# Patient Record
Sex: FEMALE | Race: WHITE | Employment: PART TIME | URBAN - METROPOLITAN AREA
[De-identification: names, ages, dates, MRNs, and addresses within clinical notes are randomized per-mention and may not be internally consistent; named-entity substitution may affect disease eponyms.]

---

## 2017-01-24 ENCOUNTER — HOSPITAL ENCOUNTER (INPATIENT)
Age: 27
LOS: 7 days | Discharge: HOME | DRG: 751 | End: 2017-01-31
Attending: EMERGENCY MEDICINE | Admitting: PSYCHIATRY & NEUROLOGY
Payer: MEDICAID

## 2017-01-24 DIAGNOSIS — F32.9 MAJOR DEPRESSIVE DISORDER WITH SINGLE EPISODE, REMISSION STATUS UNSPECIFIED: Primary | ICD-10-CM

## 2017-01-24 LAB
ALBUMIN SERPL-MCNC: 3.6 G/DL (ref 3.9–4.9)
ALP BLD-CCNC: 75 U/L (ref 40–130)
ALT SERPL-CCNC: 25 U/L (ref 0–33)
AMORPHOUS: ABNORMAL
AMPHETAMINE SCREEN, URINE: NORMAL
ANION GAP SERPL CALCULATED.3IONS-SCNC: 11 MEQ/L (ref 7–13)
AST SERPL-CCNC: 16 U/L (ref 0–35)
BARBITURATE SCREEN URINE: NORMAL
BASOPHILS ABSOLUTE: 0.1 K/UL (ref 0–0.2)
BASOPHILS RELATIVE PERCENT: 0.9 %
BENZODIAZEPINE SCREEN, URINE: NORMAL
BILIRUB SERPL-MCNC: 0.2 MG/DL (ref 0–1.2)
BILIRUBIN URINE: NEGATIVE
BLOOD, URINE: NEGATIVE
BUN BLDV-MCNC: 11 MG/DL (ref 6–20)
CALCIUM SERPL-MCNC: 8.9 MG/DL (ref 8.6–10.2)
CANNABINOID SCREEN URINE: NORMAL
CHLORIDE BLD-SCNC: 103 MEQ/L (ref 98–107)
CLARITY: ABNORMAL
CO2: 24 MEQ/L (ref 22–29)
COCAINE METABOLITE SCREEN URINE: NORMAL
COLOR: YELLOW
CREAT SERPL-MCNC: 0.46 MG/DL (ref 0.5–0.9)
EOSINOPHILS ABSOLUTE: 0.3 K/UL (ref 0–0.7)
EOSINOPHILS RELATIVE PERCENT: 4 %
EPITHELIAL CELLS, UA: ABNORMAL /HPF
ETHANOL PERCENT: NORMAL G/DL
ETHANOL: <10 MG/DL (ref 0–0.08)
GFR AFRICAN AMERICAN: >60
GFR NON-AFRICAN AMERICAN: >60
GLOBULIN: 2.2 G/DL (ref 2.3–3.5)
GLUCOSE BLD-MCNC: 138 MG/DL (ref 74–109)
GLUCOSE URINE: NEGATIVE MG/DL
GONADOTROPIN, CHORIONIC (HCG) QUANT: <0.1 MIU/ML
HCT VFR BLD CALC: 36.8 % (ref 37–47)
HEMOGLOBIN: 12.3 G/DL (ref 12–16)
KETONES, URINE: NEGATIVE MG/DL
LEUKOCYTE ESTERASE, URINE: ABNORMAL
LYMPHOCYTES ABSOLUTE: 3.3 K/UL (ref 1–4.8)
LYMPHOCYTES RELATIVE PERCENT: 38.5 %
Lab: NORMAL
MCH RBC QN AUTO: 26.4 PG (ref 27–31.3)
MCHC RBC AUTO-ENTMCNC: 33.5 % (ref 33–37)
MCV RBC AUTO: 78.9 FL (ref 82–100)
MONOCYTES ABSOLUTE: 0.4 K/UL (ref 0.2–0.8)
MONOCYTES RELATIVE PERCENT: 4.2 %
NEUTROPHILS ABSOLUTE: 4.5 K/UL (ref 1.4–6.5)
NEUTROPHILS RELATIVE PERCENT: 52.4 %
NITRITE, URINE: NEGATIVE
OPIATE SCREEN URINE: NORMAL
PDW BLD-RTO: 15.1 % (ref 11.5–14.5)
PH UA: 7 (ref 5–9)
PHENCYCLIDINE SCREEN URINE: NORMAL
PLATELET # BLD: 202 K/UL (ref 130–400)
POTASSIUM SERPL-SCNC: 3.7 MEQ/L (ref 3.5–5.1)
PROTEIN UA: NEGATIVE MG/DL
RBC # BLD: 4.67 M/UL (ref 4.2–5.4)
RBC UA: ABNORMAL /HPF (ref 0–2)
SODIUM BLD-SCNC: 138 MEQ/L (ref 132–144)
SPECIFIC GRAVITY UA: 1.02 (ref 1–1.03)
TOTAL CK: 63 U/L (ref 0–170)
TOTAL PROTEIN: 5.8 G/DL (ref 6.4–8.1)
TSH SERPL DL<=0.05 MIU/L-ACNC: 0.69 UIU/ML (ref 0.27–4.2)
UROBILINOGEN, URINE: 1 E.U./DL
WBC # BLD: 8.5 K/UL (ref 4.8–10.8)
WBC UA: ABNORMAL /HPF (ref 0–5)

## 2017-01-24 PROCEDURE — 84443 ASSAY THYROID STIM HORMONE: CPT

## 2017-01-24 PROCEDURE — 36415 COLL VENOUS BLD VENIPUNCTURE: CPT

## 2017-01-24 PROCEDURE — 80307 DRUG TEST PRSMV CHEM ANLYZR: CPT

## 2017-01-24 PROCEDURE — 85025 COMPLETE CBC W/AUTO DIFF WBC: CPT

## 2017-01-24 PROCEDURE — 82550 ASSAY OF CK (CPK): CPT

## 2017-01-24 PROCEDURE — 81001 URINALYSIS AUTO W/SCOPE: CPT

## 2017-01-24 PROCEDURE — G0480 DRUG TEST DEF 1-7 CLASSES: HCPCS

## 2017-01-24 PROCEDURE — 84702 CHORIONIC GONADOTROPIN TEST: CPT

## 2017-01-24 PROCEDURE — 99285 EMERGENCY DEPT VISIT HI MDM: CPT

## 2017-01-24 PROCEDURE — 1240000000 HC EMOTIONAL WELLNESS R&B

## 2017-01-24 PROCEDURE — 6370000000 HC RX 637 (ALT 250 FOR IP): Performed by: PSYCHIATRY & NEUROLOGY

## 2017-01-24 PROCEDURE — 80053 COMPREHEN METABOLIC PANEL: CPT

## 2017-01-24 RX ORDER — HALOPERIDOL 5 MG
5 TABLET ORAL EVERY 6 HOURS PRN
Status: DISCONTINUED | OUTPATIENT
Start: 2017-01-24 | End: 2017-01-31 | Stop reason: HOSPADM

## 2017-01-24 RX ORDER — QUETIAPINE FUMARATE 25 MG/1
100 TABLET, FILM COATED ORAL NIGHTLY
Status: ON HOLD | COMMUNITY
End: 2017-01-31 | Stop reason: HOSPADM

## 2017-01-24 RX ORDER — HYDROXYZINE PAMOATE 50 MG/1
50 CAPSULE ORAL EVERY 6 HOURS PRN
Status: DISCONTINUED | OUTPATIENT
Start: 2017-01-24 | End: 2017-01-31 | Stop reason: HOSPADM

## 2017-01-24 RX ORDER — ESCITALOPRAM OXALATE 10 MG/1
10 TABLET ORAL
Status: DISCONTINUED | OUTPATIENT
Start: 2017-01-25 | End: 2017-01-25

## 2017-01-24 RX ORDER — PALIPERIDONE 3 MG/1
3 TABLET, EXTENDED RELEASE ORAL EVERY MORNING
Status: ON HOLD | COMMUNITY
End: 2017-01-31 | Stop reason: HOSPADM

## 2017-01-24 RX ORDER — HALOPERIDOL 5 MG/ML
5 INJECTION INTRAMUSCULAR EVERY 6 HOURS PRN
Status: DISCONTINUED | OUTPATIENT
Start: 2017-01-24 | End: 2017-01-31 | Stop reason: HOSPADM

## 2017-01-24 RX ORDER — TRAZODONE HYDROCHLORIDE 50 MG/1
50 TABLET ORAL NIGHTLY
Status: DISCONTINUED | OUTPATIENT
Start: 2017-01-24 | End: 2017-01-31 | Stop reason: HOSPADM

## 2017-01-24 RX ORDER — ACETAMINOPHEN 325 MG/1
650 TABLET ORAL EVERY 4 HOURS PRN
Status: DISCONTINUED | OUTPATIENT
Start: 2017-01-24 | End: 2017-01-31 | Stop reason: HOSPADM

## 2017-01-24 RX ORDER — HYDROXYZINE HYDROCHLORIDE 50 MG/ML
50 INJECTION, SOLUTION INTRAMUSCULAR EVERY 6 HOURS PRN
Status: DISCONTINUED | OUTPATIENT
Start: 2017-01-24 | End: 2017-01-31 | Stop reason: HOSPADM

## 2017-01-24 RX ORDER — ESCITALOPRAM OXALATE 10 MG/1
10 TABLET ORAL NIGHTLY
Status: ON HOLD | COMMUNITY
End: 2017-01-31 | Stop reason: HOSPADM

## 2017-01-24 RX ADMIN — TRAZODONE HYDROCHLORIDE 50 MG: 50 TABLET ORAL at 22:28

## 2017-01-24 RX ADMIN — HYDROXYZINE PAMOATE 50 MG: 50 CAPSULE ORAL at 22:28

## 2017-01-24 ASSESSMENT — SLEEP AND FATIGUE QUESTIONNAIRES
AVERAGE NUMBER OF SLEEP HOURS: 4
DIFFICULTY ARISING: NO
RESTFUL SLEEP: NO
SLEEP PATTERN: DIFFICULTY FALLING ASLEEP
DIFFICULTY FALLING ASLEEP: YES
DO YOU USE A SLEEP AID: NO
DIFFICULTY STAYING ASLEEP: NO
DO YOU HAVE DIFFICULTY SLEEPING: YES

## 2017-01-24 ASSESSMENT — PATIENT HEALTH QUESTIONNAIRE - PHQ9: SUM OF ALL RESPONSES TO PHQ QUESTIONS 1-9: 19

## 2017-01-24 ASSESSMENT — ENCOUNTER SYMPTOMS
SHORTNESS OF BREATH: 0
VOMITING: 0
COUGH: 0
NAUSEA: 0

## 2017-01-25 PROBLEM — F33.2 MDD (MAJOR DEPRESSIVE DISORDER), RECURRENT SEVERE, WITHOUT PSYCHOSIS (HCC): Status: ACTIVE | Noted: 2017-01-25

## 2017-01-25 PROCEDURE — 6370000000 HC RX 637 (ALT 250 FOR IP): Performed by: NURSE PRACTITIONER

## 2017-01-25 PROCEDURE — 1240000000 HC EMOTIONAL WELLNESS R&B

## 2017-01-25 PROCEDURE — 99223 1ST HOSP IP/OBS HIGH 75: CPT | Performed by: PSYCHIATRY & NEUROLOGY

## 2017-01-25 PROCEDURE — 6370000000 HC RX 637 (ALT 250 FOR IP): Performed by: PSYCHIATRY & NEUROLOGY

## 2017-01-25 RX ORDER — SULFAMETHOXAZOLE AND TRIMETHOPRIM 800; 160 MG/1; MG/1
1 TABLET ORAL EVERY 12 HOURS SCHEDULED
Status: DISCONTINUED | OUTPATIENT
Start: 2017-01-25 | End: 2017-01-31 | Stop reason: HOSPADM

## 2017-01-25 RX ORDER — NICOTINE 21 MG/24HR
1 PATCH, TRANSDERMAL 24 HOURS TRANSDERMAL DAILY
Status: DISCONTINUED | OUTPATIENT
Start: 2017-01-25 | End: 2017-01-31 | Stop reason: HOSPADM

## 2017-01-25 RX ORDER — ESCITALOPRAM OXALATE 10 MG/1
15 TABLET ORAL
Status: DISCONTINUED | OUTPATIENT
Start: 2017-01-26 | End: 2017-01-28

## 2017-01-25 RX ORDER — PALIPERIDONE 3 MG/1
3 TABLET, EXTENDED RELEASE ORAL DAILY
Status: DISCONTINUED | OUTPATIENT
Start: 2017-01-25 | End: 2017-01-28

## 2017-01-25 RX ADMIN — ESCITALOPRAM 10 MG: 10 TABLET, FILM COATED ORAL at 09:36

## 2017-01-25 RX ADMIN — BENZOCAINE AND MENTHOL 1 LOZENGE: 15; 3.6 LOZENGE ORAL at 12:39

## 2017-01-25 RX ADMIN — SULFAMETHOXAZOLE AND TRIMETHOPRIM 1 TABLET: 800; 160 TABLET ORAL at 21:45

## 2017-01-25 RX ADMIN — TRAZODONE HYDROCHLORIDE 50 MG: 50 TABLET ORAL at 21:45

## 2017-01-25 RX ADMIN — PALIPERIDONE 3 MG: 3 TABLET, EXTENDED RELEASE ORAL at 12:18

## 2017-01-25 ASSESSMENT — LIFESTYLE VARIABLES: HISTORY_ALCOHOL_USE: YES

## 2017-01-26 PROCEDURE — 99232 SBSQ HOSP IP/OBS MODERATE 35: CPT | Performed by: PSYCHIATRY & NEUROLOGY

## 2017-01-26 PROCEDURE — 1240000000 HC EMOTIONAL WELLNESS R&B

## 2017-01-26 PROCEDURE — 6370000000 HC RX 637 (ALT 250 FOR IP): Performed by: PSYCHIATRY & NEUROLOGY

## 2017-01-26 PROCEDURE — 6370000000 HC RX 637 (ALT 250 FOR IP): Performed by: NURSE PRACTITIONER

## 2017-01-26 RX ADMIN — ACETAMINOPHEN 650 MG: 325 TABLET ORAL at 16:49

## 2017-01-26 RX ADMIN — PALIPERIDONE 3 MG: 3 TABLET, EXTENDED RELEASE ORAL at 09:26

## 2017-01-26 RX ADMIN — TRAZODONE HYDROCHLORIDE 50 MG: 50 TABLET ORAL at 22:01

## 2017-01-26 RX ADMIN — ESCITALOPRAM 15 MG: 10 TABLET, FILM COATED ORAL at 09:26

## 2017-01-26 RX ADMIN — HYDROXYZINE PAMOATE 50 MG: 50 CAPSULE ORAL at 15:08

## 2017-01-26 RX ADMIN — SULFAMETHOXAZOLE AND TRIMETHOPRIM 1 TABLET: 800; 160 TABLET ORAL at 22:01

## 2017-01-26 RX ADMIN — SULFAMETHOXAZOLE AND TRIMETHOPRIM 1 TABLET: 800; 160 TABLET ORAL at 09:26

## 2017-01-26 ASSESSMENT — PAIN SCALES - GENERAL: PAINLEVEL_OUTOF10: 4

## 2017-01-27 PROCEDURE — 90833 PSYTX W PT W E/M 30 MIN: CPT | Performed by: PSYCHIATRY & NEUROLOGY

## 2017-01-27 PROCEDURE — 6370000000 HC RX 637 (ALT 250 FOR IP): Performed by: PSYCHIATRY & NEUROLOGY

## 2017-01-27 PROCEDURE — 6370000000 HC RX 637 (ALT 250 FOR IP): Performed by: NURSE PRACTITIONER

## 2017-01-27 PROCEDURE — 1240000000 HC EMOTIONAL WELLNESS R&B

## 2017-01-27 PROCEDURE — 99231 SBSQ HOSP IP/OBS SF/LOW 25: CPT | Performed by: PSYCHIATRY & NEUROLOGY

## 2017-01-27 RX ORDER — SULFAMETHOXAZOLE AND TRIMETHOPRIM 800; 160 MG/1; MG/1
1 TABLET ORAL EVERY 12 HOURS SCHEDULED
Qty: 20 TABLET | Refills: 0 | Status: SHIPPED | OUTPATIENT
Start: 2017-01-25 | End: 2017-02-04

## 2017-01-27 RX ADMIN — PALIPERIDONE 3 MG: 3 TABLET, EXTENDED RELEASE ORAL at 08:29

## 2017-01-27 RX ADMIN — ESCITALOPRAM 15 MG: 10 TABLET, FILM COATED ORAL at 08:29

## 2017-01-27 RX ADMIN — HYDROXYZINE PAMOATE 50 MG: 50 CAPSULE ORAL at 16:25

## 2017-01-27 RX ADMIN — TRAZODONE HYDROCHLORIDE 50 MG: 50 TABLET ORAL at 21:37

## 2017-01-27 RX ADMIN — SULFAMETHOXAZOLE AND TRIMETHOPRIM 1 TABLET: 800; 160 TABLET ORAL at 08:29

## 2017-01-27 RX ADMIN — SULFAMETHOXAZOLE AND TRIMETHOPRIM 1 TABLET: 800; 160 TABLET ORAL at 21:37

## 2017-01-28 PROBLEM — F32.3 MAJOR DEPRESSIVE DISORDER WITH PSYCHOTIC FEATURES (HCC): Status: RESOLVED | Noted: 2017-01-28 | Resolved: 2017-01-28

## 2017-01-28 PROBLEM — F32.3 MAJOR DEPRESSIVE DISORDER WITH PSYCHOTIC FEATURES (HCC): Status: ACTIVE | Noted: 2017-01-28

## 2017-01-28 PROBLEM — F33.3 MDD (MAJOR DEPRESSIVE DISORDER), RECURRENT, SEVERE, WITH PSYCHOSIS (HCC): Status: ACTIVE | Noted: 2017-01-28

## 2017-01-28 PROCEDURE — 6370000000 HC RX 637 (ALT 250 FOR IP): Performed by: NURSE PRACTITIONER

## 2017-01-28 PROCEDURE — 6370000000 HC RX 637 (ALT 250 FOR IP): Performed by: PSYCHIATRY & NEUROLOGY

## 2017-01-28 PROCEDURE — 1240000000 HC EMOTIONAL WELLNESS R&B

## 2017-01-28 RX ORDER — PALIPERIDONE 3 MG/1
3 TABLET, EXTENDED RELEASE ORAL 2 TIMES DAILY
Status: DISCONTINUED | OUTPATIENT
Start: 2017-01-28 | End: 2017-01-31 | Stop reason: HOSPADM

## 2017-01-28 RX ORDER — ESCITALOPRAM OXALATE 20 MG/1
20 TABLET ORAL
Status: DISCONTINUED | OUTPATIENT
Start: 2017-01-29 | End: 2017-01-31 | Stop reason: HOSPADM

## 2017-01-28 RX ADMIN — PALIPERIDONE 3 MG: 3 TABLET, EXTENDED RELEASE ORAL at 08:55

## 2017-01-28 RX ADMIN — HYDROXYZINE PAMOATE 50 MG: 50 CAPSULE ORAL at 09:47

## 2017-01-28 RX ADMIN — TRAZODONE HYDROCHLORIDE 50 MG: 50 TABLET ORAL at 21:02

## 2017-01-28 RX ADMIN — HALOPERIDOL 5 MG: 5 TABLET ORAL at 09:47

## 2017-01-28 RX ADMIN — PALIPERIDONE 3 MG: 3 TABLET, EXTENDED RELEASE ORAL at 21:02

## 2017-01-28 RX ADMIN — BENZOCAINE AND MENTHOL 1 LOZENGE: 15; 3.6 LOZENGE ORAL at 19:06

## 2017-01-28 RX ADMIN — ESCITALOPRAM 20 MG: 10 TABLET, FILM COATED ORAL at 08:55

## 2017-01-28 RX ADMIN — SULFAMETHOXAZOLE AND TRIMETHOPRIM 1 TABLET: 800; 160 TABLET ORAL at 21:02

## 2017-01-28 RX ADMIN — SULFAMETHOXAZOLE AND TRIMETHOPRIM 1 TABLET: 800; 160 TABLET ORAL at 08:55

## 2017-01-28 RX ADMIN — BENZOCAINE AND MENTHOL 1 LOZENGE: 15; 3.6 LOZENGE ORAL at 09:48

## 2017-01-29 PROCEDURE — 6370000000 HC RX 637 (ALT 250 FOR IP): Performed by: NURSE PRACTITIONER

## 2017-01-29 PROCEDURE — 6370000000 HC RX 637 (ALT 250 FOR IP): Performed by: PSYCHIATRY & NEUROLOGY

## 2017-01-29 PROCEDURE — 1240000000 HC EMOTIONAL WELLNESS R&B

## 2017-01-29 RX ADMIN — PALIPERIDONE 3 MG: 3 TABLET, EXTENDED RELEASE ORAL at 09:33

## 2017-01-29 RX ADMIN — SULFAMETHOXAZOLE AND TRIMETHOPRIM 1 TABLET: 800; 160 TABLET ORAL at 09:32

## 2017-01-29 RX ADMIN — PALIPERIDONE 3 MG: 3 TABLET, EXTENDED RELEASE ORAL at 20:45

## 2017-01-29 RX ADMIN — ESCITALOPRAM OXALATE 20 MG: 20 TABLET ORAL at 09:33

## 2017-01-29 RX ADMIN — TRAZODONE HYDROCHLORIDE 50 MG: 50 TABLET ORAL at 20:45

## 2017-01-29 RX ADMIN — SULFAMETHOXAZOLE AND TRIMETHOPRIM 1 TABLET: 800; 160 TABLET ORAL at 20:45

## 2017-01-30 PROCEDURE — 6370000000 HC RX 637 (ALT 250 FOR IP): Performed by: PSYCHIATRY & NEUROLOGY

## 2017-01-30 PROCEDURE — 1240000000 HC EMOTIONAL WELLNESS R&B

## 2017-01-30 PROCEDURE — 6370000000 HC RX 637 (ALT 250 FOR IP): Performed by: PHYSICIAN ASSISTANT

## 2017-01-30 PROCEDURE — 6370000000 HC RX 637 (ALT 250 FOR IP): Performed by: NURSE PRACTITIONER

## 2017-01-30 PROCEDURE — 99232 SBSQ HOSP IP/OBS MODERATE 35: CPT | Performed by: PSYCHIATRY & NEUROLOGY

## 2017-01-30 RX ADMIN — BENZOCAINE AND MENTHOL 1 LOZENGE: 15; 3.6 LOZENGE ORAL at 12:53

## 2017-01-30 RX ADMIN — SALINE NASAL SPRAY 1 SPRAY: 1.5 SOLUTION NASAL at 21:22

## 2017-01-30 RX ADMIN — TRAZODONE HYDROCHLORIDE 50 MG: 50 TABLET ORAL at 21:10

## 2017-01-30 RX ADMIN — PALIPERIDONE 3 MG: 3 TABLET, EXTENDED RELEASE ORAL at 21:10

## 2017-01-30 RX ADMIN — SULFAMETHOXAZOLE AND TRIMETHOPRIM 1 TABLET: 800; 160 TABLET ORAL at 08:15

## 2017-01-30 RX ADMIN — PALIPERIDONE 3 MG: 3 TABLET, EXTENDED RELEASE ORAL at 08:15

## 2017-01-30 RX ADMIN — SALINE NASAL SPRAY 1 SPRAY: 1.5 SOLUTION NASAL at 15:56

## 2017-01-30 RX ADMIN — ESCITALOPRAM OXALATE 20 MG: 20 TABLET ORAL at 08:15

## 2017-01-30 RX ADMIN — ACETAMINOPHEN 650 MG: 325 TABLET ORAL at 13:43

## 2017-01-30 RX ADMIN — SULFAMETHOXAZOLE AND TRIMETHOPRIM 1 TABLET: 800; 160 TABLET ORAL at 21:10

## 2017-01-30 ASSESSMENT — PAIN SCALES - GENERAL: PAINLEVEL_OUTOF10: 8

## 2017-01-31 VITALS
BODY MASS INDEX: 34.04 KG/M2 | DIASTOLIC BLOOD PRESSURE: 76 MMHG | OXYGEN SATURATION: 99 % | SYSTOLIC BLOOD PRESSURE: 120 MMHG | RESPIRATION RATE: 20 BRPM | TEMPERATURE: 98.8 F | HEART RATE: 113 BPM | WEIGHT: 185 LBS | HEIGHT: 62 IN

## 2017-01-31 PROCEDURE — 6370000000 HC RX 637 (ALT 250 FOR IP): Performed by: PSYCHIATRY & NEUROLOGY

## 2017-01-31 PROCEDURE — 6370000000 HC RX 637 (ALT 250 FOR IP): Performed by: NURSE PRACTITIONER

## 2017-01-31 PROCEDURE — 99238 HOSP IP/OBS DSCHRG MGMT 30/<: CPT | Performed by: PSYCHIATRY & NEUROLOGY

## 2017-01-31 RX ORDER — TRAZODONE HYDROCHLORIDE 50 MG/1
50 TABLET ORAL NIGHTLY
Qty: 15 TABLET | Refills: 1 | Status: SHIPPED | OUTPATIENT
Start: 2017-01-31 | End: 2019-01-01 | Stop reason: ALTCHOICE

## 2017-01-31 RX ORDER — PALIPERIDONE 3 MG/1
3 TABLET, EXTENDED RELEASE ORAL 2 TIMES DAILY
Qty: 30 TABLET | Refills: 1 | Status: ON HOLD | OUTPATIENT
Start: 2017-01-31 | End: 2019-01-06 | Stop reason: HOSPADM

## 2017-01-31 RX ORDER — ESCITALOPRAM OXALATE 20 MG/1
20 TABLET ORAL
Qty: 15 TABLET | Refills: 1 | Status: ON HOLD | OUTPATIENT
Start: 2017-01-31 | End: 2019-01-06 | Stop reason: HOSPADM

## 2017-01-31 RX ADMIN — ESCITALOPRAM OXALATE 20 MG: 20 TABLET ORAL at 09:17

## 2017-01-31 RX ADMIN — PALIPERIDONE 3 MG: 3 TABLET, EXTENDED RELEASE ORAL at 09:17

## 2017-01-31 RX ADMIN — SULFAMETHOXAZOLE AND TRIMETHOPRIM 1 TABLET: 800; 160 TABLET ORAL at 09:17

## 2017-04-09 ENCOUNTER — HOSPITAL ENCOUNTER (EMERGENCY)
Age: 27
Discharge: HOME | End: 2017-04-09
Payer: MEDICAID

## 2017-04-09 VITALS
OXYGEN SATURATION: 97 % | SYSTOLIC BLOOD PRESSURE: 118 MMHG | WEIGHT: 220 LBS | BODY MASS INDEX: 40.48 KG/M2 | TEMPERATURE: 98.5 F | HEART RATE: 107 BPM | RESPIRATION RATE: 18 BRPM | DIASTOLIC BLOOD PRESSURE: 78 MMHG | HEIGHT: 62 IN

## 2017-04-09 DIAGNOSIS — F43.21 ADJUSTMENT DISORDER WITH DEPRESSED MOOD: Primary | ICD-10-CM

## 2017-04-09 LAB
ALBUMIN SERPL-MCNC: 3.9 G/DL (ref 3.9–4.9)
ALP BLD-CCNC: 98 U/L (ref 40–130)
ALT SERPL-CCNC: 26 U/L (ref 0–33)
AMPHETAMINE SCREEN, URINE: NORMAL
ANION GAP SERPL CALCULATED.3IONS-SCNC: 10 MEQ/L (ref 7–13)
AST SERPL-CCNC: 17 U/L (ref 0–35)
BACTERIA: NORMAL /HPF
BARBITURATE SCREEN URINE: NORMAL
BASOPHILS ABSOLUTE: 0.1 K/UL (ref 0–0.2)
BASOPHILS RELATIVE PERCENT: 0.7 %
BENZODIAZEPINE SCREEN, URINE: NORMAL
BILIRUB SERPL-MCNC: 0.3 MG/DL (ref 0–1.2)
BILIRUBIN URINE: NEGATIVE
BLOOD, URINE: NEGATIVE
BUN BLDV-MCNC: 10 MG/DL (ref 6–20)
CALCIUM SERPL-MCNC: 9 MG/DL (ref 8.6–10.2)
CANNABINOID SCREEN URINE: NORMAL
CHLORIDE BLD-SCNC: 102 MEQ/L (ref 98–107)
CK MB: 1.9 NG/ML (ref 0–3.8)
CLARITY: ABNORMAL
CO2: 24 MEQ/L (ref 22–29)
COCAINE METABOLITE SCREEN URINE: NORMAL
COLOR: YELLOW
CREAT SERPL-MCNC: 0.7 MG/DL (ref 0.5–0.9)
CREATINE KINASE-MB INDEX: 1.1 % (ref 0–3.5)
EOSINOPHILS ABSOLUTE: 0.2 K/UL (ref 0–0.7)
EOSINOPHILS RELATIVE PERCENT: 2.1 %
EPITHELIAL CELLS, UA: NORMAL /HPF
ETHANOL PERCENT: NORMAL G/DL
ETHANOL: <10 MG/DL (ref 0–0.08)
GFR AFRICAN AMERICAN: >60
GFR NON-AFRICAN AMERICAN: >60
GLOBULIN: 2.6 G/DL (ref 2.3–3.5)
GLUCOSE BLD-MCNC: 156 MG/DL (ref 74–109)
GLUCOSE URINE: NEGATIVE MG/DL
HCG(URINE) PREGNANCY TEST: NEGATIVE
HCT VFR BLD CALC: 38.9 % (ref 37–47)
HEMOGLOBIN: 12.8 G/DL (ref 12–16)
KETONES, URINE: NEGATIVE MG/DL
LEUKOCYTE ESTERASE, URINE: NEGATIVE
LYMPHOCYTES ABSOLUTE: 3.2 K/UL (ref 1–4.8)
LYMPHOCYTES RELATIVE PERCENT: 29.3 %
Lab: NORMAL
MCH RBC QN AUTO: 26.3 PG (ref 27–31.3)
MCHC RBC AUTO-ENTMCNC: 32.9 % (ref 33–37)
MCV RBC AUTO: 80 FL (ref 82–100)
MONOCYTES ABSOLUTE: 0.4 K/UL (ref 0.2–0.8)
MONOCYTES RELATIVE PERCENT: 4.1 %
NEUTROPHILS ABSOLUTE: 7 K/UL (ref 1.4–6.5)
NEUTROPHILS RELATIVE PERCENT: 63.8 %
NITRITE, URINE: NEGATIVE
OPIATE SCREEN URINE: NORMAL
PDW BLD-RTO: 15.2 % (ref 11.5–14.5)
PH UA: 6 (ref 5–9)
PHENCYCLIDINE SCREEN URINE: NORMAL
PLATELET # BLD: 280 K/UL (ref 130–400)
POTASSIUM SERPL-SCNC: 4 MEQ/L (ref 3.5–5.1)
PROTEIN UA: 100 MG/DL
RBC # BLD: 4.86 M/UL (ref 4.2–5.4)
RBC UA: NORMAL /HPF (ref 0–2)
SODIUM BLD-SCNC: 136 MEQ/L (ref 132–144)
SPECIFIC GRAVITY UA: 1.02 (ref 1–1.03)
TOTAL CK: 168 U/L (ref 0–170)
TOTAL PROTEIN: 6.5 G/DL (ref 6.4–8.1)
TSH SERPL DL<=0.05 MIU/L-ACNC: 0.93 UIU/ML (ref 0.27–4.2)
URINE REFLEX TO CULTURE: YES
UROBILINOGEN, URINE: 1 E.U./DL
WBC # BLD: 10.9 K/UL (ref 4.8–10.8)
WBC UA: NORMAL /HPF (ref 0–5)

## 2017-04-09 PROCEDURE — 85025 COMPLETE CBC W/AUTO DIFF WBC: CPT

## 2017-04-09 PROCEDURE — 84443 ASSAY THYROID STIM HORMONE: CPT

## 2017-04-09 PROCEDURE — 82553 CREATINE MB FRACTION: CPT

## 2017-04-09 PROCEDURE — 87086 URINE CULTURE/COLONY COUNT: CPT

## 2017-04-09 PROCEDURE — 99285 EMERGENCY DEPT VISIT HI MDM: CPT

## 2017-04-09 PROCEDURE — 36415 COLL VENOUS BLD VENIPUNCTURE: CPT

## 2017-04-09 PROCEDURE — 80053 COMPREHEN METABOLIC PANEL: CPT

## 2017-04-09 PROCEDURE — 6370000000 HC RX 637 (ALT 250 FOR IP): Performed by: NURSE PRACTITIONER

## 2017-04-09 PROCEDURE — 82550 ASSAY OF CK (CPK): CPT

## 2017-04-09 PROCEDURE — 84703 CHORIONIC GONADOTROPIN ASSAY: CPT

## 2017-04-09 PROCEDURE — G0480 DRUG TEST DEF 1-7 CLASSES: HCPCS

## 2017-04-09 PROCEDURE — 80307 DRUG TEST PRSMV CHEM ANLYZR: CPT

## 2017-04-09 PROCEDURE — 81001 URINALYSIS AUTO W/SCOPE: CPT

## 2017-04-09 RX ORDER — LORAZEPAM 1 MG/1
2 TABLET ORAL ONCE
Status: COMPLETED | OUTPATIENT
Start: 2017-04-09 | End: 2017-04-09

## 2017-04-09 RX ORDER — HALOPERIDOL 5 MG
5 TABLET ORAL ONCE
Status: COMPLETED | OUTPATIENT
Start: 2017-04-09 | End: 2017-04-09

## 2017-04-09 RX ORDER — GINSENG 100 MG
1 CAPSULE ORAL ONCE
Status: COMPLETED | OUTPATIENT
Start: 2017-04-09 | End: 2017-04-09

## 2017-04-09 RX ADMIN — BACITRACIN 1 G: 500 OINTMENT TOPICAL at 12:44

## 2017-04-09 RX ADMIN — HALOPERIDOL 5 MG: 5 TABLET ORAL at 12:43

## 2017-04-09 RX ADMIN — LORAZEPAM 2 MG: 1 TABLET ORAL at 12:43

## 2017-04-10 LAB — URINE CULTURE, ROUTINE: NORMAL

## 2017-07-18 ENCOUNTER — APPOINTMENT (OUTPATIENT)
Dept: GENERAL RADIOLOGY | Age: 27
End: 2017-07-18
Payer: COMMERCIAL

## 2017-07-18 ENCOUNTER — HOSPITAL ENCOUNTER (EMERGENCY)
Age: 27
Discharge: HOME | End: 2017-07-18
Payer: COMMERCIAL

## 2017-07-18 VITALS
OXYGEN SATURATION: 98 % | HEIGHT: 62 IN | WEIGHT: 220 LBS | HEART RATE: 91 BPM | BODY MASS INDEX: 40.48 KG/M2 | DIASTOLIC BLOOD PRESSURE: 81 MMHG | SYSTOLIC BLOOD PRESSURE: 114 MMHG | RESPIRATION RATE: 14 BRPM | TEMPERATURE: 98.2 F

## 2017-07-18 DIAGNOSIS — S93.601A RIGHT FOOT SPRAIN, INITIAL ENCOUNTER: ICD-10-CM

## 2017-07-18 DIAGNOSIS — S93.401A SPRAIN OF RIGHT ANKLE, UNSPECIFIED LIGAMENT, INITIAL ENCOUNTER: Primary | ICD-10-CM

## 2017-07-18 LAB — PREGNANCY TEST URINE, POC: NORMAL

## 2017-07-18 PROCEDURE — 6370000000 HC RX 637 (ALT 250 FOR IP): Performed by: PHYSICIAN ASSISTANT

## 2017-07-18 PROCEDURE — 73630 X-RAY EXAM OF FOOT: CPT

## 2017-07-18 PROCEDURE — 99283 EMERGENCY DEPT VISIT LOW MDM: CPT

## 2017-07-18 PROCEDURE — 73610 X-RAY EXAM OF ANKLE: CPT

## 2017-07-18 RX ORDER — ACETAMINOPHEN 325 MG/1
650 TABLET ORAL ONCE
Status: COMPLETED | OUTPATIENT
Start: 2017-07-18 | End: 2017-07-18

## 2017-07-18 RX ORDER — ACETAMINOPHEN 325 MG/1
650 TABLET ORAL EVERY 6 HOURS PRN
Qty: 20 TABLET | Refills: 0 | Status: SHIPPED | OUTPATIENT
Start: 2017-07-18 | End: 2019-01-01

## 2017-07-18 RX ADMIN — ACETAMINOPHEN 650 MG: 325 TABLET ORAL at 18:25

## 2017-07-18 ASSESSMENT — PAIN DESCRIPTION - LOCATION: LOCATION: ANKLE

## 2017-07-18 ASSESSMENT — PAIN DESCRIPTION - FREQUENCY: FREQUENCY: INTERMITTENT

## 2017-07-18 ASSESSMENT — PAIN DESCRIPTION - PAIN TYPE: TYPE: ACUTE PAIN

## 2017-07-18 ASSESSMENT — ENCOUNTER SYMPTOMS
GASTROINTESTINAL NEGATIVE: 1
EYES NEGATIVE: 1
RESPIRATORY NEGATIVE: 1

## 2017-07-18 ASSESSMENT — PAIN DESCRIPTION - DESCRIPTORS: DESCRIPTORS: TINGLING

## 2017-07-18 ASSESSMENT — PAIN SCALES - GENERAL
PAINLEVEL_OUTOF10: 5
PAINLEVEL_OUTOF10: 5

## 2017-08-04 ENCOUNTER — HOSPITAL ENCOUNTER (EMERGENCY)
Age: 27
Discharge: HOME | End: 2017-08-04
Payer: COMMERCIAL

## 2017-08-04 VITALS
WEIGHT: 220 LBS | BODY MASS INDEX: 40.48 KG/M2 | DIASTOLIC BLOOD PRESSURE: 71 MMHG | RESPIRATION RATE: 20 BRPM | HEIGHT: 62 IN | OXYGEN SATURATION: 98 % | SYSTOLIC BLOOD PRESSURE: 113 MMHG | HEART RATE: 76 BPM | TEMPERATURE: 98.1 F

## 2017-08-04 DIAGNOSIS — K64.9 BLEEDING HEMORRHOID: Primary | ICD-10-CM

## 2017-08-04 PROCEDURE — 99283 EMERGENCY DEPT VISIT LOW MDM: CPT

## 2017-08-04 RX ORDER — DOCUSATE SODIUM 100 MG/1
100 CAPSULE, LIQUID FILLED ORAL DAILY
Qty: 20 CAPSULE | Refills: 0 | Status: SHIPPED | OUTPATIENT
Start: 2017-08-04 | End: 2018-10-02

## 2017-08-04 ASSESSMENT — ENCOUNTER SYMPTOMS
DIARRHEA: 0
VOMITING: 0
EYE PAIN: 0
ALLERGIC/IMMUNOLOGIC NEGATIVE: 1
APNEA: 0
SHORTNESS OF BREATH: 0
ABDOMINAL PAIN: 0
ANAL BLEEDING: 1
TROUBLE SWALLOWING: 0
NAUSEA: 0
COLOR CHANGE: 0

## 2017-08-04 ASSESSMENT — PAIN DESCRIPTION - LOCATION: LOCATION: RECTUM

## 2017-08-04 ASSESSMENT — PAIN SCALES - GENERAL: PAINLEVEL_OUTOF10: 1

## 2017-08-04 ASSESSMENT — PAIN DESCRIPTION - DESCRIPTORS: DESCRIPTORS: THROBBING

## 2017-09-05 ENCOUNTER — OFFICE VISIT (OUTPATIENT)
Dept: OBGYN | Age: 27
End: 2017-09-05

## 2017-09-05 VITALS
DIASTOLIC BLOOD PRESSURE: 82 MMHG | BODY MASS INDEX: 41.77 KG/M2 | WEIGHT: 227 LBS | SYSTOLIC BLOOD PRESSURE: 130 MMHG | HEART RATE: 84 BPM | HEIGHT: 62 IN

## 2017-09-05 DIAGNOSIS — N91.2 AMENORRHEA: Primary | ICD-10-CM

## 2017-09-05 PROCEDURE — 99203 OFFICE O/P NEW LOW 30 MIN: CPT | Performed by: OBSTETRICS & GYNECOLOGY

## 2017-09-12 LAB — PAP SMEAR: ABNORMAL

## 2017-09-16 PROBLEM — N91.2 AMENORRHEA: Status: ACTIVE | Noted: 2017-09-16

## 2018-01-17 ENCOUNTER — OFFICE VISIT (OUTPATIENT)
Dept: OBGYN | Age: 28
End: 2018-01-17

## 2018-01-17 VITALS
SYSTOLIC BLOOD PRESSURE: 126 MMHG | BODY MASS INDEX: 41.96 KG/M2 | WEIGHT: 228 LBS | HEIGHT: 62 IN | DIASTOLIC BLOOD PRESSURE: 74 MMHG

## 2018-01-17 DIAGNOSIS — Z32.02 NEGATIVE PREGNANCY TEST: ICD-10-CM

## 2018-01-17 DIAGNOSIS — N91.2 AMENORRHEA: ICD-10-CM

## 2018-01-17 DIAGNOSIS — N91.2 AMENORRHEA: Primary | ICD-10-CM

## 2018-01-17 LAB
CONTROL: NORMAL
FOLLICLE STIMULATING HORMONE: 5.8 MIU/ML
GONADOTROPIN, CHORIONIC (HCG) QUANT: <0.1 MIU/ML
LUTEINIZING HORMONE: 13.4 MIU/ML
PREGNANCY TEST URINE, POC: NEGATIVE
PROLACTIN: 67.3 NG/ML
T4 FREE: 1.17 NG/DL (ref 0.93–1.7)
TSH REFLEX: 1.38 UIU/ML (ref 0.27–4.2)

## 2018-01-17 PROCEDURE — 99202 OFFICE O/P NEW SF 15 MIN: CPT | Performed by: OBSTETRICS & GYNECOLOGY

## 2018-01-17 PROCEDURE — 4004F PT TOBACCO SCREEN RCVD TLK: CPT | Performed by: OBSTETRICS & GYNECOLOGY

## 2018-01-17 PROCEDURE — G8417 CALC BMI ABV UP PARAM F/U: HCPCS | Performed by: OBSTETRICS & GYNECOLOGY

## 2018-01-17 PROCEDURE — 81025 URINE PREGNANCY TEST: CPT | Performed by: OBSTETRICS & GYNECOLOGY

## 2018-01-17 PROCEDURE — G8427 DOCREV CUR MEDS BY ELIG CLIN: HCPCS | Performed by: OBSTETRICS & GYNECOLOGY

## 2018-01-17 PROCEDURE — G8484 FLU IMMUNIZE NO ADMIN: HCPCS | Performed by: OBSTETRICS & GYNECOLOGY

## 2018-01-17 ASSESSMENT — ENCOUNTER SYMPTOMS
RECTAL PAIN: 0
CONSTIPATION: 0
VOMITING: 0
ABDOMINAL PAIN: 0
ALLERGIC/IMMUNOLOGIC NEGATIVE: 1
RESPIRATORY NEGATIVE: 1
NAUSEA: 0
DIARRHEA: 0
ANAL BLEEDING: 0
EYES NEGATIVE: 1
ABDOMINAL DISTENTION: 0
BLOOD IN STOOL: 0

## 2018-01-17 NOTE — PROGRESS NOTES
Topics Concern    Not on file     Social History Narrative    No narrative on file     Family History   Problem Relation Age of Onset    Depression Father        Review of Systems   Constitutional: Negative. Negative for activity change, appetite change, chills, diaphoresis, fatigue, fever and unexpected weight change. HENT: Negative. Eyes: Negative. Respiratory: Negative. Cardiovascular: Negative. Gastrointestinal: Negative for abdominal distention, abdominal pain, anal bleeding, blood in stool, constipation, diarrhea, nausea, rectal pain and vomiting. Endocrine: Negative. Genitourinary: Positive for menstrual problem (Oligomenorrhea). Negative for decreased urine volume, difficulty urinating, dyspareunia, dysuria, enuresis, flank pain, frequency, genital sores, hematuria, pelvic pain, urgency, vaginal bleeding, vaginal discharge and vaginal pain. Musculoskeletal: Negative. Skin: Negative. Allergic/Immunologic: Negative. Neurological: Negative. Hematological: Negative. Psychiatric/Behavioral: Negative. Objective:     Physical Exam   Constitutional: She is oriented to person, place, and time. She appears well-developed and well-nourished. No distress. HENT:   Head: Normocephalic and atraumatic. Eyes: Conjunctivae are normal.   Neck: Normal range of motion. Neck supple. Cardiovascular: Normal rate and regular rhythm. Pulmonary/Chest: Effort normal. No respiratory distress. Musculoskeletal: Normal range of motion. She exhibits no edema, tenderness or deformity. Neurological: She is alert and oriented to person, place, and time. She exhibits normal muscle tone. Coordination normal.   Skin: Skin is warm and dry. She is not diaphoretic. No pallor. Psychiatric: She has a normal mood and affect. Her behavior is normal. Judgment and thought content normal.       Assessment:      1.  Amenorrhea  POCT URINE PREGNANCY    17-Hydroxyprogesterone Qnt, Serum    Follicle Stimulating Hormone    HCG, Quantitative, Pregnancy    DHEA-Sulfate    Luteinizing Hormone    TSH with Reflex    Testosterone Free and Total, Non-Male    T4, Free    Prolactin    Progesterone, Quantitative    US Pelvis Complete    US Non OB Transvaginal   2. Negative pregnancy test           Plan:      Medications placed this encounter:  No orders of the defined types were placed in this encounter.         Orders placed this encounter:  Orders Placed This Encounter   Procedures    US Pelvis Complete     Standing Status:   Future     Standing Expiration Date:   1/17/2019    US Non OB Transvaginal     Standing Status:   Future     Standing Expiration Date:   1/17/2019    17-Hydroxyprogesterone Qnt, Serum     Standing Status:   Future     Standing Expiration Date:   0/69/2169    Follicle Stimulating Hormone     Standing Status:   Future     Standing Expiration Date:   1/17/2019    HCG, Quantitative, Pregnancy     Standing Status:   Future     Standing Expiration Date:   1/17/2019    DHEA-Sulfate     Standing Status:   Future     Standing Expiration Date:   1/17/2019    Luteinizing Hormone     Standing Status:   Future     Standing Expiration Date:   1/17/2019    TSH with Reflex     Standing Status:   Future     Standing Expiration Date:   1/17/2019    Testosterone Free and Total, Non-Male     Standing Status:   Future     Standing Expiration Date:   1/17/2019    T4, Free     Standing Status:   Future     Standing Expiration Date:   1/17/2019    Prolactin     Standing Status:   Future     Standing Expiration Date:   1/17/2019    Progesterone, Quantitative     Standing Status:   Future     Standing Expiration Date:   1/17/2019    POCT URINE PREGNANCY         Follow up:  Return for Ultrasound, Results Review, Annual.

## 2018-01-19 LAB — DHEAS (DHEA SULFATE): 189 UG/DL (ref 65–380)

## 2018-01-20 LAB
17-OH PROGESTERONE LCMS: 46.3 NG/DL
PROGESTERONE, LC/MS/MS: <0.1 NG/ML

## 2018-01-21 LAB
SEX HORMONE BINDING GLOBULIN: 21 NMOL/L (ref 30–135)
TESTOSTERONE FREE: 7.2 PG/ML (ref 0.8–7.4)
TESTOSTERONE, FEMALES/CHILDREN: 35 NG/DL (ref 9–55)

## 2018-01-22 DIAGNOSIS — R79.89 ELEVATED PROLACTIN LEVEL: Primary | ICD-10-CM

## 2018-01-31 ENCOUNTER — HOSPITAL ENCOUNTER (OUTPATIENT)
Dept: ULTRASOUND IMAGING | Age: 28
Discharge: HOME | End: 2018-02-02
Payer: COMMERCIAL

## 2018-01-31 ENCOUNTER — APPOINTMENT (OUTPATIENT)
Dept: GENERAL RADIOLOGY | Age: 28
End: 2018-01-31
Payer: COMMERCIAL

## 2018-01-31 ENCOUNTER — HOSPITAL ENCOUNTER (EMERGENCY)
Age: 28
Discharge: HOME | End: 2018-01-31
Payer: COMMERCIAL

## 2018-01-31 VITALS
HEART RATE: 72 BPM | OXYGEN SATURATION: 99 % | RESPIRATION RATE: 16 BRPM | DIASTOLIC BLOOD PRESSURE: 84 MMHG | SYSTOLIC BLOOD PRESSURE: 139 MMHG | TEMPERATURE: 97.9 F

## 2018-01-31 DIAGNOSIS — N91.2 AMENORRHEA: ICD-10-CM

## 2018-01-31 DIAGNOSIS — M25.532 LEFT WRIST PAIN: ICD-10-CM

## 2018-01-31 DIAGNOSIS — S63.502A SPRAIN OF LEFT WRIST, INITIAL ENCOUNTER: Primary | ICD-10-CM

## 2018-01-31 PROCEDURE — 6370000000 HC RX 637 (ALT 250 FOR IP): Performed by: NURSE PRACTITIONER

## 2018-01-31 PROCEDURE — 29125 APPL SHORT ARM SPLINT STATIC: CPT

## 2018-01-31 PROCEDURE — 73090 X-RAY EXAM OF FOREARM: CPT

## 2018-01-31 PROCEDURE — 76856 US EXAM PELVIC COMPLETE: CPT

## 2018-01-31 PROCEDURE — 76830 TRANSVAGINAL US NON-OB: CPT

## 2018-01-31 PROCEDURE — 99283 EMERGENCY DEPT VISIT LOW MDM: CPT

## 2018-01-31 RX ORDER — IBUPROFEN 600 MG/1
600 TABLET ORAL EVERY 6 HOURS PRN
Qty: 20 TABLET | Refills: 0 | Status: SHIPPED | OUTPATIENT
Start: 2018-01-31 | End: 2019-01-01

## 2018-01-31 RX ORDER — IBUPROFEN 400 MG/1
800 TABLET ORAL ONCE
Status: COMPLETED | OUTPATIENT
Start: 2018-01-31 | End: 2018-01-31

## 2018-01-31 RX ADMIN — IBUPROFEN 800 MG: 400 TABLET, FILM COATED ORAL at 22:31

## 2018-01-31 ASSESSMENT — PAIN DESCRIPTION - LOCATION: LOCATION: WRIST

## 2018-01-31 ASSESSMENT — PAIN DESCRIPTION - ORIENTATION: ORIENTATION: LEFT

## 2018-01-31 ASSESSMENT — PAIN DESCRIPTION - PAIN TYPE: TYPE: ACUTE PAIN

## 2018-01-31 ASSESSMENT — PAIN SCALES - GENERAL
PAINLEVEL_OUTOF10: 7
PAINLEVEL_OUTOF10: 7

## 2018-02-01 ENCOUNTER — OFFICE VISIT (OUTPATIENT)
Dept: OBGYN CLINIC | Age: 28
End: 2018-02-01
Payer: COMMERCIAL

## 2018-02-01 VITALS
WEIGHT: 230 LBS | SYSTOLIC BLOOD PRESSURE: 118 MMHG | HEIGHT: 62 IN | BODY MASS INDEX: 42.33 KG/M2 | DIASTOLIC BLOOD PRESSURE: 64 MMHG

## 2018-02-01 DIAGNOSIS — Z01.419 WOMEN'S ANNUAL ROUTINE GYNECOLOGICAL EXAMINATION: Primary | ICD-10-CM

## 2018-02-01 DIAGNOSIS — Z11.3 ROUTINE SCREENING FOR STI (SEXUALLY TRANSMITTED INFECTION): ICD-10-CM

## 2018-02-01 DIAGNOSIS — Z11.51 SPECIAL SCREENING EXAMINATION FOR HUMAN PAPILLOMAVIRUS (HPV): ICD-10-CM

## 2018-02-01 PROCEDURE — 99395 PREV VISIT EST AGE 18-39: CPT | Performed by: OBSTETRICS & GYNECOLOGY

## 2018-02-01 RX ORDER — NORETHINDRONE ACETATE AND ETHINYL ESTRADIOL 1MG-20(21)
1 KIT ORAL DAILY
Qty: 3 PACKET | Refills: 4 | Status: SHIPPED | OUTPATIENT
Start: 2018-02-01 | End: 2020-08-28

## 2018-02-01 ASSESSMENT — ENCOUNTER SYMPTOMS
ANAL BLEEDING: 0
ABDOMINAL PAIN: 0
EYES NEGATIVE: 1
ALLERGIC/IMMUNOLOGIC NEGATIVE: 1
NAUSEA: 0
VOMITING: 0
CONSTIPATION: 0
RESPIRATORY NEGATIVE: 1
RECTAL PAIN: 0
BLOOD IN STOOL: 0
ABDOMINAL DISTENTION: 0
DIARRHEA: 0

## 2018-02-01 ASSESSMENT — PATIENT HEALTH QUESTIONNAIRE - PHQ9
SUM OF ALL RESPONSES TO PHQ9 QUESTIONS 1 & 2: 0
SUM OF ALL RESPONSES TO PHQ QUESTIONS 1-9: 0
2. FEELING DOWN, DEPRESSED OR HOPELESS: 0
1. LITTLE INTEREST OR PLEASURE IN DOING THINGS: 0

## 2018-02-01 NOTE — PROGRESS NOTES
Subjective:      Patient ID: Jessica Friedman is a 24 y.o. female    Annual exam.  Irregular cycles. Pap performed. STD screening offered. Monthly SBE encouraged. F/U annual or prn. Pt also here to discuss results of labs and US extending her appointment time by 15 minutes. Labd with slightly low SHBG; otherwise labs normal.  US with 10 cm normal uterus and normal size ovaries with multiple follicles, no abnormal ovarian cysts. ECHO WNL at 1.1 cm. Discussed all results and reassured. Discussed options for cycle regulation including OCP, DEPO, Nuvaring, IUD, impant and OE patch with risks and benefits. Patient has decided to start OCP for regulation. F/u 3 month med check. Vitals:  /64   Ht 5' 2\" (1.575 m)   Wt 230 lb (104.3 kg)   LMP 11/19/2017   BMI 42.07 kg/m²   Past Medical History:   Diagnosis Date    Depression     Hypertension      Past Surgical History:   Procedure Laterality Date    TONSILLECTOMY       Allergies:  Review of patient's allergies indicates no known allergies.   Current Outpatient Prescriptions   Medication Sig Dispense Refill    norethindrone-ethinyl estradiol (LOESTRIN FE 1/20) 1-20 MG-MCG per tablet Take 1 tablet by mouth daily 3 packet 4    ibuprofen (ADVIL;MOTRIN) 600 MG tablet Take 1 tablet by mouth every 6 hours as needed for Pain 20 tablet 0    docusate sodium (COLACE) 100 MG capsule Take 1 capsule by mouth daily 20 capsule 0    acetaminophen (TYLENOL) 325 MG tablet Take 2 tablets by mouth every 6 hours as needed for Pain 20 tablet 0    escitalopram (LEXAPRO) 20 MG tablet Take 1 tablet by mouth daily (with breakfast) (Patient taking differently: Take 10 mg by mouth daily (with breakfast) ) 15 tablet 1    traZODone (DESYREL) 50 MG tablet Take 1 tablet by mouth nightly 15 tablet 1    paliperidone (INVEGA) 3 MG extended release tablet Take 1 tablet by mouth 2 times daily (Patient taking differently: Take 3 mg by mouth nightly ) 30 tablet 1     No current facility-administered medications for this visit. Social History     Social History    Marital status:      Spouse name: N/A    Number of children: N/A    Years of education: N/A     Occupational History    Not on file. Social History Main Topics    Smoking status: Current Every Day Smoker     Packs/day: 0.50     Years: 8.00     Types: Cigarettes    Smokeless tobacco: Never Used    Alcohol use No    Drug use: No    Sexual activity: Yes     Partners: Male     Other Topics Concern    Not on file     Social History Narrative    No narrative on file     Family History   Problem Relation Age of Onset    Depression Father        Review of Systems   Constitutional: Negative. Negative for activity change, appetite change, chills, diaphoresis, fatigue, fever and unexpected weight change. HENT: Negative. Eyes: Negative. Respiratory: Negative. Cardiovascular: Negative. Gastrointestinal: Negative for abdominal distention, abdominal pain, anal bleeding, blood in stool, constipation, diarrhea, nausea, rectal pain and vomiting. Endocrine: Negative. Genitourinary: Positive for menstrual problem (Oligomenorrhea). Negative for decreased urine volume, difficulty urinating, dyspareunia, dysuria, enuresis, flank pain, frequency, genital sores, hematuria, pelvic pain, urgency, vaginal bleeding, vaginal discharge and vaginal pain. Musculoskeletal: Negative. Skin: Negative. Allergic/Immunologic: Negative. Neurological: Negative. Hematological: Negative. Psychiatric/Behavioral: Negative. Objective:     Physical Exam   Constitutional: She is oriented to person, place, and time. She appears well-developed and well-nourished. HENT:   Head: Normocephalic. Neck: Normal range of motion. Neck supple. No thyromegaly present. Cardiovascular: Normal rate, regular rhythm and normal heart sounds.     Pulmonary/Chest: Effort normal and breath sounds normal. No  norethindrone-ethinyl estradiol (LOESTRIN FE 1/20) 1-20 MG-MCG per tablet     Sig: Take 1 tablet by mouth daily     Dispense:  3 packet     Refill:  4         Orders placed this encounter:  Orders Placed This Encounter   Procedures    PAP SMEAR     Standing Status:   Future     Standing Expiration Date:   2/1/2019     Order Specific Question:   Collection Type     Answer: Thin Prep     Order Specific Question:   Prior Abnormal Pap Test     Answer:   No     Order Specific Question:   Screening or Diagnostic     Answer:   Screening     Order Specific Question:   HPV Requested? Answer:   Yes     Order Specific Question:   High Risk Patient     Answer:   N/A         Follow up:  Return in about 3 months (around 5/1/2018) for Med Check.

## 2018-02-03 ASSESSMENT — ENCOUNTER SYMPTOMS
COUGH: 0
SHORTNESS OF BREATH: 0
COLOR CHANGE: 0
VOICE CHANGE: 0
SORE THROAT: 0
ABDOMINAL PAIN: 0
DIARRHEA: 0
NAUSEA: 0
TROUBLE SWALLOWING: 0
BACK PAIN: 0
VOMITING: 0
CONSTIPATION: 0

## 2018-02-03 NOTE — ED PROVIDER NOTES
[facility] ED  eMERGENCY dEPARTMENT eNCOUnter      Pt Name: Lior Duckworth  MRN: [id number]  [place] 1990  Date of evaluation: 1/31/2018  Provider: Lola Bain NP     [address] [place] [address]       Chief Complaint   Patient presents with    Wrist Pain     doesnt recall any injury        HISTORY OF PRESENT ILLNESS   (Location/Symptom, Timing/Onset, Context/Setting, Quality, Duration, Modifying Factors, Severity) Note limiting factors. HPI    Lior Duckworth is a 24year old female patient per chart review with a history of major depressive disorder, amenorrhea. She presents with complaints of left wrist pain with no known injury that started today while at work. 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 She states that the pain is a throbbing that comes and goes and hurts more with movement and touching it. She denies any associated symptoms. She denies any numbness or tingling in the left hand or left upper extremity. Nursing Notes were reviewed. REVIEW OF SYSTEMS    (2+ for level 4; 10+ for level 5)     Review of Systems   Constitutional: Negative for appetite change and fever. HENT: Negative for drooling, ear pain, sore throat, trouble swallowing and voice change. Respiratory: Negative for cough and shortness of breath. Cardiovascular: Negative for chest pain. Gastrointestinal: Negative for abdominal pain, constipation, diarrhea, nausea and vomiting. Genitourinary: Negative for decreased urine volume and dysuria. Musculoskeletal: Positive for arthralgias (left wrist). Negative for back pain. Skin: Negative for color change. Neurological: Negative for dizziness, weakness, light-headedness and headaches. Psychiatric/Behavioral: Negative for agitation and behavioral problems. Except as noted above the remainder of the review of systems was reviewed and negative.      PAST MEDICAL HISTORY     Past

## 2018-02-09 DIAGNOSIS — Z11.51 SPECIAL SCREENING EXAMINATION FOR HUMAN PAPILLOMAVIRUS (HPV): ICD-10-CM

## 2018-02-09 DIAGNOSIS — Z01.419 WOMEN'S ANNUAL ROUTINE GYNECOLOGICAL EXAMINATION: ICD-10-CM

## 2018-02-09 DIAGNOSIS — Z11.3 ROUTINE SCREENING FOR STI (SEXUALLY TRANSMITTED INFECTION): ICD-10-CM

## 2018-02-14 ENCOUNTER — TELEPHONE (OUTPATIENT)
Dept: OBGYN CLINIC | Age: 28
End: 2018-02-14

## 2018-02-14 RX ORDER — METRONIDAZOLE 500 MG/1
500 TABLET ORAL 2 TIMES DAILY
Qty: 14 TABLET | Refills: 0 | Status: SHIPPED | OUTPATIENT
Start: 2018-02-14 | End: 2018-02-21

## 2018-02-14 RX ORDER — FLUCONAZOLE 150 MG/1
150 TABLET ORAL ONCE
Qty: 1 TABLET | Refills: 0 | Status: SHIPPED | OUTPATIENT
Start: 2018-02-14 | End: 2018-02-14

## 2018-02-14 NOTE — TELEPHONE ENCOUNTER
----- Message from Nereida Arndt MD sent at 2/12/2018 10:24 PM EST -----  ASCUS, + HPV, BV and yeast.  Appt to discuss pap. Tindamax 2 gm x 1 and Diflucan x 1.

## 2018-03-01 ENCOUNTER — HOSPITAL ENCOUNTER (EMERGENCY)
Age: 28
Discharge: HOME | End: 2018-03-01
Attending: STUDENT IN AN ORGANIZED HEALTH CARE EDUCATION/TRAINING PROGRAM
Payer: COMMERCIAL

## 2018-03-01 VITALS
HEIGHT: 62 IN | WEIGHT: 220 LBS | BODY MASS INDEX: 40.48 KG/M2 | RESPIRATION RATE: 18 BRPM | OXYGEN SATURATION: 98 % | DIASTOLIC BLOOD PRESSURE: 78 MMHG | SYSTOLIC BLOOD PRESSURE: 125 MMHG | TEMPERATURE: 98.3 F | HEART RATE: 101 BPM

## 2018-03-01 DIAGNOSIS — J01.00 ACUTE MAXILLARY SINUSITIS, RECURRENCE NOT SPECIFIED: Primary | ICD-10-CM

## 2018-03-01 DIAGNOSIS — J20.9 ACUTE BRONCHITIS, UNSPECIFIED ORGANISM: ICD-10-CM

## 2018-03-01 DIAGNOSIS — Z72.0 OCCASIONAL CIGARETTE SMOKER: ICD-10-CM

## 2018-03-01 PROCEDURE — 99282 EMERGENCY DEPT VISIT SF MDM: CPT

## 2018-03-01 RX ORDER — BENZONATATE 100 MG/1
100 CAPSULE ORAL 3 TIMES DAILY PRN
Qty: 21 CAPSULE | Refills: 0 | Status: SHIPPED | OUTPATIENT
Start: 2018-03-01 | End: 2018-10-02 | Stop reason: ALTCHOICE

## 2018-03-01 RX ORDER — ALBUTEROL SULFATE 90 UG/1
2 AEROSOL, METERED RESPIRATORY (INHALATION) EVERY 6 HOURS PRN
Qty: 1 INHALER | Refills: 3 | Status: SHIPPED | OUTPATIENT
Start: 2018-03-01 | End: 2018-05-15

## 2018-03-01 RX ORDER — ECHINACEA PURPUREA EXTRACT 125 MG
2 TABLET ORAL
Qty: 1 BOTTLE | Refills: 0 | Status: SHIPPED | OUTPATIENT
Start: 2018-03-01 | End: 2018-10-02 | Stop reason: ALTCHOICE

## 2018-03-01 RX ORDER — PROMETHAZINE HYDROCHLORIDE AND CODEINE PHOSPHATE 6.25; 1 MG/5ML; MG/5ML
5 SYRUP ORAL 4 TIMES DAILY PRN
Qty: 120 ML | Refills: 0 | Status: SHIPPED | OUTPATIENT
Start: 2018-03-01 | End: 2018-03-08

## 2018-03-01 RX ORDER — AZITHROMYCIN 250 MG/1
TABLET, FILM COATED ORAL
Qty: 1 PACKET | Refills: 0 | Status: SHIPPED | OUTPATIENT
Start: 2018-03-01 | End: 2018-03-11

## 2018-03-01 ASSESSMENT — ENCOUNTER SYMPTOMS
SINUS PRESSURE: 1
PHOTOPHOBIA: 0
TROUBLE SWALLOWING: 0
BACK PAIN: 0
COUGH: 1
RHINORRHEA: 1
VOMITING: 0
SHORTNESS OF BREATH: 0
CHEST TIGHTNESS: 0
FACIAL SWELLING: 0
SINUS PAIN: 1
ABDOMINAL PAIN: 0
WHEEZING: 0
DIARRHEA: 0

## 2018-03-01 ASSESSMENT — PAIN SCALES - GENERAL: PAINLEVEL_OUTOF10: 10

## 2018-03-01 ASSESSMENT — PAIN DESCRIPTION - PAIN TYPE: TYPE: ACUTE PAIN

## 2018-03-01 ASSESSMENT — PAIN DESCRIPTION - ORIENTATION: ORIENTATION: RIGHT;LEFT

## 2018-03-01 ASSESSMENT — PAIN DESCRIPTION - LOCATION: LOCATION: EAR

## 2018-03-01 NOTE — ED PROVIDER NOTES
diarrhea and vomiting. Endocrine: Negative for polydipsia and polyphagia. Genitourinary: Negative for dysuria, flank pain and frequency. Musculoskeletal: Negative for back pain and myalgias. Skin: Negative for pallor and rash. Neurological: Negative for syncope, weakness and headaches. Hematological: Does not bruise/bleed easily. All other systems reviewed and are negative. Except as noted above the remainder of the review of systems was reviewed and negative. PAST MEDICAL HISTORY     Past Medical History:   Diagnosis Date    Depression     Hypertension          SURGICAL HISTORY       Past Surgical History:   Procedure Laterality Date    TONSILLECTOMY           CURRENT MEDICATIONS       Previous Medications    ACETAMINOPHEN (TYLENOL) 325 MG TABLET    Take 2 tablets by mouth every 6 hours as needed for Pain    DOCUSATE SODIUM (COLACE) 100 MG CAPSULE    Take 1 capsule by mouth daily    ESCITALOPRAM (LEXAPRO) 20 MG TABLET    Take 1 tablet by mouth daily (with breakfast)    IBUPROFEN (ADVIL;MOTRIN) 600 MG TABLET    Take 1 tablet by mouth every 6 hours as needed for Pain    NORETHINDRONE-ETHINYL ESTRADIOL (LOESTRIN FE 1/20) 1-20 MG-MCG PER TABLET    Take 1 tablet by mouth daily    PALIPERIDONE (INVEGA) 3 MG EXTENDED RELEASE TABLET    Take 1 tablet by mouth 2 times daily    TRAZODONE (DESYREL) 50 MG TABLET    Take 1 tablet by mouth nightly       ALLERGIES     Patient has no known allergies.     FAMILY HISTORY       Family History   Problem Relation Age of Onset    Depression Father           SOCIAL HISTORY       Social History     Social History    Marital status:      Spouse name: N/A    Number of children: N/A    Years of education: N/A     Social History Main Topics    Smoking status: Current Every Day Smoker     Packs/day: 0.50     Years: 8.00     Types: Cigarettes    Smokeless tobacco: Never Used    Alcohol use No    Drug use: No    Sexual activity: Yes     Partners: Male There is no tenderness. There is no rebound and no guarding. Musculoskeletal: Normal range of motion. She exhibits no edema or tenderness. Lymphadenopathy:        Head (right side): No submental adenopathy present. Head (left side): No submental adenopathy present. She has no cervical adenopathy. Neurological: She is alert and oriented to person, place, and time. She has normal reflexes. No cranial nerve deficit. Coordination normal.   Skin: Skin is warm and dry. No rash noted. She is not diaphoretic. No erythema. No pallor. Psychiatric: She has a normal mood and affect. Her behavior is normal. Judgment and thought content normal.   Nursing note and vitals reviewed. DIAGNOSTIC RESULTS     EKG: All EKG's are interpreted by the Emergency Department Physician who either signs or Co-signs this chart in the absence of a cardiologist.        RADIOLOGY:   Non-plain film images such as CT, Ultrasound and MRI are read by the radiologist. Plain radiographic images are visualized and preliminarily interpreted by the emergency physician with the below findings:        Interpretation per the Radiologist below, if available at the time of this note:    No orders to display         ED BEDSIDE ULTRASOUND:   Performed by ED Physician - none    LABS:  Labs Reviewed - No data to display    All other labs were within normal range or not returned as of this dictation. EMERGENCY DEPARTMENT COURSE and DIFFERENTIAL DIAGNOSIS/MDM:   Vitals:    Vitals:    03/01/18 1127   BP: 125/78   Pulse: 101   Resp: 18   Temp: 98.3 °F (36.8 °C)   TempSrc: Oral   SpO2: 98%   Weight: 220 lb (99.8 kg)   Height: 5' 2\" (1.575 m)         MDM  She has early been seen in a couple of emergency rooms according to her. She states that she's had flu swabs and chest x-rays and was told that everything is normal.  However the patient is 2 weeks of sinus symptoms with purulent nasal discharge and subjective fever.   Patient's symptoms are

## 2018-03-01 NOTE — ED NOTES
Pt given work excuse. Discharge instructions and scripts. Pt verbalized understanding.  Pt left er with steady gait     Lia Gaspar RN  67/84/63 [id number]

## 2018-05-14 ENCOUNTER — HOSPITAL ENCOUNTER (EMERGENCY)
Age: 28
Discharge: HOME | End: 2018-05-15
Payer: COMMERCIAL

## 2018-05-14 DIAGNOSIS — Z71.6 ENCOUNTER FOR SMOKING CESSATION COUNSELING: ICD-10-CM

## 2018-05-14 DIAGNOSIS — R05.9 COUGH: ICD-10-CM

## 2018-05-14 DIAGNOSIS — J45.901 EXACERBATION OF ASTHMA, UNSPECIFIED ASTHMA SEVERITY, UNSPECIFIED WHETHER PERSISTENT: Primary | ICD-10-CM

## 2018-05-14 PROCEDURE — 94640 AIRWAY INHALATION TREATMENT: CPT

## 2018-05-14 PROCEDURE — 6370000000 HC RX 637 (ALT 250 FOR IP): Performed by: PHYSICIAN ASSISTANT

## 2018-05-14 PROCEDURE — 99283 EMERGENCY DEPT VISIT LOW MDM: CPT

## 2018-05-14 RX ORDER — IPRATROPIUM BROMIDE AND ALBUTEROL SULFATE 2.5; .5 MG/3ML; MG/3ML
1 SOLUTION RESPIRATORY (INHALATION) CONTINUOUS PRN
Status: COMPLETED | OUTPATIENT
Start: 2018-05-14 | End: 2018-05-15

## 2018-05-14 RX ORDER — PREDNISONE 20 MG/1
40 TABLET ORAL ONCE
Status: COMPLETED | OUTPATIENT
Start: 2018-05-14 | End: 2018-05-15

## 2018-05-14 RX ADMIN — IPRATROPIUM BROMIDE AND ALBUTEROL SULFATE 1 AMPULE: .5; 3 SOLUTION RESPIRATORY (INHALATION) at 23:16

## 2018-05-14 RX ADMIN — IPRATROPIUM BROMIDE AND ALBUTEROL SULFATE 1 AMPULE: .5; 3 SOLUTION RESPIRATORY (INHALATION) at 23:14

## 2018-05-14 RX ADMIN — IPRATROPIUM BROMIDE AND ALBUTEROL SULFATE 1 AMPULE: .5; 3 SOLUTION RESPIRATORY (INHALATION) at 23:15

## 2018-05-14 ASSESSMENT — PAIN DESCRIPTION - ORIENTATION: ORIENTATION: LEFT;RIGHT

## 2018-05-14 ASSESSMENT — ENCOUNTER SYMPTOMS
WHEEZING: 1
SHORTNESS OF BREATH: 1
COUGH: 1
VOMITING: 0
PHOTOPHOBIA: 0
ABDOMINAL PAIN: 0
VOICE CHANGE: 0
ABDOMINAL DISTENTION: 0
APNEA: 0
BACK PAIN: 0
EYE DISCHARGE: 0
NAUSEA: 0
ANAL BLEEDING: 0

## 2018-05-14 ASSESSMENT — PAIN SCALES - GENERAL: PAINLEVEL_OUTOF10: 5

## 2018-05-14 ASSESSMENT — PULMONARY FUNCTION TESTS: PEFR_L/MIN: 100

## 2018-05-14 ASSESSMENT — PAIN DESCRIPTION - LOCATION: LOCATION: RIB CAGE

## 2018-05-14 ASSESSMENT — PAIN DESCRIPTION - PAIN TYPE: TYPE: ACUTE PAIN

## 2018-05-14 ASSESSMENT — PAIN DESCRIPTION - DESCRIPTORS: DESCRIPTORS: PRESSURE

## 2018-05-15 VITALS
RESPIRATION RATE: 18 BRPM | DIASTOLIC BLOOD PRESSURE: 68 MMHG | SYSTOLIC BLOOD PRESSURE: 144 MMHG | BODY MASS INDEX: 40.48 KG/M2 | OXYGEN SATURATION: 95 % | HEIGHT: 62 IN | HEART RATE: 91 BPM | WEIGHT: 220 LBS | TEMPERATURE: 98.5 F

## 2018-05-15 PROCEDURE — 6370000000 HC RX 637 (ALT 250 FOR IP): Performed by: PHYSICIAN ASSISTANT

## 2018-05-15 RX ORDER — ALBUTEROL SULFATE 90 UG/1
2 AEROSOL, METERED RESPIRATORY (INHALATION) EVERY 6 HOURS PRN
Qty: 1 INHALER | Refills: 0 | Status: SHIPPED | OUTPATIENT
Start: 2018-05-15 | End: 2021-08-21

## 2018-05-15 RX ORDER — PREDNISONE 10 MG/1
40 TABLET ORAL DAILY
Qty: 20 TABLET | Refills: 0 | Status: SHIPPED | OUTPATIENT
Start: 2018-05-15 | End: 2018-05-20

## 2018-05-15 RX ORDER — AMOXICILLIN 500 MG/1
500 CAPSULE ORAL 3 TIMES DAILY
Qty: 21 CAPSULE | Refills: 0 | Status: SHIPPED | OUTPATIENT
Start: 2018-05-15 | End: 2018-05-22

## 2018-05-15 RX ADMIN — PREDNISONE 40 MG: 20 TABLET ORAL at 00:18

## 2018-06-05 ENCOUNTER — HOSPITAL ENCOUNTER (EMERGENCY)
Age: 28
Discharge: HOME | End: 2018-06-05
Payer: COMMERCIAL

## 2018-06-05 ENCOUNTER — APPOINTMENT (OUTPATIENT)
Dept: GENERAL RADIOLOGY | Age: 28
End: 2018-06-05
Payer: COMMERCIAL

## 2018-06-05 VITALS
DIASTOLIC BLOOD PRESSURE: 71 MMHG | TEMPERATURE: 98 F | OXYGEN SATURATION: 99 % | HEART RATE: 81 BPM | RESPIRATION RATE: 20 BRPM | WEIGHT: 220 LBS | BODY MASS INDEX: 40.48 KG/M2 | SYSTOLIC BLOOD PRESSURE: 116 MMHG | HEIGHT: 62 IN

## 2018-06-05 DIAGNOSIS — S39.012A LUMBAR STRAIN, INITIAL ENCOUNTER: ICD-10-CM

## 2018-06-05 DIAGNOSIS — N30.01 ACUTE CYSTITIS WITH HEMATURIA: Primary | ICD-10-CM

## 2018-06-05 LAB
BACTERIA: ABNORMAL /HPF
BILIRUBIN URINE: NEGATIVE
BLOOD, URINE: ABNORMAL
CHP ED QC CHECK: NORMAL
CLARITY: CLEAR
COLOR: YELLOW
GLUCOSE URINE: NEGATIVE MG/DL
KETONES, URINE: NEGATIVE MG/DL
LEUKOCYTE ESTERASE, URINE: NEGATIVE
MUCUS: PRESENT
NITRITE, URINE: NEGATIVE
PH UA: 7 (ref 5–9)
PREGNANCY TEST URINE, POC: NEGATIVE
PROTEIN UA: NEGATIVE MG/DL
RBC UA: ABNORMAL /HPF (ref 0–2)
SPECIFIC GRAVITY UA: 1.02 (ref 1–1.03)
URINE REFLEX TO CULTURE: YES
UROBILINOGEN, URINE: 1 E.U./DL
WBC UA: ABNORMAL /HPF (ref 0–5)

## 2018-06-05 PROCEDURE — 6360000002 HC RX W HCPCS: Performed by: PHYSICIAN ASSISTANT

## 2018-06-05 PROCEDURE — 87086 URINE CULTURE/COLONY COUNT: CPT

## 2018-06-05 PROCEDURE — 96372 THER/PROPH/DIAG INJ SC/IM: CPT

## 2018-06-05 PROCEDURE — 99283 EMERGENCY DEPT VISIT LOW MDM: CPT

## 2018-06-05 PROCEDURE — 6370000000 HC RX 637 (ALT 250 FOR IP): Performed by: PHYSICIAN ASSISTANT

## 2018-06-05 PROCEDURE — 72110 X-RAY EXAM L-2 SPINE 4/>VWS: CPT

## 2018-06-05 PROCEDURE — 81001 URINALYSIS AUTO W/SCOPE: CPT

## 2018-06-05 RX ORDER — NITROFURANTOIN 25; 75 MG/1; MG/1
100 CAPSULE ORAL ONCE
Status: COMPLETED | OUTPATIENT
Start: 2018-06-05 | End: 2018-06-05

## 2018-06-05 RX ORDER — KETOROLAC TROMETHAMINE 30 MG/ML
60 INJECTION, SOLUTION INTRAMUSCULAR; INTRAVENOUS ONCE
Status: COMPLETED | OUTPATIENT
Start: 2018-06-05 | End: 2018-06-05

## 2018-06-05 RX ORDER — PHENAZOPYRIDINE HYDROCHLORIDE 200 MG/1
200 TABLET, FILM COATED ORAL 3 TIMES DAILY PRN
Qty: 6 TABLET | Refills: 0 | Status: SHIPPED | OUTPATIENT
Start: 2018-06-05 | End: 2018-06-08

## 2018-06-05 RX ORDER — NITROFURANTOIN 25; 75 MG/1; MG/1
100 CAPSULE ORAL 2 TIMES DAILY
Qty: 14 CAPSULE | Refills: 0 | Status: SHIPPED | OUTPATIENT
Start: 2018-06-05 | End: 2018-06-12

## 2018-06-05 RX ORDER — ORPHENADRINE CITRATE 30 MG/ML
60 INJECTION INTRAMUSCULAR; INTRAVENOUS ONCE
Status: COMPLETED | OUTPATIENT
Start: 2018-06-05 | End: 2018-06-05

## 2018-06-05 RX ORDER — PHENAZOPYRIDINE HYDROCHLORIDE 100 MG/1
200 TABLET, FILM COATED ORAL ONCE
Status: COMPLETED | OUTPATIENT
Start: 2018-06-05 | End: 2018-06-05

## 2018-06-05 RX ADMIN — KETOROLAC TROMETHAMINE 60 MG: 30 INJECTION, SOLUTION INTRAMUSCULAR at 14:38

## 2018-06-05 RX ADMIN — ORPHENADRINE CITRATE 60 MG: 30 INJECTION INTRAMUSCULAR; INTRAVENOUS at 14:39

## 2018-06-05 RX ADMIN — NITROFURANTOIN (MONOHYDRATE/MACROCRYSTALS) 100 MG: 75; 25 CAPSULE ORAL at 15:06

## 2018-06-05 RX ADMIN — PHENAZOPYRIDINE HYDROCHLORIDE 200 MG: 100 TABLET ORAL at 15:06

## 2018-06-05 ASSESSMENT — ENCOUNTER SYMPTOMS
EYES NEGATIVE: 1
RESPIRATORY NEGATIVE: 1
BACK PAIN: 1
GASTROINTESTINAL NEGATIVE: 1

## 2018-06-05 ASSESSMENT — PAIN SCALES - GENERAL
PAINLEVEL_OUTOF10: 8
PAINLEVEL_OUTOF10: 8

## 2018-06-05 ASSESSMENT — PAIN DESCRIPTION - LOCATION: LOCATION: BACK

## 2018-06-07 LAB — URINE CULTURE, ROUTINE: NORMAL

## 2018-10-02 ENCOUNTER — HOSPITAL ENCOUNTER (EMERGENCY)
Age: 28
Discharge: HOME | End: 2018-10-02
Payer: COMMERCIAL

## 2018-10-02 VITALS
TEMPERATURE: 98 F | SYSTOLIC BLOOD PRESSURE: 111 MMHG | HEART RATE: 97 BPM | DIASTOLIC BLOOD PRESSURE: 75 MMHG | WEIGHT: 220 LBS | BODY MASS INDEX: 40.48 KG/M2 | OXYGEN SATURATION: 96 % | HEIGHT: 62 IN | RESPIRATION RATE: 20 BRPM

## 2018-10-02 DIAGNOSIS — J01.40 ACUTE NON-RECURRENT PANSINUSITIS: Primary | ICD-10-CM

## 2018-10-02 LAB
CHP ED QC CHECK: YES
PREGNANCY TEST URINE, POC: NEGATIVE

## 2018-10-02 PROCEDURE — 99283 EMERGENCY DEPT VISIT LOW MDM: CPT

## 2018-10-02 RX ORDER — PREDNISONE 10 MG/1
50 TABLET ORAL DAILY
Qty: 25 TABLET | Refills: 0 | Status: SHIPPED | OUTPATIENT
Start: 2018-10-02 | End: 2018-10-07

## 2018-10-02 RX ORDER — AMOXICILLIN AND CLAVULANATE POTASSIUM 875; 125 MG/1; MG/1
1 TABLET, FILM COATED ORAL 2 TIMES DAILY
Qty: 20 TABLET | Refills: 0 | Status: SHIPPED | OUTPATIENT
Start: 2018-10-02 | End: 2018-10-12

## 2018-10-02 RX ORDER — BENZONATATE 100 MG/1
100 CAPSULE ORAL 3 TIMES DAILY PRN
Qty: 20 CAPSULE | Refills: 0 | Status: SHIPPED | OUTPATIENT
Start: 2018-10-02 | End: 2019-01-01 | Stop reason: ALTCHOICE

## 2018-10-02 ASSESSMENT — ENCOUNTER SYMPTOMS
WHEEZING: 1
ABDOMINAL PAIN: 0
BACK PAIN: 0
TROUBLE SWALLOWING: 0
SORE THROAT: 0
VOMITING: 0
RHINORRHEA: 1
COUGH: 1
SHORTNESS OF BREATH: 0
SINUS PAIN: 1
NAUSEA: 0
DIARRHEA: 0

## 2018-10-02 ASSESSMENT — PAIN SCALES - GENERAL: PAINLEVEL_OUTOF10: 5

## 2018-10-02 ASSESSMENT — PAIN DESCRIPTION - FREQUENCY: FREQUENCY: INTERMITTENT

## 2018-10-02 NOTE — ED PROVIDER NOTES
[address] [facility] ED  eMERGENCY dEPARTMENT eNCOUnter      Pt Name: Conda Nageotte  MRN: [id number]  [place] 1990  Date of evaluation: 10/2/2018  Provider: ALEXANDR Parker CNP      HISTORY OF PRESENT ILLNESS    Conda Nageotte is a 34 y.o. female who presents to the Emergency Department with cough, nasal congestion, runny nose x 1 week. She states dizzy when coughing or with raising head movements at times. Appetite well, denies fever. REVIEW OF SYSTEMS       Review of Systems   Constitutional: Negative for activity change, appetite change and fever. HENT: Positive for postnasal drip, rhinorrhea and sinus pain. Negative for congestion, drooling, ear pain, sore throat and trouble swallowing. Respiratory: Positive for cough and wheezing. Negative for shortness of breath. Cardiovascular: Negative for chest pain. Gastrointestinal: Negative for abdominal pain, diarrhea, nausea and vomiting. Genitourinary: Negative for dysuria. Musculoskeletal: Negative for arthralgias and back pain. Skin: Negative for rash. All other systems reviewed and are negative.         PAST MEDICAL HISTORY     Past Medical History:   Diagnosis Date    Asthma     Depression     Hypertension          SURGICAL HISTORY       Past Surgical History:   Procedure Laterality Date     SECTION      TONSILLECTOMY           CURRENT MEDICATIONS       Previous Medications    ACETAMINOPHEN (TYLENOL) 325 MG TABLET    Take 2 tablets by mouth every 6 hours as needed for Pain    ALBUTEROL SULFATE HFA (VENTOLIN HFA) 108 (90 BASE) MCG/ACT INHALER    Inhale 2 puffs into the lungs every 6 hours as needed for Wheezing    ESCITALOPRAM (LEXAPRO) 20 MG TABLET    Take 1 tablet by mouth daily (with breakfast)    IBUPROFEN (ADVIL;MOTRIN) 600 MG TABLET    Take 1 tablet by mouth every 6 hours as needed for Pain    NORETHINDRONE-ETHINYL ESTRADIOL (LOESTRIN FE ) 1-20 MG-MCG PER TABLET    Take 1 tablet by mouth daily

## 2019-01-01 ENCOUNTER — HOSPITAL ENCOUNTER (INPATIENT)
Age: 29
LOS: 5 days | Discharge: HOME | DRG: 750 | End: 2019-01-06
Attending: PSYCHIATRY & NEUROLOGY | Admitting: PSYCHIATRY & NEUROLOGY
Payer: COMMERCIAL

## 2019-01-01 DIAGNOSIS — F25.1 SCHIZOAFFECTIVE DISORDER, DEPRESSIVE TYPE (HCC): Primary | ICD-10-CM

## 2019-01-01 LAB
ACETAMINOPHEN LEVEL: <5 UG/ML (ref 10–30)
ALBUMIN SERPL-MCNC: 3.6 G/DL (ref 3.9–4.9)
ALP BLD-CCNC: 82 U/L (ref 40–130)
ALT SERPL-CCNC: 12 U/L (ref 0–33)
AMPHETAMINE SCREEN, URINE: NORMAL
ANION GAP SERPL CALCULATED.3IONS-SCNC: 11 MEQ/L (ref 7–13)
AST SERPL-CCNC: 10 U/L (ref 0–35)
BARBITURATE SCREEN URINE: NORMAL
BASOPHILS ABSOLUTE: 0.1 K/UL (ref 0–0.2)
BASOPHILS RELATIVE PERCENT: 1.1 %
BENZODIAZEPINE SCREEN, URINE: NORMAL
BILIRUB SERPL-MCNC: <0.2 MG/DL (ref 0–1.2)
BILIRUBIN URINE: NEGATIVE
BLOOD, URINE: NEGATIVE
BUN BLDV-MCNC: 12 MG/DL (ref 6–20)
CALCIUM SERPL-MCNC: 8.7 MG/DL (ref 8.6–10.2)
CANNABINOID SCREEN URINE: NORMAL
CHLORIDE BLD-SCNC: 102 MEQ/L (ref 98–107)
CLARITY: CLEAR
CO2: 23 MEQ/L (ref 22–29)
COCAINE METABOLITE SCREEN URINE: NORMAL
COLOR: YELLOW
CREAT SERPL-MCNC: 0.47 MG/DL (ref 0.5–0.9)
EOSINOPHILS ABSOLUTE: 0.2 K/UL (ref 0–0.7)
EOSINOPHILS RELATIVE PERCENT: 2.7 %
ETHANOL PERCENT: NORMAL G/DL
ETHANOL: <10 MG/DL (ref 0–0.08)
GFR AFRICAN AMERICAN: >60
GFR NON-AFRICAN AMERICAN: >60
GLOBULIN: 3 G/DL (ref 2.3–3.5)
GLUCOSE BLD-MCNC: 109 MG/DL (ref 74–109)
GLUCOSE URINE: NEGATIVE MG/DL
HCG(URINE) PREGNANCY TEST: NEGATIVE
HCT VFR BLD CALC: 39.4 % (ref 37–47)
HEMOGLOBIN: 13.2 G/DL (ref 12–16)
KETONES, URINE: NEGATIVE MG/DL
LEUKOCYTE ESTERASE, URINE: NEGATIVE
LYMPHOCYTES ABSOLUTE: 3.1 K/UL (ref 1–4.8)
LYMPHOCYTES RELATIVE PERCENT: 36.3 %
Lab: NORMAL
MCH RBC QN AUTO: 28.2 PG (ref 27–31.3)
MCHC RBC AUTO-ENTMCNC: 33.4 % (ref 33–37)
MCV RBC AUTO: 84.3 FL (ref 82–100)
MONOCYTES ABSOLUTE: 0.6 K/UL (ref 0.2–0.8)
MONOCYTES RELATIVE PERCENT: 7.3 %
NEUTROPHILS ABSOLUTE: 4.4 K/UL (ref 1.4–6.5)
NEUTROPHILS RELATIVE PERCENT: 52.6 %
NITRITE, URINE: NEGATIVE
OPIATE SCREEN URINE: NORMAL
PDW BLD-RTO: 13.8 % (ref 11.5–14.5)
PH UA: 6 (ref 5–9)
PHENCYCLIDINE SCREEN URINE: NORMAL
PLATELET # BLD: 251 K/UL (ref 130–400)
POTASSIUM SERPL-SCNC: 3.8 MEQ/L (ref 3.5–5.1)
PROTEIN UA: NEGATIVE MG/DL
RBC # BLD: 4.67 M/UL (ref 4.2–5.4)
SALICYLATE, SERUM: <0.3 MG/DL (ref 15–30)
SODIUM BLD-SCNC: 136 MEQ/L (ref 132–144)
SPECIFIC GRAVITY UA: 1.02 (ref 1–1.03)
TOTAL CK: 51 U/L (ref 0–170)
TOTAL PROTEIN: 6.6 G/DL (ref 6.4–8.1)
TSH SERPL DL<=0.05 MIU/L-ACNC: 0.75 UIU/ML (ref 0.27–4.2)
UROBILINOGEN, URINE: 1 E.U./DL
WBC # BLD: 8.4 K/UL (ref 4.8–10.8)

## 2019-01-01 PROCEDURE — 36415 COLL VENOUS BLD VENIPUNCTURE: CPT

## 2019-01-01 PROCEDURE — 6370000000 HC RX 637 (ALT 250 FOR IP): Performed by: PSYCHIATRY & NEUROLOGY

## 2019-01-01 PROCEDURE — 84703 CHORIONIC GONADOTROPIN ASSAY: CPT

## 2019-01-01 PROCEDURE — 6370000000 HC RX 637 (ALT 250 FOR IP): Performed by: PHYSICIAN ASSISTANT

## 2019-01-01 PROCEDURE — G0480 DRUG TEST DEF 1-7 CLASSES: HCPCS

## 2019-01-01 PROCEDURE — 99285 EMERGENCY DEPT VISIT HI MDM: CPT

## 2019-01-01 PROCEDURE — 85025 COMPLETE CBC W/AUTO DIFF WBC: CPT

## 2019-01-01 PROCEDURE — 82550 ASSAY OF CK (CPK): CPT

## 2019-01-01 PROCEDURE — 80307 DRUG TEST PRSMV CHEM ANLYZR: CPT

## 2019-01-01 PROCEDURE — 81003 URINALYSIS AUTO W/O SCOPE: CPT

## 2019-01-01 PROCEDURE — 84443 ASSAY THYROID STIM HORMONE: CPT

## 2019-01-01 PROCEDURE — 80053 COMPREHEN METABOLIC PANEL: CPT

## 2019-01-01 PROCEDURE — 1240000000 HC EMOTIONAL WELLNESS R&B

## 2019-01-01 RX ORDER — HALOPERIDOL 5 MG
5 TABLET ORAL EVERY 6 HOURS PRN
Status: DISCONTINUED | OUTPATIENT
Start: 2019-01-01 | End: 2019-01-06 | Stop reason: HOSPADM

## 2019-01-01 RX ORDER — NICOTINE 21 MG/24HR
1 PATCH, TRANSDERMAL 24 HOURS TRANSDERMAL DAILY
Status: DISCONTINUED | OUTPATIENT
Start: 2019-01-02 | End: 2019-01-06 | Stop reason: HOSPADM

## 2019-01-01 RX ORDER — HYDROXYZINE PAMOATE 50 MG/1
50 CAPSULE ORAL EVERY 6 HOURS PRN
Status: DISCONTINUED | OUTPATIENT
Start: 2019-01-01 | End: 2019-01-06 | Stop reason: HOSPADM

## 2019-01-01 RX ORDER — LANOLIN ALCOHOL/MO/W.PET/CERES
5 CREAM (GRAM) TOPICAL NIGHTLY
Status: ON HOLD | COMMUNITY
End: 2019-01-06 | Stop reason: HOSPADM

## 2019-01-01 RX ORDER — ACETAMINOPHEN 325 MG/1
650 TABLET ORAL EVERY 4 HOURS PRN
Status: DISCONTINUED | OUTPATIENT
Start: 2019-01-01 | End: 2019-01-06 | Stop reason: HOSPADM

## 2019-01-01 RX ORDER — HALOPERIDOL 5 MG/ML
5 INJECTION INTRAMUSCULAR EVERY 6 HOURS PRN
Status: DISCONTINUED | OUTPATIENT
Start: 2019-01-01 | End: 2019-01-06 | Stop reason: HOSPADM

## 2019-01-01 RX ORDER — HYDROXYZINE HYDROCHLORIDE 50 MG/ML
50 INJECTION, SOLUTION INTRAMUSCULAR EVERY 6 HOURS PRN
Status: DISCONTINUED | OUTPATIENT
Start: 2019-01-01 | End: 2019-01-06 | Stop reason: HOSPADM

## 2019-01-01 RX ORDER — MAGNESIUM HYDROXIDE/ALUMINUM HYDROXICE/SIMETHICONE 120; 1200; 1200 MG/30ML; MG/30ML; MG/30ML
30 SUSPENSION ORAL EVERY 6 HOURS PRN
Status: DISCONTINUED | OUTPATIENT
Start: 2019-01-01 | End: 2019-01-06 | Stop reason: HOSPADM

## 2019-01-01 RX ORDER — NORETHINDRONE ACETATE AND ETHINYL ESTRADIOL 1MG-20(21)
1 KIT ORAL DAILY
Status: DISCONTINUED | OUTPATIENT
Start: 2019-01-02 | End: 2019-01-06 | Stop reason: HOSPADM

## 2019-01-01 RX ORDER — ACETAMINOPHEN 80 MG
TABLET,CHEWABLE ORAL ONCE
Status: DISCONTINUED | OUTPATIENT
Start: 2019-01-01 | End: 2019-01-02

## 2019-01-01 RX ORDER — TRAZODONE HYDROCHLORIDE 50 MG/1
50 TABLET ORAL NIGHTLY PRN
Status: DISCONTINUED | OUTPATIENT
Start: 2019-01-01 | End: 2019-01-06 | Stop reason: HOSPADM

## 2019-01-01 RX ORDER — NICOTINE 21 MG/24HR
1 PATCH, TRANSDERMAL 24 HOURS TRANSDERMAL ONCE
Status: COMPLETED | OUTPATIENT
Start: 2019-01-01 | End: 2019-01-02

## 2019-01-01 RX ORDER — ESCITALOPRAM OXALATE 10 MG/1
15 TABLET ORAL
Status: DISCONTINUED | OUTPATIENT
Start: 2019-01-02 | End: 2019-01-02

## 2019-01-01 RX ORDER — PALIPERIDONE 6 MG/1
6 TABLET, EXTENDED RELEASE ORAL NIGHTLY
Status: DISCONTINUED | OUTPATIENT
Start: 2019-01-01 | End: 2019-01-02

## 2019-01-01 RX ORDER — ALBUTEROL SULFATE 90 UG/1
2 AEROSOL, METERED RESPIRATORY (INHALATION) EVERY 6 HOURS PRN
Status: DISCONTINUED | OUTPATIENT
Start: 2019-01-01 | End: 2019-01-06 | Stop reason: HOSPADM

## 2019-01-01 RX ORDER — BENZTROPINE MESYLATE 1 MG/ML
2 INJECTION INTRAMUSCULAR; INTRAVENOUS 2 TIMES DAILY PRN
Status: DISCONTINUED | OUTPATIENT
Start: 2019-01-01 | End: 2019-01-06 | Stop reason: HOSPADM

## 2019-01-01 RX ADMIN — MELATONIN TAB 3 MG 5 MG: 3 TAB at 22:57

## 2019-01-01 RX ADMIN — PALIPERIDONE 6 MG: 6 TABLET, EXTENDED RELEASE ORAL at 22:57

## 2019-01-01 RX ADMIN — HYDROXYZINE PAMOATE 50 MG: 50 CAPSULE ORAL at 22:57

## 2019-01-01 ASSESSMENT — ENCOUNTER SYMPTOMS
COLOR CHANGE: 0
ALLERGIC/IMMUNOLOGIC NEGATIVE: 1
APNEA: 0
ABDOMINAL PAIN: 0
EYE PAIN: 0
TROUBLE SWALLOWING: 0
SHORTNESS OF BREATH: 0

## 2019-01-01 ASSESSMENT — SLEEP AND FATIGUE QUESTIONNAIRES
DIFFICULTY FALLING ASLEEP: YES
DO YOU USE A SLEEP AID: YES
DIFFICULTY ARISING: NO
RESTFUL SLEEP: NO
SLEEP PATTERN: DISTURBED/INTERRUPTED SLEEP
DIFFICULTY STAYING ASLEEP: YES
DO YOU HAVE DIFFICULTY SLEEPING: YES

## 2019-01-01 ASSESSMENT — PATIENT HEALTH QUESTIONNAIRE - PHQ9: SUM OF ALL RESPONSES TO PHQ QUESTIONS 1-9: 13

## 2019-01-02 PROCEDURE — 6370000000 HC RX 637 (ALT 250 FOR IP): Performed by: PSYCHIATRY & NEUROLOGY

## 2019-01-02 PROCEDURE — 99223 1ST HOSP IP/OBS HIGH 75: CPT | Performed by: PSYCHIATRY & NEUROLOGY

## 2019-01-02 PROCEDURE — 1240000000 HC EMOTIONAL WELLNESS R&B

## 2019-01-02 RX ORDER — ESCITALOPRAM OXALATE 20 MG/1
20 TABLET ORAL
Status: DISCONTINUED | OUTPATIENT
Start: 2019-01-03 | End: 2019-01-06 | Stop reason: HOSPADM

## 2019-01-02 RX ORDER — PALIPERIDONE 3 MG/1
3 TABLET, EXTENDED RELEASE ORAL EVERY EVENING
Status: DISCONTINUED | OUTPATIENT
Start: 2019-01-02 | End: 2019-01-02

## 2019-01-02 RX ORDER — RISPERIDONE 2 MG/1
2 TABLET, FILM COATED ORAL 2 TIMES DAILY
Status: DISCONTINUED | OUTPATIENT
Start: 2019-01-02 | End: 2019-01-06 | Stop reason: HOSPADM

## 2019-01-02 RX ADMIN — HYDROXYZINE PAMOATE 50 MG: 50 CAPSULE ORAL at 09:37

## 2019-01-02 RX ADMIN — RISPERIDONE 2 MG: 2 TABLET ORAL at 22:02

## 2019-01-02 RX ADMIN — MELATONIN TAB 3 MG 5 MG: 3 TAB at 22:02

## 2019-01-02 RX ADMIN — ESCITALOPRAM OXALATE 15 MG: 10 TABLET ORAL at 09:30

## 2019-01-02 RX ADMIN — RISPERIDONE 2 MG: 2 TABLET ORAL at 11:21

## 2019-01-02 ASSESSMENT — LIFESTYLE VARIABLES: HISTORY_ALCOHOL_USE: NO

## 2019-01-03 LAB
EKG ATRIAL RATE: 99 BPM
EKG P AXIS: 15 DEGREES
EKG P-R INTERVAL: 138 MS
EKG Q-T INTERVAL: 352 MS
EKG QRS DURATION: 76 MS
EKG QTC CALCULATION (BAZETT): 451 MS
EKG R AXIS: 59 DEGREES
EKG T AXIS: 51 DEGREES
EKG VENTRICULAR RATE: 99 BPM

## 2019-01-03 PROCEDURE — 6370000000 HC RX 637 (ALT 250 FOR IP): Performed by: PSYCHIATRY & NEUROLOGY

## 2019-01-03 PROCEDURE — 6370000000 HC RX 637 (ALT 250 FOR IP): Performed by: NURSE PRACTITIONER

## 2019-01-03 PROCEDURE — 1240000000 HC EMOTIONAL WELLNESS R&B

## 2019-01-03 PROCEDURE — 93005 ELECTROCARDIOGRAM TRACING: CPT

## 2019-01-03 PROCEDURE — 99232 SBSQ HOSP IP/OBS MODERATE 35: CPT | Performed by: PSYCHIATRY & NEUROLOGY

## 2019-01-03 RX ADMIN — MELATONIN TAB 3 MG 5 MG: 3 TAB at 21:36

## 2019-01-03 RX ADMIN — ESCITALOPRAM OXALATE 20 MG: 20 TABLET ORAL at 08:54

## 2019-01-03 RX ADMIN — BENZOCAINE, MENTHOL 1 LOZENGE: 15; 3.6 LOZENGE ORAL at 21:36

## 2019-01-03 RX ADMIN — RISPERIDONE 2 MG: 2 TABLET ORAL at 21:36

## 2019-01-03 RX ADMIN — RISPERIDONE 2 MG: 2 TABLET ORAL at 08:54

## 2019-01-04 PROCEDURE — 99231 SBSQ HOSP IP/OBS SF/LOW 25: CPT | Performed by: PSYCHIATRY & NEUROLOGY

## 2019-01-04 PROCEDURE — 90833 PSYTX W PT W E/M 30 MIN: CPT | Performed by: PSYCHIATRY & NEUROLOGY

## 2019-01-04 PROCEDURE — 1240000000 HC EMOTIONAL WELLNESS R&B

## 2019-01-04 PROCEDURE — 6370000000 HC RX 637 (ALT 250 FOR IP): Performed by: PSYCHIATRY & NEUROLOGY

## 2019-01-04 PROCEDURE — 6370000000 HC RX 637 (ALT 250 FOR IP): Performed by: NURSE PRACTITIONER

## 2019-01-04 PROCEDURE — 93010 ELECTROCARDIOGRAM REPORT: CPT | Performed by: INTERNAL MEDICINE

## 2019-01-04 RX ADMIN — BENZOCAINE, MENTHOL 1 LOZENGE: 15; 3.6 LOZENGE ORAL at 20:38

## 2019-01-04 RX ADMIN — RISPERIDONE 2 MG: 2 TABLET ORAL at 09:18

## 2019-01-04 RX ADMIN — ACETAMINOPHEN 650 MG: 325 TABLET ORAL at 12:36

## 2019-01-04 RX ADMIN — MELATONIN TAB 3 MG 5 MG: 3 TAB at 20:35

## 2019-01-04 RX ADMIN — RISPERIDONE 2 MG: 2 TABLET ORAL at 20:35

## 2019-01-04 RX ADMIN — ESCITALOPRAM OXALATE 20 MG: 20 TABLET ORAL at 09:18

## 2019-01-04 ASSESSMENT — PAIN SCALES - GENERAL: PAINLEVEL_OUTOF10: 6

## 2019-01-05 PROCEDURE — 1240000000 HC EMOTIONAL WELLNESS R&B

## 2019-01-05 PROCEDURE — 6370000000 HC RX 637 (ALT 250 FOR IP): Performed by: PSYCHIATRY & NEUROLOGY

## 2019-01-05 PROCEDURE — 6370000000 HC RX 637 (ALT 250 FOR IP): Performed by: NURSE PRACTITIONER

## 2019-01-05 RX ADMIN — ESCITALOPRAM OXALATE 20 MG: 20 TABLET ORAL at 08:56

## 2019-01-05 RX ADMIN — MELATONIN TAB 3 MG 5 MG: 3 TAB at 20:54

## 2019-01-05 RX ADMIN — RISPERIDONE 2 MG: 2 TABLET ORAL at 08:56

## 2019-01-05 RX ADMIN — RISPERIDONE 2 MG: 2 TABLET ORAL at 20:55

## 2019-01-05 RX ADMIN — BENZOCAINE, MENTHOL 1 LOZENGE: 15; 3.6 LOZENGE ORAL at 08:56

## 2019-01-05 RX ADMIN — BENZOCAINE, MENTHOL 1 LOZENGE: 15; 3.6 LOZENGE ORAL at 20:55

## 2019-01-06 VITALS
DIASTOLIC BLOOD PRESSURE: 74 MMHG | OXYGEN SATURATION: 96 % | HEART RATE: 103 BPM | RESPIRATION RATE: 18 BRPM | HEIGHT: 62 IN | BODY MASS INDEX: 40.3 KG/M2 | WEIGHT: 219 LBS | TEMPERATURE: 97 F | SYSTOLIC BLOOD PRESSURE: 117 MMHG

## 2019-01-06 PROCEDURE — 6370000000 HC RX 637 (ALT 250 FOR IP): Performed by: PSYCHIATRY & NEUROLOGY

## 2019-01-06 RX ORDER — LANOLIN ALCOHOL/MO/W.PET/CERES
5 CREAM (GRAM) TOPICAL NIGHTLY
Qty: 15 TABLET | Refills: 1 | Status: SHIPPED | OUTPATIENT
Start: 2019-01-06 | End: 2020-03-20

## 2019-01-06 RX ORDER — RISPERIDONE 2 MG/1
2 TABLET, FILM COATED ORAL 2 TIMES DAILY
Qty: 30 TABLET | Refills: 1 | Status: SHIPPED | OUTPATIENT
Start: 2019-01-06 | End: 2020-11-13

## 2019-01-06 RX ORDER — ESCITALOPRAM OXALATE 20 MG/1
20 TABLET ORAL
Qty: 15 TABLET | Refills: 1 | Status: SHIPPED | OUTPATIENT
Start: 2019-01-07 | End: 2020-08-28

## 2019-01-06 RX ADMIN — ESCITALOPRAM OXALATE 20 MG: 20 TABLET ORAL at 09:05

## 2019-01-06 RX ADMIN — RISPERIDONE 2 MG: 2 TABLET ORAL at 09:05

## 2019-02-06 ENCOUNTER — HOSPITAL ENCOUNTER (EMERGENCY)
Age: 29
Discharge: HOME | End: 2019-02-06
Attending: EMERGENCY MEDICINE
Payer: COMMERCIAL

## 2019-02-06 ENCOUNTER — APPOINTMENT (OUTPATIENT)
Dept: GENERAL RADIOLOGY | Age: 29
End: 2019-02-06
Payer: COMMERCIAL

## 2019-02-06 ENCOUNTER — APPOINTMENT (OUTPATIENT)
Dept: ULTRASOUND IMAGING | Age: 29
End: 2019-02-06
Payer: COMMERCIAL

## 2019-02-06 VITALS
HEART RATE: 92 BPM | DIASTOLIC BLOOD PRESSURE: 79 MMHG | OXYGEN SATURATION: 98 % | TEMPERATURE: 98 F | BODY MASS INDEX: 36.8 KG/M2 | HEIGHT: 62 IN | SYSTOLIC BLOOD PRESSURE: 144 MMHG | RESPIRATION RATE: 20 BRPM | WEIGHT: 200 LBS

## 2019-02-06 DIAGNOSIS — I82.622 ARM DVT (DEEP VENOUS THROMBOEMBOLISM), ACUTE, LEFT (HCC): Primary | ICD-10-CM

## 2019-02-06 LAB
CHP ED QC CHECK: YES
PREGNANCY TEST URINE, POC: NEGATIVE

## 2019-02-06 PROCEDURE — 73080 X-RAY EXAM OF ELBOW: CPT

## 2019-02-06 PROCEDURE — 73110 X-RAY EXAM OF WRIST: CPT

## 2019-02-06 PROCEDURE — 6360000002 HC RX W HCPCS: Performed by: EMERGENCY MEDICINE

## 2019-02-06 PROCEDURE — 99284 EMERGENCY DEPT VISIT MOD MDM: CPT

## 2019-02-06 PROCEDURE — 93971 EXTREMITY STUDY: CPT

## 2019-02-06 PROCEDURE — 6370000000 HC RX 637 (ALT 250 FOR IP): Performed by: EMERGENCY MEDICINE

## 2019-02-06 RX ORDER — OXYCODONE HYDROCHLORIDE AND ACETAMINOPHEN 5; 325 MG/1; MG/1
1 TABLET ORAL ONCE
Status: COMPLETED | OUTPATIENT
Start: 2019-02-06 | End: 2019-02-06

## 2019-02-06 RX ORDER — HYDROCODONE BITARTRATE AND ACETAMINOPHEN 5; 325 MG/1; MG/1
1 TABLET ORAL EVERY 6 HOURS PRN
Qty: 12 TABLET | Refills: 0 | Status: SHIPPED | OUTPATIENT
Start: 2019-02-06 | End: 2019-02-09

## 2019-02-06 RX ADMIN — ENOXAPARIN SODIUM 90 MG: 100 INJECTION SUBCUTANEOUS at 13:52

## 2019-02-06 RX ADMIN — OXYCODONE AND ACETAMINOPHEN 1 TABLET: 5; 325 TABLET ORAL at 11:39

## 2019-02-06 ASSESSMENT — PAIN - FUNCTIONAL ASSESSMENT: PAIN_FUNCTIONAL_ASSESSMENT: PREVENTS OR INTERFERES SOME ACTIVE ACTIVITIES AND ADLS

## 2019-02-06 ASSESSMENT — ENCOUNTER SYMPTOMS
WHEEZING: 0
ABDOMINAL PAIN: 0
STRIDOR: 0
EYES NEGATIVE: 1
NAUSEA: 0
TROUBLE SWALLOWING: 0
ALLERGIC/IMMUNOLOGIC NEGATIVE: 1
RHINORRHEA: 0
VOMITING: 0
SHORTNESS OF BREATH: 0

## 2019-02-06 ASSESSMENT — PAIN DESCRIPTION - ORIENTATION
ORIENTATION: LEFT
ORIENTATION: LEFT

## 2019-02-06 ASSESSMENT — PAIN DESCRIPTION - LOCATION
LOCATION: ARM
LOCATION: ARM

## 2019-02-06 ASSESSMENT — PAIN SCALES - GENERAL
PAINLEVEL_OUTOF10: 3
PAINLEVEL_OUTOF10: 8
PAINLEVEL_OUTOF10: 3
PAINLEVEL_OUTOF10: 8

## 2019-02-06 ASSESSMENT — PAIN DESCRIPTION - DESCRIPTORS
DESCRIPTORS: DISCOMFORT;SORE
DESCRIPTORS: THROBBING

## 2019-02-06 ASSESSMENT — PAIN DESCRIPTION - FREQUENCY
FREQUENCY: CONTINUOUS
FREQUENCY: CONTINUOUS

## 2019-02-06 ASSESSMENT — PAIN DESCRIPTION - ONSET
ONSET: PROGRESSIVE
ONSET: ON-GOING

## 2019-02-06 ASSESSMENT — PAIN DESCRIPTION - PROGRESSION
CLINICAL_PROGRESSION: NOT CHANGED
CLINICAL_PROGRESSION: NOT CHANGED

## 2019-02-06 ASSESSMENT — PAIN DESCRIPTION - PAIN TYPE
TYPE: ACUTE PAIN
TYPE: ACUTE PAIN

## 2019-02-06 ASSESSMENT — PAIN SCALES - WONG BAKER: WONGBAKER_NUMERICALRESPONSE: 2

## 2019-03-07 PROBLEM — I82.A12 DVT OF LEFT AXILLARY VEIN, ACUTE (HCC): Status: ACTIVE | Noted: 2019-03-07

## 2019-06-16 ENCOUNTER — APPOINTMENT (OUTPATIENT)
Dept: ULTRASOUND IMAGING | Age: 29
End: 2019-06-16
Payer: COMMERCIAL

## 2019-06-16 ENCOUNTER — HOSPITAL ENCOUNTER (OUTPATIENT)
Age: 29
Discharge: HOME | End: 2019-06-16
Attending: OBSTETRICS & GYNECOLOGY | Admitting: OBSTETRICS & GYNECOLOGY
Payer: COMMERCIAL

## 2019-06-16 VITALS
RESPIRATION RATE: 18 BRPM | BODY MASS INDEX: 47.92 KG/M2 | DIASTOLIC BLOOD PRESSURE: 72 MMHG | HEART RATE: 104 BPM | HEIGHT: 62 IN | WEIGHT: 260.38 LBS | TEMPERATURE: 98.1 F | SYSTOLIC BLOOD PRESSURE: 142 MMHG

## 2019-06-16 PROBLEM — N91.2 AMENORRHEA: Status: RESOLVED | Noted: 2017-09-16 | Resolved: 2019-06-16

## 2019-06-16 LAB — HCG(URINE) PREGNANCY TEST: NEGATIVE

## 2019-06-16 PROCEDURE — 84703 CHORIONIC GONADOTROPIN ASSAY: CPT

## 2019-06-16 PROCEDURE — 99282 EMERGENCY DEPT VISIT SF MDM: CPT

## 2019-06-16 PROCEDURE — 76801 OB US < 14 WKS SINGLE FETUS: CPT

## 2019-06-16 PROCEDURE — 76817 TRANSVAGINAL US OBSTETRIC: CPT

## 2019-06-16 NOTE — FLOWSHEET NOTE
Pt states she passed a clot the \"size of a rat\". Denies prenatal care with current pregnancy. Per labs neg pregnancy test in Jan and Feb of this year. States \"the one I took at home was positive. Call placed to Dr Lesly Ontiveros orders obtained.

## 2019-06-16 NOTE — FLOWSHEET NOTE
Pt states her \"Mckenzie felt the baby move at [facility] a few days ago\". Unable to palpate fundus, per pt request attempted to listen to FHR unable to detect. Refuses to allow visitor in room. Pt uncooperative with attempting to find FHR. Pt admits to living at [facility] and mother has custody of other child. Pt sates she works at [employer].

## 2019-06-16 NOTE — FLOWSHEET NOTE
Mckenzie states he felt the baby move \"HOW can that happen? \" question addressed by Dr Lesly Ontiveros.

## 2019-06-16 NOTE — FLOWSHEET NOTE
Initial Ultrasound shows no pregnancy a transvaginal being done for further confirmation. Supervisor and security notified due to pt.'s psych HX.

## 2019-06-16 NOTE — PROGRESS NOTES
OB triage note    33 yo   presents to [facility] triage directly from ER desk. Pt reported she was 24 wks pregnant and passed a large clot. Pt has had no prenatal care. States she had a pos HPT and has been feeling fetal movement. Prior C/S x1. No records available. VSS  Abd morbidly obese, soft, NT    TA and TV U/S ordered - no evidence of IUP    Qual HCG pending    A/P  Irregular bleeding - not pregnant  U/S reviewed w pt. Pt very upset, stating that she felt fetal movement. Pt to f/u with an OB.

## 2019-06-16 NOTE — FLOWSHEET NOTE
Returned from Ultrasound requesting her cobyjad Angela Jerod asking \"so did they see the baby\" Informed pt we are awaiting the report and the doctor will be in to see her.

## 2019-06-16 NOTE — FLOWSHEET NOTE
Pt made aware pregnancy test negative. Discharge instructions provided to pt. Pt sated she had no further questions.

## 2019-07-29 ENCOUNTER — HOSPITAL ENCOUNTER (EMERGENCY)
Age: 29
Discharge: HOME | End: 2019-07-29
Payer: COMMERCIAL

## 2019-07-29 ENCOUNTER — APPOINTMENT (OUTPATIENT)
Dept: GENERAL RADIOLOGY | Age: 29
End: 2019-07-29
Payer: COMMERCIAL

## 2019-07-29 VITALS
RESPIRATION RATE: 18 BRPM | SYSTOLIC BLOOD PRESSURE: 154 MMHG | TEMPERATURE: 98.7 F | HEART RATE: 104 BPM | OXYGEN SATURATION: 96 % | WEIGHT: 218 LBS | DIASTOLIC BLOOD PRESSURE: 85 MMHG | HEIGHT: 62 IN | BODY MASS INDEX: 40.12 KG/M2

## 2019-07-29 DIAGNOSIS — J20.9 ACUTE BRONCHITIS, UNSPECIFIED ORGANISM: Primary | ICD-10-CM

## 2019-07-29 LAB
BILIRUBIN URINE: NEGATIVE
BLOOD, URINE: NEGATIVE
CHP ED QC CHECK: NORMAL
CLARITY: ABNORMAL
COLOR: YELLOW
GLUCOSE URINE: NEGATIVE MG/DL
KETONES, URINE: NEGATIVE MG/DL
LEUKOCYTE ESTERASE, URINE: NEGATIVE
NITRITE, URINE: NEGATIVE
PH UA: 7 (ref 5–9)
PREGNANCY TEST URINE, POC: NORMAL
PROTEIN UA: NEGATIVE MG/DL
S PYO AG THROAT QL: NEGATIVE
SPECIFIC GRAVITY UA: 1.02 (ref 1–1.03)
URINE REFLEX TO CULTURE: ABNORMAL
UROBILINOGEN, URINE: 4 E.U./DL

## 2019-07-29 PROCEDURE — 81003 URINALYSIS AUTO W/O SCOPE: CPT

## 2019-07-29 PROCEDURE — 87880 STREP A ASSAY W/OPTIC: CPT

## 2019-07-29 PROCEDURE — 71046 X-RAY EXAM CHEST 2 VIEWS: CPT

## 2019-07-29 PROCEDURE — 99283 EMERGENCY DEPT VISIT LOW MDM: CPT

## 2019-07-29 PROCEDURE — 6370000000 HC RX 637 (ALT 250 FOR IP): Performed by: PHYSICIAN ASSISTANT

## 2019-07-29 PROCEDURE — 87081 CULTURE SCREEN ONLY: CPT

## 2019-07-29 RX ORDER — DEXTROMETHORPHAN HYDROBROMIDE AND PROMETHAZINE HYDROCHLORIDE 15; 6.25 MG/5ML; MG/5ML
5 SYRUP ORAL 4 TIMES DAILY PRN
Qty: 140 ML | Refills: 0 | Status: SHIPPED | OUTPATIENT
Start: 2019-07-29 | End: 2019-08-05

## 2019-07-29 RX ORDER — GUAIFENESIN, PSEUDOEPHEDRINE HYDROCHLORIDE 600; 60 MG/1; MG/1
1 TABLET, EXTENDED RELEASE ORAL EVERY 12 HOURS
Qty: 14 TABLET | Refills: 0 | Status: SHIPPED | OUTPATIENT
Start: 2019-07-29 | End: 2019-08-05

## 2019-07-29 RX ORDER — AMOXICILLIN AND CLAVULANATE POTASSIUM 875; 125 MG/1; MG/1
1 TABLET, FILM COATED ORAL ONCE
Status: COMPLETED | OUTPATIENT
Start: 2019-07-29 | End: 2019-07-29

## 2019-07-29 RX ORDER — AMOXICILLIN AND CLAVULANATE POTASSIUM 875; 125 MG/1; MG/1
1 TABLET, FILM COATED ORAL 2 TIMES DAILY
Qty: 20 TABLET | Refills: 0 | Status: SHIPPED | OUTPATIENT
Start: 2019-07-29 | End: 2019-08-08

## 2019-07-29 RX ADMIN — AMOXICILLIN AND CLAVULANATE POTASSIUM 1 TABLET: 875; 125 TABLET, FILM COATED ORAL at 23:00

## 2019-07-29 ASSESSMENT — PAIN DESCRIPTION - PAIN TYPE: TYPE: ACUTE PAIN

## 2019-07-29 ASSESSMENT — ENCOUNTER SYMPTOMS
ABDOMINAL PAIN: 0
SORE THROAT: 1
SHORTNESS OF BREATH: 0
EYE PAIN: 0
RHINORRHEA: 1
TROUBLE SWALLOWING: 0
APNEA: 0
ALLERGIC/IMMUNOLOGIC NEGATIVE: 1
COLOR CHANGE: 0
COUGH: 1

## 2019-07-29 ASSESSMENT — PAIN DESCRIPTION - DESCRIPTORS: DESCRIPTORS: ACHING

## 2019-07-29 ASSESSMENT — PAIN DESCRIPTION - DIRECTION: RADIATING_TOWARDS: ALL OVER BODY ACHES

## 2019-07-29 ASSESSMENT — PAIN SCALES - GENERAL: PAINLEVEL_OUTOF10: 5

## 2019-07-30 NOTE — ED PROVIDER NOTES
Relationships    Social connections:     Talks on phone: None     Gets together: None     Attends Voodoo service: None     Active member of club or organization: None     Attends meetings of clubs or organizations: None     Relationship status: None    Intimate partner violence:     Fear of current or ex partner: None     Emotionally abused: None     Physically abused: None     Forced sexual activity: None   Other Topics Concern    None   Social History Narrative    None       SCREENINGS           PHYSICAL EXAM    (up to 7 forlevel 4, 8 or more for level 5)     ED Triage Vitals [07/29/19 2131]   BP Temp Temp Source Pulse Resp SpO2 Height Weight   (!) 143/93 98.7 °F (37.1 °C) Oral 109 18 96 % 5' 2\" (1.575 m) 218 lb (98.9 kg)       Physical Exam   Constitutional: She is oriented to person, place, and time. She appears well-developed and well-nourished. No distress. HENT:   Head: Normocephalic and atraumatic. Nose: Mucosal edema present. Mouth/Throat: No oropharyngeal exudate. Eyes: Pupils are equal, round, and reactive to light. Conjunctivae and EOM are normal. No scleral icterus. Neck: Normal range of motion. Neck supple. No tracheal deviation present. Cardiovascular: Normal rate, normal heart sounds and intact distal pulses. Pulmonary/Chest: Effort normal and breath sounds normal. No respiratory distress. Abdominal: Soft. Bowel sounds are normal. She exhibits no distension. Musculoskeletal: Normal range of motion. Neurological: She is alert and oriented to person, place, and time. No cranial nerve deficit. She exhibits normal muscle tone. Skin: Skin is warm and dry. No rash noted. She is not diaphoretic. No erythema. Psychiatric: She has a normal mood and affect. Her behavior is normal. Judgment and thought content normal.   Nursing note and vitals reviewed.         DIAGNOSTIC RESULTS     RADIOLOGY:   Non-plain film images such as CT, Ultrasound and MRI are read by the radiologist. days       (Please note that portions of this note were completed with a voice recognition program.  Efforts were made to edit the dictations but occasionally words are mis-transcribed.)    Willodean Nyhan, PA-C Willodean Nyhan, PA-C  07/29/19 1077

## 2019-07-30 NOTE — ED NOTES
Patient up to the bathroom to provide urine sample. Strep test sent to lab.       Teodora Hammonds RN  07/29/19 [id number]

## 2019-08-01 LAB — S PYO THROAT QL CULT: NORMAL

## 2019-08-04 NOTE — ED PROVIDER NOTES
[address] [facility] ED  eMERGENCY dEPARTMENT eNCOUnter   Independent Attestation     Pt Name: Lenora Lee  MRN: [id number]  [place] 1990  Date of evaluation: 8/4/19     Lenora Lee is a 34 y.o. female with CC: Cough (all over aches cough started 2-3 days prior with off and on fevers and diarrhea)      Based on the medical record the care appears appropriate. I was personally available for consultation in the Emergency Department.     Chrissy Owen MD  Attending Emergency Physician                    Chrissy Owen MD  08/04/19 0919

## 2019-08-11 ENCOUNTER — HOSPITAL ENCOUNTER (EMERGENCY)
Age: 29
Discharge: HOME | End: 2019-08-11
Attending: STUDENT IN AN ORGANIZED HEALTH CARE EDUCATION/TRAINING PROGRAM
Payer: COMMERCIAL

## 2019-08-11 ENCOUNTER — APPOINTMENT (OUTPATIENT)
Dept: CT IMAGING | Age: 29
End: 2019-08-11
Payer: COMMERCIAL

## 2019-08-11 VITALS
HEIGHT: 62 IN | OXYGEN SATURATION: 98 % | HEART RATE: 86 BPM | SYSTOLIC BLOOD PRESSURE: 127 MMHG | TEMPERATURE: 98.1 F | DIASTOLIC BLOOD PRESSURE: 67 MMHG | RESPIRATION RATE: 16 BRPM | WEIGHT: 220 LBS | BODY MASS INDEX: 40.48 KG/M2

## 2019-08-11 DIAGNOSIS — E86.0 DEHYDRATION: ICD-10-CM

## 2019-08-11 DIAGNOSIS — B37.31 YEAST VAGINITIS: ICD-10-CM

## 2019-08-11 DIAGNOSIS — J90 PLEURAL EFFUSION: ICD-10-CM

## 2019-08-11 DIAGNOSIS — R55 SYNCOPE, UNSPECIFIED SYNCOPE TYPE: Primary | ICD-10-CM

## 2019-08-11 LAB
ALBUMIN SERPL-MCNC: 4.2 G/DL (ref 3.5–4.6)
ALP BLD-CCNC: 92 U/L (ref 40–130)
ALT SERPL-CCNC: 37 U/L (ref 0–33)
ANION GAP SERPL CALCULATED.3IONS-SCNC: 12 MEQ/L (ref 9–15)
APTT: 23.9 SEC (ref 24.4–36.8)
AST SERPL-CCNC: 22 U/L (ref 0–35)
BASOPHILS ABSOLUTE: 0.1 K/UL (ref 0–0.2)
BASOPHILS RELATIVE PERCENT: 0.8 %
BILIRUB SERPL-MCNC: 0.3 MG/DL (ref 0.2–0.7)
BILIRUBIN URINE: NEGATIVE
BLOOD, URINE: NEGATIVE
BUN BLDV-MCNC: 8 MG/DL (ref 6–20)
C-REACTIVE PROTEIN, HIGH SENSITIVITY: 13 MG/L (ref 0–5)
CALCIUM SERPL-MCNC: 9.3 MG/DL (ref 8.5–9.9)
CHLORIDE BLD-SCNC: 101 MEQ/L (ref 95–107)
CHOLESTEROL, TOTAL: 169 MG/DL (ref 0–199)
CLARITY: ABNORMAL
CO2: 23 MEQ/L (ref 20–31)
COLOR: YELLOW
CREAT SERPL-MCNC: 0.46 MG/DL (ref 0.5–0.9)
EKG ATRIAL RATE: 101 BPM
EKG P AXIS: 6 DEGREES
EKG P-R INTERVAL: 142 MS
EKG Q-T INTERVAL: 356 MS
EKG QRS DURATION: 72 MS
EKG QTC CALCULATION (BAZETT): 461 MS
EKG R AXIS: 51 DEGREES
EKG T AXIS: 38 DEGREES
EKG VENTRICULAR RATE: 101 BPM
EOSINOPHILS ABSOLUTE: 0.3 K/UL (ref 0–0.7)
EOSINOPHILS RELATIVE PERCENT: 2.4 %
GFR AFRICAN AMERICAN: >60
GFR NON-AFRICAN AMERICAN: >60
GLOBULIN: 3.5 G/DL (ref 2.3–3.5)
GLUCOSE BLD-MCNC: 95 MG/DL (ref 70–99)
GLUCOSE URINE: NEGATIVE MG/DL
HCG, URINE, POC: NEGATIVE
HCT VFR BLD CALC: 42.2 % (ref 37–47)
HDLC SERPL-MCNC: 43 MG/DL (ref 40–59)
HEMOGLOBIN: 14.8 G/DL (ref 12–16)
INR BLD: 1
KETONES, URINE: NEGATIVE MG/DL
LACTIC ACID: 1.8 MMOL/L (ref 0.5–2.2)
LDL CHOLESTEROL CALCULATED: 87 MG/DL (ref 0–129)
LEUKOCYTE ESTERASE, URINE: NEGATIVE
LYMPHOCYTES ABSOLUTE: 4.3 K/UL (ref 1–4.8)
LYMPHOCYTES RELATIVE PERCENT: 31.9 %
Lab: NORMAL
MAGNESIUM: 1.8 MG/DL (ref 1.7–2.4)
MCH RBC QN AUTO: 30.2 PG (ref 27–31.3)
MCHC RBC AUTO-ENTMCNC: 35.1 % (ref 33–37)
MCV RBC AUTO: 86 FL (ref 82–100)
MONOCYTES ABSOLUTE: 0.7 K/UL (ref 0.2–0.8)
MONOCYTES RELATIVE PERCENT: 5.4 %
NEGATIVE QC PASS/FAIL: NORMAL
NEUTROPHILS ABSOLUTE: 7.9 K/UL (ref 1.4–6.5)
NEUTROPHILS RELATIVE PERCENT: 59.5 %
NITRITE, URINE: NEGATIVE
PDW BLD-RTO: 13.9 % (ref 11.5–14.5)
PH UA: 5 (ref 5–9)
PLATELET # BLD: 265 K/UL (ref 130–400)
POC CREATININE WHOLE BLOOD: 0.5
POSITIVE QC PASS/FAIL: NORMAL
POTASSIUM SERPL-SCNC: 4.2 MEQ/L (ref 3.4–4.9)
PRO-BNP: 39 PG/ML
PROTEIN UA: NEGATIVE MG/DL
PROTHROMBIN TIME: 13.5 SEC (ref 12.3–14.9)
RAPID INFLUENZA  B AGN: NEGATIVE
RAPID INFLUENZA A AGN: NEGATIVE
RBC # BLD: 4.9 M/UL (ref 4.2–5.4)
SODIUM BLD-SCNC: 136 MEQ/L (ref 135–144)
SPECIFIC GRAVITY UA: 1.03 (ref 1–1.03)
TOTAL CK: 85 U/L (ref 0–170)
TOTAL PROTEIN: 7.7 G/DL (ref 6.3–8)
TRIGL SERPL-MCNC: 194 MG/DL (ref 0–150)
TROPONIN: <0.01 NG/ML (ref 0–0.01)
TSH SERPL DL<=0.05 MIU/L-ACNC: 0.94 UIU/ML (ref 0.44–3.86)
URINE REFLEX TO CULTURE: ABNORMAL
UROBILINOGEN, URINE: 0.2 E.U./DL
WBC # BLD: 13.4 K/UL (ref 4.8–10.8)

## 2019-08-11 PROCEDURE — 87804 INFLUENZA ASSAY W/OPTIC: CPT

## 2019-08-11 PROCEDURE — 84484 ASSAY OF TROPONIN QUANT: CPT

## 2019-08-11 PROCEDURE — 99284 EMERGENCY DEPT VISIT MOD MDM: CPT

## 2019-08-11 PROCEDURE — 80053 COMPREHEN METABOLIC PANEL: CPT

## 2019-08-11 PROCEDURE — 85610 PROTHROMBIN TIME: CPT

## 2019-08-11 PROCEDURE — 96374 THER/PROPH/DIAG INJ IV PUSH: CPT

## 2019-08-11 PROCEDURE — 36415 COLL VENOUS BLD VENIPUNCTURE: CPT

## 2019-08-11 PROCEDURE — 96361 HYDRATE IV INFUSION ADD-ON: CPT

## 2019-08-11 PROCEDURE — 6360000002 HC RX W HCPCS: Performed by: STUDENT IN AN ORGANIZED HEALTH CARE EDUCATION/TRAINING PROGRAM

## 2019-08-11 PROCEDURE — 80061 LIPID PANEL: CPT

## 2019-08-11 PROCEDURE — 83605 ASSAY OF LACTIC ACID: CPT

## 2019-08-11 PROCEDURE — 86141 C-REACTIVE PROTEIN HS: CPT

## 2019-08-11 PROCEDURE — 87040 BLOOD CULTURE FOR BACTERIA: CPT

## 2019-08-11 PROCEDURE — 93005 ELECTROCARDIOGRAM TRACING: CPT | Performed by: STUDENT IN AN ORGANIZED HEALTH CARE EDUCATION/TRAINING PROGRAM

## 2019-08-11 PROCEDURE — 85025 COMPLETE CBC W/AUTO DIFF WBC: CPT

## 2019-08-11 PROCEDURE — 84443 ASSAY THYROID STIM HORMONE: CPT

## 2019-08-11 PROCEDURE — 83880 ASSAY OF NATRIURETIC PEPTIDE: CPT

## 2019-08-11 PROCEDURE — 2580000003 HC RX 258: Performed by: STUDENT IN AN ORGANIZED HEALTH CARE EDUCATION/TRAINING PROGRAM

## 2019-08-11 PROCEDURE — 6360000004 HC RX CONTRAST MEDICATION: Performed by: STUDENT IN AN ORGANIZED HEALTH CARE EDUCATION/TRAINING PROGRAM

## 2019-08-11 PROCEDURE — 81003 URINALYSIS AUTO W/O SCOPE: CPT

## 2019-08-11 PROCEDURE — 71275 CT ANGIOGRAPHY CHEST: CPT

## 2019-08-11 PROCEDURE — 82550 ASSAY OF CK (CPK): CPT

## 2019-08-11 PROCEDURE — 83735 ASSAY OF MAGNESIUM: CPT

## 2019-08-11 PROCEDURE — 85730 THROMBOPLASTIN TIME PARTIAL: CPT

## 2019-08-11 RX ORDER — ACETAMINOPHEN 500 MG
1000 TABLET ORAL EVERY 6 HOURS PRN
Qty: 30 TABLET | Refills: 0 | Status: SHIPPED | OUTPATIENT
Start: 2019-08-11 | End: 2019-10-28

## 2019-08-11 RX ORDER — ECHINACEA PURPUREA EXTRACT 125 MG
1 TABLET ORAL
Qty: 1 BOTTLE | Refills: 0 | Status: SHIPPED | OUTPATIENT
Start: 2019-08-11 | End: 2019-10-28

## 2019-08-11 RX ORDER — KETOROLAC TROMETHAMINE 30 MG/ML
30 INJECTION, SOLUTION INTRAMUSCULAR; INTRAVENOUS ONCE
Status: COMPLETED | OUTPATIENT
Start: 2019-08-11 | End: 2019-08-11

## 2019-08-11 RX ORDER — 0.9 % SODIUM CHLORIDE 0.9 %
1000 INTRAVENOUS SOLUTION INTRAVENOUS ONCE
Status: COMPLETED | OUTPATIENT
Start: 2019-08-11 | End: 2019-08-11

## 2019-08-11 RX ADMIN — KETOROLAC TROMETHAMINE 30 MG: 30 INJECTION, SOLUTION INTRAMUSCULAR at 12:10

## 2019-08-11 RX ADMIN — IOPAMIDOL 100 ML: 612 INJECTION, SOLUTION INTRAVENOUS at 11:52

## 2019-08-11 RX ADMIN — SODIUM CHLORIDE 1000 ML: 9 INJECTION, SOLUTION INTRAVENOUS at 10:59

## 2019-08-11 ASSESSMENT — ENCOUNTER SYMPTOMS
DIARRHEA: 0
BACK PAIN: 0
TROUBLE SWALLOWING: 0
NAUSEA: 0
COUGH: 1
SINUS PRESSURE: 0
VOMITING: 0
RHINORRHEA: 1
CHEST TIGHTNESS: 0
ABDOMINAL PAIN: 0
ANAL BLEEDING: 0
ABDOMINAL DISTENTION: 0
SHORTNESS OF BREATH: 1

## 2019-08-11 ASSESSMENT — PAIN SCALES - GENERAL
PAINLEVEL_OUTOF10: 2
PAINLEVEL_OUTOF10: 5

## 2019-08-11 NOTE — ED PROVIDER NOTES
Endocrine: Negative for polydipsia and polyphagia. Genitourinary: Positive for vaginal discharge. Negative for dysuria, flank pain and frequency. Musculoskeletal: Negative for back pain and myalgias. Skin: Negative for pallor and rash. Neurological: Positive for syncope and light-headedness. Negative for weakness and headaches. Hematological: Does not bruise/bleed easily. All other systems reviewed and are negative. Except as noted above the remainder of the review of systems was reviewed and negative. PAST MEDICAL HISTORY     Past Medical History:   Diagnosis Date    Anxiety     Asthma     Depression     Hypertension          SURGICALHISTORY       Past Surgical History:   Procedure Laterality Date     SECTION      TONSILLECTOMY           CURRENT MEDICATIONS       Previous Medications    ALBUTEROL SULFATE HFA (VENTOLIN HFA) 108 (90 BASE) MCG/ACT INHALER    Inhale 2 puffs into the lungs every 6 hours as needed for Wheezing    APIXABAN (ELIQUIS STARTER PACK) 5 MG TABS TABLET    Take 10 mg (2 tablets) orally twice daily for 7 days, then take 5 mg (1 tablet) orally twice daily thereafter.     ESCITALOPRAM (LEXAPRO) 20 MG TABLET    Take 1 tablet by mouth daily (with breakfast)    MELATONIN 3 MG TABS TABLET    Take 1.5 tablets by mouth nightly    NORETHINDRONE-ETHINYL ESTRADIOL (LOESTRIN FE ) 1-20 MG-MCG PER TABLET    Take 1 tablet by mouth daily    RISPERIDONE (RISPERDAL) 2 MG TABLET    Take 1 tablet by mouth 2 times daily       ALLERGIES     Food    FAMILY HISTORY       Family History   Problem Relation Age of Onset    Depression Father           SOCIAL HISTORY       Social History     Socioeconomic History    Marital status:      Spouse name: None    Number of children: None    Years of education: None    Highest education level: None   Occupational History    None   Social Needs    Financial resource strain: None    Food insecurity:     Worry: None tachycardia 101 bpm, normal axis, there is good R wave transition in precordial leads, QT interval 356 ms, no PVCs. RADIOLOGY:   Non-plain filmimages such as CT, Ultrasound and MRI are read by the radiologist. Plain radiographic images are visualized and preliminarily interpreted by the emergency physician with the below findings:    CTA of the chest (verbal report from Dr. Vinicius Landeros): No pulmonary embolism, no consolidative infiltrate, trace bilateral pleural effusions, no pneumothorax.     Interpretation per the Radiologist below, if available at the time ofthis note:    CTA [address]    (Results Pending)         ED BEDSIDE ULTRASOUND:   Performed by ED Physician - none    LABS:  Labs Reviewed   APTT - Abnormal; Notable for the following components:       Result Value    aPTT 23.9 (*)     All other components within normal limits   CBC WITH AUTO DIFFERENTIAL - Abnormal; Notable for the following components:    WBC 13.4 (*)     Neutrophils # 7.9 (*)     All other components within normal limits   COMPREHENSIVE METABOLIC PANEL - Abnormal; Notable for the following components:    CREATININE 0.46 (*)     ALT 37 (*)     All other components within normal limits   HIGH SENSITIVITY CRP - Abnormal; Notable for the following components:    CRP High Sensitivity 13.0 (*)     All other components within normal limits   LIPID PANEL - Abnormal; Notable for the following components:    Triglycerides 194 (*)     All other components within normal limits   URINE RT REFLEX TO CULTURE - Abnormal; Notable for the following components:    Clarity, UA CLOUDY (*)     All other components within normal limits   POCT CREATININE - Normal   RAPID INFLUENZA A/B ANTIGENS   CULTURE BLOOD #1   CULTURE BLOOD #2   BRAIN NATRIURETIC PEPTIDE   CK   LACTIC ACID, PLASMA   MAGNESIUM   PROTIME-INR   TROPONIN   TSH WITHOUT REFLEX   URINE RT REFLEX TO CULTURE   POC PREGNANCY UR-QUAL       All other labs were within normal range or not returned

## 2019-08-12 LAB
GFR AFRICAN AMERICAN: >60
GFR NON-AFRICAN AMERICAN: >60
PERFORMED ON: ABNORMAL
POC CREATININE: 0.5 MG/DL (ref 0.6–1.1)
POC SAMPLE TYPE: ABNORMAL

## 2019-08-13 PROCEDURE — 93010 ELECTROCARDIOGRAM REPORT: CPT | Performed by: INTERNAL MEDICINE

## 2019-08-16 LAB
BLOOD CULTURE, ROUTINE: NORMAL
CULTURE, BLOOD 2: NORMAL

## 2019-08-20 ENCOUNTER — HOSPITAL ENCOUNTER (EMERGENCY)
Age: 29
Discharge: ANOTHER HOSPITAL | End: 2019-08-21
Payer: COMMERCIAL

## 2019-08-20 DIAGNOSIS — F25.0 SCHIZOAFFECTIVE DISORDER, BIPOLAR TYPE (HCC): Primary | ICD-10-CM

## 2019-08-20 LAB
ACETAMINOPHEN LEVEL: <5 UG/ML (ref 10–30)
ALBUMIN SERPL-MCNC: 3.7 G/DL (ref 3.5–4.6)
ALP BLD-CCNC: 87 U/L (ref 40–130)
ALT SERPL-CCNC: 33 U/L (ref 0–33)
AMPHETAMINE SCREEN, URINE: NORMAL
ANION GAP SERPL CALCULATED.3IONS-SCNC: 11 MEQ/L (ref 9–15)
AST SERPL-CCNC: 17 U/L (ref 0–35)
BARBITURATE SCREEN URINE: NORMAL
BASOPHILS ABSOLUTE: 0.1 K/UL (ref 0–0.2)
BASOPHILS RELATIVE PERCENT: 0.7 %
BENZODIAZEPINE SCREEN, URINE: NORMAL
BILIRUB SERPL-MCNC: <0.2 MG/DL (ref 0.2–0.7)
BILIRUBIN URINE: NEGATIVE
BLOOD, URINE: NEGATIVE
BUN BLDV-MCNC: 12 MG/DL (ref 6–20)
CALCIUM SERPL-MCNC: 9.2 MG/DL (ref 8.5–9.9)
CANNABINOID SCREEN URINE: NORMAL
CHLORIDE BLD-SCNC: 102 MEQ/L (ref 95–107)
CLARITY: ABNORMAL
CO2: 27 MEQ/L (ref 20–31)
COCAINE METABOLITE SCREEN URINE: NORMAL
COLOR: YELLOW
CREAT SERPL-MCNC: 0.55 MG/DL (ref 0.5–0.9)
EOSINOPHILS ABSOLUTE: 0.3 K/UL (ref 0–0.7)
EOSINOPHILS RELATIVE PERCENT: 2.5 %
ETHANOL PERCENT: NORMAL G/DL
ETHANOL: <10 MG/DL (ref 0–0.08)
GFR AFRICAN AMERICAN: >60
GFR NON-AFRICAN AMERICAN: >60
GLOBULIN: 3.2 G/DL (ref 2.3–3.5)
GLUCOSE BLD-MCNC: 124 MG/DL (ref 70–99)
GLUCOSE URINE: NEGATIVE MG/DL
HCG(URINE) PREGNANCY TEST: NEGATIVE
HCT VFR BLD CALC: 39.5 % (ref 37–47)
HEMOGLOBIN: 14 G/DL (ref 12–16)
KETONES, URINE: NEGATIVE MG/DL
LEUKOCYTE ESTERASE, URINE: NEGATIVE
LYMPHOCYTES ABSOLUTE: 3.7 K/UL (ref 1–4.8)
LYMPHOCYTES RELATIVE PERCENT: 35 %
Lab: NORMAL
MCH RBC QN AUTO: 30.9 PG (ref 27–31.3)
MCHC RBC AUTO-ENTMCNC: 35.5 % (ref 33–37)
MCV RBC AUTO: 87.1 FL (ref 82–100)
MONOCYTES ABSOLUTE: 0.7 K/UL (ref 0.2–0.8)
MONOCYTES RELATIVE PERCENT: 6.8 %
NEUTROPHILS ABSOLUTE: 5.8 K/UL (ref 1.4–6.5)
NEUTROPHILS RELATIVE PERCENT: 55 %
NITRITE, URINE: NEGATIVE
OPIATE SCREEN URINE: NORMAL
PDW BLD-RTO: 14 % (ref 11.5–14.5)
PH UA: 6.5 (ref 5–9)
PHENCYCLIDINE SCREEN URINE: NORMAL
PLATELET # BLD: 267 K/UL (ref 130–400)
POTASSIUM SERPL-SCNC: 4.1 MEQ/L (ref 3.4–4.9)
PROTEIN UA: NEGATIVE MG/DL
RBC # BLD: 4.53 M/UL (ref 4.2–5.4)
SALICYLATE, SERUM: <0.3 MG/DL (ref 15–30)
SODIUM BLD-SCNC: 140 MEQ/L (ref 135–144)
SPECIFIC GRAVITY UA: 1.02 (ref 1–1.03)
TOTAL CK: 58 U/L (ref 0–170)
TOTAL PROTEIN: 6.9 G/DL (ref 6.3–8)
TSH SERPL DL<=0.05 MIU/L-ACNC: 0.86 UIU/ML (ref 0.44–3.86)
URINE REFLEX TO CULTURE: ABNORMAL
UROBILINOGEN, URINE: 1 E.U./DL
WBC # BLD: 10.5 K/UL (ref 4.8–10.8)

## 2019-08-20 PROCEDURE — 80307 DRUG TEST PRSMV CHEM ANLYZR: CPT

## 2019-08-20 PROCEDURE — 85025 COMPLETE CBC W/AUTO DIFF WBC: CPT

## 2019-08-20 PROCEDURE — 84443 ASSAY THYROID STIM HORMONE: CPT

## 2019-08-20 PROCEDURE — 84703 CHORIONIC GONADOTROPIN ASSAY: CPT

## 2019-08-20 PROCEDURE — 99284 EMERGENCY DEPT VISIT MOD MDM: CPT

## 2019-08-20 PROCEDURE — 81003 URINALYSIS AUTO W/O SCOPE: CPT

## 2019-08-20 PROCEDURE — 80053 COMPREHEN METABOLIC PANEL: CPT

## 2019-08-20 PROCEDURE — 36415 COLL VENOUS BLD VENIPUNCTURE: CPT

## 2019-08-20 PROCEDURE — G0480 DRUG TEST DEF 1-7 CLASSES: HCPCS

## 2019-08-20 PROCEDURE — 82550 ASSAY OF CK (CPK): CPT

## 2019-08-20 PROCEDURE — 6370000000 HC RX 637 (ALT 250 FOR IP): Performed by: PERSONAL EMERGENCY RESPONSE ATTENDANT

## 2019-08-20 RX ORDER — HYDROXYZINE HYDROCHLORIDE 25 MG/1
50 TABLET, FILM COATED ORAL ONCE
Status: COMPLETED | OUTPATIENT
Start: 2019-08-20 | End: 2019-08-20

## 2019-08-20 RX ADMIN — HYDROXYZINE HYDROCHLORIDE 50 MG: 25 TABLET, FILM COATED ORAL at 21:14

## 2019-08-20 ASSESSMENT — ENCOUNTER SYMPTOMS
ABDOMINAL PAIN: 0
SORE THROAT: 0
EYE PAIN: 0
COUGH: 0
SHORTNESS OF BREATH: 0
NAUSEA: 0
RHINORRHEA: 0
DIARRHEA: 0
PHOTOPHOBIA: 0
VOMITING: 0
BACK PAIN: 0

## 2019-08-20 NOTE — ED PROVIDER NOTES
[address] [facility] ED  EMERGENCY DEPARTMENT ENCOUNTER      Pt Name: Alex Rucker  MRN: [id number]  De[place]fguera 1990  Date of evaluation: 8/20/2019  Provider: Yazmin Griggs MD    CHIEF COMPLAINT       Chief Complaint   Patient presents with    Psychiatric Evaluation     suicidal. brought by [facility]         HISTORY OF PRESENT ILLNESS   (Location/Symptom, Timing/Onset,Context/Setting, Quality, Duration, Modifying Factors, Severity)  Note limiting factors. Alex Rucker is a 34 y.o. female who presents to the emergency department with complaints of anxiety, depression specific plan for suicide. Reports ongoing stressors including the loss of her job was is also been out of her medications for the last week and has not been able to get a hold of her nor  to assist in scheduling an appointment with the [facility] psychiatrist.      Location/Symptom -depression, SI  Onset - 1 week  Context/Setting - as above  Quality - depressed  Duration - 1 week  Modifying Factors - none  Severity - severe        Nursing Notes were reviewed. REVIEW OF SYSTEMS    (2-9 systems for level 4, 10 or more for level 5)     Review of Systems   Constitutional: Negative for chills, diaphoresis, fatigue and fever. HENT: Negative for congestion, rhinorrhea and sore throat. Eyes: Negative for photophobia and pain. Respiratory: Negative for cough and shortness of breath. Cardiovascular: Negative for chest pain and palpitations. Gastrointestinal: Negative for abdominal pain, diarrhea, nausea and vomiting. Genitourinary: Negative for dysuria and flank pain. Musculoskeletal: Negative for back pain. Skin: Negative for rash. Neurological: Negative for dizziness, light-headedness and headaches. Psychiatric/Behavioral: Positive for suicidal ideas. The patient is nervous/anxious. Depressed     All other systems reviewed and are negative.       Except as noted above the remainder of the review of systems was other components within normal limits   ACETAMINOPHEN LEVEL - Abnormal; Notable for the following components:    Acetaminophen Level <5 (*)     All other components within normal limits   CBC WITH AUTO DIFFERENTIAL   CK   ETHANOL   URINE DRUG SCREEN   TSH WITHOUT REFLEX   PREGNANCY, URINE       All other labs were within normal range or not returned as of this dictation. EMERGENCY DEPARTMENT COURSE and DIFFERENTIAL DIAGNOSIS/MDM:   Vitals:    Vitals:    08/20/19 1407 08/20/19 2045 08/21/19 0512 08/21/19 0846   BP: 131/76 127/82  122/83   Pulse: 116 96  100   Resp: 18 18  18   Temp: 98.7 °F (37.1 °C)   97.7 °F (36.5 °C)   TempSrc: Oral      SpO2: 95% 95%  96%   Weight: 218 lb (98.9 kg)  218 lb (98.9 kg)    Height: 5' 2\" (1.575 m)  5' 2\" (1.575 m)             MDM The patient is medically cleared for psychiatric evaluation. CRITICAL CARE TIME       CONSULTS:  None    PROCEDURES:  Unless otherwise noted below, none     Procedures    FINAL IMPRESSION      1. Schizoaffective disorder, bipolar type [facility])          DISPOSITION/PLAN   DISPOSITION Decision To Transfer 08/21/2019 05:12:58 AM      PATIENT REFERRED TO:  No follow-up provider specified.     DISCHARGE MEDICATIONS:  New Prescriptions    No medications on file          (Please notethat portions of this note were completed with a voice recognition program.  Efforts were made to edit the dictations but occasionally words are mis-transcribed.)    Roxanne Sloan MD (electronically signed)  Attending Emergency Physician          Roxanne Sloan MD  08/21/19 9976

## 2019-08-20 NOTE — ED NOTES
Lab called to notify of lab draw.  Electronically signed by Guerita Eng RN on 8/20/2019 at 3:42 PM       Mayur Sellers RN  08/20/19 9515

## 2019-08-20 NOTE — ED NOTES
Provisional Diagnosis:    Schizoaffective bipolar type    Psychosocial and Contextual Factors:    Pt's financee called ambulance after pt cut her left wrist with butter knife. 6 superficial marks noted. No bleeding noted. No sutures required or orders given for care per ED physician. Pt complains that she just lost her job at [employer] this week for \"racking too many points for being [employer]\". Pt admits she has been without her medications from the [facility] for some time because she is having difficulty getting a hold of her CM at facility. C-SSRS Summary:     Patient: C-SSRS Suicide Screening  1) Within the past month, have you wished you were dead or wished you could go to sleep and not wake up? : Yes  2) Have you actually had any thoughts of killing yourself? : Yes(self-harmed today with butter knife)  3) Have you been thinking about how you might kill yourself? : No  4) Have you had these thoughts and had some intention of acting on them? : No  5) Have you started to work out or worked out the details of how to kill yourself? Do you intend to carry out this plan? : No  6) Have you ever done anything, started to do anything, or prepared to do anything to end your life?: Yes(pt self-harmed today with a butter knife superficial lacerations to left wrsit 6 marks)  Did this occur within the past 3 months? : No    Family: N/A    Agency: [facility]    C-SSRS Risk Assessment  Suicidal and Self-Injurious Behavior : Self-injurious behavior without suicidal intent - Past 3 months, Self-injurious behavior without suicidal intent - Lifetime  Suicidal Ideation (Most Severe in Past Month):  Wish to be dead, Suicidal thoughts with method (but without specific plan or intent to act)  Activating Events (Recent): Recent loss(es) or other significant negative event(s) (legal, financial, relationship, etc., Current or pending isolation or feeling alone  Treatment History: Previous psychiatric diagnoses and treatments, Hopeless or

## 2019-08-20 NOTE — ED NOTES
Pt provided urine specimen. Sent to lab.  Lab called for blood work     Jeannie , [place]  08/20/19 3523

## 2019-08-21 VITALS
HEIGHT: 62 IN | WEIGHT: 218 LBS | DIASTOLIC BLOOD PRESSURE: 83 MMHG | OXYGEN SATURATION: 96 % | RESPIRATION RATE: 18 BRPM | SYSTOLIC BLOOD PRESSURE: 122 MMHG | BODY MASS INDEX: 40.12 KG/M2 | HEART RATE: 100 BPM | TEMPERATURE: 97.7 F

## 2019-08-21 NOTE — ED NOTES
Pt in bed with eyes closed. Respirations are even and unlabored. No s/s of distress noted at this time. Will continue to monitor.    Electronically signed by Colby Valera RN on 8/21/2019 at 7:39 AM       Rosemary Finney RN  08/21/19 4387

## 2019-08-21 NOTE — ED NOTES
Called [facility] for report, per Barrett call back in 30 min.  Electronically signed by Josué Kinney RN on 8/21/2019 at 8:04 AM       Matthew Dutta RN  08/21/19 0384

## 2019-09-24 ENCOUNTER — HOSPITAL ENCOUNTER (EMERGENCY)
Age: 29
Discharge: HOME | End: 2019-09-24
Payer: COMMERCIAL

## 2019-09-24 VITALS
BODY MASS INDEX: 40.48 KG/M2 | DIASTOLIC BLOOD PRESSURE: 82 MMHG | HEIGHT: 62 IN | HEART RATE: 85 BPM | WEIGHT: 220 LBS | RESPIRATION RATE: 16 BRPM | OXYGEN SATURATION: 95 % | SYSTOLIC BLOOD PRESSURE: 141 MMHG | TEMPERATURE: 98.2 F

## 2019-09-24 DIAGNOSIS — R10.84 GENERALIZED ABDOMINAL PAIN: Primary | ICD-10-CM

## 2019-09-24 LAB
ALBUMIN SERPL-MCNC: 3.8 G/DL (ref 3.5–4.6)
ALP BLD-CCNC: 76 U/L (ref 40–130)
ALT SERPL-CCNC: 64 U/L (ref 0–33)
ANION GAP SERPL CALCULATED.3IONS-SCNC: 12 MEQ/L (ref 9–15)
AST SERPL-CCNC: 28 U/L (ref 0–35)
BACTERIA: NEGATIVE /HPF
BASOPHILS ABSOLUTE: 0.2 K/UL (ref 0–0.2)
BASOPHILS RELATIVE PERCENT: 1.3 %
BILIRUB SERPL-MCNC: <0.2 MG/DL (ref 0.2–0.7)
BILIRUBIN URINE: NEGATIVE
BLOOD, URINE: NEGATIVE
BUN BLDV-MCNC: 8 MG/DL (ref 6–20)
CALCIUM SERPL-MCNC: 9.3 MG/DL (ref 8.5–9.9)
CHLORIDE BLD-SCNC: 106 MEQ/L (ref 95–107)
CLARITY: CLEAR
CO2: 23 MEQ/L (ref 20–31)
COLOR: YELLOW
CREAT SERPL-MCNC: 0.7 MG/DL (ref 0.5–0.9)
EOSINOPHILS ABSOLUTE: 0.3 K/UL (ref 0–0.7)
EOSINOPHILS RELATIVE PERCENT: 2.3 %
EPITHELIAL CELLS, UA: NORMAL /HPF (ref 0–5)
GFR AFRICAN AMERICAN: >60
GFR NON-AFRICAN AMERICAN: >60
GLOBULIN: 2.9 G/DL (ref 2.3–3.5)
GLUCOSE BLD-MCNC: 96 MG/DL (ref 70–99)
GLUCOSE URINE: NEGATIVE MG/DL
HCG, URINE, POC: NEGATIVE
HCT VFR BLD CALC: 41.7 % (ref 37–47)
HEMOGLOBIN: 14.2 G/DL (ref 12–16)
HYALINE CASTS: NORMAL /HPF (ref 0–5)
KETONES, URINE: NEGATIVE MG/DL
LACTIC ACID: 1.6 MMOL/L (ref 0.5–2.2)
LEUKOCYTE ESTERASE, URINE: ABNORMAL
LIPASE: 28 U/L (ref 12–95)
LYMPHOCYTES ABSOLUTE: 5 K/UL (ref 1–4.8)
LYMPHOCYTES RELATIVE PERCENT: 38.7 %
Lab: NORMAL
MCH RBC QN AUTO: 29.5 PG (ref 27–31.3)
MCHC RBC AUTO-ENTMCNC: 34 % (ref 33–37)
MCV RBC AUTO: 86.8 FL (ref 82–100)
MONOCYTES ABSOLUTE: 0.8 K/UL (ref 0.2–0.8)
MONOCYTES RELATIVE PERCENT: 6.3 %
NEGATIVE QC PASS/FAIL: NORMAL
NEUTROPHILS ABSOLUTE: 6.7 K/UL (ref 1.4–6.5)
NEUTROPHILS RELATIVE PERCENT: 51.4 %
NITRITE, URINE: NEGATIVE
PDW BLD-RTO: 13.5 % (ref 11.5–14.5)
PH UA: 6.5 (ref 5–9)
PLATELET # BLD: 258 K/UL (ref 130–400)
POSITIVE QC PASS/FAIL: NORMAL
POTASSIUM SERPL-SCNC: 3.6 MEQ/L (ref 3.4–4.9)
PROTEIN UA: NEGATIVE MG/DL
RBC # BLD: 4.81 M/UL (ref 4.2–5.4)
RBC UA: NORMAL /HPF (ref 0–5)
SODIUM BLD-SCNC: 141 MEQ/L (ref 135–144)
SPECIFIC GRAVITY UA: 1.01 (ref 1–1.03)
TOTAL PROTEIN: 6.7 G/DL (ref 6.3–8)
URINE REFLEX TO CULTURE: YES
UROBILINOGEN, URINE: 0.2 E.U./DL
WBC # BLD: 13 K/UL (ref 4.8–10.8)
WBC UA: NORMAL /HPF (ref 0–5)

## 2019-09-24 PROCEDURE — 96374 THER/PROPH/DIAG INJ IV PUSH: CPT

## 2019-09-24 PROCEDURE — 83605 ASSAY OF LACTIC ACID: CPT

## 2019-09-24 PROCEDURE — 96375 TX/PRO/DX INJ NEW DRUG ADDON: CPT

## 2019-09-24 PROCEDURE — 2580000003 HC RX 258: Performed by: PHYSICIAN ASSISTANT

## 2019-09-24 PROCEDURE — 36415 COLL VENOUS BLD VENIPUNCTURE: CPT

## 2019-09-24 PROCEDURE — 83690 ASSAY OF LIPASE: CPT

## 2019-09-24 PROCEDURE — 81001 URINALYSIS AUTO W/SCOPE: CPT

## 2019-09-24 PROCEDURE — 6360000002 HC RX W HCPCS: Performed by: PHYSICIAN ASSISTANT

## 2019-09-24 PROCEDURE — 85025 COMPLETE CBC W/AUTO DIFF WBC: CPT

## 2019-09-24 PROCEDURE — 87086 URINE CULTURE/COLONY COUNT: CPT

## 2019-09-24 PROCEDURE — 80053 COMPREHEN METABOLIC PANEL: CPT

## 2019-09-24 PROCEDURE — 99283 EMERGENCY DEPT VISIT LOW MDM: CPT

## 2019-09-24 RX ORDER — ONDANSETRON 4 MG/1
4 TABLET, ORALLY DISINTEGRATING ORAL EVERY 8 HOURS PRN
Qty: 20 TABLET | Refills: 0 | Status: SHIPPED | OUTPATIENT
Start: 2019-09-24 | End: 2019-10-28

## 2019-09-24 RX ORDER — 0.9 % SODIUM CHLORIDE 0.9 %
1000 INTRAVENOUS SOLUTION INTRAVENOUS ONCE
Status: COMPLETED | OUTPATIENT
Start: 2019-09-24 | End: 2019-09-24

## 2019-09-24 RX ORDER — DICYCLOMINE HYDROCHLORIDE 10 MG/1
10 CAPSULE ORAL EVERY 6 HOURS PRN
Qty: 20 CAPSULE | Refills: 0 | Status: SHIPPED | OUTPATIENT
Start: 2019-09-24 | End: 2019-10-28

## 2019-09-24 RX ORDER — HYDROXYZINE PAMOATE 25 MG/1
25 CAPSULE ORAL 2 TIMES DAILY
COMMUNITY
End: 2020-03-20

## 2019-09-24 RX ORDER — KETOROLAC TROMETHAMINE 30 MG/ML
30 INJECTION, SOLUTION INTRAMUSCULAR; INTRAVENOUS ONCE
Status: COMPLETED | OUTPATIENT
Start: 2019-09-24 | End: 2019-09-24

## 2019-09-24 RX ORDER — ONDANSETRON 2 MG/ML
4 INJECTION INTRAMUSCULAR; INTRAVENOUS ONCE
Status: COMPLETED | OUTPATIENT
Start: 2019-09-24 | End: 2019-09-24

## 2019-09-24 RX ADMIN — SODIUM CHLORIDE 1000 ML: 9 INJECTION, SOLUTION INTRAVENOUS at 20:37

## 2019-09-24 RX ADMIN — ONDANSETRON 4 MG: 2 INJECTION INTRAMUSCULAR; INTRAVENOUS at 20:37

## 2019-09-24 RX ADMIN — KETOROLAC TROMETHAMINE 30 MG: 30 INJECTION, SOLUTION INTRAMUSCULAR at 20:37

## 2019-09-24 ASSESSMENT — ENCOUNTER SYMPTOMS
APNEA: 0
EYE PAIN: 0
ABDOMINAL PAIN: 1
NAUSEA: 1
VOMITING: 1
TROUBLE SWALLOWING: 0
SHORTNESS OF BREATH: 0
DIARRHEA: 1
COLOR CHANGE: 0
ALLERGIC/IMMUNOLOGIC NEGATIVE: 1

## 2019-09-24 ASSESSMENT — PAIN SCALES - GENERAL
PAINLEVEL_OUTOF10: 5
PAINLEVEL_OUTOF10: 5

## 2019-09-24 ASSESSMENT — PAIN DESCRIPTION - PAIN TYPE: TYPE: ACUTE PAIN

## 2019-09-24 ASSESSMENT — PAIN DESCRIPTION - LOCATION: LOCATION: ABDOMEN

## 2019-09-25 NOTE — ED PROVIDER NOTES
Her behavior is normal. Judgment and thought content normal.   Nursing note and vitals reviewed. DIAGNOSTIC RESULTS     RADIOLOGY:   Non-plain film images such as CT, Ultrasound and MRI are read by the radiologist. Yvonne Torres radiographic images are visualized and preliminarilyinterpreted by Sondra Mendosa PA-C with the below findings:      Interpretation per the Radiologist below, if available at the time of this note:    No orders to display       LABS:  Labs Reviewed   URINE RT REFLEX TO CULTURE - Abnormal; Notable for the following components:       Result Value    Leukocyte Esterase, Urine MODERATE (*)     All other components within normal limits   COMPREHENSIVE METABOLIC PANEL - Abnormal; Notable for the following components:    ALT 64 (*)     All other components within normal limits   CBC WITH AUTO DIFFERENTIAL - Abnormal; Notable for the following components:    WBC 13.0 (*)     Neutrophils Absolute 6.7 (*)     Lymphocytes Absolute 5.0 (*)     All other components within normal limits   POC PREGNANCY UR-QUAL - Normal   URINE CULTURE   LACTIC ACID, PLASMA   LIPASE   MICROSCOPIC URINALYSIS       All other labs were within normal range or not returnedas of this dictation. EMERGENCYDEPARTMENT COURSE and DIFFERENTIAL DIAGNOSIS/MDM:   Vitals:    Vitals:    09/24/19 1940 09/24/19 2030 09/24/19 2100 09/24/19 2200   BP: (!) 163/99 128/86 132/69 135/75   Pulse: 99 98 96 92   Resp: 18 14 16 14   Temp: 98.2 °F (36.8 °C)      TempSrc: Oral      SpO2: 97% 97% 96% 97%   Weight: 220 lb (99.8 kg)      Height: 5' 2\" (1.575 m)          REASSESSMENT      Patient presents emergency department with a 3-week history of generalized abdominal pain. On exam patient had no tenderness to palpation. Patient has been afebrile and no signs of concerning abnormalities on exam.  Patient will be discharged home with PCP follow-up    MDM    PROCEDURES:    Procedures      FINAL IMPRESSION      1.  Generalized abdominal pain

## 2019-09-26 LAB — URINE CULTURE, ROUTINE: NORMAL

## 2019-10-22 ENCOUNTER — HOSPITAL ENCOUNTER (EMERGENCY)
Age: 29
Discharge: HOME | End: 2019-10-22
Payer: COMMERCIAL

## 2019-10-22 VITALS
HEIGHT: 62 IN | BODY MASS INDEX: 46.01 KG/M2 | TEMPERATURE: 97.8 F | OXYGEN SATURATION: 98 % | RESPIRATION RATE: 20 BRPM | HEART RATE: 86 BPM | DIASTOLIC BLOOD PRESSURE: 101 MMHG | WEIGHT: 250 LBS | SYSTOLIC BLOOD PRESSURE: 148 MMHG

## 2019-10-22 DIAGNOSIS — R10.84 GENERALIZED ABDOMINAL PAIN: ICD-10-CM

## 2019-10-22 DIAGNOSIS — N39.0 URINARY TRACT INFECTION WITHOUT HEMATURIA, SITE UNSPECIFIED: Primary | ICD-10-CM

## 2019-10-22 DIAGNOSIS — Z87.42 HISTORY OF POLYCYSTIC OVARIES: ICD-10-CM

## 2019-10-22 LAB
ALBUMIN SERPL-MCNC: 3.9 G/DL (ref 3.5–4.6)
ALP BLD-CCNC: 72 U/L (ref 40–130)
ALT SERPL-CCNC: 62 U/L (ref 0–33)
ANION GAP SERPL CALCULATED.3IONS-SCNC: 13 MEQ/L (ref 9–15)
AST SERPL-CCNC: 32 U/L (ref 0–35)
BACTERIA: NEGATIVE /HPF
BASOPHILS ABSOLUTE: 0.1 K/UL (ref 0–0.2)
BASOPHILS RELATIVE PERCENT: 1 %
BILIRUB SERPL-MCNC: <0.2 MG/DL (ref 0.2–0.7)
BILIRUBIN URINE: NEGATIVE
BLOOD, URINE: NEGATIVE
BUN BLDV-MCNC: 8 MG/DL (ref 6–20)
CALCIUM SERPL-MCNC: 8.7 MG/DL (ref 8.5–9.9)
CHLORIDE BLD-SCNC: 101 MEQ/L (ref 95–107)
CLARITY: CLEAR
CO2: 22 MEQ/L (ref 20–31)
COLOR: YELLOW
CONTROL: ABNORMAL
CREAT SERPL-MCNC: 0.46 MG/DL (ref 0.5–0.9)
EOSINOPHILS ABSOLUTE: 0.3 K/UL (ref 0–0.7)
EOSINOPHILS RELATIVE PERCENT: 2.7 %
EPITHELIAL CELLS, UA: ABNORMAL /HPF (ref 0–5)
GFR AFRICAN AMERICAN: >60
GFR NON-AFRICAN AMERICAN: >60
GLOBULIN: 3 G/DL (ref 2.3–3.5)
GLUCOSE BLD-MCNC: 118 MG/DL (ref 70–99)
GLUCOSE URINE: NEGATIVE MG/DL
HCG, URINE, POC: NEGATIVE
HCT VFR BLD CALC: 42.1 % (ref 37–47)
HEMOGLOBIN: 14.5 G/DL (ref 12–16)
HYALINE CASTS: ABNORMAL /HPF (ref 0–5)
KETONES, URINE: NEGATIVE MG/DL
LEUKOCYTE ESTERASE, URINE: ABNORMAL
LYMPHOCYTES ABSOLUTE: 4.7 K/UL (ref 1–4.8)
LYMPHOCYTES RELATIVE PERCENT: 35.6 %
Lab: NORMAL
MCH RBC QN AUTO: 29.8 PG (ref 27–31.3)
MCHC RBC AUTO-ENTMCNC: 34.5 % (ref 33–37)
MCV RBC AUTO: 86.2 FL (ref 82–100)
MONOCYTES ABSOLUTE: 1 K/UL (ref 0.2–0.8)
MONOCYTES RELATIVE PERCENT: 7.4 %
NEGATIVE QC PASS/FAIL: NORMAL
NEUTROPHILS ABSOLUTE: 7 K/UL (ref 1.4–6.5)
NEUTROPHILS RELATIVE PERCENT: 53.3 %
NITRITE, URINE: NEGATIVE
PDW BLD-RTO: 13.1 % (ref 11.5–14.5)
PH UA: 6 (ref 5–9)
PLATELET # BLD: 280 K/UL (ref 130–400)
POSITIVE QC PASS/FAIL: NORMAL
POTASSIUM SERPL-SCNC: 3.6 MEQ/L (ref 3.4–4.9)
PREGNANCY TEST URINE, POC: ABNORMAL
PROTEIN UA: 30 MG/DL
RBC # BLD: 4.88 M/UL (ref 4.2–5.4)
RBC UA: ABNORMAL /HPF (ref 0–5)
SODIUM BLD-SCNC: 136 MEQ/L (ref 135–144)
SPECIFIC GRAVITY UA: 1.01 (ref 1–1.03)
TOTAL PROTEIN: 6.9 G/DL (ref 6.3–8)
URINE REFLEX TO CULTURE: YES
UROBILINOGEN, URINE: 0.2 E.U./DL
WBC # BLD: 13.1 K/UL (ref 4.8–10.8)
WBC UA: ABNORMAL /HPF (ref 0–5)

## 2019-10-22 PROCEDURE — 85025 COMPLETE CBC W/AUTO DIFF WBC: CPT

## 2019-10-22 PROCEDURE — 87086 URINE CULTURE/COLONY COUNT: CPT

## 2019-10-22 PROCEDURE — 6360000002 HC RX W HCPCS: Performed by: PHYSICIAN ASSISTANT

## 2019-10-22 PROCEDURE — 80053 COMPREHEN METABOLIC PANEL: CPT

## 2019-10-22 PROCEDURE — 96374 THER/PROPH/DIAG INJ IV PUSH: CPT

## 2019-10-22 PROCEDURE — 36415 COLL VENOUS BLD VENIPUNCTURE: CPT

## 2019-10-22 PROCEDURE — 99284 EMERGENCY DEPT VISIT MOD MDM: CPT

## 2019-10-22 PROCEDURE — 96375 TX/PRO/DX INJ NEW DRUG ADDON: CPT

## 2019-10-22 PROCEDURE — 81001 URINALYSIS AUTO W/SCOPE: CPT

## 2019-10-22 RX ORDER — HYDROCODONE BITARTRATE AND ACETAMINOPHEN 5; 325 MG/1; MG/1
1 TABLET ORAL EVERY 6 HOURS PRN
Qty: 10 TABLET | Refills: 0 | Status: SHIPPED | OUTPATIENT
Start: 2019-10-22 | End: 2019-10-25

## 2019-10-22 RX ORDER — SULFAMETHOXAZOLE AND TRIMETHOPRIM 800; 160 MG/1; MG/1
1 TABLET ORAL 2 TIMES DAILY
Qty: 14 TABLET | Refills: 0 | Status: SHIPPED | OUTPATIENT
Start: 2019-10-22 | End: 2019-10-28

## 2019-10-22 RX ORDER — KETOROLAC TROMETHAMINE 30 MG/ML
30 INJECTION, SOLUTION INTRAMUSCULAR; INTRAVENOUS ONCE
Status: COMPLETED | OUTPATIENT
Start: 2019-10-22 | End: 2019-10-22

## 2019-10-22 RX ORDER — ONDANSETRON 2 MG/ML
4 INJECTION INTRAMUSCULAR; INTRAVENOUS ONCE
Status: COMPLETED | OUTPATIENT
Start: 2019-10-22 | End: 2019-10-22

## 2019-10-22 RX ADMIN — ONDANSETRON 4 MG: 2 INJECTION INTRAMUSCULAR; INTRAVENOUS at 20:13

## 2019-10-22 RX ADMIN — KETOROLAC TROMETHAMINE 30 MG: 30 INJECTION, SOLUTION INTRAMUSCULAR; INTRAVENOUS at 20:13

## 2019-10-22 ASSESSMENT — ENCOUNTER SYMPTOMS
BACK PAIN: 1
NAUSEA: 1
ABDOMINAL PAIN: 1
VOMITING: 0
EYE DISCHARGE: 0
COLOR CHANGE: 0
VOICE CHANGE: 0
PHOTOPHOBIA: 0
APNEA: 0
ANAL BLEEDING: 0
ABDOMINAL DISTENTION: 0

## 2019-10-22 ASSESSMENT — PAIN DESCRIPTION - ORIENTATION: ORIENTATION: RIGHT;LEFT

## 2019-10-22 ASSESSMENT — PAIN DESCRIPTION - LOCATION: LOCATION: ABDOMEN

## 2019-10-22 ASSESSMENT — PAIN SCALES - GENERAL: PAINLEVEL_OUTOF10: 3

## 2019-10-24 LAB — URINE CULTURE, ROUTINE: NORMAL

## 2019-10-28 ENCOUNTER — OFFICE VISIT (OUTPATIENT)
Dept: ENDOCRINOLOGY | Age: 29
End: 2019-10-28
Payer: COMMERCIAL

## 2019-10-28 VITALS
HEIGHT: 62 IN | OXYGEN SATURATION: 96 % | RESPIRATION RATE: 18 BRPM | DIASTOLIC BLOOD PRESSURE: 82 MMHG | BODY MASS INDEX: 46.22 KG/M2 | WEIGHT: 251.2 LBS | HEART RATE: 108 BPM | SYSTOLIC BLOOD PRESSURE: 131 MMHG

## 2019-10-28 DIAGNOSIS — E66.01 MORBID OBESITY (HCC): ICD-10-CM

## 2019-10-28 DIAGNOSIS — R73.03 PRE-DIABETES: Primary | ICD-10-CM

## 2019-10-28 DIAGNOSIS — E88.81 INSULIN RESISTANCE: ICD-10-CM

## 2019-10-28 DIAGNOSIS — E28.2 PCOS (POLYCYSTIC OVARIAN SYNDROME): ICD-10-CM

## 2019-10-28 DIAGNOSIS — E04.9 GOITER: ICD-10-CM

## 2019-10-28 PROBLEM — E88.819 INSULIN RESISTANCE: Status: ACTIVE | Noted: 2019-10-28

## 2019-10-28 LAB
CHP ED QC CHECK: NORMAL
GLUCOSE BLD-MCNC: 170 MG/DL
HBA1C MFR BLD: 5.7 %

## 2019-10-28 PROCEDURE — 83036 HEMOGLOBIN GLYCOSYLATED A1C: CPT | Performed by: INTERNAL MEDICINE

## 2019-10-28 PROCEDURE — 99203 OFFICE O/P NEW LOW 30 MIN: CPT | Performed by: INTERNAL MEDICINE

## 2019-10-28 PROCEDURE — G8484 FLU IMMUNIZE NO ADMIN: HCPCS | Performed by: INTERNAL MEDICINE

## 2019-10-28 PROCEDURE — G8417 CALC BMI ABV UP PARAM F/U: HCPCS | Performed by: INTERNAL MEDICINE

## 2019-10-28 PROCEDURE — 4004F PT TOBACCO SCREEN RCVD TLK: CPT | Performed by: INTERNAL MEDICINE

## 2019-10-28 PROCEDURE — 82962 GLUCOSE BLOOD TEST: CPT | Performed by: INTERNAL MEDICINE

## 2019-10-28 PROCEDURE — G8427 DOCREV CUR MEDS BY ELIG CLIN: HCPCS | Performed by: INTERNAL MEDICINE

## 2019-10-28 RX ORDER — MULTIVIT WITH MINERALS/LUTEIN
250 TABLET ORAL DAILY
COMMUNITY
End: 2021-05-21

## 2019-10-28 RX ORDER — UREA 10 %
800 LOTION (ML) TOPICAL DAILY
COMMUNITY
End: 2020-07-30

## 2019-10-28 ASSESSMENT — ENCOUNTER SYMPTOMS: ROS SKIN COMMENTS: FACIAL HAIR

## 2019-10-29 ENCOUNTER — HOSPITAL ENCOUNTER (OUTPATIENT)
Dept: PULMONOLOGY | Age: 29
Discharge: HOME | End: 2019-10-29
Payer: COMMERCIAL

## 2019-10-29 PROCEDURE — 94070 EVALUATION OF WHEEZING: CPT

## 2019-10-29 PROCEDURE — 94070 EVALUATION OF WHEEZING: CPT | Performed by: INTERNAL MEDICINE

## 2019-10-29 PROCEDURE — 6360000002 HC RX W HCPCS: Performed by: NURSE PRACTITIONER

## 2019-10-29 RX ORDER — ALBUTEROL SULFATE 2.5 MG/3ML
2.5 SOLUTION RESPIRATORY (INHALATION) ONCE
Status: COMPLETED | OUTPATIENT
Start: 2019-10-29 | End: 2019-10-29

## 2019-10-29 RX ADMIN — ALBUTEROL SULFATE 2.5 MG: 2.5 SOLUTION RESPIRATORY (INHALATION) at 11:33

## 2019-11-04 ENCOUNTER — HOSPITAL ENCOUNTER (OUTPATIENT)
Dept: ULTRASOUND IMAGING | Age: 29
Discharge: HOME | End: 2019-11-06
Payer: COMMERCIAL

## 2019-11-04 DIAGNOSIS — N91.2 AMENORRHEA: ICD-10-CM

## 2019-11-04 PROCEDURE — 76830 TRANSVAGINAL US NON-OB: CPT

## 2019-11-04 PROCEDURE — 76856 US EXAM PELVIC COMPLETE: CPT

## 2020-01-02 ENCOUNTER — OFFICE VISIT (OUTPATIENT)
Dept: ENDOCRINOLOGY | Age: 30
End: 2020-01-02
Payer: COMMERCIAL

## 2020-01-02 VITALS
HEIGHT: 62 IN | WEIGHT: 255 LBS | HEART RATE: 109 BPM | SYSTOLIC BLOOD PRESSURE: 122 MMHG | BODY MASS INDEX: 46.93 KG/M2 | DIASTOLIC BLOOD PRESSURE: 80 MMHG

## 2020-01-02 PROCEDURE — 4004F PT TOBACCO SCREEN RCVD TLK: CPT | Performed by: INTERNAL MEDICINE

## 2020-01-02 PROCEDURE — G8428 CUR MEDS NOT DOCUMENT: HCPCS | Performed by: INTERNAL MEDICINE

## 2020-01-02 PROCEDURE — G8417 CALC BMI ABV UP PARAM F/U: HCPCS | Performed by: INTERNAL MEDICINE

## 2020-01-02 PROCEDURE — 99213 OFFICE O/P EST LOW 20 MIN: CPT | Performed by: INTERNAL MEDICINE

## 2020-01-02 PROCEDURE — G8484 FLU IMMUNIZE NO ADMIN: HCPCS | Performed by: INTERNAL MEDICINE

## 2020-01-02 RX ORDER — CABERGOLINE 0.5 MG/1
0.5 TABLET ORAL
Qty: 8 TABLET | Refills: 3 | Status: SHIPPED | OUTPATIENT
Start: 2020-01-02 | End: 2020-08-28

## 2020-01-06 NOTE — PROGRESS NOTES
Subjective:      Patient ID: Matthew Gilbert is a 34 y.o. female. 2 month f/u on obesity /pcos   C/o galactorrhea     Other   This is a chronic (polycystic ovary syndrome/prediabtes ) problem. The current episode started more than 1 year ago. Treatments tried: metformin      Reviewed labs     hyperprolactinemia galactorrhea from Risperdal       Results for Cristian Mccall (MRN [id number]) as of 1/5/2020 22:21   Ref. Range 12/11/2019 10:24   Sodium Latest Ref Range: 135 - 144 mEq/L 138   Potassium Latest Ref Range: 3.4 - 4.9 mEq/L 3.8   Chloride Latest Ref Range: 95 - 107 mEq/L 104   CO2 Latest Ref Range: 20 - 31 mEq/L 21   BUN Latest Ref Range: 6 - 20 mg/dL 7   Creatinine Latest Ref Range: 0.50 - 0.90 mg/dL 0.50   Anion Gap Latest Ref Range: 9 - 15 mEq/L 13   GFR Non- Latest Ref Range: >60  >60.0   GFR African American Latest Ref Range: >60  >60.0   Glucose Latest Ref Range: 70 - 99 mg/dL 137 (H)   Calcium Latest Ref Range: 8.5 - 9.9 mg/dL 8.9   Total Protein Latest Ref Range: 6.3 - 8.0 g/dL 7.3   Cholesterol, Total Latest Ref Range: 0 - 199 mg/dL 118   HDL Cholesterol Latest Ref Range: 40 - 59 mg/dL 37 (L)   LDL Calculated Latest Ref Range: 0 - 129 mg/dL 44   Triglycerides Latest Ref Range: 0 - 150 mg/dL 183 (H)   Albumin Latest Ref Range: 3.5 - 4.6 g/dL 4.0   Globulin Latest Ref Range: 2.3 - 3.5 g/dL 3.3   Alk Phos Latest Ref Range: 40 - 130 U/L 109   ALT Latest Ref Range: 0 - 33 U/L 63 (H)   AST Latest Ref Range: 0 - 35 U/L 31   Bilirubin Latest Ref Range: 0.2 - 0.7 mg/dL 0.3   Bilirubin, Direct Latest Ref Range: 0.0 - 0.4 mg/dL <0.2   Bilirubin, Indirect Latest Ref Range: 0.0 - 0.6 mg/dL see below   Hemoglobin A1C Latest Ref Range: 4.8 - 5.9 % 6.0 (H)   Prolactin Latest Units: ng/mL 36.6   TSH Latest Ref Range: 0.440 - 3.860 uIU/mL 0.933   T4 Free Latest Ref Range: 0.84 - 1.68 ng/dL 1.46           Obesity Body mass index is 46.64 kg/m².     Patient Active Problem List   Diagnosis    MDD (major depressive disorder), recurrent severe, without psychosis [facility]    MDD (major depressive disorder), recurrent, severe, with psychosis [facility]    Schizoaffective disorder, depressive type [facility]    DVT of left axillary vein, acute ([facility]    Insulin resistance    PCOS (polycystic ovarian syndrome)    SOB (shortness of breath)       Allergies   Allergen Reactions    Food Hives and Itching     Squash       Current Outpatient Medications:     Estradiol Cypionate (DEPO-ESTRADIOL IM), Inject into the muscle, Disp: , Rfl:     cabergoline (DOSTINEX) 0.5 MG tablet, Take 1 tablet by mouth Twice a Week, Disp: 8 tablet, Rfl: 3    Ascorbic Acid (VITAMIN C) 250 MG tablet, Take 250 mg by mouth daily, Disp: , Rfl:     Multiple Vitamin (MULTI VITAMIN DAILY PO), Take by mouth, Disp: , Rfl:     folic acid (FOLVITE) 528 MCG tablet, Take 800 mcg by mouth daily, Disp: , Rfl:     metFORMIN (GLUCOPHAGE) 500 MG tablet, Take 1 tablet by mouth 2 times daily (with meals), Disp: 60 tablet, Rfl: 3    hydrOXYzine (VISTARIL) 25 MG capsule, Take 25 mg by mouth 2 times daily, Disp: , Rfl:     escitalopram (LEXAPRO) 20 MG tablet, Take 1 tablet by mouth daily (with breakfast), Disp: 15 tablet, Rfl: 1    risperiDONE (RISPERDAL) 2 MG tablet, Take 1 tablet by mouth 2 times daily, Disp: 30 tablet, Rfl: 1    melatonin 3 MG TABS tablet, Take 1.5 tablets by mouth nightly, Disp: 15 tablet, Rfl: 1    albuterol sulfate HFA (VENTOLIN HFA) 108 (90 Base) MCG/ACT inhaler, Inhale 2 puffs into the lungs every 6 hours as needed for Wheezing, Disp: 1 Inhaler, Rfl: 0    Ferrous Gluconate (IRON 27 PO), Take by mouth, Disp: , Rfl:     norethindrone-ethinyl estradiol (LOESTRIN FE 1/20) 1-20 MG-MCG per tablet, Take 1 tablet by mouth daily (Patient not taking: Reported on 1/2/2020), Disp: 3 packet, Rfl: 4    Review of Systems   Psychiatric/Behavioral: Positive for dysphoric mood. All other systems reviewed and are negative.     Vitals:    01/02/20 1512   BP:

## 2020-01-27 ENCOUNTER — HOSPITAL ENCOUNTER (OUTPATIENT)
Dept: ULTRASOUND IMAGING | Age: 30
Discharge: HOME | End: 2020-01-29
Payer: COMMERCIAL

## 2020-01-27 PROCEDURE — 76536 US EXAM OF HEAD AND NECK: CPT

## 2020-02-05 ENCOUNTER — HOSPITAL ENCOUNTER (OUTPATIENT)
Dept: NUTRITION | Age: 30
Discharge: HOME | End: 2020-02-05
Payer: COMMERCIAL

## 2020-02-05 PROCEDURE — 97802 MEDICAL NUTRITION INDIV IN: CPT

## 2020-02-18 ENCOUNTER — HOSPITAL ENCOUNTER (EMERGENCY)
Age: 30
Discharge: HOME | End: 2020-02-18
Attending: EMERGENCY MEDICINE
Payer: COMMERCIAL

## 2020-02-18 VITALS
OXYGEN SATURATION: 99 % | HEIGHT: 62 IN | DIASTOLIC BLOOD PRESSURE: 82 MMHG | BODY MASS INDEX: 44.35 KG/M2 | SYSTOLIC BLOOD PRESSURE: 129 MMHG | TEMPERATURE: 98.6 F | RESPIRATION RATE: 18 BRPM | HEART RATE: 117 BPM | WEIGHT: 241 LBS

## 2020-02-18 LAB
INFLUENZA A BY PCR: NEGATIVE
INFLUENZA B BY PCR: NEGATIVE

## 2020-02-18 PROCEDURE — 99283 EMERGENCY DEPT VISIT LOW MDM: CPT

## 2020-02-18 PROCEDURE — 6370000000 HC RX 637 (ALT 250 FOR IP): Performed by: EMERGENCY MEDICINE

## 2020-02-18 PROCEDURE — 87502 INFLUENZA DNA AMP PROBE: CPT

## 2020-02-18 RX ORDER — IBUPROFEN 400 MG/1
400 TABLET ORAL ONCE
Status: COMPLETED | OUTPATIENT
Start: 2020-02-18 | End: 2020-02-18

## 2020-02-18 RX ORDER — ONDANSETRON 4 MG/1
4 TABLET, ORALLY DISINTEGRATING ORAL EVERY 8 HOURS PRN
Qty: 20 TABLET | Refills: 0 | Status: SHIPPED | OUTPATIENT
Start: 2020-02-18 | End: 2020-07-30

## 2020-02-18 RX ORDER — ACETAMINOPHEN 325 MG/1
650 TABLET ORAL ONCE
Status: COMPLETED | OUTPATIENT
Start: 2020-02-18 | End: 2020-02-18

## 2020-02-18 RX ADMIN — ACETAMINOPHEN 650 MG: 325 TABLET ORAL at 20:52

## 2020-02-18 RX ADMIN — IBUPROFEN 400 MG: 400 TABLET, FILM COATED ORAL at 20:52

## 2020-02-18 ASSESSMENT — ENCOUNTER SYMPTOMS
BLOOD IN STOOL: 0
ABDOMINAL PAIN: 0
COUGH: 0
BACK PAIN: 0
EYE PAIN: 0
VOMITING: 0
NAUSEA: 1
SINUS PAIN: 0
EYES NEGATIVE: 1
SHORTNESS OF BREATH: 0
DIARRHEA: 0
CHEST TIGHTNESS: 0
ABDOMINAL DISTENTION: 0
SORE THROAT: 0
WHEEZING: 0
EYE DISCHARGE: 0
RESPIRATORY NEGATIVE: 1

## 2020-02-18 ASSESSMENT — PAIN DESCRIPTION - DESCRIPTORS
DESCRIPTORS: ACHING
DESCRIPTORS: ACHING

## 2020-02-18 ASSESSMENT — PAIN SCALES - GENERAL
PAINLEVEL_OUTOF10: 5
PAINLEVEL_OUTOF10: 3
PAINLEVEL_OUTOF10: 5

## 2020-02-18 ASSESSMENT — PAIN DESCRIPTION - PROGRESSION: CLINICAL_PROGRESSION: GRADUALLY IMPROVING

## 2020-02-18 ASSESSMENT — PAIN DESCRIPTION - PAIN TYPE: TYPE: ACUTE PAIN

## 2020-02-18 ASSESSMENT — PAIN DESCRIPTION - ONSET: ONSET: ON-GOING

## 2020-02-18 ASSESSMENT — PAIN DESCRIPTION - LOCATION
LOCATION: RIB CAGE
LOCATION: RIB CAGE

## 2020-02-18 ASSESSMENT — PAIN DESCRIPTION - FREQUENCY
FREQUENCY: INTERMITTENT
FREQUENCY: INTERMITTENT

## 2020-02-19 NOTE — ED PROVIDER NOTES
congestion or rhinorrhea. Eyes:      General: No scleral icterus. Right eye: No discharge. Left eye: No discharge. Conjunctiva/sclera: Conjunctivae normal.      Pupils: Pupils are equal, round, and reactive to light. Neck:      Musculoskeletal: Normal range of motion and neck supple. Cardiovascular:      Rate and Rhythm: Normal rate and regular rhythm. Heart sounds: Normal heart sounds. No murmur. No friction rub. Pulmonary:      Effort: Pulmonary effort is normal. No respiratory distress. Breath sounds: Normal breath sounds. No stridor. No wheezing or rhonchi. Abdominal:      General: Bowel sounds are normal. There is no distension. Palpations: Abdomen is soft. Tenderness: There is no abdominal tenderness. Musculoskeletal: Normal range of motion. General: No tenderness, deformity or signs of injury. Skin:     General: Skin is warm and dry. Neurological:      Mental Status: She is alert and oriented to person, place, and time. Psychiatric:         Behavior: Behavior normal.         DIAGNOSTIC RESULTS     EKG: All EKG's are interpreted by the Emergency Department Physician who either signs or Co-signsthis chart in the absence of a cardiologist.    None    RADIOLOGY:   Non-plain filmimages such as CT, Ultrasound and MRI are read by the radiologist. Plain radiographic images are visualized and preliminarily interpreted by the emergency physician with the below findings:    None    Interpretation per the Radiologist below, if available at the time ofthis note:    No orders to display         ED BEDSIDE ULTRASOUND:   Performed by ED Physician - none    LABS:  Labs Reviewed   RAPID INFLUENZA A/B ANTIGENS       All other labs were within normal range or not returned as of this dictation.     EMERGENCY DEPARTMENT COURSE and DIFFERENTIAL DIAGNOSIS/MDM:   Vitals:    Vitals:    02/18/20 2030   BP: 129/82   Pulse: 117   Resp: 18   Temp: 98.6 °F (37 °C)   TempSrc: Oral   SpO2: 99%   Weight: 241 lb (109.3 kg)   Height: 5' 2\" (1.575 m)       Patient's once a screen is negative the patient is going to be discharged Tylenol or ibuprofen on their own schedules plenty of fluids plenty of rest.    MDM    CRITICAL CARE TIME   Total Critical Care time was 0 minutes, excluding separately reportableprocedures. There was a high probability of clinicallysignificant/life threatening deterioration in the patient's condition which required my urgent intervention. CONSULTS:  None    PROCEDURES:  Unless otherwise noted below, none     Procedures    FINAL IMPRESSION      1.  Acute viral syndrome          DISPOSITION/PLAN   DISPOSITION Decision To Discharge 02/18/2020 09:05:53 PM      PATIENT REFERRED TO:  Danay Callaway  [address] [address] [phone]            DISCHARGE MEDICATIONS:  New Prescriptions    ONDANSETRON (ZOFRAN ODT) 4 MG DISINTEGRATING TABLET    Take 1 tablet by mouth every 8 hours as needed for Nausea          (Please note that portions of this note were completed with a voice recognition program.  Efforts were made to edit the dictations but occasionally words are mis-transcribed.)    Merrill Richardson DO (electronically signed)  Attending Emergency Physician          Merrill Richardson DO  02/18/20 0071

## 2020-02-19 NOTE — ED TRIAGE NOTES
Pt presents to ED with c/o general illness. Per pt, she was diagnosed with the flu 2-3 weeks ago. Pt states she took all meds to completion, however 1.5 weeks ago she began experiencing fever, chills, n/v/d, productive cough associated w/yellow mucous, sob, and rib pain/cp. Upon assessment, pt is A/Ox4, skin p/w/d, resp even and unlabored, msp's intact.

## 2020-03-20 ENCOUNTER — OFFICE VISIT (OUTPATIENT)
Dept: ENDOCRINOLOGY | Age: 30
End: 2020-03-20
Payer: COMMERCIAL

## 2020-03-20 VITALS
WEIGHT: 255 LBS | BODY MASS INDEX: 46.64 KG/M2 | DIASTOLIC BLOOD PRESSURE: 78 MMHG | SYSTOLIC BLOOD PRESSURE: 110 MMHG | HEART RATE: 106 BPM

## 2020-03-20 PROCEDURE — G8417 CALC BMI ABV UP PARAM F/U: HCPCS | Performed by: INTERNAL MEDICINE

## 2020-03-20 PROCEDURE — 99213 OFFICE O/P EST LOW 20 MIN: CPT | Performed by: INTERNAL MEDICINE

## 2020-03-20 PROCEDURE — G8484 FLU IMMUNIZE NO ADMIN: HCPCS | Performed by: INTERNAL MEDICINE

## 2020-03-20 PROCEDURE — G8427 DOCREV CUR MEDS BY ELIG CLIN: HCPCS | Performed by: INTERNAL MEDICINE

## 2020-03-20 PROCEDURE — 4004F PT TOBACCO SCREEN RCVD TLK: CPT | Performed by: INTERNAL MEDICINE

## 2020-03-20 RX ORDER — FLUOXETINE HYDROCHLORIDE 20 MG/1
20 CAPSULE ORAL DAILY
COMMUNITY
Start: 2020-03-02 | End: 2020-11-13 | Stop reason: ALTCHOICE

## 2020-03-20 RX ORDER — ATORVASTATIN CALCIUM 20 MG/1
20 TABLET, FILM COATED ORAL DAILY
COMMUNITY
Start: 2020-01-30 | End: 2020-11-13

## 2020-03-20 RX ORDER — LURASIDONE HYDROCHLORIDE 40 MG/1
40 TABLET, FILM COATED ORAL DAILY
COMMUNITY
Start: 2020-03-02 | End: 2020-11-13

## 2020-03-20 RX ORDER — ESTRADIOL 0.03 MG/D
FILM, EXTENDED RELEASE TRANSDERMAL
COMMUNITY
End: 2020-07-30

## 2020-03-20 RX ORDER — MELATONIN 10 MG
CAPSULE ORAL
COMMUNITY
Start: 2020-03-16

## 2020-03-20 NOTE — PROGRESS NOTES
severe, without [facility]    MDD (major depressive disorder), recurrent, severe, with psychosis ([facility]    Schizoaffective disorder, depressive type ([facility]    DVT of left axillary vein, acute ([facility]    Insulin resistance    PCOS (polycystic ovarian syndrome)    SOB (shortness of breath)       Allergies   Allergen Reactions    Food Hives and Itching     Squash       Current Outpatient Medications:     estradiol 0.025 MG/24HR PTTW, Inject into the muscle, Disp: , Rfl:     atorvastatin (LIPITOR) 20 MG tablet, 20 mg daily, Disp: , Rfl:     FLUoxetine (PROZAC) 20 MG capsule, 20 mg daily, Disp: , Rfl:     LATUDA 40 MG TABS tablet, 40 mg daily, Disp: , Rfl:     Melatonin 10 MG CAPS, , Disp: , Rfl:     ondansetron (ZOFRAN ODT) 4 MG disintegrating tablet, Take 1 tablet by mouth every 8 hours as needed for Nausea, Disp: 20 tablet, Rfl: 0    Estradiol Cypionate (DEPO-ESTRADIOL IM), Inject into the muscle, Disp: , Rfl:     cabergoline (DOSTINEX) 0.5 MG tablet, Take 1 tablet by mouth Twice a Week, Disp: 8 tablet, Rfl: 3    Ferrous Gluconate (IRON 27 PO), Take by mouth, Disp: , Rfl:     Ascorbic Acid (VITAMIN C) 250 MG tablet, Take 250 mg by mouth daily, Disp: , Rfl:     Multiple Vitamin (MULTI VITAMIN DAILY PO), Take by mouth, Disp: , Rfl:     folic acid (FOLVITE) 560 MCG tablet, Take 800 mcg by mouth daily, Disp: , Rfl:     metFORMIN (GLUCOPHAGE) 500 MG tablet, Take 1 tablet by mouth 2 times daily (with meals), Disp: 60 tablet, Rfl: 3    risperiDONE (RISPERDAL) 2 MG tablet, Take 1 tablet by mouth 2 times daily, Disp: 30 tablet, Rfl: 1    albuterol sulfate HFA (VENTOLIN HFA) 108 (90 Base) MCG/ACT inhaler, Inhale 2 puffs into the lungs every 6 hours as needed for Wheezing, Disp: 1 Inhaler, Rfl: 0    norethindrone-ethinyl estradiol (LOESTRIN FE 1/20) 1-20 MG-MCG per tablet, Take 1 tablet by mouth daily, Disp: 3 packet, Rfl: 4    escitalopram (LEXAPRO) 20 MG tablet, Take 1 tablet by mouth daily (with breakfast) (Patient not taking: Reported on 3/20/2020), Disp: 15 tablet, Rfl: 1    Review of Systems   Psychiatric/Behavioral: Positive for dysphoric mood. All other systems reviewed and are negative. Vitals:    03/20/20 1507   BP: 110/78   Pulse: 106   Weight: 255 lb (115.7 kg)       Objective:   Physical Exam  Constitutional:       Appearance: Normal appearance. She is well-developed. She is obese. HENT:      Head: Normocephalic and atraumatic. Neck:      Musculoskeletal: Normal range of motion and neck supple. Thyroid: Thyromegaly present. Cardiovascular:      Rate and Rhythm: Normal rate. Musculoskeletal: Normal range of motion. Neurological:      Mental Status: She is alert. Assessment:       Diagnosis Orders   1. PCOS (polycystic ovarian syndrome)  Basic Metabolic Panel    Hemoglobin A1C   2. Goiter     3. Hyperprolactinemia ([address]     4.  Obstructive sleep apnea syndrome  Kwesi Brown MD, [facility]           Plan:      Orders Placed This Encounter   Procedures    Basic Metabolic Panel     Standing Status:   Future     Standing Expiration Date:   3/20/2021    Hemoglobin A1C     Standing Status:   Future     Standing Expiration Date:   3/20/2021   Kwesi Brown MD, Pulmonology, [facility]     Referral Priority:   Routine     Referral Type:   Eval and Treat     Referral Reason:   Specialty Services Required     Referred to Provider:   Mavis Lomeli MD     Requested Specialty:   Pulmonology     Number of Visits Requested:   1     Continue metformin and Dostinex at current dose     Era Madrid MD

## 2020-04-17 ENCOUNTER — APPOINTMENT (OUTPATIENT)
Dept: GENERAL RADIOLOGY | Age: 30
End: 2020-04-17
Payer: COMMERCIAL

## 2020-04-17 ENCOUNTER — HOSPITAL ENCOUNTER (EMERGENCY)
Age: 30
Discharge: HOME | End: 2020-04-17
Attending: EMERGENCY MEDICINE
Payer: COMMERCIAL

## 2020-04-17 VITALS
HEART RATE: 116 BPM | HEIGHT: 62 IN | WEIGHT: 220 LBS | TEMPERATURE: 98 F | DIASTOLIC BLOOD PRESSURE: 86 MMHG | OXYGEN SATURATION: 98 % | RESPIRATION RATE: 17 BRPM | SYSTOLIC BLOOD PRESSURE: 143 MMHG | BODY MASS INDEX: 40.48 KG/M2

## 2020-04-17 LAB
ALBUMIN SERPL-MCNC: 4.1 G/DL (ref 3.5–4.6)
ALP BLD-CCNC: 93 U/L (ref 40–130)
ALT SERPL-CCNC: 47 U/L (ref 0–33)
ANION GAP SERPL CALCULATED.3IONS-SCNC: 16 MEQ/L (ref 9–15)
AST SERPL-CCNC: 23 U/L (ref 0–35)
ATYPICAL LYMPHOCYTE RELATIVE PERCENT: 4 %
BASOPHILS ABSOLUTE: 0.1 K/UL (ref 0–0.2)
BASOPHILS RELATIVE PERCENT: 1 %
BILIRUB SERPL-MCNC: 0.3 MG/DL (ref 0.2–0.7)
BILIRUBIN URINE: NEGATIVE
BLOOD, URINE: ABNORMAL
BUN BLDV-MCNC: 9 MG/DL (ref 6–20)
CALCIUM SERPL-MCNC: 10.1 MG/DL (ref 8.5–9.9)
CHLORIDE BLD-SCNC: 101 MEQ/L (ref 95–107)
CHP ED QC CHECK: NORMAL
CLARITY: CLEAR
CO2: 22 MEQ/L (ref 20–31)
COLOR: YELLOW
CREAT SERPL-MCNC: 0.62 MG/DL (ref 0.5–0.9)
EOSINOPHILS ABSOLUTE: 0.6 K/UL (ref 0–0.7)
EOSINOPHILS RELATIVE PERCENT: 5 %
EPITHELIAL CELLS, UA: NORMAL /HPF
GFR AFRICAN AMERICAN: >60
GFR NON-AFRICAN AMERICAN: >60
GLOBULIN: 3.3 G/DL (ref 2.3–3.5)
GLUCOSE BLD-MCNC: 112 MG/DL (ref 70–99)
GLUCOSE URINE: NEGATIVE MG/DL
HCT VFR BLD CALC: 46.5 % (ref 37–47)
HEMOGLOBIN: 15.6 G/DL (ref 12–16)
KETONES, URINE: NEGATIVE MG/DL
LEUKOCYTE ESTERASE, URINE: ABNORMAL
LYMPHOCYTES ABSOLUTE: 5.9 K/UL (ref 1–4.8)
LYMPHOCYTES RELATIVE PERCENT: 42 %
MCH RBC QN AUTO: 29 PG (ref 27–31.3)
MCHC RBC AUTO-ENTMCNC: 33.5 % (ref 33–37)
MCV RBC AUTO: 86.6 FL (ref 82–100)
MONOCYTES ABSOLUTE: 0.4 K/UL (ref 0.2–0.8)
MONOCYTES RELATIVE PERCENT: 2.9 %
NEUTROPHILS ABSOLUTE: 5.9 K/UL (ref 1.4–6.5)
NEUTROPHILS RELATIVE PERCENT: 46 %
NITRITE, URINE: NEGATIVE
PDW BLD-RTO: 14 % (ref 11.5–14.5)
PH UA: 6 (ref 5–9)
PLATELET # BLD: 289 K/UL (ref 130–400)
PLATELET SLIDE REVIEW: NORMAL
POTASSIUM SERPL-SCNC: 4.1 MEQ/L (ref 3.4–4.9)
PREGNANCY TEST URINE, POC: NEGATIVE
PROTEIN UA: NEGATIVE MG/DL
RBC # BLD: 5.37 M/UL (ref 4.2–5.4)
RBC # BLD: NORMAL 10*6/UL
RBC UA: NORMAL /HPF (ref 0–2)
SLIDE REVIEW: ABNORMAL
SODIUM BLD-SCNC: 139 MEQ/L (ref 135–144)
SPECIFIC GRAVITY UA: 1.01 (ref 1–1.03)
TOTAL PROTEIN: 7.4 G/DL (ref 6.3–8)
UROBILINOGEN, URINE: 0.2 E.U./DL
WBC # BLD: 12.9 K/UL (ref 4.8–10.8)
WBC UA: NORMAL /HPF (ref 0–5)

## 2020-04-17 PROCEDURE — 96361 HYDRATE IV INFUSION ADD-ON: CPT

## 2020-04-17 PROCEDURE — 6360000002 HC RX W HCPCS: Performed by: EMERGENCY MEDICINE

## 2020-04-17 PROCEDURE — 81003 URINALYSIS AUTO W/O SCOPE: CPT

## 2020-04-17 PROCEDURE — 85025 COMPLETE CBC W/AUTO DIFF WBC: CPT

## 2020-04-17 PROCEDURE — 99284 EMERGENCY DEPT VISIT MOD MDM: CPT

## 2020-04-17 PROCEDURE — 96375 TX/PRO/DX INJ NEW DRUG ADDON: CPT

## 2020-04-17 PROCEDURE — 36415 COLL VENOUS BLD VENIPUNCTURE: CPT

## 2020-04-17 PROCEDURE — 72110 X-RAY EXAM L-2 SPINE 4/>VWS: CPT

## 2020-04-17 PROCEDURE — 80053 COMPREHEN METABOLIC PANEL: CPT

## 2020-04-17 PROCEDURE — 2580000003 HC RX 258: Performed by: EMERGENCY MEDICINE

## 2020-04-17 PROCEDURE — 96374 THER/PROPH/DIAG INJ IV PUSH: CPT

## 2020-04-17 RX ORDER — ONDANSETRON 2 MG/ML
4 INJECTION INTRAMUSCULAR; INTRAVENOUS ONCE
Status: COMPLETED | OUTPATIENT
Start: 2020-04-17 | End: 2020-04-17

## 2020-04-17 RX ORDER — MELOXICAM 15 MG/1
15 TABLET ORAL DAILY PRN
Qty: 90 TABLET | Refills: 1 | Status: SHIPPED | OUTPATIENT
Start: 2020-04-17 | End: 2020-07-30

## 2020-04-17 RX ORDER — CYCLOBENZAPRINE HCL 10 MG
10 TABLET ORAL NIGHTLY PRN
Qty: 10 TABLET | Refills: 0 | Status: SHIPPED | OUTPATIENT
Start: 2020-04-17 | End: 2020-04-27

## 2020-04-17 RX ORDER — KETOROLAC TROMETHAMINE 30 MG/ML
30 INJECTION, SOLUTION INTRAMUSCULAR; INTRAVENOUS ONCE
Status: COMPLETED | OUTPATIENT
Start: 2020-04-17 | End: 2020-04-17

## 2020-04-17 RX ORDER — 0.9 % SODIUM CHLORIDE 0.9 %
1000 INTRAVENOUS SOLUTION INTRAVENOUS ONCE
Status: COMPLETED | OUTPATIENT
Start: 2020-04-17 | End: 2020-04-17

## 2020-04-17 RX ORDER — MORPHINE SULFATE 2 MG/ML
4 INJECTION, SOLUTION INTRAMUSCULAR; INTRAVENOUS
Status: DISCONTINUED | OUTPATIENT
Start: 2020-04-17 | End: 2020-04-17 | Stop reason: HOSPADM

## 2020-04-17 RX ADMIN — SODIUM CHLORIDE 1000 ML: 9 INJECTION, SOLUTION INTRAVENOUS at 18:03

## 2020-04-17 RX ADMIN — ONDANSETRON 4 MG: 2 INJECTION INTRAMUSCULAR; INTRAVENOUS at 18:03

## 2020-04-17 RX ADMIN — MORPHINE SULFATE 4 MG: 2 INJECTION, SOLUTION INTRAMUSCULAR; INTRAVENOUS at 18:03

## 2020-04-17 RX ADMIN — KETOROLAC TROMETHAMINE 30 MG: 30 INJECTION, SOLUTION INTRAMUSCULAR at 18:04

## 2020-04-17 ASSESSMENT — PAIN SCALES - GENERAL
PAINLEVEL_OUTOF10: 9
PAINLEVEL_OUTOF10: 9

## 2020-04-17 ASSESSMENT — PAIN DESCRIPTION - ORIENTATION: ORIENTATION: LOWER

## 2020-04-17 ASSESSMENT — ENCOUNTER SYMPTOMS
ABDOMINAL PAIN: 0
SORE THROAT: 0
COUGH: 0
DIARRHEA: 0
NAUSEA: 0
SHORTNESS OF BREATH: 0
BACK PAIN: 1
VOMITING: 0

## 2020-04-17 ASSESSMENT — PAIN DESCRIPTION - ONSET: ONSET: ON-GOING

## 2020-04-17 ASSESSMENT — PAIN DESCRIPTION - DESCRIPTORS: DESCRIPTORS: PATIENT UNABLE TO DESCRIBE

## 2020-04-17 ASSESSMENT — PAIN DESCRIPTION - FREQUENCY: FREQUENCY: CONTINUOUS

## 2020-04-17 ASSESSMENT — PAIN DESCRIPTION - LOCATION: LOCATION: BACK

## 2020-04-17 ASSESSMENT — PAIN DESCRIPTION - PAIN TYPE: TYPE: ACUTE PAIN

## 2020-04-17 NOTE — ED NOTES
ptr asking for a \"turkey wrap\"   Told  Patient she cant have one till we are finished with her tests      Fox Null RN  04/17/20 8116

## 2020-04-17 NOTE — ED NOTES
Pt messaging on phone  While being brought back from xray   No distress noted      Fox Cortez, RN  04/17/20 1980

## 2020-04-17 NOTE — ED PROVIDER NOTES
(DOSTINEX) 0.5 MG TABLET    Take 1 tablet by mouth Twice a Week    ESCITALOPRAM (LEXAPRO) 20 MG TABLET    Take 1 tablet by mouth daily (with breakfast)    ESTRADIOL 0.025 MG/24HR PTTW    Inject into the muscle    ESTRADIOL CYPIONATE (DEPO-ESTRADIOL IM)    Inject into the muscle    FERROUS GLUCONATE (IRON 27 PO)    Take by mouth    FLUOXETINE (PROZAC) 20 MG CAPSULE    20 mg daily    FOLIC ACID (FOLVITE) 630 MCG TABLET    Take 800 mcg by mouth daily    LATUDA 40 MG TABS TABLET    40 mg daily    MELATONIN 10 MG CAPS        METFORMIN (GLUCOPHAGE) 500 MG TABLET    take 1 tablet by mouth twice a day with food    MULTIPLE VITAMIN (MULTI VITAMIN DAILY PO)    Take by mouth    NORETHINDRONE-ETHINYL ESTRADIOL (LOESTRIN FE 1/20) 1-20 MG-MCG PER TABLET    Take 1 tablet by mouth daily    ONDANSETRON (ZOFRAN ODT) 4 MG DISINTEGRATING TABLET    Take 1 tablet by mouth every 8 hours as needed for Nausea    RISPERIDONE (RISPERDAL) 2 MG TABLET    Take 1 tablet by mouth 2 times daily       ALLERGIES     Food    FAMILY HISTORY       Family History   Problem Relation Age of Onset    Depression Father     Diabetes Mother           SOCIAL HISTORY       Social History     Socioeconomic History    Marital status:      Spouse name: None    Number of children: None    Years of education: None    Highest education level: None   Occupational History    None   Social Needs    Financial resource strain: None    Food insecurity     Worry: None     Inability: None    Transportation needs     Medical: None     Non-medical: None   Tobacco Use    Smoking status: Current Every Day Smoker     Packs/day: 0.25     Years: 12.00     Pack years: 3.00     Types: Cigarettes    Smokeless tobacco: Never Used    Tobacco comment: 3 cigs/day   Substance and Sexual Activity    Alcohol use: Yes     Comment: \"once in a while\"    Drug use: Not Currently     Types: Marijuana     Comment: every 3-4 months    Sexual activity: Yes     Partners: Male suspicion for cord compression. Pt given 1 L NS, IV morphine, IV zofran, IV toradol with moderate relief. Labs remarkable for WBC 12. XR of lumbar spine negative. Pt likely with msk pain. Pt reassessed and feels completely better. Pt tolerating PO in the ED. Pt educated about the results and msk back pain. Pt given prescription for mobic, flexeril. Pt given back pain warning signs and will f/u with pcp. Pt understands plan. FINAL IMPRESSION      1.  Strain of lumbar region, initial encounter          DISPOSITION/PLAN   DISPOSITION Decision To Discharge 04/17/2020 06:36:31 PM        DISCHARGE MEDICATIONS:  [unfilled]         Noé Justice MD(electronically signed)  Attending Emergency Physician            Noé Justice MD  04/17/20 5945

## 2020-04-17 NOTE — ED NOTES
Dr Wisam Louie at bedside     [address] [facility], [address] [place] [address]  04/17/20 1874

## 2020-04-18 RX ORDER — MELOXICAM 15 MG/1
15 TABLET ORAL DAILY PRN
Qty: 90 TABLET | Refills: 1 | Status: SHIPPED | OUTPATIENT
Start: 2020-04-18 | End: 2020-07-30

## 2020-04-18 RX ORDER — CYCLOBENZAPRINE HCL 10 MG
10 TABLET ORAL NIGHTLY PRN
Qty: 10 TABLET | Refills: 0 | Status: SHIPPED | OUTPATIENT
Start: 2020-04-18 | End: 2020-04-28

## 2020-05-30 ENCOUNTER — HOSPITAL ENCOUNTER (EMERGENCY)
Age: 30
Discharge: HOME | End: 2020-05-31
Attending: NEUROMUSCULOSKELETAL MEDICINE, SPORTS MEDICINE
Payer: COMMERCIAL

## 2020-05-30 ENCOUNTER — APPOINTMENT (OUTPATIENT)
Dept: GENERAL RADIOLOGY | Age: 30
End: 2020-05-30
Payer: COMMERCIAL

## 2020-05-30 VITALS
BODY MASS INDEX: 46.74 KG/M2 | OXYGEN SATURATION: 95 % | HEIGHT: 62 IN | WEIGHT: 254 LBS | DIASTOLIC BLOOD PRESSURE: 85 MMHG | HEART RATE: 99 BPM | RESPIRATION RATE: 18 BRPM | SYSTOLIC BLOOD PRESSURE: 119 MMHG | TEMPERATURE: 98.5 F

## 2020-05-30 PROCEDURE — 73610 X-RAY EXAM OF ANKLE: CPT

## 2020-05-30 PROCEDURE — 73630 X-RAY EXAM OF FOOT: CPT

## 2020-05-30 PROCEDURE — 99283 EMERGENCY DEPT VISIT LOW MDM: CPT

## 2020-05-30 ASSESSMENT — PAIN DESCRIPTION - FREQUENCY: FREQUENCY: INTERMITTENT

## 2020-05-30 ASSESSMENT — PAIN DESCRIPTION - LOCATION: LOCATION: ANKLE

## 2020-05-30 ASSESSMENT — PAIN DESCRIPTION - DESCRIPTORS: DESCRIPTORS: DISCOMFORT

## 2020-05-30 ASSESSMENT — PAIN DESCRIPTION - ONSET: ONSET: ON-GOING

## 2020-05-30 ASSESSMENT — PAIN DESCRIPTION - ORIENTATION: ORIENTATION: LEFT

## 2020-05-30 ASSESSMENT — PAIN SCALES - GENERAL: PAINLEVEL_OUTOF10: 3

## 2020-05-30 ASSESSMENT — PAIN DESCRIPTION - PAIN TYPE: TYPE: ACUTE PAIN

## 2020-05-31 PROCEDURE — 6370000000 HC RX 637 (ALT 250 FOR IP): Performed by: NEUROMUSCULOSKELETAL MEDICINE, SPORTS MEDICINE

## 2020-05-31 RX ORDER — IBUPROFEN 400 MG/1
400 TABLET ORAL ONCE
Status: COMPLETED | OUTPATIENT
Start: 2020-05-31 | End: 2020-05-31

## 2020-05-31 RX ORDER — IBUPROFEN 400 MG/1
400 TABLET ORAL EVERY 6 HOURS PRN
Qty: 120 TABLET | Refills: 0 | Status: SHIPPED | OUTPATIENT
Start: 2020-05-31 | End: 2020-11-13

## 2020-05-31 RX ADMIN — IBUPROFEN 400 MG: 400 TABLET, FILM COATED ORAL at 00:22

## 2020-05-31 ASSESSMENT — PAIN SCALES - GENERAL: PAINLEVEL_OUTOF10: 5

## 2020-05-31 NOTE — ED PROVIDER NOTES
tremor, vertigo, dizziness, memory loss, syncope. Skin: No rash, hair loss or abnormal pigmentation. Psychiatric: negative for sleep disturbance, mood disorder and recent psychosocial stressors  Hematology/Lymphology: Negative for prolonged bleeding, bruising easily or swollen nodes. Endocrine: No polyuria, polydipsia, cold or heat intolerance. All other 14-point systems reviewed and negative except for HPI. Except as noted above the remainder of the review of systems was reviewed and negative.        PAST MEDICAL HISTORY     Past Medical History:   Diagnosis Date    Anxiety     Asthma     Depression     DVT (deep venous thrombosis) (HCC)     Hypertension          SURGICAL HISTORY       Past Surgical History:   Procedure Laterality Date     SECTION      TONSILLECTOMY           CURRENTMEDICATIONS       Previous Medications    ALBUTEROL SULFATE HFA (VENTOLIN HFA) 108 (90 BASE) MCG/ACT INHALER    Inhale 2 puffs into the lungs every 6 hours as needed for Wheezing    ASCORBIC ACID (VITAMIN C) 250 MG TABLET    Take 250 mg by mouth daily    ATORVASTATIN (LIPITOR) 20 MG TABLET    20 mg daily    CABERGOLINE (DOSTINEX) 0.5 MG TABLET    Take 1 tablet by mouth Twice a Week    ESCITALOPRAM (LEXAPRO) 20 MG TABLET    Take 1 tablet by mouth daily (with breakfast)    ESTRADIOL 0.025 MG/24HR PTTW    Inject into the muscle    ESTRADIOL CYPIONATE (DEPO-ESTRADIOL IM)    Inject into the muscle    FERROUS GLUCONATE (IRON 27 PO)    Take by mouth    FLUOXETINE (PROZAC) 20 MG CAPSULE    20 mg daily    FOLIC ACID (FOLVITE) 451 MCG TABLET    Take 800 mcg by mouth daily    LATUDA 40 MG TABS TABLET    40 mg daily    MELATONIN 10 MG CAPS        MELOXICAM (MOBIC) 15 MG TABLET    Take 1 tablet by mouth daily as needed for Pain    MELOXICAM (MOBIC) 15 MG TABLET    Take 1 tablet by mouth daily as needed for Pain    METFORMIN (GLUCOPHAGE) 500 MG TABLET    take 1 tablet by mouth twice a day with food    MULTIPLE VITAMIN (MULTI PM        DISCHARGE MEDICATIONS:  Grecia Islas MD(electronically signed)  Attending Emergency Physician            Traci Santos MD  05/31/20 0006

## 2020-07-11 ENCOUNTER — HOSPITAL ENCOUNTER (EMERGENCY)
Age: 30
Discharge: HOME | End: 2020-07-11
Payer: COMMERCIAL

## 2020-07-11 LAB
HCG, URINE, POC: NEGATIVE
Lab: NORMAL
NEGATIVE QC PASS/FAIL: NORMAL
POSITIVE QC PASS/FAIL: NORMAL

## 2020-07-11 PROCEDURE — 6370000000 HC RX 637 (ALT 250 FOR IP)

## 2020-07-11 PROCEDURE — 99282 EMERGENCY DEPT VISIT SF MDM: CPT

## 2020-07-11 RX ORDER — SULFAMETHOXAZOLE AND TRIMETHOPRIM 800; 160 MG/1; MG/1
TABLET ORAL
Status: COMPLETED
Start: 2020-07-11 | End: 2020-07-11

## 2020-07-11 RX ORDER — CEPHALEXIN 500 MG/1
CAPSULE ORAL
Status: COMPLETED
Start: 2020-07-11 | End: 2020-07-11

## 2020-07-11 RX ADMIN — CEPHALEXIN: 500 CAPSULE ORAL at 01:40

## 2020-07-11 RX ADMIN — SULFAMETHOXAZOLE AND TRIMETHOPRIM: 800; 160 TABLET ORAL at 01:40

## 2020-07-17 ENCOUNTER — OFFICE VISIT (OUTPATIENT)
Dept: FAMILY MEDICINE CLINIC | Age: 30
End: 2020-07-17
Payer: COMMERCIAL

## 2020-07-17 VITALS
SYSTOLIC BLOOD PRESSURE: 122 MMHG | OXYGEN SATURATION: 98 % | DIASTOLIC BLOOD PRESSURE: 64 MMHG | RESPIRATION RATE: 18 BRPM | TEMPERATURE: 97 F | HEART RATE: 96 BPM

## 2020-07-17 PROBLEM — Z20.822 COVID-19 RULED OUT: Status: ACTIVE | Noted: 2020-07-17

## 2020-07-17 PROBLEM — K52.9 ACUTE GASTROENTERITIS: Status: ACTIVE | Noted: 2020-07-17

## 2020-07-17 PROCEDURE — 4004F PT TOBACCO SCREEN RCVD TLK: CPT | Performed by: NURSE PRACTITIONER

## 2020-07-17 PROCEDURE — 99213 OFFICE O/P EST LOW 20 MIN: CPT | Performed by: NURSE PRACTITIONER

## 2020-07-17 PROCEDURE — G8417 CALC BMI ABV UP PARAM F/U: HCPCS | Performed by: NURSE PRACTITIONER

## 2020-07-17 PROCEDURE — G8428 CUR MEDS NOT DOCUMENT: HCPCS | Performed by: NURSE PRACTITIONER

## 2020-07-17 ASSESSMENT — ENCOUNTER SYMPTOMS
ABDOMINAL DISTENTION: 1
FACIAL SWELLING: 0
CHEST TIGHTNESS: 0
DIARRHEA: 1
CONSTIPATION: 0
NAUSEA: 1
BACK PAIN: 0
WHEEZING: 0
SORE THROAT: 0
EYE DISCHARGE: 0
ABDOMINAL PAIN: 0
SINUS PRESSURE: 0
SHORTNESS OF BREATH: 0
EYE REDNESS: 0
COUGH: 0

## 2020-07-17 NOTE — PROGRESS NOTES
Subjective:      Patient ID: Roxene Mortimer is a 31 y.o. female who presents today for:  Chief Complaint   Patient presents with    Concern For COVID-19     x3 days getting worse. diarrhea, sob, hot and cold chills, sweats, body stiffness.         HPI    Past Medical History:   Diagnosis Date    Anxiety     Asthma     Depression     DVT (deep venous thrombosis) (formerly [facility])     Hypertension      Past Surgical History:   Procedure Laterality Date     SECTION      TONSILLECTOMY       Family History   Problem Relation Age of Onset    Depression Father     Diabetes Mother      Social History     Socioeconomic History    Marital status:      Spouse name: Not on file    Number of children: Not on file    Years of education: Not on file    Highest education level: Not on file   Occupational History    Not on file   Social Needs    Financial resource strain: Not on file    Food insecurity     Worry: Not on file     Inability: Not on file    Transportation needs     Medical: Not on file     Non-medical: Not on file   Tobacco Use    Smoking status: Current Every Day Smoker     Packs/day: 0.25     Years: 12.00     Pack years: 3.00     Types: Cigarettes    Smokeless tobacco: Never Used    Tobacco comment: 3 cigs/day   Substance and Sexual Activity    Alcohol use: Yes     Comment: \"once in a while\"    Drug use: Not Currently     Types: Marijuana     Comment: every 3-4 months    Sexual activity: Yes     Partners: Male   Lifestyle    Physical activity     Days per week: Not on file     Minutes per session: Not on file    Stress: Not on file   Relationships    Social connections     Talks on phone: Not on file     Gets together: Not on file     Attends Zoroastrianism service: Not on file     Active member of club or organization: Not on file     Attends meetings of clubs or organizations: Not on file     Relationship status: Not on file    Intimate partner violence     Fear of current or ex partner: Not on file     Emotionally abused: Not on file     Physically abused: Not on file     Forced sexual activity: Not on file   Other Topics Concern    Not on file   Social History Narrative    Not on file     Current Outpatient Medications on File Prior to Visit   Medication Sig Dispense Refill    VITAMIN D PO Take by mouth      metFORMIN (GLUCOPHAGE) 500 MG tablet take 1 tablet by mouth twice a day with food 60 tablet 3    atorvastatin (LIPITOR) 20 MG tablet 20 mg daily      FLUoxetine (PROZAC) 20 MG capsule 20 mg daily      LATUDA 40 MG TABS tablet 40 mg daily      Melatonin 10 MG CAPS       ondansetron (ZOFRAN ODT) 4 MG disintegrating tablet Take 1 tablet by mouth every 8 hours as needed for Nausea 20 tablet 0    cabergoline (DOSTINEX) 0.5 MG tablet Take 1 tablet by mouth Twice a Week 8 tablet 3    Ferrous Gluconate (IRON 27 PO) Take by mouth      Ascorbic Acid (VITAMIN C) 250 MG tablet Take 250 mg by mouth daily      Multiple Vitamin (MULTI VITAMIN DAILY PO) Take by mouth      folic acid (FOLVITE) 566 MCG tablet Take 800 mcg by mouth daily      escitalopram (LEXAPRO) 20 MG tablet Take 1 tablet by mouth daily (with breakfast) 15 tablet 1    risperiDONE (RISPERDAL) 2 MG tablet Take 1 tablet by mouth 2 times daily 30 tablet 1    albuterol sulfate HFA (VENTOLIN HFA) 108 (90 Base) MCG/ACT inhaler Inhale 2 puffs into the lungs every 6 hours as needed for Wheezing 1 Inhaler 0    norethindrone-ethinyl estradiol (LOESTRIN FE 1/20) 1-20 MG-MCG per tablet Take 1 tablet by mouth daily 3 packet 4    ibuprofen (IBU) 400 MG tablet Take 1 tablet by mouth every 6 hours as needed for Pain (Patient not taking: Reported on 7/17/2020) 120 tablet 0    meloxicam (MOBIC) 15 MG tablet Take 1 tablet by mouth daily as needed for Pain (Patient not taking: Reported on 7/17/2020) 90 tablet 1    meloxicam (MOBIC) 15 MG tablet Take 1 tablet by mouth daily as needed for Pain (Patient not taking: Reported on 7/17/2020) 90 tablet 1    estradiol 0.025 MG/24HR PTTW Inject into the muscle      Estradiol Cypionate (DEPO-ESTRADIOL IM) Inject into the muscle       No current facility-administered medications on file prior to visit.      :  Food    Review of Systems   Constitutional: Positive for chills and fatigue. Negative for appetite change, diaphoresis and fever. HENT: Negative for congestion, dental problem, ear discharge, ear pain, facial swelling, mouth sores, postnasal drip, sinus pressure and sore throat. Eyes: Negative for discharge and redness. Respiratory: Negative for cough, chest tightness, shortness of breath and wheezing. Cardiovascular: Negative for chest pain, palpitations and leg swelling. Gastrointestinal: Positive for abdominal distention, diarrhea and nausea. Negative for abdominal pain and constipation. Endocrine: Negative for cold intolerance and heat intolerance. Genitourinary: Negative for difficulty urinating, dysuria, flank pain, pelvic pain and urgency. Musculoskeletal: Negative for arthralgias, back pain, gait problem and joint swelling. Skin: Negative. Neurological: Negative for dizziness, seizures, syncope, weakness, numbness and headaches. Psychiatric/Behavioral: Negative for agitation, behavioral problems, confusion and dysphoric mood. Objective:   /64 (Site: Left Upper Arm, Position: Sitting, Cuff Size: Large Adult)   Pulse 96   Temp 97 °F (36.1 °C) (Tympanic)   Resp 18   SpO2 98%     Physical Exam  Constitutional:       Appearance: She is well-developed. HENT:      Head: Normocephalic and atraumatic. Eyes:      Pupils: Pupils are equal, round, and reactive to light. Neck:      Thyroid: No thyromegaly. Trachea: No tracheal deviation. Cardiovascular:      Rate and Rhythm: Normal rate and regular rhythm. Heart sounds: Normal heart sounds. No murmur. No gallop.     Pulmonary:      Effort: Pulmonary effort is normal.      Breath sounds: Normal breath sounds. No wheezing or rales. Chest:      Chest wall: No tenderness. Abdominal:      General: Bowel sounds are increased. There is no distension. Palpations: Abdomen is soft. There is no mass. Tenderness: There is no abdominal tenderness. There is no guarding or rebound. Negative signs include Rovsing's sign. Musculoskeletal: Normal range of motion. General: No tenderness. Lymphadenopathy:      Cervical: No cervical adenopathy. Skin:     General: Skin is warm and dry. Findings: No erythema or rash. Neurological:      Mental Status: She is alert and oriented to person, place, and time. Coordination: Coordination normal.   Psychiatric:         Behavior: Behavior normal.         Thought Content: Thought content normal.         Procedures   :       Diagnosis Orders   1. COVID-19 ruled out     2. Acute gastroenteritis         :      No orders of the defined types were placed in this encounter. we spoke about symptoms monitoring and since her bowel sound are quite hyperactive- to make sure she is hydrating and supplementing her electrolytes  Since testing is taking a while- we spoke about possible other testing if symptoms continue to get worse or no improvement in few days    No orders of the defined types were placed in this encounter. No follow-ups on file. Reviewed with the patient: current clinicalstatus, medications, activities and diet. Side effects, adverse effects of the medication prescribedtoday, as well as treatment plan/ rationale and result expectations have been discussedwith the patient who expresses understanding and desires to proceed. Close follow upto evaluate treatment results and for coordination of care. I have reviewedthe patient's medical history in detail and updated the computerized patient record.     ALEXANDR Chisholm - CNP

## 2020-07-18 DIAGNOSIS — K52.9 ACUTE GASTROENTERITIS: ICD-10-CM

## 2020-07-18 DIAGNOSIS — Z20.822 COVID-19 RULED OUT: ICD-10-CM

## 2020-07-23 LAB
SARS-COV-2: NOT DETECTED
SOURCE: NORMAL

## 2020-07-24 ENCOUNTER — TELEPHONE (OUTPATIENT)
Dept: FAMILY MEDICINE CLINIC | Age: 30
End: 2020-07-24

## 2020-07-30 ENCOUNTER — OFFICE VISIT (OUTPATIENT)
Dept: OBGYN CLINIC | Age: 30
End: 2020-07-30
Payer: COMMERCIAL

## 2020-07-30 VITALS
HEART RATE: 92 BPM | SYSTOLIC BLOOD PRESSURE: 100 MMHG | BODY MASS INDEX: 46.19 KG/M2 | DIASTOLIC BLOOD PRESSURE: 62 MMHG | HEIGHT: 62 IN | WEIGHT: 251 LBS

## 2020-07-30 DIAGNOSIS — N89.8 VAGINAL ITCHING: ICD-10-CM

## 2020-07-30 DIAGNOSIS — N76.0 ACUTE VAGINITIS: ICD-10-CM

## 2020-07-30 DIAGNOSIS — R39.9 UTI SYMPTOMS: ICD-10-CM

## 2020-07-30 LAB
BILIRUBIN, POC: NORMAL
BLOOD URINE, POC: NORMAL
CLARITY, POC: CLEAR
COLOR, POC: YELLOW
GLUCOSE URINE, POC: NORMAL
KETONES, POC: NORMAL
LEUKOCYTE EST, POC: NORMAL
NITRITE, POC: NORMAL
PH, POC: 6
PROTEIN, POC: NORMAL
SPECIFIC GRAVITY, POC: 1.03
UROBILINOGEN, POC: NORMAL

## 2020-07-30 PROCEDURE — 81003 URINALYSIS AUTO W/O SCOPE: CPT | Performed by: ADVANCED PRACTICE MIDWIFE

## 2020-07-30 PROCEDURE — 99213 OFFICE O/P EST LOW 20 MIN: CPT | Performed by: ADVANCED PRACTICE MIDWIFE

## 2020-07-30 PROCEDURE — G8427 DOCREV CUR MEDS BY ELIG CLIN: HCPCS | Performed by: ADVANCED PRACTICE MIDWIFE

## 2020-07-30 PROCEDURE — G8417 CALC BMI ABV UP PARAM F/U: HCPCS | Performed by: ADVANCED PRACTICE MIDWIFE

## 2020-07-30 PROCEDURE — 4004F PT TOBACCO SCREEN RCVD TLK: CPT | Performed by: ADVANCED PRACTICE MIDWIFE

## 2020-07-30 RX ORDER — FLUCONAZOLE 150 MG/1
TABLET ORAL
Qty: 6 TABLET | Refills: 1 | Status: SHIPPED | OUTPATIENT
Start: 2020-07-30 | End: 2020-08-28

## 2020-07-30 RX ORDER — NITROFURANTOIN 25; 75 MG/1; MG/1
100 CAPSULE ORAL 2 TIMES DAILY
Qty: 14 CAPSULE | Refills: 0 | Status: SHIPPED | OUTPATIENT
Start: 2020-07-30 | End: 2020-08-06

## 2020-07-30 ASSESSMENT — ENCOUNTER SYMPTOMS
VOMITING: 0
ABDOMINAL PAIN: 0
COUGH: 0
CONSTIPATION: 0
DIARRHEA: 0
NAUSEA: 0
SHORTNESS OF BREATH: 0

## 2020-07-30 NOTE — PROGRESS NOTES
Chief Complaint:     Drew Mcduffie is a 31 y.o. female who presents here today for complaints of:      Chief Complaint   Patient presents with    Vaginal Itching     And white discharge when wiping. History of Present Illness:     Vaginal Irritation, Discharge - here today with complaints of:  Vaginal itching, white chunky discharge. This is a new problem, the severity of symptoms is described as moderate, and the progression is unchanged. Symptoms began approximately 2-3 day(s) ago.  Denies pain.  Associated symptoms:  c/o urinary frequency, urgency. Denies fever, headache, malaise, lymphadenopathy, myalgias. Denies dysuria. Denies vaginal odor.  Activities that aggravate:  None   Treatments tried:   None   Sexually active: Yes   Gender of sexual partners:  Male   Possible STD exposure within the last 90 days:  No, Use of barrier protection:  No   Past history of sexually transmitted diseases:  Denies    Past Medical, Surgical, Family, and Social History: Allergies:  Food    No LMP recorded.     OB History    Para Term  AB Living   7 1 1 0 5 1   SAB TAB Ectopic Molar Multiple Live Births   5 0 0 0 0 1      # Outcome Date GA Lbr Terence/2nd Weight Sex Delivery Anes PTL Lv   7 Term 14    M CS-Unspec   KIARA   6 SAB            5 SAB            4 SAB            3 SAB            2             1 SAB      SAB         Obstetric Comments   1 boy: 3 yo does not have custody (lives with mom pt has visitation rights)     Past Medical History:   Diagnosis Date    Anxiety     Asthma     Depression     DVT (deep venous thrombosis) ([facility] [address]     Hypertension      Past Surgical History:   Procedure Laterality Date     SECTION      TONSILLECTOMY       Family History   Problem Relation Age of Onset    Depression Father     Diabetes Mother      Medications:     Current Outpatient Medications on File Prior to Visit   Medication Sig Dispense Refill    VITAMIN D PO Take rhinorrhea. Mouth/Throat:      Mouth: Mucous membranes are moist.   Eyes:      General: No scleral icterus. Right eye: No discharge. Left eye: No discharge. Neck:      Musculoskeletal: Normal range of motion and neck supple. Cardiovascular:      Rate and Rhythm: Normal rate and regular rhythm. Pulses: Normal pulses. Pulmonary:      Effort: Pulmonary effort is normal. No respiratory distress. Abdominal:      Palpations: Abdomen is soft. Hernia: There is no hernia in the left inguinal area or right inguinal area. Genitourinary:     General: Normal vulva. Exam position: Lithotomy position. Pubic Area: No rash or pubic lice. Labia:         Right: No rash, tenderness, lesion or injury. Left: No rash, tenderness, lesion or injury. Urethra: No prolapse, urethral pain, urethral swelling or urethral lesion. Vagina: No signs of injury and foreign body. Vaginal discharge, erythema and tenderness present. No bleeding, lesions or prolapsed vaginal walls. Cervix: No cervical motion tenderness, discharge, friability, lesion, erythema, cervical bleeding or eversion. Uterus: Not deviated, not enlarged, not fixed, not tender and no uterine prolapse. Adnexa:         Right: No mass, tenderness or fullness. Left: No mass, tenderness or fullness. Rectum: No mass or external hemorrhoid. Comments: Moderate vulvar redness, irritation  Musculoskeletal: Normal range of motion. Right lower leg: No edema. Left lower leg: No edema. Lymphadenopathy:      Lower Body: No right inguinal adenopathy. No left inguinal adenopathy. Skin:     General: Skin is warm and dry. Capillary Refill: Capillary refill takes less than 2 seconds. Coloration: Skin is not jaundiced or pale. Neurological:      Mental Status: She is alert and oriented to person, place, and time. Mental status is at baseline. Motor: No weakness. Coordination: Coordination normal.      Gait: Gait normal.   Psychiatric:         Mood and Affect: Mood normal.         Behavior: Behavior normal.       Assessment:      Diagnosis Orders   1. Vaginal itching  POCT Urinalysis No Micro (Auto)    Wet Prep, Genital   2. Antibiotic-induced yeast infection     3. Acute vaginitis  C.trachomatis N.gonorrhoeae DNA   4. UTI symptoms  Culture, Urine    Urinalysis     Plan:     1. UTI Symptoms  UA - Negative, sent for culture  Rx for Macrobid    2. Vaginitis  Candidal Vaginitis - Rx for Diflucan, topical miconazole for external symptom relief PRN    3. Hx of antbiotic induced yeast infection  Diflucan Rx given for 6 doses (2 weeks) instead of just 3 doses    Follow Up:  Return if symptoms worsen or fail to improve, for Annual Well Woman Visit (when vaginitis symptoms have resolved). Orders Placed This Encounter   Procedures    Wet Prep, Genital     Standing Status:   Future     Standing Expiration Date:   7/30/2021    C.trachomatis N.gonorrhoeae DNA     Standing Status:   Future     Standing Expiration Date:   7/30/2021    Culture, Urine     Order Specific Question:   Specify (ex-cath, midstream, cysto, etc)? Answer:   midstream    Urinalysis     Standing Status:   Future     Standing Expiration Date:   7/30/2021    POCT Urinalysis No Micro (Auto)     Orders Placed This Encounter   Medications    fluconazole (DIFLUCAN) 150 MG tablet     Sig: Take 1 tablet every 3 days for 6 doses. For example: Take on Monday, Thursday, Sunday.      Dispense:  6 tablet     Refill:  1    nitrofurantoin, macrocrystal-monohydrate, (MACROBID) 100 MG capsule     Sig: Take 1 capsule by mouth 2 times daily for 7 days     Dispense:  14 capsule     Refill:  0    miconazole (MICOTIN) 2 % cream     Sig: Apply externally as needed to relieve itching, irritation     Dispense:  1 Tube     Refill:  [facility]

## 2020-07-30 NOTE — PATIENT INSTRUCTIONS
wipe from front to back. When should you call for help? Call your doctor now or seek immediate medical care if:  · Symptoms such as fever, chills, nausea, or vomiting get worse or appear for the first time. · You have new pain in your back just below your rib cage. This is called flank pain. · There is new blood or pus in your urine. · You have any problems with your antibiotic medicine. Watch closely for changes in your health, and be sure to contact your doctor if:  · You are not getting better after taking an antibiotic for 2 days. · Your symptoms go away but then come back. Where can you learn more? Go to [email or web address] and sign in to your [employer] account. Enter N994 in the CPXi box to learn more about \"Urinary Tract Infection in Women: Care Instructions. \"     If you do not have an account, please click on the \"Sign Up Now\" link. Current as of: August 22, 2019               Content Version: 12.5  © 0987-9465 [employer]. Care instructions adapted under license by [facility] [employer] [facility] ([facility]). If you have questions about a medical condition or this instruction, always ask your healthcare professional. [place] [address] any warranty or liability for your use of this information. Patient Education        Vaginal Yeast Infection: Care Instructions  Your Care Instructions     A vaginal yeast infection is caused by too many yeast cells in the vagina. This is common in women of all ages. Itching, vaginal discharge and irritation, and other symptoms can bother you. But yeast infections don't often cause other health problems. Some medicines can increase your risk of getting a yeast infection. These include antibiotics, birth control pills, hormones, and steroids. You may also be more likely to get a yeast infection if you are pregnant, have diabetes, douche, or wear tight clothes.   With treatment, most yeast infections get better in 2 to 3 days.  Follow-up care is a key part of your treatment and safety. Be sure to make and go to all appointments, and call your doctor if you are having problems. It's also a good idea to know your test results and keep a list of the medicines you take. How can you care for yourself at home? · Take your medicines exactly as prescribed. Call your doctor if you think you are having a problem with your medicine. · Ask your doctor about over-the-counter (OTC) medicines for yeast infections. They may cost less than prescription medicines. If you use an OTC treatment, read and follow all instructions on the label. · Do not use tampons while using a vaginal cream or suppository. The tampons can absorb the medicine. Use pads instead. · Wear loose cotton clothing. Do not wear nylon or other fabric that holds body heat and moisture close to the skin. · Try sleeping without underwear. · Do not scratch. Relieve itching with a cold pack or a cool bath. · Do not wash your vaginal area more than once a day. Use plain water or a mild, unscented soap. Air-dry the vaginal area. · Change out of wet swimsuits after swimming. · Do not have sex until you have finished your treatment. · Do not douche. When should you call for help? Call your doctor now or seek immediate medical care if:  · You have unexpected vaginal bleeding. · You have new or increased pain in your vagina or pelvis. Watch closely for changes in your health, and be sure to contact your doctor if:  · You have a fever. · You are not getting better after 2 days. · Your symptoms come back after you finish your medicines. Where can you learn more? Go to [email or web address] [email or web address] and sign in to your [employer] account. Enter E502 in the Yerdle box to learn more about \"Vaginal Yeast Infection: Care Instructions. \"     If you do not have an account, please click on the \"Sign Up Now\" link.   Current as of: November 8, 8775               GTDVDMJ Version: 12.5  © 7209-0653 [employer]. Care instructions adapted under license by [facility] ([facility]). If you have questions about a medical condition or this instruction, always ask your healthcare professional. [address] any warranty or liability for your use of this information.

## 2020-07-31 LAB
BACTERIA: ABNORMAL /HPF
BILIRUBIN URINE: NEGATIVE
BLOOD, URINE: NEGATIVE
CLARITY: ABNORMAL
CLUE CELLS: ABNORMAL
COLOR: YELLOW
EPITHELIAL CELLS, UA: ABNORMAL /HPF (ref 0–5)
GLUCOSE URINE: NEGATIVE MG/DL
HYALINE CASTS: ABNORMAL /HPF (ref 0–5)
KETONES, URINE: NEGATIVE MG/DL
LEUKOCYTE ESTERASE, URINE: ABNORMAL
NITRITE, URINE: NEGATIVE
PH UA: 5 (ref 5–9)
PROTEIN UA: NEGATIVE MG/DL
RBC UA: ABNORMAL /HPF (ref 0–2)
SPECIFIC GRAVITY UA: 1.02 (ref 1–1.03)
TRICHOMONAS PREP: ABNORMAL
TRICHOMONAS VAGINALIS SCREEN: NEGATIVE
UROBILINOGEN, URINE: 0.2 E.U./DL
WBC UA: ABNORMAL /HPF (ref 0–5)
YEAST WET PREP: ABNORMAL

## 2020-08-01 LAB — URINE CULTURE, ROUTINE: NORMAL

## 2020-08-05 LAB
C TRACH DNA GENITAL QL NAA+PROBE: NEGATIVE
N. GONORRHOEAE DNA: NEGATIVE

## 2020-08-28 ENCOUNTER — OFFICE VISIT (OUTPATIENT)
Dept: OBGYN CLINIC | Age: 30
End: 2020-08-28
Payer: COMMERCIAL

## 2020-08-28 VITALS
WEIGHT: 253 LBS | BODY MASS INDEX: 46.56 KG/M2 | HEIGHT: 62 IN | SYSTOLIC BLOOD PRESSURE: 124 MMHG | DIASTOLIC BLOOD PRESSURE: 82 MMHG

## 2020-08-28 DIAGNOSIS — Z11.51 SCREENING FOR HPV (HUMAN PAPILLOMAVIRUS): ICD-10-CM

## 2020-08-28 DIAGNOSIS — Z01.419 ENCOUNTER FOR WELL WOMAN EXAM WITH ROUTINE GYNECOLOGICAL EXAM: ICD-10-CM

## 2020-08-28 PROCEDURE — 4004F PT TOBACCO SCREEN RCVD TLK: CPT | Performed by: ADVANCED PRACTICE MIDWIFE

## 2020-08-28 PROCEDURE — G8427 DOCREV CUR MEDS BY ELIG CLIN: HCPCS | Performed by: ADVANCED PRACTICE MIDWIFE

## 2020-08-28 PROCEDURE — 36415 COLL VENOUS BLD VENIPUNCTURE: CPT | Performed by: ADVANCED PRACTICE MIDWIFE

## 2020-08-28 PROCEDURE — 99395 PREV VISIT EST AGE 18-39: CPT | Performed by: ADVANCED PRACTICE MIDWIFE

## 2020-08-28 PROCEDURE — G8417 CALC BMI ABV UP PARAM F/U: HCPCS | Performed by: ADVANCED PRACTICE MIDWIFE

## 2020-08-28 PROCEDURE — 99214 OFFICE O/P EST MOD 30 MIN: CPT | Performed by: ADVANCED PRACTICE MIDWIFE

## 2020-08-28 RX ORDER — ACETAMINOPHEN AND CODEINE PHOSPHATE 120; 12 MG/5ML; MG/5ML
1 SOLUTION ORAL DAILY
COMMUNITY
End: 2020-08-28 | Stop reason: ALTCHOICE

## 2020-08-28 RX ORDER — FLUCONAZOLE 150 MG/1
TABLET ORAL
Qty: 3 TABLET | Refills: 1 | Status: SHIPPED | OUTPATIENT
Start: 2020-08-28 | End: 2020-11-13

## 2020-08-28 RX ORDER — ACETAMINOPHEN AND CODEINE PHOSPHATE 120; 12 MG/5ML; MG/5ML
1 SOLUTION ORAL DAILY
Qty: 90 TABLET | Refills: 3 | Status: SHIPPED | OUTPATIENT
Start: 2020-08-28 | End: 2020-11-13

## 2020-08-28 ASSESSMENT — ENCOUNTER SYMPTOMS
SHORTNESS OF BREATH: 0
TROUBLE SWALLOWING: 0
COUGH: 0
VOICE CHANGE: 0
SORE THROAT: 0
VOMITING: 0
CONSTIPATION: 0
DIARRHEA: 0
RHINORRHEA: 0
ABDOMINAL PAIN: 0
NAUSEA: 0

## 2020-08-28 NOTE — PROGRESS NOTES
History:   Procedure Laterality Date     SECTION      TONSILLECTOMY       Family History   Problem Relation Age of Onset    Depression Father     Diabetes Mother      Medications:     Current Outpatient Medications on File Prior to Visit   Medication Sig Dispense Refill    Multiple Vitamins-Minerals (MULTIVITAMIN ADULT PO) Take by mouth      VITAMIN D PO Take by mouth      ibuprofen (IBU) 400 MG tablet Take 1 tablet by mouth every 6 hours as needed for Pain 120 tablet 0    atorvastatin (LIPITOR) 20 MG tablet 20 mg daily      LATUDA 40 MG TABS tablet 40 mg daily      Melatonin 10 MG CAPS       Ascorbic Acid (VITAMIN C) 250 MG tablet Take 250 mg by mouth daily      risperiDONE (RISPERDAL) 2 MG tablet Take 1 tablet by mouth 2 times daily 30 tablet 1    albuterol sulfate HFA (VENTOLIN HFA) 108 (90 Base) MCG/ACT inhaler Inhale 2 puffs into the lungs every 6 hours as needed for Wheezing 1 Inhaler 0    FLUoxetine (PROZAC) 20 MG capsule 20 mg daily       No current facility-administered medications on file prior to visit. Review of Systems:     Review of Systems   Constitutional: Negative for activity change, appetite change, chills, diaphoresis, fatigue, fever and unexpected weight change. HENT: Negative for congestion, postnasal drip, rhinorrhea, sneezing, sore throat, trouble swallowing and voice change. Respiratory: Negative for cough and shortness of breath. Cardiovascular: Negative for chest pain. Gastrointestinal: Negative for abdominal pain, constipation, diarrhea, nausea and vomiting. Genitourinary: Positive for menstrual problem (Irregular Periods). Negative for difficulty urinating, dyspareunia, dysuria, frequency, genital sores, pelvic pain, vaginal bleeding, vaginal discharge and vaginal pain. Musculoskeletal: Negative for arthralgias and myalgias. Neurological: Negative for dizziness, syncope and headaches. Hematological: Negative for adenopathy.    All other systems friability, lesion, erythema, cervical bleeding or eversion. Uterus: Normal. Not deviated, not enlarged, not fixed, not tender and no uterine prolapse. Adnexa: Right adnexa normal and left adnexa normal.        Right: No mass, tenderness or fullness. Left: No mass, tenderness or fullness. Rectum: Normal. No mass or external hemorrhoid. Comments: Significant vulvar redness, irritation  Musculoskeletal: Normal range of motion. General: No swelling or deformity. Right lower leg: No edema. Left lower leg: No edema. Lymphadenopathy:      Cervical: No cervical adenopathy. Upper Body:      Right upper body: No supraclavicular, axillary or pectoral adenopathy. Left upper body: No supraclavicular, axillary or pectoral adenopathy. Lower Body: No right inguinal adenopathy. No left inguinal adenopathy. Skin:     General: Skin is warm and dry. Capillary Refill: Capillary refill takes less than 2 seconds. Coloration: Skin is not jaundiced or pale. Findings: Acne present. Comments: Acanthosis Nigricans - back of neck, axilla, pelvic/groin region  Increased facial hair   Neurological:      General: No focal deficit present. Mental Status: She is alert and oriented to person, place, and time. Mental status is at baseline. Cranial Nerves: No cranial nerve deficit. Sensory: No sensory deficit. Motor: No weakness. Coordination: Coordination normal.      Gait: Gait normal.   Psychiatric:         Mood and Affect: Mood normal.         Behavior: Behavior normal.         Thought Content: Thought content normal.         Judgment: Judgment normal.       Assessment:      Diagnosis Orders   1. Encounter for well woman exam with routine gynecological exam  Pap Smear   2. Screening for HPV (human papillomavirus)  Pap Smear   3. Candidal vaginitis  fluconazole (DIFLUCAN) 150 MG tablet   4.  Polycystic ovarian disease  Anti Mullerian Hormone    Follicle Stimulating Hormone    HCG, Quantitative, Pregnancy    Insulin, Random    Luteinizing Hormone    Prolactin    Testosterone free nonmale    US PELVIS COMPLETE    US NON OB TRANSVAGINAL   5. Infertility associated with anovulation  Anti Mullerian Hormone    Follicle Stimulating Hormone    HCG, Quantitative, Pregnancy    Insulin, Random    Luteinizing Hormone    Prolactin    Testosterone free nonmale    US PELVIS COMPLETE    US NON OB TRANSVAGINAL   6. Screening for STD (sexually transmitted disease)  Miscellaneous Sendout 1     Plan:     1. Annual Well Woman Visit  Previous Pap:  ASCUS, HPV+ (2/6/18). NILM, HPV+ (9/5/17)  Pap collected today    2. Vaginitis, Candidal  Rx for Diflucan    3. PCOS, Infertility  S:  Questioning PCOS diagnosis, feels that it was made without further investigation or testing. Reports a history of irregular/absent periods, coarse facial hair, acne, darkening of skin folds. Would like to discuss infertility treatment options. O:  Obesity, A1c 6% (12/11/19), darkening of skin behind neck, axilla, groin/pelvic region  A:  PCOS  P:  Obtain PCOS labs, Pelvic US  Refer to physician for infertility counseling and treatment plan options    4. STD Screening  Consents to routine STD screening    Pt was seen with total face to face time of 45 minutes with more than 50% of the visit being counseling and education regarding encounter diagnosis of PCOS, infertility. See discussion /counseling details as stated above. Follow Up:  Return for Follow-up with Dr. Anton Fabry or Sherif Graham for infertility treatment (after pelvic US is done). Orders Placed This Encounter   Procedures    US PELVIS COMPLETE     Standing Status:   Future     Standing Expiration Date:   8/28/2021    US NON OB TRANSVAGINAL     Standing Status:   Future     Standing Expiration Date:   8/28/2021    Pap Smear     Patient History:    No LMP recorded.   OBGYN Status: Having periods  Past Surgical History:  No date:  SECTION  No date: TONSILLECTOMY  Medications/Contraceptives Affecting Cytology     Combination Contraceptives - Oral Disp Start End     norethindrone-ethinyl estradiol ([address] ZIMMER ) 1-20 MG-MCG per   tablet    3 packet 2018     Sig: Take 1 tablet by mouth daily    Route: Oral        Social History    Tobacco Use      Smoking status: Current Every Day Smoker        Packs/day: 0.25        Years: 12.00        Pack years: 3        Types: Cigarettes      Smokeless tobacco: Never Used      Tobacco comment: 3 cigs/day       Standing Status:   Future     Standing Expiration Date:   2021     Order Specific Question:   Collection Type     Answer: Thin Prep     Order Specific Question:   Prior Abnormal Pap Test     Answer:   No     Order Specific Question:   Screening or Diagnostic     Answer:   Screening     Order Specific Question:   HPV Requested? Answer:   Yes     Order Specific Question:   High Risk Patient     Answer:   N/A    Anti Mullerian Hormone     Standing Status:   Future     Standing Expiration Date:      [facility] Follicle Stimulating Hormone     Standing Status:   Future     Standing Expiration Date:   2021    HCG, Quantitative, Pregnancy     Standing Status:   Future     Standing Expiration Date:   2021    Insulin, Random     Standing Status:   Future     Standing Expiration Date:   2021    Luteinizing Hormone     Standing Status:   Future     Standing Expiration Date:   2021    Prolactin     Standing Status:   Future     Standing Expiration Date:   2021    Testosterone free nonmale     Standing Status:   Future     Standing Expiration Date:   2021    Miscellaneous Sendout 1     Standing Status:   Future     Standing Expiration Date:   2021     Order Specific Question:   Specify Req. Test (1 Test/Order)     Answer:   GONORRHEA/CHLAMYDIA/TRICH.  TO BE TESTED ON THIN PREP VIAL     Orders Placed This Encounter   Medications  norethindrone (ORTHO MICRONOR) 0.35 MG tablet     Sig: Take 1 tablet by mouth daily     Dispense:  90 tablet     Refill:  3    fluconazole (DIFLUCAN) 150 MG tablet     Sig: Take 1 tablet every 3 days for 3 doses. For example: Take on Monday, Thursday, Sunday.      Dispense:  3 tablet     Refill:  [facility]

## 2020-08-28 NOTE — PATIENT INSTRUCTIONS
Patient Education        Polycystic Ovary Syndrome: Care Instructions  Your Care Instructions  Polycystic ovary syndrome, or PCOS, means a woman's hormones are out of balance. It can cause problems with your periods and make it hard to get pregnant. Doctors don't know for sure what causes PCOS, but it seems to run in families. It also seems to be linked to obesity and a risk for diabetes. If you have PCOS, your sisters and daughters have a higher chance of getting it too. You may have other symptoms. These include weight gain, acne, too much hair growth on the face or body, high blood pressure, and high blood sugar. Your ovaries may have cysts on them. These cysts are growths filled with fluid. Keep in mind that although you may not have regular periods, you can still get pregnant. Talk to your doctor about birth control if you do not want to get pregnant. Sometimes the hormone changes with PCOS can also make it hard for some women to get pregnant. If this is a concern, talk to your doctor about treatment for this problem. Women who have PCOS can go for months or longer with no period. Your doctor may recommend medicines that can help get your cycles back to normal.  Follow-up care is a key part of your treatment and safety. Be sure to make and go to all appointments, and call your doctor if you are having problems. It's also a good idea to know your test results and keep a list of the medicines you take. How can you care for yourself at home? · Take your medicines exactly as prescribed. Call your doctor if you think you are having a problem with your medicine. · Eat a healthy diet. Include fruits, vegetables, beans, and whole grains in your diet each day. · If you are overweight, losing weight can help with many of the symptoms of PCOS. Talk to your doctor about safe ways to lose weight. · Get at least 30 minutes of exercise on most days of the week. Walking is a good choice.  Or you can run, swim, cycle, or play tennis or team sports. · For hair growth you don't want, try bleaching, plucking, electrolysis, or laser therapy. · Acne can be treated with over-the-counter medicines. Look for ones that have benzoyl peroxide or salicylic acid in them. When should you call for help? Call your doctor now or seek immediate medical care if:  · You have severe vaginal bleeding. · You have new or worse belly or pelvic pain. Watch closely for changes in your health, and be sure to contact your doctor if:  · You do not get better as expected. · You have unusual vaginal bleeding. Where can you learn more? Go to https://chpepiceweb.healthnoFeeRealEstateSales.com. org and sign in to your [employer] account. Enter A066 in the [employer] box to learn more about \"Polycystic Ovary Syndrome: Care Instructions. \"     If you do not have an account, please click on the \"Sign Up Now\" link. Current as of: November 8, 2019               Content Version: 12.5  © 5651-1719 [employer]. Care instructions adapted under license by [facility]). If you have questions about a medical condition or this instruction, always ask your healthcare professional. [place] [address] any warranty or liability for your use of this information. Patient Education        Well Visit, Ages 25 to 48: Care Instructions  Your Care Instructions     Physical exams can help you stay healthy. Your doctor has checked your overall health and may have suggested ways to take good care of yourself. He or she also may have recommended tests. At home, you can help prevent illness with healthy eating, regular exercise, and other steps. Follow-up care is a key part of your treatment and safety. Be sure to make and go to all appointments, and call your doctor if you are having problems. It's also a good idea to know your test results and keep a list of the medicines you take. How can you care for yourself at home?   · Reach and stay at a factors. · Vision. Talk with your doctor about how often to have a glaucoma test.  · Diabetes. Ask your doctor whether you should have tests for diabetes. · Colon cancer. Your risk for colorectal cancer gets higher as you get older. Some experts say that adults should start regular screening at age 48 and stop at age 76. Others say to start before age 48 or continue after age 76. Talk with your doctor about your risk and when to start and stop screening. For women  · Breast exam and mammogram. Talk to your doctor about when you should have a clinical breast exam and a mammogram. Medical experts differ on whether and how often women under 50 should have these tests. Your doctor can help you decide what is right for you. · Cervical cancer screening test and pelvic exam. Begin with a Pap test at age 24. The test often is part of a pelvic exam. Starting at age 27, you may choose to have a Pap test, an HPV test, or both tests at the same time (called co-testing). Talk with your doctor about how often to have testing. · Tests for sexually transmitted infections (STIs). Ask whether you should have tests for STIs. You may be at risk if you have sex with more than one person, especially if your partners do not wear condoms. For men  · Tests for sexually transmitted infections (STIs). Ask whether you should have tests for STIs. You may be at risk if you have sex with more than one person, especially if you do not wear a condom. · Testicular cancer exam. Ask your doctor whether you should check your testicles regularly. · Prostate exam. Talk to your doctor about whether you should have a blood test (called a PSA test) for prostate cancer. Experts differ on whether and when men should have this test. Some experts suggest it if you are older than 39 and are -American or have a father or brother who got prostate cancer when he was younger than 72. When should you call for help?   Watch closely for changes in your health, and be sure to contact your doctor if you have any problems or symptoms that concern you. Where can you learn more? Go to https://chpepiceweb.[employer]). org and sign in to your [employer] account. Enter P072 in the [facility] box to learn more about \"Well Visit, Ages 25 to 48: Care Instructions. \"     If you do not have an account, please click on the \"Sign Up Now\" link. Current as of: August 22, 2019               Content Version: 12.5  © 6106-9443 [employer]wise[employer]. Care instructions adapted under license by [facility] ([facility]). If you have questions about a medical condition or this instruction, always ask your healthcare professional. [address] any warranty or liability for your use of this information.

## 2020-09-08 LAB
C. TRACHOMATIS DNA,THIN PREP: NEGATIVE
N. GONORRHOEAE DNA, THIN PREP: NEGATIVE

## 2020-09-12 LAB
HPV COMMENT: NORMAL
HPV TYPE 16: NOT DETECTED
HPV TYPE 18: NOT DETECTED
HPVOH (OTHER TYPES): NOT DETECTED

## 2020-11-12 PROBLEM — F39 MOOD DISORDER (HCC): Status: ACTIVE | Noted: 2018-03-14

## 2020-11-13 ENCOUNTER — OFFICE VISIT (OUTPATIENT)
Dept: OBGYN CLINIC | Age: 30
End: 2020-11-13
Payer: COMMERCIAL

## 2020-11-13 VITALS
HEART RATE: 92 BPM | BODY MASS INDEX: 47.01 KG/M2 | WEIGHT: 257 LBS | DIASTOLIC BLOOD PRESSURE: 82 MMHG | SYSTOLIC BLOOD PRESSURE: 142 MMHG

## 2020-11-13 DIAGNOSIS — N91.2 AMENORRHEA: ICD-10-CM

## 2020-11-13 DIAGNOSIS — E28.2 POLYCYSTIC OVARIAN DISEASE: ICD-10-CM

## 2020-11-13 LAB
BASOPHILS ABSOLUTE: 0.1 K/UL (ref 0–0.2)
BASOPHILS RELATIVE PERCENT: 0.7 %
EOSINOPHILS ABSOLUTE: 0.3 K/UL (ref 0–0.7)
EOSINOPHILS RELATIVE PERCENT: 2.2 %
FOLLICLE STIMULATING HORMONE: 6.5 MIU/ML
GLUCOSE BLD-MCNC: 116 MG/DL (ref 70–99)
HCT VFR BLD CALC: 41.7 % (ref 37–47)
HEMOGLOBIN: 13.8 G/DL (ref 12–16)
INSULIN: 95.9 UIU/ML (ref 2.6–24.9)
LUTEINIZING HORMONE: 13.5 MIU/ML
LYMPHOCYTES ABSOLUTE: 3.9 K/UL (ref 1–4.8)
LYMPHOCYTES RELATIVE PERCENT: 29.7 %
MCH RBC QN AUTO: 28.8 PG (ref 27–31.3)
MCHC RBC AUTO-ENTMCNC: 33 % (ref 33–37)
MCV RBC AUTO: 87.1 FL (ref 82–100)
MONOCYTES ABSOLUTE: 0.9 K/UL (ref 0.2–0.8)
MONOCYTES RELATIVE PERCENT: 6.6 %
NEUTROPHILS ABSOLUTE: 8 K/UL (ref 1.4–6.5)
NEUTROPHILS RELATIVE PERCENT: 60.8 %
PDW BLD-RTO: 13.6 % (ref 11.5–14.5)
PLATELET # BLD: 268 K/UL (ref 130–400)
PROLACTIN: 50 NG/ML
RBC # BLD: 4.79 M/UL (ref 4.2–5.4)
TSH REFLEX: 1.9 UIU/ML (ref 0.44–3.86)
WBC # BLD: 13.2 K/UL (ref 4.8–10.8)

## 2020-11-13 PROCEDURE — 99204 OFFICE O/P NEW MOD 45 MIN: CPT | Performed by: OBSTETRICS & GYNECOLOGY

## 2020-11-13 PROCEDURE — G8417 CALC BMI ABV UP PARAM F/U: HCPCS | Performed by: OBSTETRICS & GYNECOLOGY

## 2020-11-13 PROCEDURE — G8428 CUR MEDS NOT DOCUMENT: HCPCS | Performed by: OBSTETRICS & GYNECOLOGY

## 2020-11-13 PROCEDURE — G8484 FLU IMMUNIZE NO ADMIN: HCPCS | Performed by: OBSTETRICS & GYNECOLOGY

## 2020-11-13 PROCEDURE — 4004F PT TOBACCO SCREEN RCVD TLK: CPT | Performed by: OBSTETRICS & GYNECOLOGY

## 2020-11-13 RX ORDER — RISPERIDONE 1 MG/1
TABLET, FILM COATED ORAL
COMMUNITY
Start: 2020-10-26 | End: 2021-10-13

## 2020-11-13 RX ORDER — HYDROXYZINE PAMOATE 25 MG/1
CAPSULE ORAL
Status: ON HOLD | COMMUNITY
Start: 2020-10-26 | End: 2021-10-18 | Stop reason: HOSPADM

## 2020-11-13 RX ORDER — LETROZOLE 2.5 MG/1
2.5 TABLET, FILM COATED ORAL DAILY
Qty: 5 TABLET | Refills: 1 | Status: SHIPPED | OUTPATIENT
Start: 2020-11-13 | End: 2021-01-14 | Stop reason: SDUPTHER

## 2020-11-13 RX ORDER — LURASIDONE HYDROCHLORIDE 80 MG/1
140 TABLET, FILM COATED ORAL EVERY EVENING
Status: ON HOLD | COMMUNITY
Start: 2020-10-26 | End: 2021-10-18 | Stop reason: HOSPADM

## 2020-11-13 RX ORDER — PNV,CALCIUM 72/IRON/FOLIC ACID 27 MG-1 MG
1 TABLET ORAL DAILY
Qty: 30 TABLET | Refills: 11 | Status: SHIPPED | OUTPATIENT
Start: 2020-11-13 | End: 2020-11-13

## 2020-11-13 RX ORDER — MEDROXYPROGESTERONE ACETATE 10 MG/1
10 TABLET ORAL DAILY
Qty: 10 TABLET | Refills: 3 | Status: SHIPPED | OUTPATIENT
Start: 2020-11-13 | End: 2021-10-13

## 2020-11-13 ASSESSMENT — ENCOUNTER SYMPTOMS
COUGH: 0
ABDOMINAL PAIN: 0
SORE THROAT: 0
VOICE CHANGE: 0
CONSTIPATION: 0
TROUBLE SWALLOWING: 0
CHEST TIGHTNESS: 0
VOMITING: 0
COLOR CHANGE: 0
BACK PAIN: 0
NAUSEA: 0
BLOOD IN STOOL: 0
WHEEZING: 0
SHORTNESS OF BREATH: 0
ABDOMINAL DISTENTION: 0

## 2020-11-13 NOTE — PROGRESS NOTES
Subjective: Estrella came in for evaluation of oligo ovulation. She states she is a  6 para 1-0-5-1. She has been treated in the past with Metformin for polycystic ovaries she is now taking that twice a day from Dr. Tyshawn Duron for pre-diabetes. She had 1 full-term spontaneous vaginal delivery 4 years ago she does not have custody of that child. She has multiple psych issues. Pregnancy test today was negative      Patient ID: Rylie Luong is a 31 y.o. female. HPI    Review of Systems   Constitutional: Negative for activity change, appetite change, fatigue and unexpected weight change. HENT: Negative for dental problem, ear pain, hearing loss, nosebleeds, sore throat, trouble swallowing and voice change. Eyes: Negative for visual disturbance. Respiratory: Negative for cough, chest tightness, shortness of breath and wheezing. Cardiovascular: Negative for chest pain and palpitations. Gastrointestinal: Negative for abdominal distention, abdominal pain, blood in stool, constipation, nausea and vomiting. Endocrine: Negative for cold intolerance, heat intolerance, polydipsia, polyphagia and polyuria. Genitourinary: Positive for menstrual problem. Negative for difficulty urinating, dyspareunia, dysuria, flank pain, frequency, genital sores, hematuria, pelvic pain, urgency, vaginal bleeding, vaginal discharge and vaginal pain. Musculoskeletal: Negative for arthralgias, back pain, joint swelling and myalgias. Skin: Negative for color change and rash. Allergic/Immunologic: Negative for environmental allergies, food allergies and immunocompromised state. Neurological: Negative for dizziness, seizures, syncope, speech difficulty, weakness, numbness and headaches. Hematological: Negative for adenopathy. Does not bruise/bleed easily. Psychiatric/Behavioral: Negative for agitation, behavioral problems, confusion, decreased concentration, dysphoric mood and suicidal ideas.  The patient is not

## 2020-11-18 LAB
SEX HORMONE BINDING GLOBULIN: 25 NMOL/L (ref 30–135)
TESTOSTERONE FREE: 6.7 PG/ML (ref 0.8–7.4)
TESTOSTERONE, FEMALES/CHILDREN: 35 NG/DL (ref 9–55)

## 2020-12-17 ENCOUNTER — OFFICE VISIT (OUTPATIENT)
Dept: OBGYN CLINIC | Age: 30
End: 2020-12-17
Payer: COMMERCIAL

## 2020-12-17 VITALS
SYSTOLIC BLOOD PRESSURE: 138 MMHG | HEART RATE: 75 BPM | BODY MASS INDEX: 47.92 KG/M2 | DIASTOLIC BLOOD PRESSURE: 82 MMHG | WEIGHT: 262 LBS

## 2020-12-17 PROCEDURE — G8427 DOCREV CUR MEDS BY ELIG CLIN: HCPCS | Performed by: OBSTETRICS & GYNECOLOGY

## 2020-12-17 PROCEDURE — G8484 FLU IMMUNIZE NO ADMIN: HCPCS | Performed by: OBSTETRICS & GYNECOLOGY

## 2020-12-17 PROCEDURE — 99213 OFFICE O/P EST LOW 20 MIN: CPT | Performed by: OBSTETRICS & GYNECOLOGY

## 2020-12-17 PROCEDURE — 4004F PT TOBACCO SCREEN RCVD TLK: CPT | Performed by: OBSTETRICS & GYNECOLOGY

## 2020-12-17 PROCEDURE — G8417 CALC BMI ABV UP PARAM F/U: HCPCS | Performed by: OBSTETRICS & GYNECOLOGY

## 2020-12-17 RX ORDER — FLUCONAZOLE 150 MG/1
150 TABLET ORAL
Qty: 3 TABLET | Refills: 1 | Status: SHIPPED | OUTPATIENT
Start: 2020-12-17 | End: 2021-05-13 | Stop reason: ALTCHOICE

## 2020-12-17 ASSESSMENT — ENCOUNTER SYMPTOMS
WHEEZING: 0
CONSTIPATION: 0
SORE THROAT: 0
BLOOD IN STOOL: 0
CHEST TIGHTNESS: 0
ABDOMINAL DISTENTION: 0
TROUBLE SWALLOWING: 0
VOMITING: 0
ABDOMINAL PAIN: 0
VOICE CHANGE: 0
COUGH: 0
NAUSEA: 0
SHORTNESS OF BREATH: 0
BACK PAIN: 0
COLOR CHANGE: 0

## 2020-12-17 NOTE — PROGRESS NOTES
Subjective:  Marty took Provera for 20 days she did have a withdrawal bleed. She did not get the Femara. UCG negative she has not had a menstrual cycle yet. Sling level and sugar were level were elevated she stopped taking the Metformin for an unknown reason. Advised to restart that      Patient ID: Perla Trent is a 31 y.o. female. HPI    Review of Systems   Constitutional: Negative for activity change, appetite change, fatigue and unexpected weight change. HENT: Negative for dental problem, ear pain, hearing loss, nosebleeds, sore throat, trouble swallowing and voice change. Eyes: Negative for visual disturbance. Respiratory: Negative for cough, chest tightness, shortness of breath and wheezing. Cardiovascular: Negative for chest pain and palpitations. Gastrointestinal: Negative for abdominal distention, abdominal pain, blood in stool, constipation, nausea and vomiting. Endocrine: Negative for cold intolerance, heat intolerance, polydipsia, polyphagia and polyuria. Genitourinary: Negative for difficulty urinating, dyspareunia, dysuria, flank pain, frequency, genital sores, hematuria, menstrual problem, pelvic pain, urgency, vaginal bleeding, vaginal discharge and vaginal pain. Musculoskeletal: Negative for arthralgias, back pain, joint swelling and myalgias. Skin: Negative for color change and rash. Allergic/Immunologic: Negative for environmental allergies, food allergies and immunocompromised state. Neurological: Negative for dizziness, seizures, syncope, speech difficulty, weakness, numbness and headaches. Hematological: Negative for adenopathy. Does not bruise/bleed easily. Psychiatric/Behavioral: Negative for agitation, behavioral problems, confusion, decreased concentration, dysphoric mood and suicidal ideas. The patient is not nervous/anxious and is not hyperactive. Objective:   Physical Exam  Constitutional:       General: She is awake.       Appearance: She is obese.   Neurological:      Mental Status: She is alert. Psychiatric:         Behavior: Behavior is cooperative.          Assessment:    Anovulation      Plan:    Patient to follow up in mid January around a 42 we will do a UCG if negative we will give her Provera followed by the [address] [place], [place] [address]St ,

## 2020-12-18 ENCOUNTER — APPOINTMENT (OUTPATIENT)
Dept: CT IMAGING | Age: 30
End: 2020-12-18
Payer: COMMERCIAL

## 2020-12-18 ENCOUNTER — HOSPITAL ENCOUNTER (EMERGENCY)
Age: 30
Discharge: HOME | End: 2020-12-18
Payer: COMMERCIAL

## 2020-12-18 VITALS
OXYGEN SATURATION: 98 % | SYSTOLIC BLOOD PRESSURE: 135 MMHG | DIASTOLIC BLOOD PRESSURE: 84 MMHG | TEMPERATURE: 98.1 F | WEIGHT: 262 LBS | BODY MASS INDEX: 48.21 KG/M2 | HEART RATE: 115 BPM | HEIGHT: 62 IN | RESPIRATION RATE: 16 BRPM

## 2020-12-18 LAB
ALBUMIN SERPL-MCNC: 4.1 G/DL (ref 3.5–4.6)
ALP BLD-CCNC: 114 U/L (ref 40–130)
ALT SERPL-CCNC: 41 U/L (ref 0–33)
ANION GAP SERPL CALCULATED.3IONS-SCNC: 13 MEQ/L (ref 9–15)
AST SERPL-CCNC: 27 U/L (ref 0–35)
BACTERIA: ABNORMAL /HPF
BASOPHILS ABSOLUTE: 0.1 K/UL (ref 0–0.2)
BASOPHILS RELATIVE PERCENT: 0.6 %
BILIRUB SERPL-MCNC: 0.5 MG/DL (ref 0.2–0.7)
BILIRUBIN URINE: NEGATIVE
BLOOD, URINE: ABNORMAL
BUN BLDV-MCNC: 8 MG/DL (ref 6–20)
CALCIUM SERPL-MCNC: 9 MG/DL (ref 8.5–9.9)
CHLORIDE BLD-SCNC: 100 MEQ/L (ref 95–107)
CHP ED QC CHECK: YES
CLARITY: ABNORMAL
CO2: 19 MEQ/L (ref 20–31)
COLOR: YELLOW
CREAT SERPL-MCNC: 0.55 MG/DL (ref 0.5–0.9)
EOSINOPHILS ABSOLUTE: 0.1 K/UL (ref 0–0.7)
EOSINOPHILS RELATIVE PERCENT: 0.6 %
EPITHELIAL CELLS, UA: ABNORMAL /HPF (ref 0–5)
GFR AFRICAN AMERICAN: >60
GFR AFRICAN AMERICAN: >60
GFR NON-AFRICAN AMERICAN: >60
GFR NON-AFRICAN AMERICAN: >60
GLOBULIN: 2.8 G/DL (ref 2.3–3.5)
GLUCOSE BLD-MCNC: 103 MG/DL (ref 70–99)
GLUCOSE URINE: NEGATIVE MG/DL
HCT VFR BLD CALC: 41.1 % (ref 37–47)
HEMOGLOBIN: 14.1 G/DL (ref 12–16)
HYALINE CASTS: ABNORMAL /HPF (ref 0–5)
INFLUENZA A BY PCR: NEGATIVE
INFLUENZA B BY PCR: NEGATIVE
KETONES, URINE: NEGATIVE MG/DL
LACTIC ACID: 1.3 MMOL/L (ref 0.5–2.2)
LEUKOCYTE ESTERASE, URINE: ABNORMAL
LIPASE: 14 U/L (ref 12–95)
LYMPHOCYTES ABSOLUTE: 2 K/UL (ref 1–4.8)
LYMPHOCYTES RELATIVE PERCENT: 18.9 %
MCH RBC QN AUTO: 29.5 PG (ref 27–31.3)
MCHC RBC AUTO-ENTMCNC: 34.4 % (ref 33–37)
MCV RBC AUTO: 85.8 FL (ref 82–100)
MONOCYTES ABSOLUTE: 0.8 K/UL (ref 0.2–0.8)
MONOCYTES RELATIVE PERCENT: 7.7 %
NEUTROPHILS ABSOLUTE: 7.7 K/UL (ref 1.4–6.5)
NEUTROPHILS RELATIVE PERCENT: 72.2 %
NITRITE, URINE: NEGATIVE
PDW BLD-RTO: 13.2 % (ref 11.5–14.5)
PERFORMED ON: NORMAL
PH UA: 6.5 (ref 5–9)
PLATELET # BLD: 222 K/UL (ref 130–400)
POC CREATININE WHOLE BLOOD: 0.6
POC CREATININE: 0.6 MG/DL (ref 0.6–1.1)
POC SAMPLE TYPE: NORMAL
POTASSIUM SERPL-SCNC: 3.7 MEQ/L (ref 3.4–4.9)
PREGNANCY TEST URINE, POC: NEGATIVE
PROTEIN UA: 30 MG/DL
RBC # BLD: 4.79 M/UL (ref 4.2–5.4)
RBC UA: ABNORMAL /HPF (ref 0–2)
SARS-COV-2, NAAT: NOT DETECTED
SODIUM BLD-SCNC: 132 MEQ/L (ref 135–144)
SPECIFIC GRAVITY UA: 1.02 (ref 1–1.03)
TOTAL PROTEIN: 6.9 G/DL (ref 6.3–8)
URINE REFLEX TO CULTURE: YES
UROBILINOGEN, URINE: 1 E.U./DL
WBC # BLD: 10.6 K/UL (ref 4.8–10.8)
WBC UA: >100 /HPF (ref 0–5)

## 2020-12-18 PROCEDURE — U0002 COVID-19 LAB TEST NON-CDC: HCPCS

## 2020-12-18 PROCEDURE — 85025 COMPLETE CBC W/AUTO DIFF WBC: CPT

## 2020-12-18 PROCEDURE — 87040 BLOOD CULTURE FOR BACTERIA: CPT

## 2020-12-18 PROCEDURE — 87502 INFLUENZA DNA AMP PROBE: CPT

## 2020-12-18 PROCEDURE — 83690 ASSAY OF LIPASE: CPT

## 2020-12-18 PROCEDURE — 96365 THER/PROPH/DIAG IV INF INIT: CPT

## 2020-12-18 PROCEDURE — 36415 COLL VENOUS BLD VENIPUNCTURE: CPT

## 2020-12-18 PROCEDURE — 6360000004 HC RX CONTRAST MEDICATION: Performed by: PHYSICIAN ASSISTANT

## 2020-12-18 PROCEDURE — 6360000002 HC RX W HCPCS: Performed by: PERSONAL EMERGENCY RESPONSE ATTENDANT

## 2020-12-18 PROCEDURE — 2580000003 HC RX 258: Performed by: PHYSICIAN ASSISTANT

## 2020-12-18 PROCEDURE — 6360000002 HC RX W HCPCS: Performed by: PHYSICIAN ASSISTANT

## 2020-12-18 PROCEDURE — 2580000003 HC RX 258: Performed by: PERSONAL EMERGENCY RESPONSE ATTENDANT

## 2020-12-18 PROCEDURE — 80053 COMPREHEN METABOLIC PANEL: CPT

## 2020-12-18 PROCEDURE — 74177 CT ABD & PELVIS W/CONTRAST: CPT

## 2020-12-18 PROCEDURE — 87086 URINE CULTURE/COLONY COUNT: CPT

## 2020-12-18 PROCEDURE — 87077 CULTURE AEROBIC IDENTIFY: CPT

## 2020-12-18 PROCEDURE — 96375 TX/PRO/DX INJ NEW DRUG ADDON: CPT

## 2020-12-18 PROCEDURE — 87186 SC STD MICRODIL/AGAR DIL: CPT

## 2020-12-18 PROCEDURE — 81001 URINALYSIS AUTO W/SCOPE: CPT

## 2020-12-18 PROCEDURE — 87149 DNA/RNA DIRECT PROBE: CPT

## 2020-12-18 PROCEDURE — 6370000000 HC RX 637 (ALT 250 FOR IP): Performed by: PERSONAL EMERGENCY RESPONSE ATTENDANT

## 2020-12-18 PROCEDURE — 99284 EMERGENCY DEPT VISIT MOD MDM: CPT

## 2020-12-18 PROCEDURE — 83605 ASSAY OF LACTIC ACID: CPT

## 2020-12-18 RX ORDER — CEPHALEXIN 500 MG/1
1000 CAPSULE ORAL 2 TIMES DAILY
Qty: 28 CAPSULE | Refills: 0 | Status: SHIPPED | OUTPATIENT
Start: 2020-12-18 | End: 2020-12-25

## 2020-12-18 RX ORDER — KETOROLAC TROMETHAMINE 15 MG/ML
15 INJECTION, SOLUTION INTRAMUSCULAR; INTRAVENOUS ONCE
Status: COMPLETED | OUTPATIENT
Start: 2020-12-18 | End: 2020-12-18

## 2020-12-18 RX ORDER — ACETAMINOPHEN 500 MG
1000 TABLET ORAL ONCE
Status: COMPLETED | OUTPATIENT
Start: 2020-12-18 | End: 2020-12-18

## 2020-12-18 RX ORDER — 0.9 % SODIUM CHLORIDE 0.9 %
1000 INTRAVENOUS SOLUTION INTRAVENOUS ONCE
Status: COMPLETED | OUTPATIENT
Start: 2020-12-18 | End: 2020-12-18

## 2020-12-18 RX ADMIN — ACETAMINOPHEN 1000 MG: 500 TABLET ORAL at 20:19

## 2020-12-18 RX ADMIN — SODIUM CHLORIDE 1000 ML: 9 INJECTION, SOLUTION INTRAVENOUS at 18:10

## 2020-12-18 RX ADMIN — CEFTRIAXONE SODIUM 1 G: 1 INJECTION, POWDER, FOR SOLUTION INTRAMUSCULAR; INTRAVENOUS at 20:19

## 2020-12-18 RX ADMIN — KETOROLAC TROMETHAMINE 15 MG: 15 INJECTION, SOLUTION INTRAMUSCULAR; INTRAVENOUS at 18:10

## 2020-12-18 RX ADMIN — IOPAMIDOL 100 ML: 612 INJECTION, SOLUTION INTRAVENOUS at 18:54

## 2020-12-18 ASSESSMENT — PAIN DESCRIPTION - PAIN TYPE
TYPE: ACUTE PAIN
TYPE: ACUTE PAIN

## 2020-12-18 ASSESSMENT — ENCOUNTER SYMPTOMS
ABDOMINAL PAIN: 1
SORE THROAT: 0
EYE DISCHARGE: 0
CONSTIPATION: 0
DIARRHEA: 0
ABDOMINAL DISTENTION: 0
COLOR CHANGE: 0
VOMITING: 0
RHINORRHEA: 0
NAUSEA: 0
SHORTNESS OF BREATH: 0

## 2020-12-18 ASSESSMENT — PAIN SCALES - GENERAL
PAINLEVEL_OUTOF10: 7
PAINLEVEL_OUTOF10: 10
PAINLEVEL_OUTOF10: 3
PAINLEVEL_OUTOF10: 3

## 2020-12-18 ASSESSMENT — PAIN DESCRIPTION - LOCATION
LOCATION: ABDOMEN
LOCATION: ABDOMEN

## 2020-12-18 ASSESSMENT — PAIN DESCRIPTION - DESCRIPTORS: DESCRIPTORS: ACHING

## 2020-12-18 NOTE — ED NOTES
Patient presents to the ER with complaints of abdominal pain  Body aches  Back pain  And fever  Patient states that she has been feeling this way for a few days  Respirations even and unlabored  Febrile at 102.1     Aston Bajwa RN  12/18/20 [id number]

## 2020-12-18 NOTE — ED PROVIDER NOTES
[address] [facility] ED  eMERGENCY dEPARTMENT eNCOUnter      Pt Name: Susan Maddox  MRN: [id number]  [place] 1990  Date of evaluation: 12/18/2020  Provider: Sheyla Leal       Chief Complaint   Patient presents with    Abdominal Pain         HISTORY OF PRESENT ILLNESS   (Location/Symptom, Timing/Onset,Context/Setting, Quality, Duration, Modifying Factors, Severity)  Note limiting factors. Susan Maddox is a 31 y.o. female who presents to the emergency department complaint of generalized pain all over, as well as low suprapubic abdominal pain which patient states started this morning for her. There is no fevers, no nausea vomiting according to patient. denies any urinary complaints, no constipation or diarrhea. She is rating her current pain at this time is a 10 out of 10. HPI    NursingNotes were reviewed. REVIEW OF SYSTEMS    (2-9 systems for level 4, 10 or more for level 5)     Review of Systems   Constitutional: Negative for activity change and appetite change. HENT: Negative for congestion, ear discharge, ear pain, nosebleeds, rhinorrhea and sore throat. Eyes: Negative for discharge. Respiratory: Negative for shortness of breath. Cardiovascular: Negative for chest pain, palpitations and leg swelling. Gastrointestinal: Positive for abdominal pain. Negative for abdominal distention, constipation, diarrhea, nausea and vomiting. Genitourinary: Negative for difficulty urinating and dysuria. Musculoskeletal: Positive for myalgias. Negative for arthralgias. Skin: Negative for color change, pallor, rash and wound. Neurological: Negative for dizziness, tremors, syncope, weakness, numbness and headaches. Psychiatric/Behavioral: Negative for agitation and confusion. Except as noted above the remainder of the review of systems was reviewed and negative.        PAST MEDICAL HISTORY     Past Medical History:   Diagnosis Date    Abnormal Pap smear of cervix     Anxiety     Asthma     Depression     D[facility] thrombosis) ([facility]     Hypertension     PTSD (post-traumatic stress disorder)     Schizoaffective disorder ([facility])          SURGICALHISTORY       Past Surgical History:   Procedure Laterality Date     SECTION      TONSILLECTOMY           CURRENT MEDICATIONS       Previous Medications    ALBUTEROL SULFATE HFA (VENTOLIN HFA) 108 (90 BASE) MCG/ACT INHALER    Inhale 2 puffs into the lungs every 6 hours as needed for Wheezing    ASCORBIC ACID (VITAMIN C) 250 MG TABLET    Take 250 mg by mouth daily    FLUCONAZOLE (DIFLUCAN) 150 MG TABLET    Take 1 tablet by mouth every 3 days May repeat in 72 hours as needed.     HYDROXYZINE (VISTARIL) 25 MG CAPSULE    TAKE 1 CAPSULE BY MOUTH 3 TIMES A DAY AS NEEDED FOR ANXIETY    LATUDA 80 MG TABS TABLET    TAKE 1 TABLET BY MOUTH EVERY EVENING WITH MEALS TAKE WITH AT LEAST 350 CALORIE MEAL    LETROZOLE (FEMARA) 2.5 MG TABLET    Take 1 tablet by mouth daily Day 5 through 9 of the menstrual cycle    MEDROXYPROGESTERONE (PROVERA) 10 MG TABLET    Take 1 tablet by mouth daily For 10 days    MELATONIN 10 MG CAPS        MULTIPLE VITAMINS-MINERALS (MULTIVITAMIN ADULT PO)    Take by mouth    RISPERIDONE (RISPERDAL) 1 MG TABLET    TAKE 1 TABLET BY MOUTH EVERYDAY AT BEDTIME    VITAMIN D PO    Take by mouth       ALLERGIES     Food    FAMILY HISTORY       Family History   Problem Relation Age of Onset    Depression Father     Diabetes Mother     Breast Cancer Neg Hx           SOCIAL HISTORY       Social History     Socioeconomic History    Marital status:      Spouse name: None    Number of children: None    Years of education: None    Highest education level: None   Occupational History    None   Social Needs    Financial resource strain: None    Food insecurity     Worry: None     Inability: None    Transportation needs     Medical: None     Non-medical: None   Tobacco Use    Smoking status: Current normal.      Pupils: Pupils are equal, round, and reactive to light. Neck:      Musculoskeletal: Normal range of motion and neck supple. No neck rigidity. Vascular: No JVD. Trachea: No tracheal deviation. Cardiovascular:      Rate and Rhythm: Normal rate. Pulses: Normal pulses. Heart sounds: Normal heart sounds. No murmur. No friction rub. No gallop. Pulmonary:      Effort: Pulmonary effort is normal. No tachypnea, accessory muscle usage, respiratory distress or retractions. Breath sounds: No stridor. No wheezing, rhonchi or rales. Chest:      Chest wall: No tenderness. Abdominal:      General: Abdomen is flat. Bowel sounds are normal. There is no distension or abdominal bruit. Palpations: There is no shifting dullness, fluid wave, hepatomegaly, splenomegaly, mass or pulsatile mass. Tenderness: There is abdominal tenderness. There is no right CVA tenderness, left CVA tenderness, guarding or rebound. Negative signs include Deal's sign, Rovsing's sign and McBurney's sign. Comments: Patient complains of pain with slightest touch abdomen in all 4 quadrants, there is no guarding mass or rebound. No CVA tenderness. Musculoskeletal:         General: No deformity. Skin:     General: Skin is warm and dry. Capillary Refill: Capillary refill takes less than 2 seconds. Coloration: Skin is not jaundiced. Neurological:      General: No focal deficit present. Mental Status: She is alert and oriented to person, place, and time. Mental status is at baseline. Cranial Nerves: No cranial nerve deficit. Sensory: No sensory deficit. Motor: No weakness.       Coordination: Coordination normal.   Psychiatric:         Mood and Affect: Mood normal.         DIAGNOSTIC RESULTS     EKG: All EKG's are interpreted by the Emergency Department Physician who either signs or Co-signsthis chart in the absence of a cardiologist.        RADIOLOGY:   Trixie Marcial process. Pt does have a UTI and was started on Rocephin. Patient was given 1 L IV fluids and Toradol and is currently afebrile at 98.1. She is up in the cart stating that she feels much better eating food and drinking fluids. She is aware to alternate Motrin Tylenol at home every 3-4 hours for fever/pain control. She will be sent home with Keflex. She is also to keep her self hydrated at home. Standard anticipatory guidance given to patient upon discharge. Have given them a specific time frame in which to follow-up and who to follow-up with. I have also advised them that they should return to the emergency department if they get worse, or not getting better or develop any new or concerning symptoms. Patient demonstrates understanding. CRITICAL CARE TIME   Total Critical Care time was 0 minutes, excluding separately reportableprocedures. There was a high probability of clinicallysignificant/life threatening deterioration in the patient's condition which required my urgent intervention. CONSULTS:  None    PROCEDURES:  Unless otherwise noted below, none     Procedures    FINAL IMPRESSION      1. Acute cystitis without hematuria          DISPOSITION/PLAN   DISPOSITION Discharge - Pending Orders Complete 12/18/2020 08:05:09 PM      PATIENT REFERRED TO:  Joellen Led  [address] [facility],[phone] [id number]  [phone]    In 2 days        DISCHARGE MEDICATIONS:  New Prescriptions    CEPHALEXIN (KEFLEX) 500 MG CAPSULE    Take 2 capsules by mouth 2 times daily for 7 days          (Please note that portions of this note were completed with a voice recognition program.  Efforts were made to edit the dictations but occasionally words are mis-transcribed. )    JADE Hill (electronically signed)  Attending Emergency Physician         JADE Lassiter  12/18/20 2010

## 2020-12-20 LAB
BLOOD CULTURE, ROUTINE: ABNORMAL
BLOOD CULTURE, ROUTINE: ABNORMAL
ORGANISM: ABNORMAL

## 2020-12-21 LAB
CULTURE, BLOOD 2: ABNORMAL
ORGANISM: ABNORMAL
URINE CULTURE, ROUTINE: ABNORMAL

## 2021-01-14 ENCOUNTER — OFFICE VISIT (OUTPATIENT)
Dept: OBGYN CLINIC | Age: 31
End: 2021-01-14
Payer: COMMERCIAL

## 2021-01-14 VITALS
DIASTOLIC BLOOD PRESSURE: 88 MMHG | HEART RATE: 95 BPM | BODY MASS INDEX: 46.64 KG/M2 | SYSTOLIC BLOOD PRESSURE: 130 MMHG | WEIGHT: 255 LBS

## 2021-01-14 DIAGNOSIS — E28.2 POLYCYSTIC OVARIAN DISEASE: Primary | ICD-10-CM

## 2021-01-14 LAB
HCG, URINE, POC: NEGATIVE
Lab: NORMAL
NEGATIVE QC PASS/FAIL: NORMAL
POSITIVE QC PASS/FAIL: NORMAL

## 2021-01-14 PROCEDURE — G8484 FLU IMMUNIZE NO ADMIN: HCPCS | Performed by: OBSTETRICS & GYNECOLOGY

## 2021-01-14 PROCEDURE — 99213 OFFICE O/P EST LOW 20 MIN: CPT | Performed by: OBSTETRICS & GYNECOLOGY

## 2021-01-14 PROCEDURE — G8427 DOCREV CUR MEDS BY ELIG CLIN: HCPCS | Performed by: OBSTETRICS & GYNECOLOGY

## 2021-01-14 PROCEDURE — 81025 URINE PREGNANCY TEST: CPT | Performed by: OBSTETRICS & GYNECOLOGY

## 2021-01-14 PROCEDURE — 4004F PT TOBACCO SCREEN RCVD TLK: CPT | Performed by: OBSTETRICS & GYNECOLOGY

## 2021-01-14 PROCEDURE — G8417 CALC BMI ABV UP PARAM F/U: HCPCS | Performed by: OBSTETRICS & GYNECOLOGY

## 2021-01-14 RX ORDER — LETROZOLE 2.5 MG/1
2.5 TABLET, FILM COATED ORAL DAILY
Qty: 5 TABLET | Refills: 1 | Status: SHIPPED | OUTPATIENT
Start: 2021-01-14 | End: 2021-03-11 | Stop reason: SDUPTHER

## 2021-01-14 RX ORDER — DOXEPIN HYDROCHLORIDE 25 MG/1
CAPSULE ORAL
COMMUNITY
Start: 2021-01-05 | End: 2021-05-13 | Stop reason: SDUPTHER

## 2021-01-14 RX ORDER — ATORVASTATIN CALCIUM 20 MG/1
TABLET, FILM COATED ORAL
COMMUNITY
Start: 2020-12-29 | End: 2021-10-13

## 2021-01-14 RX ORDER — PRENATAL WITH FERROUS FUM AND FOLIC ACID 3080; 920; 120; 400; 22; 1.84; 3; 20; 10; 1; 12; 200; 27; 25; 2 [IU]/1; [IU]/1; MG/1; [IU]/1; MG/1; MG/1; MG/1; MG/1; MG/1; MG/1; UG/1; MG/1; MG/1; MG/1; MG/1
TABLET ORAL
COMMUNITY
Start: 2020-11-13

## 2021-01-14 RX ORDER — FLUTICASONE PROPIONATE 100 MCG
1 BLISTER, WITH INHALATION DEVICE INHALATION DAILY
COMMUNITY
Start: 2021-01-05

## 2021-01-14 RX ORDER — FLUOXETINE HYDROCHLORIDE 20 MG/1
CAPSULE ORAL
COMMUNITY
Start: 2021-01-07 | End: 2021-10-13

## 2021-01-14 ASSESSMENT — ENCOUNTER SYMPTOMS
BACK PAIN: 0
WHEEZING: 0
CHEST TIGHTNESS: 0
VOICE CHANGE: 0
NAUSEA: 0
COUGH: 0
SORE THROAT: 0
ABDOMINAL PAIN: 0
ABDOMINAL DISTENTION: 0
SHORTNESS OF BREATH: 0
COLOR CHANGE: 0
CONSTIPATION: 0
BLOOD IN STOOL: 0
TROUBLE SWALLOWING: 0
VOMITING: 0

## 2021-01-14 NOTE — PROGRESS NOTES
Lacey Knapp (:  1990) is a 31 y.o. female,Established patient, here for evaluation of the following chief complaint(s): Patient had a spontaneous menses on . She went to get her letrozole and she did not get it at the pharmacy. She has been taking the Metformin and tolerating it well twice daily and she has lost a few pounds. Follow-up (infertility)      ASSESSMENT/PLAN:  1. Polycystic ovarian disease  -     POC Pregnancy Urine Qual  #2 infertility  Plan due to metformin twice daily with meals  Patient to take letrozole day 5 through 9 of a spontaneous cycle. She has been asked to come back here in February to follow-up regarding the menstrual cycles and the PCL    No follow-ups on file. SUBJECTIVE/OBJECTIVE:  HPI    Review of Systems   Constitutional: Negative for activity change, appetite change, fatigue and unexpected weight change. HENT: Negative for dental problem, ear pain, hearing loss, nosebleeds, sore throat, trouble swallowing and voice change. Eyes: Negative for visual disturbance. Respiratory: Negative for cough, chest tightness, shortness of breath and wheezing. Cardiovascular: Negative for chest pain and palpitations. Gastrointestinal: Negative for abdominal distention, abdominal pain, blood in stool, constipation, nausea and vomiting. Endocrine: Negative for cold intolerance, heat intolerance, polydipsia, polyphagia and polyuria. Genitourinary: Negative for difficulty urinating, dyspareunia, dysuria, flank pain, frequency, genital sores, hematuria, menstrual problem, pelvic pain, urgency, vaginal bleeding, vaginal discharge and vaginal pain. Musculoskeletal: Negative for arthralgias, back pain, joint swelling and myalgias. Skin: Negative for color change and rash. Allergic/Immunologic: Negative for environmental allergies, food allergies and immunocompromised state.    Neurological: Negative for dizziness, seizures, syncope, speech difficulty, weakness, numbness and headaches. Hematological: Negative for adenopathy. Does not bruise/bleed easily. Psychiatric/Behavioral: Negative for agitation, behavioral problems, confusion, decreased concentration, dysphoric mood and suicidal ideas. The patient is not nervous/anxious and is not hyperactive. Physical Exam  Vitals signs reviewed. Constitutional:       Appearance: She is obese. Neurological:      Mental Status: She is alert. An electronic signature was used to authenticate this note.     --Leilani Garcia, DO

## 2021-01-28 ENCOUNTER — TELEPHONE (OUTPATIENT)
Dept: OBGYN CLINIC | Age: 31
End: 2021-01-28

## 2021-01-28 NOTE — TELEPHONE ENCOUNTER
Pt was unable to  medication for fertility. States she needs a prior authorization for the medication.  Please call pt when she can  medication

## 2021-02-11 ENCOUNTER — OFFICE VISIT (OUTPATIENT)
Dept: OBGYN CLINIC | Age: 31
End: 2021-02-11
Payer: COMMERCIAL

## 2021-02-11 VITALS
SYSTOLIC BLOOD PRESSURE: 122 MMHG | HEART RATE: 118 BPM | DIASTOLIC BLOOD PRESSURE: 68 MMHG | WEIGHT: 256 LBS | BODY MASS INDEX: 46.82 KG/M2

## 2021-02-11 DIAGNOSIS — E28.2 POLYCYSTIC OVARIAN DISEASE: Primary | ICD-10-CM

## 2021-02-11 LAB
HCG, URINE, POC: NEGATIVE
Lab: NORMAL
NEGATIVE QC PASS/FAIL: NORMAL
POSITIVE QC PASS/FAIL: NORMAL

## 2021-02-11 PROCEDURE — 4004F PT TOBACCO SCREEN RCVD TLK: CPT | Performed by: OBSTETRICS & GYNECOLOGY

## 2021-02-11 PROCEDURE — G8427 DOCREV CUR MEDS BY ELIG CLIN: HCPCS | Performed by: OBSTETRICS & GYNECOLOGY

## 2021-02-11 PROCEDURE — 81025 URINE PREGNANCY TEST: CPT | Performed by: OBSTETRICS & GYNECOLOGY

## 2021-02-11 PROCEDURE — 99213 OFFICE O/P EST LOW 20 MIN: CPT | Performed by: OBSTETRICS & GYNECOLOGY

## 2021-02-11 PROCEDURE — G8417 CALC BMI ABV UP PARAM F/U: HCPCS | Performed by: OBSTETRICS & GYNECOLOGY

## 2021-02-11 PROCEDURE — G8484 FLU IMMUNIZE NO ADMIN: HCPCS | Performed by: OBSTETRICS & GYNECOLOGY

## 2021-02-11 ASSESSMENT — ENCOUNTER SYMPTOMS
COUGH: 0
BACK PAIN: 0
SORE THROAT: 0
WHEEZING: 0
VOMITING: 0
CHEST TIGHTNESS: 0
ABDOMINAL PAIN: 0
NAUSEA: 0
COLOR CHANGE: 0
CONSTIPATION: 0
SHORTNESS OF BREATH: 0
ABDOMINAL DISTENTION: 0
TROUBLE SWALLOWING: 0
BLOOD IN STOOL: 0
VOICE CHANGE: 0

## 2021-02-11 NOTE — PROGRESS NOTES
Jon Litten Tenorio (:  1990) is a 3 [facility] y.o. female,Established patient, here for evaluation of the following chief complaint(s): She had the cycle spontaneously the end of January took the letrozole 2-1/2 mg day for 2055 which was started on  she is noticing fertile mucus around this time which should be done around day 14. Also taking the Metformin twice daily and tolerating that well. Follow-up (infertility. taking letrizole. c/o fatigue and for the past 2 days has had clear sticky mucus)      ASSESSMENT/PLAN:  1. Polycystic ovarian disease  -     POC Pregnancy Urine Qual  Continue the Metformin if she is unable to conceive. She gets the day 40 she is to return for a pregnancy test if negative Provera then increase the letrozole to 5 mg    No follow-ups on file. SUBJECTIVE/OBJECTIVE:  HPI    Review of Systems   Constitutional: Negative for activity change, appetite change, fatigue and unexpected weight change. HENT: Negative for dental problem, ear pain, hearing loss, nosebleeds, sore throat, trouble swallowing and voice change. Eyes: Negative for visual disturbance. Respiratory: Negative for cough, chest tightness, shortness of breath and wheezing. Cardiovascular: Negative for chest pain and palpitations. Gastrointestinal: Negative for abdominal distention, abdominal pain, blood in stool, constipation, nausea and vomiting. Endocrine: Negative for cold intolerance, heat intolerance, polydipsia, polyphagia and polyuria. Genitourinary: Negative for difficulty urinating, dyspareunia, dysuria, flank pain, frequency, genital sores, hematuria, menstrual problem, pelvic pain, urgency, vaginal bleeding, vaginal discharge and vaginal pain. Musculoskeletal: Negative for arthralgias, back pain, joint swelling and myalgias. Skin: Negative for color change and rash. Allergic/Immunologic: Negative for environmental allergies, food allergies and immunocompromised state. Neurological: Negative for dizziness, seizures, syncope, speech difficulty, weakness, numbness and headaches. Hematological: Negative for adenopathy. Does not bruise/bleed easily. Psychiatric/Behavioral: Negative for agitation, behavioral problems, confusion, decreased concentration, dysphoric mood and suicidal ideas. The patient is not nervous/anxious and is not hyperactive. Physical Exam  Vitals signs reviewed. Constitutional:       Appearance: She is obese. Neurological:      Mental Status: She is alert. On this date 02/11/21 I have spent 25 minutes reviewing previous notes, test results and face to face with the patient discussing the diagnosis and importance of compliance with the treatment plan as well as documenting on the day of the visit. An electronic signature was used to authenticate this note.     --Thuan Vick, DO

## 2021-02-22 ENCOUNTER — TELEPHONE (OUTPATIENT)
Dept: OBGYN CLINIC | Age: 31
End: 2021-02-22

## 2021-02-22 ENCOUNTER — VIRTUAL VISIT (OUTPATIENT)
Dept: ENDOCRINOLOGY | Age: 31
End: 2021-02-22
Payer: COMMERCIAL

## 2021-02-22 DIAGNOSIS — E28.2 PCOS (POLYCYSTIC OVARIAN SYNDROME): Primary | ICD-10-CM

## 2021-02-22 PROCEDURE — 99213 OFFICE O/P EST LOW 20 MIN: CPT | Performed by: PHYSICIAN ASSISTANT

## 2021-02-22 PROCEDURE — G8428 CUR MEDS NOT DOCUMENT: HCPCS | Performed by: PHYSICIAN ASSISTANT

## 2021-02-22 RX ORDER — LANCETS 30 GAUGE
1 EACH MISCELLANEOUS
Qty: 150 EACH | Refills: 3 | Status: SHIPPED | OUTPATIENT
Start: 2021-02-22 | End: 2021-08-16 | Stop reason: SDUPTHER

## 2021-02-22 RX ORDER — METFORMIN HYDROCHLORIDE 500 MG/1
500 TABLET, EXTENDED RELEASE ORAL
Qty: 60 TABLET | Refills: 3 | Status: SHIPPED | OUTPATIENT
Start: 2021-02-22 | End: 2021-06-21

## 2021-02-22 RX ORDER — GLUCOSAMINE HCL/CHONDROITIN SU 500-400 MG
1 CAPSULE ORAL
Qty: 150 STRIP | Refills: 3 | Status: SHIPPED | OUTPATIENT
Start: 2021-02-22 | End: 2021-08-16 | Stop reason: SDUPTHER

## 2021-02-22 ASSESSMENT — ENCOUNTER SYMPTOMS
NAUSEA: 0
COUGH: 0
RHINORRHEA: 0
SINUS PRESSURE: 0
SHORTNESS OF BREATH: 0
VOMITING: 0
EYE PAIN: 0
EYE REDNESS: 0
WHEEZING: 0
ABDOMINAL PAIN: 0
SORE THROAT: 0
DIARRHEA: 0

## 2021-02-22 NOTE — TELEPHONE ENCOUNTER
Michael Schmid called this morning stating she's currently on amoxicillin and that usually gives her a yeast infection. She denies any symptoms right now but is requesting something be sent over.

## 2021-02-22 NOTE — PROGRESS NOTES
2021  TELEHEALTH EVALUATION -- Audio/Visual (During XHCPA-27 public health emergency)    Due to COVID 19 outbreak, patient's office visit was converted to a virtual visit. Patient was contacted and agreed to proceed with a virtual visit via [email or web address]. me  I was the provider at my office, the patient was at home. The risks and benefits of converting to a virtual visit were discussed in light of the current infectious disease epidemic. Patient also understood that insurance coverage and co-pays are up to their individual insurance plans. Pursuant to the emergency declaration under the [facility] waiver authority and the [employer] Act, this Virtual  Visit was conducted, with patient's consent, to reduce the patient's risk of exposure to COVID-19 and provide continuity of care for an established patient. Due to this being a TeleHealth encounter, evaluation of the following organ systems is limited: Vitals/Constitutional/EENT/Resp/CV/GI//MS/Neuro/Skin/Heme-Lymph-Imm    Nina Moura (:  1990) has requested an audio/video evaluation for the following concern(s):  Assessment:       Diagnosis Orders   1. PCOS (polycystic ovarian syndrome)         PLAN:     1. Continue Metformin 500 mg XR twice daily  2. Get laboratory studies drawn today including an A1c  3. Will make further recommendations based on those results  4. Follow-up with me in 3 months  5. Repeat labs 1 week prior to appointment      No orders of the defined types were placed in this encounter. No orders of the defined types were placed in this encounter. No follow-ups on file. Subjective:     Chief Complaint   Patient presents with    Blood Sugar Problem     There were no vitals filed for this visit.   Wt Readings from Last 3 Encounters:   21 256 lb (116.1 kg)   21 255 lb (115.7 kg)   20 262 lb (118.8 kg)     BP Readings from Last 3 Encounters:   21 122/68   21 130/88   20 135/84     Patient is a 29-year-old obese female with history of PCOS. She has been treated with Metformin twice daily with good results. Her A1c has been slowly increasing. No recent laboratory studies for comparison. She is making dietary changes, we discussed calorie counting and reducing her daily caloric intake to less than 1500 a day to start. She is exercising 3 to 4 days a week but is unable to lose any weight despite dietary and lifestyle changes.   I am sending a prescription for a meter, strips, and lancets to her pharmacy instructed her to check her glucose 1-2 times a day and to contact me if she has consistent readings greater than 150    Past Medical History:   Diagnosis Date    Abnormal Pap smear of cervix     Anxiety     Asthma     Depression     DVT [facility] thrombosis) ([facility])     Hypertension     PTSD (post-traumatic stress disorder)     Schizoaffective disorder ([facility])      Past Surgical History:   Procedure Laterality Date     SECTION      TONSILLECTOMY       Social History     Socioeconomic History    Marital status:      Spouse name: Not on file    Number of children: Not on file    Years of education: Not on file    Highest education level: Not on file   Occupational History    Not on file   Social Needs    Financial resource strain: Not on file    Food insecurity     Worry: Not on file     Inability: Not on file    Transportation needs     Medical: Not on file     Non-medical: Not on file   Tobacco Use    Smoking status: Current Every Day Smoker     Packs/day: 0.25     Years: 12.00     Pack years: 3.00     Types: Cigarettes    Smokeless tobacco: Never Used    Tobacco comment: 3 cigs/day   Substance and Sexual Activity    Alcohol use: Not Currently     Comment: \"once in a while\"    Drug use: Not Currently    Sexual activity: Yes     Partners: Male   Lifestyle    Physical activity Days per week: Not on file     Minutes per session: Not on file    Stress: Not on file   Relationships    Social connections     Talks on phone: Not on file     Gets together: Not on file     Attends Anabaptist service: Not on file     Active member of club or organization: Not on file     Attends meetings of clubs or organizations: Not on file     Relationship status: Not on file    Intimate partner violence     Fear of current or ex partner: Not on file     Emotionally abused: Not on file     Physically abused: Not on file     Forced sexual activity: Not on file   Other Topics Concern    Not on file   Social History Narrative    Not on file     Family History   Problem Relation Age of Onset    Depression Father     Diabetes Mother     Breast Cancer Neg Hx      Allergies   Allergen Reactions    Food Hives and Itching     Squash       Current Outpatient Medications:     Prenatal Vit-Fe Fumarate-FA (PRENATAL VITAMIN) 27-1 MG TABS tablet, take 1 tablet by mouth once daily, Disp: , Rfl:     FLOVENT DISKUS 100 MCG/BLIST AEPB inhaler, , Disp: , Rfl:     FLUoxetine (PROZAC) 20 MG capsule, , Disp: , Rfl:     doxepin (SINEQUAN) 25 MG capsule, , Disp: , Rfl:     atorvastatin (LIPITOR) 20 MG tablet, TAKE 1 TABLET BY MOUTH EVERY DAY, Disp: , Rfl:     letrozole (FEMARA) 2.5 MG tablet, Take 1 tablet by mouth daily Day 5 through 9 of the menstrual cycle, Disp: 5 tablet, Rfl: 1    fluconazole (DIFLUCAN) 150 MG tablet, Take 1 tablet by mouth every 3 days May repeat in 72 hours as needed. , Disp: 3 tablet, Rfl: 1    hydrOXYzine (VISTARIL) 25 MG capsule, TAKE 1 CAPSULE BY MOUTH 3 TIMES A DAY AS NEEDED FOR ANXIETY, Disp: , Rfl:     LATUDA 80 MG TABS tablet, TAKE 1 TABLET BY MOUTH EVERY EVENING WITH MEALS TAKE WITH AT LEAST 350 CALORIE MEAL, Disp: , Rfl:     risperiDONE (RISPERDAL) 1 MG tablet, TAKE 1 TABLET BY MOUTH EVERYDAY AT BEDTIME, Disp: , Rfl:     medroxyPROGESTERone (PROVERA) 10 MG tablet, Take 1 tablet by mouth daily For 10 days, Disp: 10 tablet, Rfl: 3    Multiple Vitamins-Minerals (MULTIVITAMIN ADULT PO), Take by mouth, Disp: , Rfl:     VITAMIN D PO, Take by mouth, Disp: , Rfl:     Melatonin 10 MG CAPS, , Disp: , Rfl:     Ascorbic Acid (VITAMIN C) 250 MG tablet, Take 250 mg by mouth daily, Disp: , Rfl:     albuterol sulfate HFA (VENTOLIN HFA) 108 (90 Base) MCG/ACT inhaler, Inhale 2 puffs into the lungs every 6 hours as needed for Wheezing, Disp: 1 Inhaler, Rfl: 0  Lab Results   Component Value Date     (L) 12/18/2020    K 3.7 12/18/2020     12/18/2020    CO2 19 (L) 12/18/2020    BUN 8 12/18/2020    CREATININE 0.6 12/18/2020    GLUCOSE 103 (H) 12/18/2020    CALCIUM 9.0 12/18/2020    PROT 6.9 12/18/2020    LABALBU 4.1 12/18/2020    BILITOT 0.5 12/18/2020    ALKPHOS 114 12/18/2020    AST 27 12/18/2020    ALT 41 (H) 12/18/2020    LABGLOM >60 12/18/2020    GFRAA >60 12/18/2020    GLOB 2.8 12/18/2020     Lab Results   Component Value Date    WBC 10.6 12/18/2020    HGB 14.1 12/18/2020    HCT 41.1 12/18/2020    MCV 85.8 12/18/2020     12/18/2020     Lab Results   Component Value Date    LABA1C 6.0 (H) 12/11/2019    LABA1C 5.7 10/28/2019     Lab Results   Component Value Date    HDL 37 (L) 12/11/2019    HDL 34 (L) 08/22/2019    HDL 43 08/11/2019    LDLCALC 44 12/11/2019    LDLCALC 56 08/22/2019    LDLCALC 87 08/11/2019    CHOL 118 12/11/2019    CHOL 160 08/22/2019    CHOL 169 08/11/2019    TRIG 183 (H) 12/11/2019    TRIG 352 (H) 08/22/2019    TRIG 194 (H) 08/11/2019     No results found for: TESTM  Lab Results   Component Value Date    TSH 0.933 12/11/2019    TSH 0.865 08/20/2019    TSH 0.938 08/11/2019    TSHREFLEX 1.900 11/13/2020    TSHREFLEX 1.380 01/17/2018    T4FREE 1.46 12/11/2019    T4FREE 1.17 01/17/2018     No results found for: TPOABS    Review of Systems   Constitutional: Negative for chills, fatigue and fever.    HENT: Negative for congestion, ear pain, postnasal drip, rhinorrhea, sinus pressure and sore throat. Eyes: Negative for pain and redness. Respiratory: Negative for cough, shortness of breath and wheezing. Cardiovascular: Negative for chest pain, palpitations and leg swelling. Gastrointestinal: Negative for abdominal pain, diarrhea, nausea and vomiting. Endocrine: Negative for cold intolerance, heat intolerance, polydipsia and polyuria. Genitourinary: Negative for difficulty urinating. Musculoskeletal: Negative for arthralgias. Skin: Negative for rash. Allergic/Immunologic: Negative for environmental allergies. Neurological: Negative for dizziness and headaches. Psychiatric/Behavioral: Negative for dysphoric mood. Objective:   Physical Exam  Constitutional:       General: She is not in acute distress. Appearance: Normal appearance. She is not ill-appearing. HENT:      Head: Normocephalic. Nose: Congestion present. No rhinorrhea. Mouth/Throat:      Mouth: Mucous membranes are dry. Eyes:      Pupils: Pupils are equal, round, and reactive to light. Pulmonary:      Effort: No respiratory distress. Neurological:      Mental Status: She is alert.

## 2021-02-23 ENCOUNTER — TELEPHONE (OUTPATIENT)
Dept: ENDOCRINOLOGY | Age: 31
End: 2021-02-23

## 2021-02-23 RX ORDER — LANCETS 30 GAUGE
EACH MISCELLANEOUS
Qty: 150 EACH | Refills: 3 | Status: SHIPPED | OUTPATIENT
Start: 2021-02-23 | End: 2021-11-11 | Stop reason: SDUPTHER

## 2021-02-23 RX ORDER — FLUCONAZOLE 150 MG/1
TABLET ORAL
Qty: 3 TABLET | Refills: 0 | Status: SHIPPED | OUTPATIENT
Start: 2021-02-23 | End: 2021-05-13 | Stop reason: ALTCHOICE

## 2021-02-23 NOTE — TELEPHONE ENCOUNTER
Patient calling to inform you that she needs a new RX for One Touch Ultra lancets. Send RX to listed [facility].

## 2021-03-05 DIAGNOSIS — E28.2 PCOS (POLYCYSTIC OVARIAN SYNDROME): ICD-10-CM

## 2021-03-05 LAB
ANION GAP SERPL CALCULATED.3IONS-SCNC: 13 MEQ/L (ref 9–15)
BUN BLDV-MCNC: 11 MG/DL (ref 6–20)
CALCIUM SERPL-MCNC: 9.3 MG/DL (ref 8.5–9.9)
CHLORIDE BLD-SCNC: 101 MEQ/L (ref 95–107)
CO2: 23 MEQ/L (ref 20–31)
CREAT SERPL-MCNC: 0.6 MG/DL (ref 0.5–0.9)
GFR AFRICAN AMERICAN: >60
GFR NON-AFRICAN AMERICAN: >60
GLUCOSE BLD-MCNC: 108 MG/DL (ref 70–99)
POTASSIUM SERPL-SCNC: 4.3 MEQ/L (ref 3.4–4.9)
SODIUM BLD-SCNC: 137 MEQ/L (ref 135–144)

## 2021-03-09 ENCOUNTER — TELEPHONE (OUTPATIENT)
Dept: ENDOCRINOLOGY | Age: 31
End: 2021-03-09

## 2021-03-09 RX ORDER — BLOOD SUGAR DIAGNOSTIC
1 STRIP MISCELLANEOUS 3 TIMES DAILY
Qty: 100 EACH | Refills: 3 | Status: SHIPPED | OUTPATIENT
Start: 2021-03-09 | End: 2021-08-16 | Stop reason: SDUPTHER

## 2021-03-09 NOTE — TELEPHONE ENCOUNTER
Patient is calling and requesting a prescription for alcohol pads to be sent to [facility] on [facility]. Please send this for the patient. Thanks.

## 2021-03-11 ENCOUNTER — OFFICE VISIT (OUTPATIENT)
Dept: OBGYN CLINIC | Age: 31
End: 2021-03-11
Payer: COMMERCIAL

## 2021-03-11 VITALS
BODY MASS INDEX: 44.81 KG/M2 | HEART RATE: 72 BPM | WEIGHT: 245 LBS | DIASTOLIC BLOOD PRESSURE: 76 MMHG | SYSTOLIC BLOOD PRESSURE: 104 MMHG

## 2021-03-11 DIAGNOSIS — R30.0 DYSURIA: ICD-10-CM

## 2021-03-11 DIAGNOSIS — R30.0 DYSURIA: Primary | ICD-10-CM

## 2021-03-11 LAB
BACTERIA: ABNORMAL /HPF
BILIRUBIN URINE: NEGATIVE
BLOOD, URINE: NEGATIVE
CLARITY: ABNORMAL
COLOR: YELLOW
EPITHELIAL CELLS, UA: ABNORMAL /HPF (ref 0–5)
GLUCOSE URINE: NEGATIVE MG/DL
HYALINE CASTS: ABNORMAL /HPF (ref 0–5)
KETONES, URINE: NEGATIVE MG/DL
LEUKOCYTE ESTERASE, URINE: ABNORMAL
NITRITE, URINE: NEGATIVE
PH UA: 5.5 (ref 5–9)
PROTEIN UA: NEGATIVE MG/DL
RBC UA: ABNORMAL /HPF (ref 0–5)
SPECIFIC GRAVITY UA: 1.02 (ref 1–1.03)
UROBILINOGEN, URINE: 0.2 E.U./DL
WBC UA: >100 /HPF (ref 0–5)

## 2021-03-11 PROCEDURE — G8427 DOCREV CUR MEDS BY ELIG CLIN: HCPCS | Performed by: OBSTETRICS & GYNECOLOGY

## 2021-03-11 PROCEDURE — G8484 FLU IMMUNIZE NO ADMIN: HCPCS | Performed by: OBSTETRICS & GYNECOLOGY

## 2021-03-11 PROCEDURE — 99213 OFFICE O/P EST LOW 20 MIN: CPT | Performed by: OBSTETRICS & GYNECOLOGY

## 2021-03-11 PROCEDURE — G8417 CALC BMI ABV UP PARAM F/U: HCPCS | Performed by: OBSTETRICS & GYNECOLOGY

## 2021-03-11 PROCEDURE — 4004F PT TOBACCO SCREEN RCVD TLK: CPT | Performed by: OBSTETRICS & GYNECOLOGY

## 2021-03-11 RX ORDER — LORATADINE 10 MG/1
TABLET ORAL
COMMUNITY
Start: 2021-01-25 | End: 2021-10-13

## 2021-03-11 RX ORDER — FLUTICASONE PROPIONATE 50 MCG
SPRAY, SUSPENSION (ML) NASAL
COMMUNITY
Start: 2021-02-08 | End: 2021-10-13

## 2021-03-11 RX ORDER — LETROZOLE 2.5 MG/1
2.5 TABLET, FILM COATED ORAL DAILY
Qty: 5 TABLET | Refills: 1 | Status: SHIPPED | OUTPATIENT
Start: 2021-03-11 | End: 2021-05-13 | Stop reason: SDUPTHER

## 2021-03-11 RX ORDER — DOXEPIN HYDROCHLORIDE 75 MG/1
CAPSULE ORAL
COMMUNITY
Start: 2021-02-13 | End: 2021-10-13

## 2021-03-11 NOTE — PROGRESS NOTES
Srinivas Knapp (: 1990) is a 31 y.o. female, Established patient, here for evaluation of the following chief complaint(s):  Follow-up (cycles and letrizole. had a light period x 1 week beginnning 21)  John Brown is here following up on her leg withdrawal treatment. She also is complaining of some discomfort with urination. Primarily tingling when she urinates. She denies hematuria. She had a spontaneous cycle on  she took the letrozole days 6 through 10  She was advised to take it on day 5 through 9 but it did not come in the mail on time. Jennifer Librochelle UCG is negative today      SUBJECTIVE/OBJECTIVE:  HPI    Past Surgical History:   Procedure Laterality Date     SECTION      TONSILLECTOMY          Review of Systems   Genitourinary: Positive for dysuria. Physical Exam  Vitals signs reviewed. Exam conducted with a chaperone present. Constitutional:       Appearance: She is well-developed. She is obese. HENT:      Head: Normocephalic and atraumatic. Eyes:      Pupils: Pupils are equal, round, and reactive to light. Neck:      Musculoskeletal: Normal range of motion. Thyroid: No thyromegaly. Trachea: No tracheal deviation. Cardiovascular:      Rate and Rhythm: Normal rate and regular rhythm. Heart sounds: Normal heart sounds. Pulmonary:      Effort: Pulmonary effort is normal.      Breath sounds: Normal breath sounds. Chest:      Chest wall: No tenderness. Abdominal:      General: Bowel sounds are normal. There is no distension. Palpations: Abdomen is soft. There is no mass. Tenderness: There is no abdominal tenderness. There is no guarding or rebound. Hernia: There is no hernia in the left inguinal area. Genitourinary:     Labia:         Right: No rash, tenderness, lesion or injury. Left: No rash, tenderness, lesion or injury. Vagina: Normal. No foreign body. No vaginal discharge, erythema, tenderness or bleeding.       Cervix: No cervical motion tenderness or discharge. Uterus: Not deviated, not enlarged, not fixed and not tender. Adnexa:         Right: No mass, tenderness or fullness. Left: No mass, tenderness or fullness. Rectum: Normal. No mass, tenderness, anal fissure, external hemorrhoid or internal hemorrhoid. Normal anal tone. Musculoskeletal: Normal range of motion. Lymphadenopathy:      Cervical: No cervical adenopathy. Neurological:      Mental Status: She is alert and oriented to person, place, and time. Vitals:    03/11/21 1302   BP: 104/76   Pulse: 72   Weight: 245 lb (111.1 kg)       ASSESSMENT/PLAN:   Diagnosis Orders   1. Dysuria  Culture, Urine    Urinalysis     Polycystic ovaries  Plan we will check UA CNS. Patient to take the letrozole 20 mg day 5 through 9 if she has a spontaneous cycle. If she gets to day 40 of the cycle she is to come in for a urine pregnancy test if negative will then need Provera. No follow-ups on file. On this date 3/11/2021 I have spent 20 minutes reviewing previous notes, test results and face to face with the patient discussing the diagnosis and importance of compliance with the treatment plan as well as documenting on the day of the visit. An electronic signature was used to authenticate this note.

## 2021-03-12 ENCOUNTER — TELEPHONE (OUTPATIENT)
Dept: OBGYN CLINIC | Age: 31
End: 2021-03-12

## 2021-03-12 RX ORDER — NITROFURANTOIN 25; 75 MG/1; MG/1
100 CAPSULE ORAL 2 TIMES DAILY
Qty: 20 CAPSULE | Refills: 0 | Status: SHIPPED | OUTPATIENT
Start: 2021-03-12 | End: 2021-03-22

## 2021-03-12 NOTE — TELEPHONE ENCOUNTER
Patient was seen in the office yesterday with c/o burning and tingling on urination. Urine culture and ua sent. Patient calling today with worsening symptoms with pain on urination like razor blades. States she is unable to wait until culture is back and is requesting ATB. Ok to sent per Dr. Rianna Saldivar.

## 2021-03-14 LAB
ORGANISM: ABNORMAL
URINE CULTURE, ROUTINE: ABNORMAL

## 2021-03-17 RX ORDER — NITROFURANTOIN 25; 75 MG/1; MG/1
100 CAPSULE ORAL 2 TIMES DAILY
Qty: 20 CAPSULE | Refills: 0 | Status: SHIPPED | OUTPATIENT
Start: 2021-03-17 | End: 2021-03-27

## 2021-05-01 ENCOUNTER — APPOINTMENT (OUTPATIENT)
Dept: GENERAL RADIOLOGY | Age: 31
End: 2021-05-01
Payer: COMMERCIAL

## 2021-05-01 ENCOUNTER — HOSPITAL ENCOUNTER (EMERGENCY)
Age: 31
Discharge: HOME | End: 2021-05-01
Attending: FAMILY MEDICINE
Payer: COMMERCIAL

## 2021-05-01 VITALS
SYSTOLIC BLOOD PRESSURE: 140 MMHG | OXYGEN SATURATION: 99 % | DIASTOLIC BLOOD PRESSURE: 88 MMHG | TEMPERATURE: 98.7 F | HEART RATE: 97 BPM | BODY MASS INDEX: 45.08 KG/M2 | HEIGHT: 62 IN | RESPIRATION RATE: 22 BRPM | WEIGHT: 245 LBS

## 2021-05-01 DIAGNOSIS — S86.912A STRAIN OF LEFT KNEE, INITIAL ENCOUNTER: Primary | ICD-10-CM

## 2021-05-01 LAB
HCG, URINE, POC: NEGATIVE
Lab: NORMAL
NEGATIVE QC PASS/FAIL: NORMAL
POSITIVE QC PASS/FAIL: NORMAL

## 2021-05-01 PROCEDURE — 6370000000 HC RX 637 (ALT 250 FOR IP): Performed by: FAMILY MEDICINE

## 2021-05-01 PROCEDURE — 99285 EMERGENCY DEPT VISIT HI MDM: CPT

## 2021-05-01 PROCEDURE — 73564 X-RAY EXAM KNEE 4 OR MORE: CPT

## 2021-05-01 RX ORDER — CYCLOBENZAPRINE HCL 10 MG
10 TABLET ORAL ONCE
Status: COMPLETED | OUTPATIENT
Start: 2021-05-01 | End: 2021-05-01

## 2021-05-01 RX ORDER — IBUPROFEN 800 MG/1
800 TABLET ORAL EVERY 6 HOURS PRN
Status: DISCONTINUED | OUTPATIENT
Start: 2021-05-01 | End: 2021-05-01 | Stop reason: HOSPADM

## 2021-05-01 RX ORDER — IBUPROFEN 800 MG/1
800 TABLET ORAL 2 TIMES DAILY PRN
Qty: 60 TABLET | Refills: 5 | OUTPATIENT
Start: 2021-05-01 | End: 2021-05-21

## 2021-05-01 RX ADMIN — IBUPROFEN 800 MG: 800 TABLET ORAL at 11:08

## 2021-05-01 RX ADMIN — CYCLOBENZAPRINE 10 MG: 10 TABLET, FILM COATED ORAL at 11:07

## 2021-05-01 ASSESSMENT — PAIN DESCRIPTION - PAIN TYPE
TYPE: ACUTE PAIN
TYPE: ACUTE PAIN

## 2021-05-01 ASSESSMENT — ENCOUNTER SYMPTOMS
EYES NEGATIVE: 1
ALLERGIC/IMMUNOLOGIC NEGATIVE: 1
RESPIRATORY NEGATIVE: 1
GASTROINTESTINAL NEGATIVE: 1

## 2021-05-01 ASSESSMENT — PAIN DESCRIPTION - DESCRIPTORS: DESCRIPTORS: ACHING

## 2021-05-01 ASSESSMENT — PAIN SCALES - GENERAL
PAINLEVEL_OUTOF10: 8
PAINLEVEL_OUTOF10: 8

## 2021-05-01 ASSESSMENT — PAIN DESCRIPTION - PROGRESSION: CLINICAL_PROGRESSION: NOT CHANGED

## 2021-05-01 ASSESSMENT — PAIN DESCRIPTION - ORIENTATION: ORIENTATION: LEFT

## 2021-05-01 ASSESSMENT — PAIN DESCRIPTION - ONSET: ONSET: ON-GOING

## 2021-05-01 NOTE — ED PROVIDER NOTES
Medications    ALBUTEROL SULFATE HFA (VENTOLIN HFA) 108 (90 BASE) MCG/ACT INHALER    Inhale 2 puffs into the lungs every 6 hours as needed for Wheezing    ALCOHOL SWABS (ALCOHOL PADS) 70 % PADS    1 each by Does not apply route three times daily    ASCORBIC ACID (VITAMIN C) 250 MG TABLET    Take 250 mg by mouth daily    ATORVASTATIN (LIPITOR) 20 MG TABLET    TAKE 1 TABLET BY MOUTH EVERY DAY    BLOOD GLUCOSE MONITOR KIT AND SUPPLIES    1 kit by Other route 4 times daily (before meals and nightly) Pt test 4x daily Dx E11.65. May substitute for generic or insurance covered product    BLOOD GLUCOSE MONITOR STRIPS    1 strip by Other route 4 times daily (before meals and nightly) Pt test 4x daily Dx E11.65. May substitute for generic or insurance covered product    DOXEPIN (SINEQUAN) 25 MG CAPSULE        DOXEPIN (SINEQUAN) 75 MG CAPSULE    TAKE 1 CAPSULE BY MOUTH AT BEDTIME AS NEEDED FOR SLEEP    FLOVENT DISKUS 100 MCG/BLIST AEPB INHALER        FLUCONAZOLE (DIFLUCAN) 150 MG TABLET    Take 1 tablet by mouth every 3 days May repeat in 72 hours as needed. FLUCONAZOLE (DIFLUCAN) 150 MG TABLET    One tab every 3 days for 3 doses    FLUOXETINE (PROZAC) 20 MG CAPSULE        FLUTICASONE (FLONASE) 50 MCG/ACT NASAL SPRAY    USE 1 2 SPRAYS IN EACH NOSTRIL DAILY AS NEEDED FOR NASAL CONGESTION    HYDROXYZINE (VISTARIL) 25 MG CAPSULE    TAKE 1 CAPSULE BY MOUTH 3 TIMES A DAY AS NEEDED FOR ANXIETY    LANCETS (ONETOUCH DELICA PLUS [id number]) MISC    Test 4x daily    LANCETS MISC    1 each by Does not apply route 4 times daily (before meals and nightly) Pt test 4x daily Dx E11.65.   May substitute for generic or insurance covered product    LATUDA 80 MG TABS TABLET    TAKE 1 TABLET BY MOUTH EVERY EVENING WITH MEALS TAKE WITH AT LEAST 350 CALORIE MEAL    LETROZOLE ([address] [facility]) 2.5 MG TABLET    Take 1 tablet by mouth daily Day 5 through 9 of the menstrual cycle    LORATADINE (CLARITIN) 10 MG TABLET    TAKE 1 TABLET BY MOUTH EVERY DAY MEDROXYPROGESTERONE (PROVERA) 10 MG TABLET    Take 1 tablet by mouth daily For 10 days    MELATONIN 10 MG CAPS        METFORMIN (GLUCOPHAGE XR) 500 MG EXTENDED RELEASE TABLET    Take 1 tablet by mouth 2 times daily (before meals) May substitute for generic or covered medication    MULTIPLE VITAMINS-MINERALS (MULTIVITAMIN ADULT PO)    Take by mouth    PRENATAL VIT-FE FUMARATE-FA (PRENATAL VITAMIN) 27-1 MG TABS TABLET    take 1 tablet by mouth once daily    RISPERIDONE (RISPERDAL) 1 MG TABLET    TAKE 1 TABLET BY MOUTH EVERYDAY AT BEDTIME    VITAMIN D PO    Take by mouth       ALLERGIES     Food    FAMILY HISTORY       Family History   Problem Relation Age of Onset    Depression Father     Diabetes Mother     Breast Cancer Neg Hx           SOCIAL HISTORY       Social History     Socioeconomic History    Marital status:      Spouse name: None    Number of children: None    Years of education: None    Highest education level: None   Occupational History    None   Social Needs    Financial resource strain: None    Food insecurity     Worry: None     Inability: None    Transportation needs     Medical: None     Non-medical: None   Tobacco Use    Smoking status: Current Every Day Smoker     Packs/day: 0.25     Years: 12.00     Pack years: 3.00     Types: Cigarettes    Smokeless tobacco: Never Used    Tobacco comment: 3 cigs/day   Substance and Sexual Activity    Alcohol use: Not Currently     Comment: \"once in a while\"    Drug use: Not Currently    Sexual activity: Yes     Partners: Male   Lifestyle    Physical activity     Days per week: None     Minutes per session: None    Stress: None   Relationships    Social connections     Talks on phone: None     Gets together: None     Attends Islam service: None     Active member of club or organization: None     Attends meetings of clubs or organizations: None     Relationship status: None    Intimate partner violence     Fear of current or ex partner: None     Emotionally abused: None     Physically abused: None     Forced sexual activity: None   Other Topics Concern    None   Social History Narrative    None       SCREENINGS      @FLOW([id number])@      PHYSICAL EXAM    (up to 7 for level 4, 8 or more for level 5)     ED Triage Vitals [05/01/21 1008]   BP Temp Temp src Pulse Resp SpO2 Height Weight   (!) 147/91 -- -- 108 -- -- 5' 2\" (1.575 m) 245 lb (111.1 kg)       Physical Exam  Vitals signs and nursing note reviewed. Constitutional:       Appearance: She is well-developed. HENT:      Head: Normocephalic and atraumatic. Right Ear: Tympanic membrane and external ear normal.      Left Ear: Tympanic membrane and external ear normal.      Nose: Nose normal.      Mouth/Throat:      Mouth: Mucous membranes are moist.   Eyes:      Pupils: Pupils are equal, round, and reactive to light. Neck:      Musculoskeletal: Normal range of motion and neck supple. Cardiovascular:      Rate and Rhythm: Normal rate and regular rhythm. Heart sounds: Normal heart sounds. Pulmonary:      Effort: Pulmonary effort is normal. No respiratory distress. Breath sounds: Normal breath sounds. No wheezing, rhonchi or rales. Chest:      Chest wall: No tenderness. Abdominal:      General: Abdomen is flat. Bowel sounds are normal.      Palpations: Abdomen is soft. Musculoskeletal:      Left knee: She exhibits decreased range of motion. She exhibits no swelling, no effusion, no ecchymosis, no deformity and no laceration. Skin:     General: Skin is warm and dry. Capillary Refill: Capillary refill takes less than 2 seconds. Neurological:      Mental Status: She is alert and oriented to person, place, and time. Cranial Nerves: No cranial nerve deficit. Sensory: No sensory deficit. Motor: No abnormal muscle tone.       Coordination: Coordination normal.      Deep Tendon Reflexes: Reflexes normal.   Psychiatric:         Mood and Affect: Mood normal.         Behavior: Behavior normal.         Thought Content: Thought content normal.         Judgment: Judgment normal.         DIAGNOSTIC RESULTS     EKG: All EKG's are interpreted by the Emergency Department Physician who either signs or Co-signsthis chart in the absence of a cardiologist.         RADIOLOGY:   Tiera Gomesman such as CT, Ultrasound and MRI are read by the radiologist. Plain radiographic images are visualized and preliminarily interpreted by the emergency physician with the below findings:         Interpretation per the Radiologist below, if available at the time ofthis note:    XR KNEE LEFT (MIN 4 VIEWS)   Final Result        No acute fracture or dislocation    ED BEDSIDE ULTRASOUND:   Performed by ED Physician - none    LABS:  Labs Reviewed   POC PREGNANCY UR-QUAL       All other labs were within normal range or not returned as of this dictation. EMERGENCY DEPARTMENT COURSE and DIFFERENTIAL DIAGNOSIS/MDM:   Vitals:    Vitals:    05/01/21 1008   BP: (!) 147/91   Pulse: 108   Weight: 245 lb (111.1 kg)   Height: 5' 2\" (1.575 m)              MDM  Number of Diagnoses or Management Options  Strain of left knee, initial encounter  Diagnosis management comments: 36years old presented to the ER after she slipped and fell in her bathroom had some left knee pain but x-ray showed no acute fracture patient have pain with twisting mild tenderness over the medial joint line put in a knee hinged brace advised to follow-up with PCP ibuprofen was sent to her pharmacy     CONSULTS:  None    PROCEDURES:  Unless otherwise noted below, none     Procedures    FINAL IMPRESSION      1.  Strain of left knee, initial encounter          DISPOSITION/PLAN   DISPOSITION Decision To Discharge 05/01/2021 11:18:21 AM      PATIENT REFERRED TO:  Epimenio Major   [facility],[phone]  [phone]    In 2 days        DISCHARGE MEDICATIONS:  New Prescriptions    IBUPROFEN (ADVIL;MOTRIN) 800 MG TABLET    Take 1 tablet by mouth 2 times daily as needed for Pain          (Please note thatportions of this note were completed with a voice recognition program.  Efforts were made to edit the dictations but occasionally words are mis-transcribed.)    Greg Carrasco MD (electronically signed)  Attending Emergency Physician          Micheal Kidd MD  05/01/21 6410

## 2021-05-01 NOTE — ED NOTES
Reddie brace applied to left knee. P teducated about reapplication and removal.  Pt verbalized understanding.       Nini Rondon RN  05/01/21 9590

## 2021-05-01 NOTE — ED TRIAGE NOTES
Pt to ed from home via ems with c/o left knee pain after fall from sanding. PT repots hat she \"had a dizzy spell\" and lost footing. Pt reports landing on left knee. Skin to left knee intact, no deformity noted, no redness or swelling. PT denies any other injury or trauma. Respirations even and unlabored. Pt ambulatory on scene and able to transfer self from stretcher to cot to wheelchair.  No signs of acute distress

## 2021-05-05 ENCOUNTER — HOSPITAL ENCOUNTER (EMERGENCY)
Age: 31
Discharge: HOME | End: 2021-05-05
Attending: EMERGENCY MEDICINE
Payer: COMMERCIAL

## 2021-05-05 VITALS
RESPIRATION RATE: 14 BRPM | BODY MASS INDEX: 44.53 KG/M2 | OXYGEN SATURATION: 98 % | HEART RATE: 92 BPM | HEIGHT: 62 IN | SYSTOLIC BLOOD PRESSURE: 142 MMHG | DIASTOLIC BLOOD PRESSURE: 91 MMHG | TEMPERATURE: 98.1 F | WEIGHT: 242 LBS

## 2021-05-05 DIAGNOSIS — R19.7 NAUSEA, VOMITING AND DIARRHEA: Primary | ICD-10-CM

## 2021-05-05 DIAGNOSIS — R11.2 NAUSEA, VOMITING AND DIARRHEA: Primary | ICD-10-CM

## 2021-05-05 LAB
BILIRUBIN URINE: NEGATIVE
BLOOD, URINE: NEGATIVE
CLARITY: CLEAR
COLOR: YELLOW
GLUCOSE URINE: NEGATIVE MG/DL
KETONES, URINE: NEGATIVE MG/DL
LEUKOCYTE ESTERASE, URINE: NEGATIVE
NITRITE, URINE: NEGATIVE
PH UA: 5 (ref 5–9)
PROTEIN UA: NEGATIVE MG/DL
SPECIFIC GRAVITY UA: 1.02 (ref 1–1.03)
UROBILINOGEN, URINE: 0.2 E.U./DL

## 2021-05-05 PROCEDURE — 99284 EMERGENCY DEPT VISIT MOD MDM: CPT

## 2021-05-05 PROCEDURE — 96372 THER/PROPH/DIAG INJ SC/IM: CPT

## 2021-05-05 PROCEDURE — 6370000000 HC RX 637 (ALT 250 FOR IP): Performed by: EMERGENCY MEDICINE

## 2021-05-05 PROCEDURE — 81003 URINALYSIS AUTO W/O SCOPE: CPT

## 2021-05-05 PROCEDURE — 6360000002 HC RX W HCPCS: Performed by: EMERGENCY MEDICINE

## 2021-05-05 RX ORDER — DICYCLOMINE HYDROCHLORIDE 10 MG/1
10 CAPSULE ORAL EVERY 6 HOURS PRN
Qty: 20 CAPSULE | Refills: 0 | Status: SHIPPED | OUTPATIENT
Start: 2021-05-05 | End: 2021-10-13

## 2021-05-05 RX ORDER — ONDANSETRON 4 MG/1
4 TABLET, ORALLY DISINTEGRATING ORAL EVERY 8 HOURS PRN
Qty: 20 TABLET | Refills: 0 | Status: SHIPPED | OUTPATIENT
Start: 2021-05-05 | End: 2021-05-12

## 2021-05-05 RX ORDER — DICYCLOMINE HYDROCHLORIDE 10 MG/ML
20 INJECTION INTRAMUSCULAR ONCE
Status: COMPLETED | OUTPATIENT
Start: 2021-05-05 | End: 2021-05-05

## 2021-05-05 RX ORDER — ONDANSETRON 4 MG/1
4 TABLET, ORALLY DISINTEGRATING ORAL ONCE
Status: COMPLETED | OUTPATIENT
Start: 2021-05-05 | End: 2021-05-05

## 2021-05-05 RX ADMIN — ONDANSETRON 4 MG: 4 TABLET, ORALLY DISINTEGRATING ORAL at 20:35

## 2021-05-05 RX ADMIN — DICYCLOMINE HYDROCHLORIDE 20 MG: 20 INJECTION INTRAMUSCULAR at 20:35

## 2021-05-05 NOTE — ED NOTES
Bed: 08  Expected date: 5/5/21  Expected time:   Means of arrival:   Comments:  36 F N/V  158/88  90  97%       Garth Rodriguez  05/05/21 1935

## 2021-05-05 NOTE — ED TRIAGE NOTES
Pt arrives via EMS from home with c/o nausea x 1 day. Denies abd pain, denies vomiting, denies dysuria. Pt states she had diarrhea twice yesterday. Pt states she is trying to get pregnant but LMP was 4/21. Pt alert and oriented x 4. Skin pink, warm, dry. Respirations even and unlabored. No distress noted at this time.   Blood glucose per EMS was 137

## 2021-05-06 NOTE — ED PROVIDER NOTES
eMERGENCY dEPARTMENT eNCOUnter      CHIEF COMPLAINT    Chief Complaint   Patient presents with    Nausea       HPI    Marisa Granger is a 36 y.o. female With history of depression, hypertension, schizoaffective disorder, anxiety, asthma who presentsto ED from home  By private car   With complaint of nausea vomiting diarrhea  Onset 2 days  Intensity of symptoms moderate  Patient denies any blood in the vomitus or diarrhea. Patient also complains of generalized abdominal cramping. Patient is unsure whether she is pregnant or not. PAST MEDICAL HISTORY    Past Medical History:   Diagnosis Date    Abnormal Pap smear of cervix     Anxiety     Asthma     Depression     DVT (deep venous thrombosis) (HCC)     Hypertension     PTSD (post-traumatic stress disorder)     Schizoaffective disorder (HCC)        SURGICAL HISTORY    Past Surgical History:   Procedure Laterality Date     SECTION      TONSILLECTOMY         CURRENT MEDICATIONS    Current Outpatient Rx   Medication Sig Dispense Refill    dicyclomine (BENTYL) 10 MG capsule Take 1 capsule by mouth every 6 hours as needed (cramps) 20 capsule 0    ondansetron (ZOFRAN-ODT) 4 MG disintegrating tablet Place 1 tablet under the tongue every 8 hours as needed for Nausea or Vomiting May Sub regular tablet (non-ODT) if insurance does not cover ODT.  20 tablet 0    ibuprofen (ADVIL;MOTRIN) 800 MG tablet Take 1 tablet by mouth 2 times daily as needed for Pain 60 tablet 5    doxepin (SINEQUAN) 75 MG capsule TAKE 1 CAPSULE BY MOUTH AT BEDTIME AS NEEDED FOR SLEEP      loratadine (CLARITIN) 10 MG tablet TAKE 1 TABLET BY MOUTH EVERY DAY      fluticasone (FLONASE) 50 MCG/ACT nasal spray USE 1 2 SPRAYS IN EACH NOSTRIL DAILY AS NEEDED FOR NASAL CONGESTION      letrozole (FEMARA) 2.5 MG tablet Take 1 tablet by mouth daily Day 5 through 9 of the menstrual cycle 5 tablet 1    Alcohol Swabs (ALCOHOL PADS) 70 % PADS 1 each by Does not apply route three times daily 100 each 3    fluconazole (DIFLUCAN) 150 MG tablet One tab every 3 days for 3 doses 3 tablet 0    Lancets (ONETOUCH DELICA PLUS [id number]) MISC Test 4x daily 150 each 3    blood glucose monitor strips 1 strip by Other route 4 times daily (before meals and nightly) Pt test 4x daily Dx E11.65. May substitute for generic or insurance covered product 150 strip 3    blood glucose monitor kit and supplies 1 kit by Other route 4 times daily (before meals and nightly) Pt test 4x daily Dx E11.65. May substitute for generic or insurance covered product 1 kit 0    Lancets MISC 1 each by Does not apply route 4 times daily (before meals and nightly) Pt test 4x daily Dx E11.65. May substitute for generic or insurance covered product 150 each 3    metFORMIN (GLUCOPHAGE XR) 500 MG extended release tablet Take 1 tablet by mouth 2 times daily (before meals) May substitute for generic or covered medication 60 tablet 3    Prenatal Vit-Fe Fumarate-FA (PRENATAL VITAMIN) 27-1 MG TABS tablet take 1 tablet by mouth once daily      FLOVENT DISKUS 100 MCG/BLIST AEPB inhaler       FLUoxetine (PROZAC) 20 MG capsule       doxepin (SINEQUAN) 25 MG capsule       atorvastatin (LIPITOR) 20 MG tablet TAKE 1 TABLET BY MOUTH EVERY DAY      fluconazole (DIFLUCAN) 150 MG tablet Take 1 tablet by mouth every 3 days May repeat in 72 hours as needed.  3 tablet 1    hydrOXYzine (VISTARIL) 25 MG capsule TAKE 1 CAPSULE BY MOUTH 3 TIMES A DAY AS NEEDED FOR ANXIETY      LATUDA 80 MG TABS tablet TAKE 1 TABLET BY MOUTH EVERY EVENING WITH MEALS TAKE WITH AT LEAST 350 CALORIE MEAL      risperiDONE (RISPERDAL) 1 MG tablet TAKE 1 TABLET BY MOUTH EVERYDAY AT BEDTIME      medroxyPROGESTERone (PROVERA) 10 MG tablet Take 1 tablet by mouth daily For 10 days 10 tablet 3    Multiple Vitamins-Minerals (MULTIVITAMIN ADULT PO) Take by mouth      VITAMIN D PO Take by mouth      Melatonin 10 MG CAPS       Ascorbic Acid (VITAMIN C) 250 MG tablet Take 250 mg by mouth daily      albuterol sulfate HFA (VENTOLIN HFA) 108 (90 Base) MCG/ACT inhaler Inhale 2 puffs into the lungs every 6 hours as needed for Wheezing 1 Inhaler 0       ALLERGIES    Allergies   Allergen Reactions    Food Hives and Itching     Squash       FAMILY HISTORY    Family History   Problem Relation Age of Onset    Depression Father     Diabetes Mother     Breast Cancer Neg Hx        SOCIAL HISTORY    Social History     Socioeconomic History    Marital status:      Spouse name: None    Number of children: None    Years of education: None    Highest education level: None   Occupational History    None   Social Needs    Financial resource strain: None    Food insecurity     Worry: None     Inability: None    Transportation needs     Medical: None     Non-medical: None   Tobacco Use    Smoking status: Current Every Day Smoker     Packs/day: 0.25     Years: 12.00     Pack years: 3.00     Types: Cigarettes    Smokeless tobacco: Never Used    Tobacco comment: 3 cigs/day   Substance and Sexual Activity    Alcohol use: Not Currently     Comment: \"once in a while\"    Drug use: Not Currently    Sexual activity: Yes     Partners: Male   Lifestyle    Physical activity     Days per week: None     Minutes per session: None    Stress: None   Relationships    Social connections     Talks on phone: None     Gets together: None     Attends Zoroastrianism service: None     Active member of club or organization: None     Attends meetings of clubs or organizations: None     Relationship status: None    Intimate partner violence     Fear of current or ex partner: None     Emotionally abused: None     Physically abused: None     Forced sexual activity: None   Other Topics Concern    None   Social History Narrative    None       REVIEW OF SYSTEMS    Constitutional:  Denies fever, chills, weight loss or weakness   Eyes:  Denies photophobia or discharge   HENT:  Denies sore throat or ear pain   Respiratory: Denies cough or shortness of breath   Cardiovascular:  Denies chest pain, palpitations or swelling   GI: Complains of abdominal pain, nausea, vomiting, and diarrhea   Musculoskeletal:  Denies back pain   Skin:  Denies rash   Neurologic:  Denies headache, focal weakness or sensory changes   Endocrine:  Denies polyuria or polydypsia   Lymphatic:  Denies swollen glands   Psychiatric:  Denies depression, suicidal ideation or homicidal ideation   All systems negative except as marked. PHYSICAL EXAM    VITAL SIGNS: BP (!) 142/91   Pulse 92   Temp 98.1 °F (36.7 °C) (Oral)   Resp 14   Ht 5' 2\" (1.575 m)   Wt 242 lb (109.8 kg)   LMP 04/21/2021   SpO2 98%   BMI 44.26 kg/m²    Constitutional:  No acute distress, Non-toxic appearance. HENT:  Normocephalic, Atraumatic, Bilateral external ears normal, Oropharynx moist, No oral exudates, Nose normal. Neck- Normal range of motion, No tenderness, Supple, No stridor. Eyes:  PERRL, EOMI, Conjunctiva normal, No discharge. Respiratory:  Normal breath sounds, No respiratory distress, No wheezing, No chest tenderness. Cardiovascular:  Normal heart rate, Normal rhythm, No murmurs, No rubs, No gallops. GI:  Bowel sounds normal, Soft, No tenderness, No masses, No pulsatile masses. No right upper quadrant or right lower quadrant tenderness present. : No CVA tenderness. Musculoskeletal:  Intact distal pulses, No edema, No tenderness, No cyanosis, No clubbing. Good range of motion in all major joints. No tenderness to palpation or major deformities noted. Back- No tenderness. Integument:  Warm, Dry, No erythema, No rash. Lymphatic:  No lymphadenopathy noted. Neurologic:  Alert & oriented x 3, Normal motor function, Normal sensory function, No focal deficits noted. Psychiatric:  Affect normal, Judgment normal, Mood normal.     REEVALUATION   Patient was updated the results of labs   Patient feels better. Tolerating p.o.     Labs Reviewed   URINALYSIS   HCG, QUANTITATIVE, PREGNANCY           Summation      Patient Course:     ED Medications administered this visit:    Medications   ondansetron (ZOFRAN-ODT) disintegrating tablet 4 mg (4 mg Oral Given 5/5/21 2035)   dicyclomine (BENTYL) injection 20 mg (20 mg Intramuscular Given 5/5/21 2035)       New Prescriptions from this visit:    New Prescriptions    DICYCLOMINE (BENTYL) 10 MG CAPSULE    Take 1 capsule by mouth every 6 hours as needed (cramps)    ONDANSETRON (ZOFRAN-ODT) 4 MG DISINTEGRATING TABLET    Place 1 tablet under the tongue every 8 hours as needed for Nausea or Vomiting May Sub regular tablet (non-ODT) if insurance does not cover ODT. Follow-up:  Jeni [address],[phone]    Schedule an appointment as soon as possible for a visit in 3 days  Follow up within 3 days, Return to ED sooner if symptoms worsen        Final Impression:   1.  Nausea, vomiting and diarrhea               (Please note that portions of this note were completed with a voice recognition program.  Efforts were made to edit the dictations but occasionally words are mis-transcribed.)          Jocelynn Malone MD  05/05/21 8657

## 2021-05-12 ENCOUNTER — APPOINTMENT (OUTPATIENT)
Dept: CT IMAGING | Age: 31
End: 2021-05-12
Payer: COMMERCIAL

## 2021-05-12 ENCOUNTER — HOSPITAL ENCOUNTER (EMERGENCY)
Age: 31
Discharge: HOME | End: 2021-05-12
Attending: STUDENT IN AN ORGANIZED HEALTH CARE EDUCATION/TRAINING PROGRAM
Payer: COMMERCIAL

## 2021-05-12 VITALS
RESPIRATION RATE: 16 BRPM | OXYGEN SATURATION: 98 % | WEIGHT: 245 LBS | BODY MASS INDEX: 45.08 KG/M2 | TEMPERATURE: 97.7 F | DIASTOLIC BLOOD PRESSURE: 76 MMHG | HEIGHT: 62 IN | HEART RATE: 87 BPM | SYSTOLIC BLOOD PRESSURE: 127 MMHG

## 2021-05-12 DIAGNOSIS — N20.1 RIGHT DISTAL URETERAL CALCULUS: Primary | ICD-10-CM

## 2021-05-12 DIAGNOSIS — L03.317 CELLULITIS OF BUTTOCK: ICD-10-CM

## 2021-05-12 LAB
ALBUMIN SERPL-MCNC: 4.8 G/DL (ref 3.5–4.6)
ALP BLD-CCNC: 113 U/L (ref 40–130)
ALT SERPL-CCNC: 90 U/L (ref 0–33)
ANION GAP SERPL CALCULATED.3IONS-SCNC: 12 MEQ/L (ref 9–15)
AST SERPL-CCNC: 55 U/L (ref 0–35)
BACTERIA: NEGATIVE /HPF
BASOPHILS ABSOLUTE: 0.1 K/UL (ref 0–0.2)
BASOPHILS RELATIVE PERCENT: 0.8 %
BILIRUB SERPL-MCNC: 0.3 MG/DL (ref 0.2–0.7)
BILIRUBIN URINE: NEGATIVE
BLOOD, URINE: ABNORMAL
BUN BLDV-MCNC: 9 MG/DL (ref 6–20)
C-REACTIVE PROTEIN: 5.5 MG/L (ref 0–5)
CALCIUM SERPL-MCNC: 10 MG/DL (ref 8.5–9.9)
CHLORIDE BLD-SCNC: 100 MEQ/L (ref 95–107)
CLARITY: CLEAR
CO2: 23 MEQ/L (ref 20–31)
COLOR: YELLOW
CREAT SERPL-MCNC: 0.61 MG/DL (ref 0.5–0.9)
EOSINOPHILS ABSOLUTE: 0.2 K/UL (ref 0–0.7)
EOSINOPHILS RELATIVE PERCENT: 1.6 %
EPITHELIAL CELLS, UA: ABNORMAL /HPF (ref 0–5)
GFR AFRICAN AMERICAN: >60
GFR NON-AFRICAN AMERICAN: >60
GLOBULIN: 2.7 G/DL (ref 2.3–3.5)
GLUCOSE BLD-MCNC: 90 MG/DL (ref 70–99)
GLUCOSE URINE: NEGATIVE MG/DL
HCG, URINE, POC: NEGATIVE
HCT VFR BLD CALC: 43.3 % (ref 37–47)
HEMOGLOBIN: 14.8 G/DL (ref 12–16)
HYALINE CASTS: ABNORMAL /HPF (ref 0–5)
KETONES, URINE: NEGATIVE MG/DL
LEUKOCYTE ESTERASE, URINE: NEGATIVE
LIPASE: 28 U/L (ref 12–95)
LYMPHOCYTES ABSOLUTE: 4.9 K/UL (ref 1–4.8)
LYMPHOCYTES RELATIVE PERCENT: 38.8 %
Lab: NORMAL
MCH RBC QN AUTO: 28.6 PG (ref 27–31.3)
MCHC RBC AUTO-ENTMCNC: 34.1 % (ref 33–37)
MCV RBC AUTO: 83.8 FL (ref 82–100)
MONOCYTES ABSOLUTE: 0.7 K/UL (ref 0.2–0.8)
MONOCYTES RELATIVE PERCENT: 5.5 %
NEGATIVE QC PASS/FAIL: NORMAL
NEUTROPHILS ABSOLUTE: 6.8 K/UL (ref 1.4–6.5)
NEUTROPHILS RELATIVE PERCENT: 53.3 %
NITRITE, URINE: NEGATIVE
PDW BLD-RTO: 13.3 % (ref 11.5–14.5)
PH UA: 5.5 (ref 5–9)
PLATELET # BLD: 332 K/UL (ref 130–400)
POSITIVE QC PASS/FAIL: NORMAL
POTASSIUM SERPL-SCNC: 4.4 MEQ/L (ref 3.4–4.9)
PROTEIN UA: 100 MG/DL
RBC # BLD: 5.16 M/UL (ref 4.2–5.4)
RBC UA: ABNORMAL /HPF (ref 0–5)
SODIUM BLD-SCNC: 135 MEQ/L (ref 135–144)
SPECIFIC GRAVITY UA: 1.02 (ref 1–1.03)
TOTAL PROTEIN: 7.5 G/DL (ref 6.3–8)
URINE REFLEX TO CULTURE: ABNORMAL
UROBILINOGEN, URINE: 0.2 E.U./DL
WBC # BLD: 12.7 K/UL (ref 4.8–10.8)
WBC UA: ABNORMAL /HPF (ref 0–5)

## 2021-05-12 PROCEDURE — 83690 ASSAY OF LIPASE: CPT

## 2021-05-12 PROCEDURE — 85025 COMPLETE CBC W/AUTO DIFF WBC: CPT

## 2021-05-12 PROCEDURE — 81001 URINALYSIS AUTO W/SCOPE: CPT

## 2021-05-12 PROCEDURE — 74150 CT ABDOMEN W/O CONTRAST: CPT

## 2021-05-12 PROCEDURE — 99285 EMERGENCY DEPT VISIT HI MDM: CPT

## 2021-05-12 PROCEDURE — 36415 COLL VENOUS BLD VENIPUNCTURE: CPT

## 2021-05-12 PROCEDURE — 86140 C-REACTIVE PROTEIN: CPT

## 2021-05-12 PROCEDURE — 6370000000 HC RX 637 (ALT 250 FOR IP): Performed by: STUDENT IN AN ORGANIZED HEALTH CARE EDUCATION/TRAINING PROGRAM

## 2021-05-12 PROCEDURE — 6360000002 HC RX W HCPCS: Performed by: STUDENT IN AN ORGANIZED HEALTH CARE EDUCATION/TRAINING PROGRAM

## 2021-05-12 PROCEDURE — 96374 THER/PROPH/DIAG INJ IV PUSH: CPT

## 2021-05-12 PROCEDURE — 80053 COMPREHEN METABOLIC PANEL: CPT

## 2021-05-12 RX ORDER — KETOROLAC TROMETHAMINE 10 MG/1
10 TABLET, FILM COATED ORAL EVERY 6 HOURS PRN
Qty: 16 TABLET | Refills: 0 | Status: SHIPPED | OUTPATIENT
Start: 2021-05-12 | End: 2021-09-08

## 2021-05-12 RX ORDER — KETOROLAC TROMETHAMINE 30 MG/ML
30 INJECTION, SOLUTION INTRAMUSCULAR; INTRAVENOUS ONCE
Status: COMPLETED | OUTPATIENT
Start: 2021-05-12 | End: 2021-05-12

## 2021-05-12 RX ORDER — TAMSULOSIN HYDROCHLORIDE 0.4 MG/1
0.4 CAPSULE ORAL ONCE
Status: COMPLETED | OUTPATIENT
Start: 2021-05-12 | End: 2021-05-12

## 2021-05-12 RX ORDER — SULFAMETHOXAZOLE AND TRIMETHOPRIM 800; 160 MG/1; MG/1
1 TABLET ORAL 2 TIMES DAILY
Qty: 20 TABLET | Refills: 0 | Status: SHIPPED | OUTPATIENT
Start: 2021-05-12 | End: 2021-05-21

## 2021-05-12 RX ORDER — TAMSULOSIN HYDROCHLORIDE 0.4 MG/1
0.4 CAPSULE ORAL DAILY
Qty: 5 CAPSULE | Refills: 0 | Status: SHIPPED | OUTPATIENT
Start: 2021-05-12 | End: 2021-05-21

## 2021-05-12 RX ADMIN — TAMSULOSIN HYDROCHLORIDE 0.4 MG: 0.4 CAPSULE ORAL at 17:46

## 2021-05-12 RX ADMIN — KETOROLAC TROMETHAMINE 30 MG: 30 INJECTION, SOLUTION INTRAMUSCULAR; INTRAVENOUS at 15:35

## 2021-05-12 ASSESSMENT — PAIN DESCRIPTION - FREQUENCY: FREQUENCY: CONTINUOUS

## 2021-05-12 ASSESSMENT — ENCOUNTER SYMPTOMS
CHEST TIGHTNESS: 0
TROUBLE SWALLOWING: 0
COUGH: 0
ABDOMINAL PAIN: 1
BACK PAIN: 0
SHORTNESS OF BREATH: 0
VOMITING: 0
SINUS PRESSURE: 0
DIARRHEA: 0

## 2021-05-12 ASSESSMENT — PAIN DESCRIPTION - PAIN TYPE: TYPE: ACUTE PAIN

## 2021-05-12 ASSESSMENT — PAIN DESCRIPTION - PROGRESSION
CLINICAL_PROGRESSION: RAPIDLY IMPROVING
CLINICAL_PROGRESSION: NOT CHANGED

## 2021-05-12 ASSESSMENT — PAIN DESCRIPTION - ORIENTATION
ORIENTATION: LOWER
ORIENTATION: LOWER

## 2021-05-12 ASSESSMENT — PAIN SCALES - GENERAL
PAINLEVEL_OUTOF10: 7
PAINLEVEL_OUTOF10: 4
PAINLEVEL_OUTOF10: 7

## 2021-05-12 ASSESSMENT — PAIN DESCRIPTION - DESCRIPTORS
DESCRIPTORS: ACHING
DESCRIPTORS: ACHING

## 2021-05-12 ASSESSMENT — PAIN DESCRIPTION - ONSET: ONSET: ON-GOING

## 2021-05-12 NOTE — ED NOTES
Bed: 16  Expected date:   Expected time:   Means of arrival:   Comments:  31f ABD pain, 90, 156/90, 98ra, 18, IV and labs     Vicenta [facility]ieves[facility]  05/12/21 2389

## 2021-05-12 NOTE — ED NOTES
Discharge instructions reviewed with patient. Verbalized understanding. Denies additional questions. Denies any pain at discharge. Rogers Corral  Freddrick Cooks, [facility]  05/12/21 0023

## 2021-05-12 NOTE — ED NOTES
Patient reports that her pain has completely resolved. Given ice water. Shay Hartman RN  05/12/21 8449

## 2021-05-12 NOTE — ED TRIAGE NOTES
Patient to ER for abdominal pain that began two weeks prior and worsened today. Reports in her lower right and left quadrants. No n/v/d. No injury. Patient medicated with Dicyclomine 10mg at 1200pm today with no relief.

## 2021-05-12 NOTE — ED NOTES
Patient asleep in bed. Respirations even and unlabored. No distress noted. Korina Schultz RN  05/12/21 5728

## 2021-05-12 NOTE — ED PROVIDER NOTES
[address] [facility] ED  eMERGENCY dEPARTMENT eNCOUnter      Pt Name: Gisela Ji  MRN: [id number]  [place] 1990  Date of evaluation: 5/12/2021  Provider: Vu Abreu, [address] [place] [address]       Chief Complaint   Patient presents with    Abdominal Pain     x 2 weeks, worsened today         HISTORY OF PRESENT ILLNESS   (Location/Symptom, Timing/Onset,Context/Setting, Quality, Duration, Modifying Factors, Severity)  Note limiting factors. Gisela Ji is a 36 y.o. female who presents to the emergency department with c/o right sided and left lower quadrant abdominal pain. Patient denies fever, chills or cough. Patient describes the pain as sharp in quality. Patient denies N/V/D. Patient denies loss of smell or taste. Suddenly and has gradually worsened. Patient states it waxes and wanes in quality. Patient does not take anything to help with the symptoms. Patient denies any history of kidney stones. The history is provided by the patient. NursingNotes were reviewed. REVIEW OF SYSTEMS    (2-9 systems for level 4, 10 or more for level 5)     Review of Systems   Constitutional: Negative for activity change, appetite change, chills, fever and unexpected weight change. HENT: Negative for drooling, ear pain, nosebleeds, sinus pressure and trouble swallowing. Respiratory: Negative for cough, chest tightness and shortness of breath. Cardiovascular: Negative for chest pain and leg swelling. Gastrointestinal: Positive for abdominal pain. Negative for diarrhea and vomiting. Endocrine: Negative for polydipsia and polyphagia. Genitourinary: Negative for dysuria, flank pain and frequency. Musculoskeletal: Negative for back pain and myalgias. Skin: Negative for pallor and rash. Neurological: Negative for syncope, weakness and headaches. Hematological: Does not bruise/bleed easily. All other systems reviewed and are negative.       Except as noted above the remainder of the review of systems was reviewed and negative. PAST MEDICAL HISTORY     Past Medical History:   Diagnosis Date    Abnormal Pap smear of cervix     Anxiety     Asthma     Depression     DVT [facility]deep venous thrombosis) ([facility])     Hypertension     PTSD (post-traumatic stress disorder)     Schizoaffective disorder ([facility])          SURGICALHISTORY       Past Surgical History:   Procedure Laterality Date     SECTION      TONSILLECTOMY           CURRENT MEDICATIONS       Previous Medications    ALBUTEROL SULFATE HFA (VENTOLIN HFA) 108 (90 BASE) MCG/ACT INHALER    Inhale 2 puffs into the lungs every 6 hours as needed for Wheezing    ALCOHOL SWABS (ALCOHOL PADS) 70 % PADS    1 each by Does not apply route three times daily    ASCORBIC ACID (VITAMIN C) 250 MG TABLET    Take 250 mg by mouth daily    ATORVASTATIN (LIPITOR) 20 MG TABLET    TAKE 1 TABLET BY MOUTH EVERY DAY    BLOOD GLUCOSE MONITOR KIT AND SUPPLIES    1 kit by Other route 4 times daily (before meals and nightly) Pt test 4x daily Dx E11.65. May substitute for generic or insurance covered product    BLOOD GLUCOSE MONITOR STRIPS    1 strip by Other route 4 times daily (before meals and nightly) Pt test 4x daily Dx E11.65. May substitute for generic or insurance covered product    DICYCLOMINE (BENTYL) 10 MG CAPSULE    Take 1 capsule by mouth every 6 hours as needed (cramps)    DOXEPIN (SINEQUAN) 25 MG CAPSULE        DOXEPIN (SINEQUAN) 75 MG CAPSULE    TAKE 1 CAPSULE BY MOUTH AT BEDTIME AS NEEDED FOR SLEEP    FLOVENT DISKUS 100 MCG/BLIST AEPB INHALER        FLUCONAZOLE (DIFLUCAN) 150 MG TABLET    Take 1 tablet by mouth every 3 days May repeat in 72 hours as needed.     FLUCONAZOLE (DIFLUCAN) 150 MG TABLET    One tab every 3 days for 3 doses    FLUOXETINE (PROZAC) 20 MG CAPSULE        FLUTICASONE (FLONASE) 50 MCG/ACT NASAL SPRAY    USE 1 2 SPRAYS IN EACH NOSTRIL DAILY AS NEEDED FOR NASAL CONGESTION    HYDROXYZINE (VISTARIL) 25 MG CAPSULE    TAKE 1 CAPSULE BY MOUTH 3 TIMES A DAY AS NEEDED FOR ANXIETY    LANCETS (ONETOUCH DELICA PLUS [id number]) MISC    Test 4x daily    LANCETS MISC    1 each by Does not apply route 4 times daily (before meals and nightly) Pt test 4x daily Dx E11.65.   May substitute for generic or insurance covered product    LATUDA 80 MG TABS TABLET    TAKE 1 TABLET BY MOUTH EVERY EVENING WITH MEALS TAKE WITH AT LEAST 350 CALORIE MEAL    LETROZOLE ([address] [facility]) 2.5 MG TABLET    Take 1 tablet by mouth daily Day 5 through 9 of the menstrual cycle    LORATADINE (CLARITIN) 10 MG TABLET    TAKE 1 TABLET BY MOUTH EVERY DAY    MEDROXYPROGESTERONE (PROVERA) 10 MG TABLET    Take 1 tablet by mouth daily For 10 days    MELATONIN 10 MG CAPS        METFORMIN (GLUCOPHAGE XR) 500 MG EXTENDED RELEASE TABLET    Take 1 tablet by mouth 2 times daily (before meals) May substitute for generic or covered medication    MULTIPLE VITAMINS-MINERALS (MULTIVITAMIN ADULT PO)    Take by mouth    PRENATAL VIT-FE FUMARATE-FA (PRENATAL VITAMIN) 27-1 MG TABS TABLET    take 1 tablet by mouth once daily    RISPERIDONE (RISPERDAL) 1 MG TABLET    TAKE 1 TABLET BY MOUTH EVERYDAY AT BEDTIME    VITAMIN D PO    Take by mouth       ALLERGIES     Food    FAMILY HISTORY       Family History   Problem Relation Age of Onset    Depression Father     Diabetes Mother     Breast Cancer Neg Hx           SOCIAL HISTORY       Social History     Socioeconomic History    Marital status:      Spouse name: None    Number of children: None    Years of education: None    Highest education level: None   Occupational History    None   Social Needs    Financial resource strain: None    Food insecurity     Worry: None     Inability: None    Transportation needs     Medical: None     Non-medical: None   Tobacco Use    Smoking status: Current Every Day Smoker     Packs/day: 0.25     Years: 12.00     Pack years: 3.00     Types: Cigarettes    Smokeless tobacco: Never Used    Tobacco comment: 3 No discharge. Extraocular Movements: Extraocular movements intact. Conjunctiva/sclera: Conjunctivae normal.      Left eye: No exudate. Pupils: Pupils are equal, round, and reactive to light. Neck:      Musculoskeletal: Normal range of motion and neck supple. No neck rigidity. Vascular: No JVD. Trachea: No tracheal deviation. Comments: No meningismus. Cardiovascular:      Rate and Rhythm: Normal rate and regular rhythm. Heart sounds: Normal heart sounds. Heart sounds not distant. No murmur. No friction rub. No gallop. Pulmonary:      Effort: Pulmonary effort is normal. No respiratory distress. Breath sounds: Normal breath sounds. No stridor. No wheezing, rhonchi or rales. Chest:      Chest wall: No tenderness. Abdominal:      General: Abdomen is flat. Bowel sounds are normal. There is no distension or abdominal bruit. There are no signs of injury. Palpations: Abdomen is soft. There is no shifting dullness, fluid wave, hepatomegaly, splenomegaly, mass or pulsatile mass. Tenderness: There is abdominal tenderness in the right lower quadrant and left lower quadrant. There is no right CVA tenderness, left CVA tenderness, guarding or rebound. Negative signs include Deal's sign and McBurney's sign. Hernia: No hernia is present. Musculoskeletal: Normal range of motion. General: No swelling, tenderness, deformity or signs of injury. Lymphadenopathy:      Head:      Right side of head: No submental adenopathy. Left side of head: No submental adenopathy. Skin:     General: Skin is warm and dry. Capillary Refill: Capillary refill takes less than 2 seconds. Coloration: Skin is not jaundiced or pale. Findings: No bruising, erythema, lesion or rash. Neurological:      General: No focal deficit present. Mental Status: She is alert and oriented to person, place, and time. Mental status is at baseline.       Cranial Nerves: No cranial nerve deficit. Sensory: No sensory deficit. Motor: No weakness. Coordination: Coordination normal.      Deep Tendon Reflexes: Reflexes are normal and symmetric. Psychiatric:         Mood and Affect: Mood normal.         Behavior: Behavior normal. Behavior is cooperative. Thought Content: Thought content normal.         Judgment: Judgment normal.         DIAGNOSTIC RESULTS     EKG: All EKG's are interpreted by the Emergency Department Physician who either signs or Co-signsthis chart in the absence of a cardiologist.        RADIOLOGY:   [facility] such as CT, Ultrasound and MRI are read by the radiologist. Plain radiographic images are visualized and preliminarily interpreted by the emergency physician with the below findings:        Interpretation per the Radiologist below, if available at the time ofthis note:    CT KIDNEY WO CONTRAST   Final Result   2 MM CALCULUS IN [address],[place]. THE CALCULUS LIES APPROXIMATELY 1 CM ABOVE THE URETEROVESICAL JUNCTION. LIVER STEATOSIS. NO BOWEL DILATATION. NORMAL APPENDIX. NO DIVERTICULITIS. All CT scans at this facility use dose modulation, iterative reconstruction, and/or weight based dosing when appropriate to reduce radiation dose to as low as reasonably achievable.                   ED BEDSIDE ULTRASOUND:   Performed by ED Physician - none    LABS:  Labs Reviewed   URINE RT REFLEX TO CULTURE - Abnormal; Notable for the following components:       Result Value    Blood, Urine LARGE (*)     Protein,  (*)     All other components within normal limits   CBC WITH AUTO DIFFERENTIAL - Abnormal; Notable for the following components:    WBC 12.7 (*)     Neutrophils Absolute 6.8 (*)     Lymphocytes Absolute 4.9 (*)     All other components within normal limits   COMPREHENSIVE METABOLIC PANEL - Abnormal; Notable for the following components:    Calcium Take 1 capsule by mouth daily for 5 doses          (Please note that portions of this note were completed with a voice recognition program.  Efforts were made to edit the dictations but occasionally words are mis-transcribed.)    Rekha Ash DO (electronically signed)  Attending Emergency Physician          Rekha Ash DO  05/12/21 [address] [place] [address]  05/12/21 [id number]

## 2021-05-13 ENCOUNTER — OFFICE VISIT (OUTPATIENT)
Dept: OBGYN CLINIC | Age: 31
End: 2021-05-13
Payer: COMMERCIAL

## 2021-05-13 VITALS — DIASTOLIC BLOOD PRESSURE: 76 MMHG | SYSTOLIC BLOOD PRESSURE: 130 MMHG | WEIGHT: 242 LBS | BODY MASS INDEX: 44.26 KG/M2

## 2021-05-13 DIAGNOSIS — N97.0 INFERTILITY ASSOCIATED WITH ANOVULATION: Primary | ICD-10-CM

## 2021-05-13 PROCEDURE — G8428 CUR MEDS NOT DOCUMENT: HCPCS | Performed by: OBSTETRICS & GYNECOLOGY

## 2021-05-13 PROCEDURE — 4004F PT TOBACCO SCREEN RCVD TLK: CPT | Performed by: OBSTETRICS & GYNECOLOGY

## 2021-05-13 PROCEDURE — G8417 CALC BMI ABV UP PARAM F/U: HCPCS | Performed by: OBSTETRICS & GYNECOLOGY

## 2021-05-13 PROCEDURE — 99213 OFFICE O/P EST LOW 20 MIN: CPT | Performed by: OBSTETRICS & GYNECOLOGY

## 2021-05-13 RX ORDER — LETROZOLE 2.5 MG/1
2.5 TABLET, FILM COATED ORAL DAILY
Qty: 5 TABLET | Refills: 1 | Status: SHIPPED | OUTPATIENT
Start: 2021-05-13 | End: 2021-10-13

## 2021-05-13 ASSESSMENT — ENCOUNTER SYMPTOMS
BACK PAIN: 0
CHEST TIGHTNESS: 0
COUGH: 0
SORE THROAT: 0
COLOR CHANGE: 0
CONSTIPATION: 0
TROUBLE SWALLOWING: 0
BLOOD IN STOOL: 0
ABDOMINAL PAIN: 0
VOMITING: 0
NAUSEA: 0
ABDOMINAL DISTENTION: 0
WHEEZING: 0
VOICE CHANGE: 0
SHORTNESS OF BREATH: 0

## 2021-05-13 NOTE — PROGRESS NOTES
bruise/bleed easily. Psychiatric/Behavioral: Negative for agitation, behavioral problems, confusion, decreased concentration, dysphoric mood and suicidal ideas. The patient is not nervous/anxious and is not hyperactive. Physical Exam  Vitals signs reviewed. Constitutional:       Appearance: She is obese. Neurological:      Mental Status: She is alert. Vitals:    05/13/21 1302   BP: 130/76   Weight: 242 lb (109.8 kg)       ASSESSMENT/PLAN:  Oligo ovulation  Obesity  Patient to return on day 40 if she has not had a spontaneous cycle for UCG and possible Provera withdrawal patient encouraged to have's healthy snacking between her meals and to increase hydration      No follow-ups on file. On this date 5/13/2021 I have spent 20 minutes reviewing previous notes, test results and face to face with the patient discussing the diagnosis and importance of compliance with the treatment plan as well as documenting on the day of the visit. An electronic signature was used to authenticate this note.

## 2021-05-17 ENCOUNTER — HOSPITAL ENCOUNTER (EMERGENCY)
Age: 31
Discharge: HOME | End: 2021-05-17
Attending: EMERGENCY MEDICINE
Payer: COMMERCIAL

## 2021-05-17 VITALS
RESPIRATION RATE: 17 BRPM | HEART RATE: 98 BPM | OXYGEN SATURATION: 98 % | DIASTOLIC BLOOD PRESSURE: 88 MMHG | TEMPERATURE: 98.2 F | SYSTOLIC BLOOD PRESSURE: 133 MMHG | BODY MASS INDEX: 44.53 KG/M2 | HEIGHT: 62 IN | WEIGHT: 242 LBS

## 2021-05-17 DIAGNOSIS — N20.0 KIDNEY STONE: Primary | ICD-10-CM

## 2021-05-17 LAB
BACTERIA: ABNORMAL /HPF
BILIRUBIN URINE: ABNORMAL
BLOOD, URINE: ABNORMAL
CLARITY: ABNORMAL
COLOR: ABNORMAL
CRYSTALS, UA: ABNORMAL /HPF
EPITHELIAL CELLS, UA: ABNORMAL /HPF
GLUCOSE URINE: NEGATIVE MG/DL
HCG, URINE, POC: NEGATIVE
KETONES, URINE: ABNORMAL MG/DL
LEUKOCYTE ESTERASE, URINE: NEGATIVE
Lab: NORMAL
MUCUS: PRESENT /LPF
NEGATIVE QC PASS/FAIL: NORMAL
NITRITE, URINE: NEGATIVE
PH UA: 5.5 (ref 5–9)
POSITIVE QC PASS/FAIL: NORMAL
PROTEIN UA: 100 MG/DL
RBC UA: >100 /HPF (ref 0–2)
SPECIFIC GRAVITY UA: >=1.03 (ref 1–1.03)
URINE REFLEX TO CULTURE: ABNORMAL
UROBILINOGEN, URINE: 1 E.U./DL
WBC UA: ABNORMAL /HPF (ref 0–5)

## 2021-05-17 PROCEDURE — 99283 EMERGENCY DEPT VISIT LOW MDM: CPT

## 2021-05-17 PROCEDURE — 81001 URINALYSIS AUTO W/SCOPE: CPT

## 2021-05-17 RX ORDER — IBUPROFEN 800 MG/1
800 TABLET ORAL ONCE
Status: DISCONTINUED | OUTPATIENT
Start: 2021-05-17 | End: 2021-05-17 | Stop reason: HOSPADM

## 2021-05-17 ASSESSMENT — ENCOUNTER SYMPTOMS
VOMITING: 0
CHEST TIGHTNESS: 0
SORE THROAT: 0
NAUSEA: 0
ABDOMINAL PAIN: 0
SHORTNESS OF BREATH: 0
EYE PAIN: 0
BACK PAIN: 1

## 2021-05-17 ASSESSMENT — PAIN DESCRIPTION - FREQUENCY: FREQUENCY: INTERMITTENT

## 2021-05-17 NOTE — ED PROVIDER NOTES
disorder)     Schizoaffective disorder ([facility] [address]          SURGICAL HISTORY       Past Surgical History:   Procedure Laterality Date     SECTION      TONSILLECTOMY           CURRENT MEDICATIONS       Previous Medications    ALBUTEROL SULFATE HFA (VENTOLIN HFA) 108 (90 BASE) MCG/ACT INHALER    Inhale 2 puffs into the lungs every 6 hours as needed for Wheezing    ALCOHOL SWABS (ALCOHOL PADS) 70 % PADS    1 each by Does not apply route three times daily    ASCORBIC ACID (VITAMIN C) 250 MG TABLET    Take 250 mg by mouth daily    ATORVASTATIN (LIPITOR) 20 MG TABLET    TAKE 1 TABLET BY MOUTH EVERY DAY    BLOOD GLUCOSE MONITOR KIT AND SUPPLIES    1 kit by Other route 4 times daily (before meals and nightly) Pt test 4x daily Dx E11.65. May substitute for generic or insurance covered product    BLOOD GLUCOSE MONITOR STRIPS    1 strip by Other route 4 times daily (before meals and nightly) Pt test 4x daily Dx E11.65. May substitute for generic or insurance covered product    DICYCLOMINE (BENTYL) 10 MG CAPSULE    Take 1 capsule by mouth every 6 hours as needed (cramps)    DOXEPIN (SINEQUAN) 75 MG CAPSULE    TAKE 1 CAPSULE BY MOUTH AT BEDTIME AS NEEDED FOR SLEEP    FLOVENT DISKUS 100 MCG/BLIST AEPB INHALER        FLUOXETINE (PROZAC) 20 MG CAPSULE        FLUTICASONE (FLONASE) 50 MCG/ACT NASAL SPRAY    USE 1 2 SPRAYS IN EACH NOSTRIL DAILY AS NEEDED FOR NASAL CONGESTION    HYDROXYZINE (VISTARIL) 25 MG CAPSULE    TAKE 1 CAPSULE BY MOUTH 3 TIMES A DAY AS NEEDED FOR ANXIETY    KETOROLAC (TORADOL) 10 MG TABLET    Take 1 tablet by mouth every 6 hours as needed for Pain    LANCETS (ONETOUCH DELICA PLUS [id number]) MISC    Test 4x daily    LANCETS MISC    1 each by Does not apply route 4 times daily (before meals and nightly) Pt test 4x daily Dx E11.65.   May substitute for generic or insurance covered product    LATUDA 80 MG TABS TABLET    TAKE 1 TABLET BY MOUTH EVERY EVENING WITH MEALS TAKE WITH AT LEAST 350 CALORIE MEAL LETROZOLE (FEMARA) 2.5 MG TABLET    Take 1 tablet by mouth daily Day 5 through 9 of the menstrual cycle    LORATADINE (CLARITIN) 10 MG TABLET    TAKE 1 TABLET BY MOUTH EVERY DAY    MEDROXYPROGESTERONE (PROVERA) 10 MG TABLET    Take 1 tablet by mouth daily For 10 days    MELATONIN 10 MG CAPS        METFORMIN (GLUCOPHAGE XR) 500 MG EXTENDED RELEASE TABLET    Take 1 tablet by mouth 2 times daily (before meals) May substitute for generic or covered medication    MULTIPLE VITAMINS-MINERALS (MULTIVITAMIN ADULT PO)    Take by mouth    PRENATAL VIT-FE FUMARATE-FA (PRENATAL VITAMIN) 27-1 MG TABS TABLET    take 1 tablet by mouth once daily    RISPERIDONE (RISPERDAL) 1 MG TABLET    TAKE 1 TABLET BY MOUTH EVERYDAY AT BEDTIME    SULFAMETHOXAZOLE-TRIMETHOPRIM (BACTRIM DS) 800-160 MG PER TABLET    Take 1 tablet by mouth 2 times daily for 10 days    TAMSULOSIN (FLOMAX) 0.4 MG CAPSULE    Take 1 capsule by mouth daily for 5 doses    VITAMIN D PO    Take by mouth       ALLERGIES     Food    FAMILY HISTORY       Family History   Problem Relation Age of Onset    Depression Father     Diabetes Mother     Breast Cancer Neg Hx           SOCIAL HISTORY       Social History     Socioeconomic History    Marital status:      Spouse name: Not on file    Number of children: Not on file    Years of education: Not on file    Highest education level: Not on file   Occupational History    Not on file   Tobacco Use    Smoking status: Current Every Day Smoker     Packs/day: 0.25     Years: 12.00     Pack years: 3.00     Types: Cigarettes    Smokeless tobacco: Never Used    Tobacco comment: 3 cigs/day   Vaping Use    Vaping Use: Never used   Substance and Sexual Activity    Alcohol use: Not Currently     Comment: \"once in a while\"    Drug use: Not Currently    Sexual activity: Yes     Partners: Male   Other Topics Concern    Not on file   Social History Narrative    Not on file     Social Determinants of Health     Financial Bowel sounds are normal.      Palpations: Abdomen is soft. Tenderness: There is no abdominal tenderness. There is no guarding. Musculoskeletal:         General: Normal range of motion. Cervical back: Normal range of motion and neck supple. Comments: No flank pain. Has some lower lumbar paraspinal tenderness only. Skin:     General: Skin is warm and dry. Findings: No rash. Neurological:      Mental Status: She is alert and oriented to person, place, and time. Cranial Nerves: No cranial nerve deficit. Psychiatric:         Behavior: Behavior normal.         DIAGNOSTIC RESULTS     EKG: All EKG's are interpreted by the Emergency Department Physician who either signs or Co-signs this chart in the absence of a cardiologist.        RADIOLOGY:   Non-plain film images such as CT, Ultrasound and MRI are read by the radiologist. Plain radiographic images are visualized and preliminarily interpreted by the emergency physician with the below findings:        Interpretation per the Radiologist below, if available at the time of this note:    No orders to display         ED BEDSIDE ULTRASOUND:   Performed by ED Physician - none    LABS:  Labs Reviewed   URINE RT REFLEX TO CULTURE - Abnormal; Notable for the following components:       Result Value    Color, UA DARK YELLOW (*)     Clarity, UA CLOUDY (*)     Bilirubin Urine SMALL (*)     Ketones, Urine TRACE (*)     Blood, Urine LARGE (*)     Protein,  (*)     All other components within normal limits   MICROSCOPIC URINALYSIS   POC PREGNANCY UR-QUAL       All other labs were within normal range or not returned as of this dictation. EMERGENCY DEPARTMENT COURSE and DIFFERENTIAL DIAGNOSIS/MDM:   Vitals:    Vitals:    05/17/21 1239   BP: 133/88   Pulse: 98   Resp: 17   Temp: 98.2 °F (36.8 °C)   SpO2: 98%   Weight: 242 lb (109.8 kg)   Height: 5' 2\" (1.575 m)       Patient comes in with complaint of pain with urination.   She was found to have hematuria without any white cells. She does have a 2 mm stone in her distal right ureter found this week. Patient is to follow-up with urology. MDM      REASSESSMENT          CRITICAL CARE TIME   Total Critical Care time was 0 minutes, excluding separately reportable procedures. There was a high probability of clinically significant/life threatening deterioration in the patient's condition which required my urgent intervention. CONSULTS:  None    PROCEDURES:  Unless otherwise noted below, none     Procedures        FINAL IMPRESSION      1. Kidney stone          DISPOSITION/PLAN   DISPOSITION        PATIENT REFERRED TO:  Shaina Garcia MD  [address] [address]  [phone]    Schedule an appointment as soon as possible for a visit         DISCHARGE MEDICATIONS:  New Prescriptions    No medications on file     Controlled Substances Monitoring:     No flowsheet data found.     (Please note that portions of this note were completed with a voice recognition program.  Efforts were made to edit the dictations but occasionally words are mis-transcribed.)    Sary Lopez DO (electronically signed)  Attending Emergency Physician            Sary Lopez DO  05/17/21 [id number]

## 2021-05-18 ENCOUNTER — TELEPHONE (OUTPATIENT)
Dept: UROLOGY | Age: 31
End: 2021-05-18

## 2021-05-18 DIAGNOSIS — N20.0 KIDNEY STONE: Primary | ICD-10-CM

## 2021-05-18 NOTE — TELEPHONE ENCOUNTER
CT KIDNEY WO CONTRAST    5-12-21    Impression   2 MM CALCULUS IN THE DISTAL RIGHT URETER WITH MILD DILATATION RIGHT RENAL COLLECTING SYSTEM. THE CALCULUS LIES APPROXIMATELY 1 CM ABOVE THE URETEROVESICAL JUNCTION.       LIVER STEATOSIS.       NO BOWEL DILATATION. NORMAL APPENDIX. NO DIVERTICULITIS.         All CT scans at this facility use dose modulation, iterative reconstruction, and/or weight based dosing when appropriate to reduce radiation dose to as low as reasonably achievable. Would you like a KUB prior?

## 2021-05-21 ENCOUNTER — OFFICE VISIT (OUTPATIENT)
Dept: UROLOGY | Age: 31
End: 2021-05-21
Payer: COMMERCIAL

## 2021-05-21 ENCOUNTER — HOSPITAL ENCOUNTER (OUTPATIENT)
Dept: GENERAL RADIOLOGY | Age: 31
Discharge: HOME | End: 2021-05-23
Payer: COMMERCIAL

## 2021-05-21 VITALS
DIASTOLIC BLOOD PRESSURE: 88 MMHG | SYSTOLIC BLOOD PRESSURE: 138 MMHG | HEIGHT: 62 IN | HEART RATE: 89 BPM | WEIGHT: 242 LBS | BODY MASS INDEX: 44.53 KG/M2

## 2021-05-21 DIAGNOSIS — N20.1 URETERAL CALCULUS: Primary | ICD-10-CM

## 2021-05-21 DIAGNOSIS — N20.0 KIDNEY STONE: ICD-10-CM

## 2021-05-21 PROCEDURE — 74018 RADEX ABDOMEN 1 VIEW: CPT

## 2021-05-21 PROCEDURE — 4004F PT TOBACCO SCREEN RCVD TLK: CPT | Performed by: UROLOGY

## 2021-05-21 PROCEDURE — 99214 OFFICE O/P EST MOD 30 MIN: CPT | Performed by: UROLOGY

## 2021-05-21 PROCEDURE — G8417 CALC BMI ABV UP PARAM F/U: HCPCS | Performed by: UROLOGY

## 2021-05-21 PROCEDURE — G8427 DOCREV CUR MEDS BY ELIG CLIN: HCPCS | Performed by: UROLOGY

## 2021-05-21 RX ORDER — PHENAZOPYRIDINE HYDROCHLORIDE 100 MG/1
TABLET, FILM COATED ORAL
Status: ON HOLD | COMMUNITY
Start: 2021-05-17 | End: 2021-10-14 | Stop reason: ALTCHOICE

## 2021-05-21 RX ORDER — ACETAMINOPHEN 325 MG/1
TABLET ORAL
Status: ON HOLD | COMMUNITY
Start: 2021-04-11 | End: 2021-10-18 | Stop reason: HOSPADM

## 2021-05-21 NOTE — PROGRESS NOTES
[facility] EVALUATION NOTE                                                 H&P                                                                                                                                                 Reason for Visit  Right distal ureteral calculus    History of Present Illness  24-year-old female with history of recurrent calculi  Was seen in emergency room 4 days ago for right renal colic  Patient had a 2 mm distal ureteral stone near the UVJ  Currently asymptomatic  Today's KUB shows no evidence of stone      Urologic Review of Systems/Symptoms  Denies renal colic    Review of Systems  Hospitalization: Recent ER admission  All 14 categories of Review of Systems otherwise reviewed no other findings reported.   History of stones  Past Medical History:   Diagnosis Date    Abnormal Pap smear of cervix     Anxiety     Asthma     Depression     D[facility]) ([facility])     Hypertension     PTSD (post-traumatic stress disorder)     Schizoaffective disorder ([facility])      Past Surgical History:   Procedure Laterality Date     SECTION      TONSILLECTOMY       Social History     Socioeconomic History    Marital status:      Spouse name: None    Number of children: None    Years of education: None    Highest education level: None   Occupational History    None   Tobacco Use    Smoking status: Current Every Day Smoker     Packs/day: 0.25     Years: 12.00     Pack years: 3.00     Types: Cigarettes    Smokeless tobacco: Never Used    Tobacco comment: 3 cigs/day   Vaping Use    Vaping Use: Never used   Substance and Sexual Activity    Alcohol use: Not Currently     Comment: \"once in a while\"    Drug use: Not Currently    Sexual activity: Yes     Partners: Male   Other Topics Concern    None   Social History Narrative    None     Social Determinants of Health     Financial Resource Strain:     Difficulty of Paying Living Expenses:    Food Insecurity:  Worried About [facility] in the Last Year:    [address] in the Last Year:    Transportation Needs:     Lack of Transportation (Medical):  Lack of Transportation (Non-Medical):    Physical Activity:     Days of Exercise per Week:     Minutes of Exercise per Session:    Stress:     Feeling of Stress :    Social Connections:     Frequency of Communication with Friends and Family:     Frequency of Social Gatherings with Friends and Family:     Attends [employer] Services:     Active Member of Clubs or Organizations:     Attends Club or Organization Meetings:     Marital Status:    Intimate Partner Violence:     Fear of Current or Ex-Partner:     Emotionally Abused:     Physically Abused:     Sexually Abused:      Family History   Problem Relation Age of Onset    Depression Father     Diabetes Mother     Breast Cancer Neg Hx      Current Outpatient Medications   Medication Sig Dispense Refill    letrozole ([address] [facility]) 2.5 MG tablet Take 1 tablet by mouth daily Day 5 through 9 of the menstrual cycle 5 tablet 1    ketorolac (TORADOL) 10 MG tablet Take 1 tablet by mouth every 6 hours as needed for Pain 16 tablet 0    dicyclomine (BENTYL) 10 MG capsule Take 1 capsule by mouth every 6 hours as needed (cramps) 20 capsule 0    doxepin (SINEQUAN) 75 MG capsule TAKE 1 CAPSULE BY MOUTH AT BEDTIME AS NEEDED FOR SLEEP      fluticasone (FLONASE) 50 MCG/ACT nasal spray USE 1 2 SPRAYS IN EACH NOSTRIL DAILY AS NEEDED FOR NASAL CONGESTION      Alcohol Swabs (ALCOHOL PADS) 70 % PADS 1 each by Does not apply route three times daily 100 each 3    Lancets (ONETOUCH DELICA PLUS [id number]) MISC Test 4x daily 150 each 3    blood glucose monitor strips 1 strip by Other route 4 times daily (before meals and nightly) Pt test 4x daily Dx E11.65.   May substitute for generic or insurance covered product 150 strip 3    Lancets MISC 1 each by Does not apply route 4 times daily (before meals and nightly) Pt test 4x daily Dx E11.65. May substitute for generic or insurance covered product 150 each 3    metFORMIN (GLUCOPHAGE XR) 500 MG extended release tablet Take 1 tablet by mouth 2 times daily (before meals) May substitute for generic or covered medication 60 tablet 3    Prenatal Vit-Fe Fumarate-FA (PRENATAL VITAMIN) 27-1 MG TABS tablet take 1 tablet by mouth once daily      FLOVENT DISKUS 100 MCG/BLIST AEPB inhaler       FLUoxetine (PROZAC) 20 MG capsule       atorvastatin (LIPITOR) 20 MG tablet TAKE 1 TABLET BY MOUTH EVERY DAY      hydrOXYzine (VISTARIL) 25 MG capsule TAKE 1 CAPSULE BY MOUTH 3 TIMES A DAY AS NEEDED FOR ANXIETY      LATUDA 80 MG TABS tablet TAKE 1 TABLET BY MOUTH EVERY EVENING WITH MEALS TAKE WITH AT LEAST 350 CALORIE MEAL      medroxyPROGESTERone (PROVERA) 10 MG tablet Take 1 tablet by mouth daily For 10 days 10 tablet 3    Multiple Vitamins-Minerals (MULTIVITAMIN ADULT PO) Take by mouth      albuterol sulfate HFA (VENTOLIN HFA) 108 (90 Base) MCG/ACT inhaler Inhale 2 puffs into the lungs every 6 hours as needed for Wheezing 1 Inhaler 0    acetaminophen (TYLENOL) 325 MG tablet TAKE 2 TABLET BY MOUTH EVERY 6 HOURS AS NEEDED FOR HEADACHE      phenazopyridine (PYRIDIUM) 100 MG tablet       loratadine (CLARITIN) 10 MG tablet TAKE 1 TABLET BY MOUTH EVERY DAY (Patient not taking: Reported on 5/21/2021)      blood glucose monitor kit and supplies 1 kit by Other route 4 times daily (before meals and nightly) Pt test 4x daily Dx E11.65. May substitute for generic or insurance covered product 1 kit 0    risperiDONE (RISPERDAL) 1 MG tablet TAKE 1 TABLET BY MOUTH EVERYDAY AT BEDTIME (Patient not taking: Reported on 5/21/2021)      Melatonin 10 MG CAPS  (Patient not taking: Reported on 5/21/2021)       No current facility-administered medications for this visit.      Food  All reviewed and verified by Dr Phyllis Ordonez on today's visit    No results found for: PSA, PSADIA  No results found for this visit on 05/21/21. Physical Exam  Vitals:    05/21/21 0915   BP: 138/88   Pulse: 89   Weight: 242 lb (109.8 kg)   Height: 5' 2\" (1.575 m)     Constitutional: Not in distress. Denies renal colic  Cardiovascular: Normal rate, BP reviewed. Normal  Pulmonary/Chest: Normal respiratory effort not short of breath  Abdominal: Not distended. Urologic Exam  CT reviewed  KUB reviewed  No evidence of stone. Assessment/Medical Necessity-Decision Making  History of right distal ureteral calculus no evidence of stone on KUB patient currently asymptomatic  Chances are she has passed a stone  Patient return if pain recurs for another x-ray  Plan  Otherwise sent follow-up as needed  Greater than 50% of 30 minutes spent consulting patient face-to-face  No orders of the defined types were placed in this encounter. No orders of the defined types were placed in this encounter. Vanessa Lopez MD       Please note this report has been partially produced using speech recognition software  And may cause contain errors related to that system including grammar, punctuation and spelling as well as words and phrases that may seem inappropriate. If there are questions or concerns please feel free to contact me to clarify.

## 2021-05-29 ENCOUNTER — HOSPITAL ENCOUNTER (EMERGENCY)
Age: 31
Discharge: HOME | End: 2021-05-29
Payer: COMMERCIAL

## 2021-05-29 VITALS
SYSTOLIC BLOOD PRESSURE: 161 MMHG | DIASTOLIC BLOOD PRESSURE: 111 MMHG | RESPIRATION RATE: 20 BRPM | OXYGEN SATURATION: 97 % | TEMPERATURE: 98.2 F | HEIGHT: 62 IN | HEART RATE: 104 BPM | WEIGHT: 242 LBS | BODY MASS INDEX: 44.53 KG/M2

## 2021-05-29 DIAGNOSIS — R11.2 NON-INTRACTABLE VOMITING WITH NAUSEA, UNSPECIFIED VOMITING TYPE: Primary | ICD-10-CM

## 2021-05-29 LAB
BILIRUBIN URINE: NEGATIVE
BLOOD, URINE: NEGATIVE
CLARITY: CLEAR
COLOR: YELLOW
GLUCOSE URINE: NEGATIVE MG/DL
HCG, URINE, POC: NEGATIVE
KETONES, URINE: ABNORMAL MG/DL
LEUKOCYTE ESTERASE, URINE: NEGATIVE
Lab: NORMAL
NEGATIVE QC PASS/FAIL: NORMAL
NITRITE, URINE: NEGATIVE
PH UA: 5 (ref 5–9)
POSITIVE QC PASS/FAIL: NORMAL
PROTEIN UA: ABNORMAL MG/DL
SPECIFIC GRAVITY UA: 1.02 (ref 1–1.03)
URINE REFLEX TO CULTURE: ABNORMAL
UROBILINOGEN, URINE: 0.2 E.U./DL

## 2021-05-29 PROCEDURE — 99283 EMERGENCY DEPT VISIT LOW MDM: CPT

## 2021-05-29 PROCEDURE — 81003 URINALYSIS AUTO W/O SCOPE: CPT

## 2021-05-29 PROCEDURE — 6370000000 HC RX 637 (ALT 250 FOR IP): Performed by: PHYSICIAN ASSISTANT

## 2021-05-29 RX ORDER — ONDANSETRON 4 MG/1
4 TABLET, ORALLY DISINTEGRATING ORAL ONCE
Status: COMPLETED | OUTPATIENT
Start: 2021-05-29 | End: 2021-05-29

## 2021-05-29 RX ORDER — ONDANSETRON 4 MG/1
4 TABLET, ORALLY DISINTEGRATING ORAL EVERY 8 HOURS PRN
Qty: 20 TABLET | Refills: 0 | Status: ON HOLD | OUTPATIENT
Start: 2021-05-29 | End: 2021-10-18 | Stop reason: HOSPADM

## 2021-05-29 RX ADMIN — ONDANSETRON 4 MG: 4 TABLET, ORALLY DISINTEGRATING ORAL at 21:39

## 2021-05-29 ASSESSMENT — ENCOUNTER SYMPTOMS
COLOR CHANGE: 0
TROUBLE SWALLOWING: 0
EYE PAIN: 0
NAUSEA: 1
VOMITING: 1
ABDOMINAL PAIN: 0
SHORTNESS OF BREATH: 0
ALLERGIC/IMMUNOLOGIC NEGATIVE: 1
APNEA: 0

## 2021-05-30 NOTE — ED PROVIDER NOTES
[facility] ED  eMERGENCYdEPARTMENT eNCOUnter      Pt Name: Mikayla Kauffman  MRN: [id number]  [place] 1990  Date of evaluation: 5/29/2021  Provider:Tawanda Valentino PA-C    CHIEF COMPLAINT       Chief Complaint   Patient presents with    Emesis    Nausea         HISTORY OF PRESENT ILLNESS  (Location/Symptom, Timing/Onset, Context/Setting, Quality, Duration, Modifying Factors, Severity.)   Mikayla Kauffman is a 36 y.o. female who presents to the emergency department with complaints of 1 day history of nausea and vomiting. Patient states that she is unsure when her last period was and believes that she may be pregnant. Patient denies any abdominal pain or diarrhea. Patient denies any chest pain or shortness of breath. Patient does state that she has been under a lot of stress recently and believes this is attributing to her nausea and vomiting. HPI    Nursing Notes were reviewed and I agree. REVIEW OF SYSTEMS    (2-9 systems for level 4, 10 or more for level 5)     Review of Systems   Constitutional: Negative for diaphoresis and fever. HENT: Negative for hearing loss and trouble swallowing. Eyes: Negative for pain. Respiratory: Negative for apnea and shortness of breath. Cardiovascular: Negative for chest pain. Gastrointestinal: Positive for nausea and vomiting. Negative for abdominal pain. Endocrine: Negative. Genitourinary: Negative for hematuria. Musculoskeletal: Negative for neck pain and neck stiffness. Skin: Negative for color change. Allergic/Immunologic: Negative. Neurological: Negative for dizziness and numbness. Hematological: Negative. Psychiatric/Behavioral: The patient is nervous/anxious. All other systems reviewed and are negative. Except as noted above the remainder of the review of systems was reviewed and negative.        PAST MEDICAL HISTORY     Past Medical History:   Diagnosis Date    Abnormal Pap smear of cervix     Anxiety     Asthma  Depression     [facility]) ([facility])     Hypertension     PTSD (post-traumatic stress disorder)     Schizoaffective disorder ([facility])          SURGICAL HISTORY       Past Surgical History:   Procedure Laterality Date     SECTION      TONSILLECTOMY           CURRENT MEDICATIONS       Previous Medications    ACETAMINOPHEN (TYLENOL) 325 MG TABLET    TAKE 2 TABLET BY MOUTH EVERY 6 HOURS AS NEEDED FOR HEADACHE    ALBUTEROL SULFATE HFA (VENTOLIN HFA) 108 (90 BASE) MCG/ACT INHALER    Inhale 2 puffs into the lungs every 6 hours as needed for Wheezing    ALCOHOL SWABS (ALCOHOL PADS) 70 % PADS    1 each by Does not apply route three times daily    ATORVASTATIN (LIPITOR) 20 MG TABLET    TAKE 1 TABLET BY MOUTH EVERY DAY    BLOOD GLUCOSE MONITOR KIT AND SUPPLIES    1 kit by Other route 4 times daily (before meals and nightly) Pt test 4x daily Dx E11.65. May substitute for generic or insurance covered product    BLOOD GLUCOSE MONITOR STRIPS    1 strip by Other route 4 times daily (before meals and nightly) Pt test 4x daily Dx E11.65. May substitute for generic or insurance covered product    DICYCLOMINE (BENTYL) 10 MG CAPSULE    Take 1 capsule by mouth every 6 hours as needed (cramps)    DOXEPIN (SINEQUAN) 75 MG CAPSULE    TAKE 1 CAPSULE BY MOUTH AT BEDTIME AS NEEDED FOR SLEEP    FLOVENT DISKUS 100 MCG/BLIST AEPB INHALER        FLUOXETINE (PROZAC) 20 MG CAPSULE        FLUTICASONE (FLONASE) 50 MCG/ACT NASAL SPRAY    USE 1 2 SPRAYS IN EACH NOSTRIL DAILY AS NEEDED FOR NASAL CONGESTION    HYDROXYZINE (VISTARIL) 25 MG CAPSULE    TAKE 1 CAPSULE BY MOUTH 3 TIMES A DAY AS NEEDED FOR ANXIETY    KETOROLAC (TORADOL) 10 MG TABLET    Take 1 tablet by mouth every 6 hours as needed for Pain    LANCETS (ONETOUCH DELICA PLUS [id number]) MISC    Test 4x daily    LANCETS MISC    1 each by Does not apply route 4 times daily (before meals and nightly) Pt test 4x daily Dx E11.65.   May substitute for generic or insurance covered product    LATUDA 80 MG TABS TABLET    TAKE 1 TABLET BY MOUTH EVERY EVENING WITH MEALS TAKE WITH AT LEAST 350 CALORIE MEAL    LETROZOLE ([address]) 2.5 MG TABLET    Take 1 tablet by mouth daily Day 5 through 9 of the menstrual cycle    LORATADINE (CLARITIN) 10 MG TABLET    TAKE 1 TABLET BY MOUTH EVERY DAY    MEDROXYPROGESTERONE (PROVERA) 10 MG TABLET    Take 1 tablet by mouth daily For 10 days    MELATONIN 10 MG CAPS        METFORMIN (GLUCOPHAGE XR) 500 MG EXTENDED RELEASE TABLET    Take 1 tablet by mouth 2 times daily (before meals) May substitute for generic or covered medication    MULTIPLE VITAMINS-MINERALS (MULTIVITAMIN ADULT PO)    Take by mouth    PHENAZOPYRIDINE (PYRIDIUM) 100 MG TABLET        PRENATAL VIT-FE FUMARATE-FA (PRENATAL VITAMIN) 27-1 MG TABS TABLET    take 1 tablet by mouth once daily    RISPERIDONE (RISPERDAL) 1 MG TABLET    TAKE 1 TABLET BY MOUTH EVERYDAY AT BEDTIME       ALLERGIES     Food    FAMILY HISTORY       Family History   Problem Relation Age of Onset    Depression Father     Diabetes Mother     Breast Cancer Neg Hx           SOCIAL HISTORY       Social History     Socioeconomic History    Marital status:      Spouse name: Not on file    Number of children: Not on file    Years of education: Not on file    Highest education level: Not on file   Occupational History    Not on file   Tobacco Use    Smoking status: Current Every Day Smoker     Packs/day: 0.25     Years: 12.00     Pack years: 3.00     Types: Cigarettes    Smokeless tobacco: Never Used    Tobacco comment: 3 cigs/day   Vaping Use    Vaping Use: Never used   Substance and Sexual Activity    Alcohol use: Not Currently     Comment: \"once in a while\"    Drug use: Not Currently    Sexual activity: Yes     Partners: Male   Other Topics Concern    Not on file   Social History Narrative    Not on file     Social Determinants of Health     Financial Resource Strain:     Difficulty of Paying Living motion and neck supple. Skin:     General: Skin is warm and dry. Findings: No erythema or rash. Neurological:      Mental Status: She is alert and oriented to person, place, and time. Cranial Nerves: No cranial nerve deficit. Motor: No abnormal muscle tone. Psychiatric:         Behavior: Behavior normal.         Thought Content: Thought content normal.         Judgment: Judgment normal.           DIAGNOSTIC RESULTS     RADIOLOGY:   Non-plain film images such as CT, Ultrasound and MRI are read by the radiologist. Plain radiographic images are visualized and preliminarilyinterpreted by Dahlia Bhardwaj PA-C with the below findings:      Interpretation per the Radiologist below, if available at the time of this note:    No orders to display       LABS:  Labs Reviewed   URINE RT REFLEX TO CULTURE - Abnormal; Notable for the following components:       Result Value    Ketones, Urine TRACE (*)     Protein, UA TRACE (*)     All other components within normal limits   POC PREGNANCY UR-QUAL       All other labs were within normal range or not returnedas of this dictation. EMERGENCYDEPARTMENT COURSE and DIFFERENTIAL DIAGNOSIS/MDM:   Vitals:    Vitals:    05/29/21 2044   BP: (!) 161/111   Pulse: 104   Resp: 20   Temp: 98.2 °F (36.8 °C)   TempSrc: Oral   SpO2: 97%   Weight: 242 lb (109.8 kg)   Height: 5' 2\" (1.575 m)       REASSESSMENT      Patient presented the emergency department with 1 day of nausea and vomiting. Patient is tolerating p.o. without difficulty in the emergency department. Patient's pregnancy test is negative. Patient will be discharged home with supportive therapy and referred to PCP for outpatient follow-up    MDM    PROCEDURES:    Procedures      FINAL IMPRESSION      1.  Non-intractable vomiting with nausea, unspecified vomiting type          DISPOSITION/PLAN   DISPOSITION Decision To Discharge 05/29/2021 09:38:01 PM      PATIENT REFERRED TO:  Ana Gutirerez [facility] [address]  [phone]            DISCHARGE MEDICATIONS:  New Prescriptions    ONDANSETRON (ZOFRAN ODT) 4 MG DISINTEGRATING TABLET    Take 1 tablet by mouth every 8 hours as needed for Nausea       (Please note that portions of this note were completed with a voice recognition program.  Efforts were made to edit the dictations but occasionally words are mis-transcribed.)    MANUEL Ruiz PA-C  05/29/21 [id number]

## 2021-05-30 NOTE — ED TRIAGE NOTES
Pt states that she has been having N/V today. Pt states that she is unsure if she is pregnant and has no other complaints. Pt states that she does not have any other symptoms at this time. Pt states that she normally only has a high b/p when she is pregnant.

## 2021-05-30 NOTE — ED NOTES
Pt was given d/c instructions, follow up care instructions, and prescriptions. Pt at this time is a+ox4, no signs of distress, and was ambulatory on exit. Pt has no questions and states understanding of information given.       [place] RN  05/30/21 0266

## 2021-06-21 RX ORDER — METFORMIN HYDROCHLORIDE 500 MG/1
500 TABLET, EXTENDED RELEASE ORAL
Qty: 60 TABLET | Refills: 0 | Status: SHIPPED | OUTPATIENT
Start: 2021-06-21 | End: 2021-07-20

## 2021-08-16 ENCOUNTER — OFFICE VISIT (OUTPATIENT)
Dept: ENDOCRINOLOGY | Age: 31
End: 2021-08-16
Payer: COMMERCIAL

## 2021-08-16 VITALS
HEART RATE: 114 BPM | DIASTOLIC BLOOD PRESSURE: 87 MMHG | OXYGEN SATURATION: 97 % | BODY MASS INDEX: 44.9 KG/M2 | HEIGHT: 62 IN | SYSTOLIC BLOOD PRESSURE: 134 MMHG | WEIGHT: 244 LBS

## 2021-08-16 DIAGNOSIS — E28.2 PCOS (POLYCYSTIC OVARIAN SYNDROME): Primary | ICD-10-CM

## 2021-08-16 DIAGNOSIS — E22.1 HYPERPROLACTINEMIA (HCC): ICD-10-CM

## 2021-08-16 DIAGNOSIS — E88.81 INSULIN RESISTANCE: ICD-10-CM

## 2021-08-16 DIAGNOSIS — G47.33 OBSTRUCTIVE SLEEP APNEA SYNDROME: ICD-10-CM

## 2021-08-16 LAB
CHP ED QC CHECK: NORMAL
GLUCOSE BLD-MCNC: 155 MG/DL

## 2021-08-16 PROCEDURE — G8417 CALC BMI ABV UP PARAM F/U: HCPCS | Performed by: INTERNAL MEDICINE

## 2021-08-16 PROCEDURE — G8427 DOCREV CUR MEDS BY ELIG CLIN: HCPCS | Performed by: INTERNAL MEDICINE

## 2021-08-16 PROCEDURE — 99213 OFFICE O/P EST LOW 20 MIN: CPT | Performed by: INTERNAL MEDICINE

## 2021-08-16 PROCEDURE — 82962 GLUCOSE BLOOD TEST: CPT | Performed by: INTERNAL MEDICINE

## 2021-08-16 PROCEDURE — 4004F PT TOBACCO SCREEN RCVD TLK: CPT | Performed by: INTERNAL MEDICINE

## 2021-08-16 RX ORDER — BLOOD SUGAR DIAGNOSTIC
1 STRIP MISCELLANEOUS 3 TIMES DAILY
Qty: 100 EACH | Refills: 3 | Status: SHIPPED | OUTPATIENT
Start: 2021-08-16 | End: 2021-11-11 | Stop reason: SDUPTHER

## 2021-08-16 RX ORDER — GLUCOSAMINE HCL/CHONDROITIN SU 500-400 MG
1 CAPSULE ORAL
Qty: 150 STRIP | Refills: 3 | Status: ON HOLD | OUTPATIENT
Start: 2021-08-16 | End: 2021-10-18 | Stop reason: HOSPADM

## 2021-08-16 RX ORDER — METFORMIN HYDROCHLORIDE 500 MG/1
TABLET, EXTENDED RELEASE ORAL
Qty: 60 TABLET | Refills: 5 | Status: SHIPPED | OUTPATIENT
Start: 2021-08-16 | End: 2021-11-11 | Stop reason: SDUPTHER

## 2021-08-16 RX ORDER — LANCETS 30 GAUGE
1 EACH MISCELLANEOUS
Qty: 150 EACH | Refills: 3 | Status: SHIPPED | OUTPATIENT
Start: 2021-08-16 | End: 2021-11-11 | Stop reason: SDUPTHER

## 2021-08-16 NOTE — PROGRESS NOTES
2021    Assessment:       Diagnosis Orders   1. PCOS (polycystic ovarian syndrome)     2. Insulin resistance  POCT Glucose    Hemoglobin D3R    Basic Metabolic Panel   3. Hyperprolactinemia (HCC)  Prolactin   4. Obstructive sleep apnea syndrome  Taina Phillips MD, Pulmonology, [facility]         PLAN:     Orders Placed This Encounter   Medications    metFORMIN (GLUCOPHAGE-XR) 500 MG extended release tablet     Sig: TAKE 1 TABLET BY MOUTH 2 TIMES DAILY (BEFORE MEALS)     Dispense:  60 tablet     Refill:  5    Alcohol Swabs (ALCOHOL PADS) 70 % PADS     Si each by Does not apply route three times daily     Dispense:  100 each     Refill:  3    blood glucose monitor strips     Si strip by Other route 4 times daily (before meals and nightly) Pt test 4x daily Dx E11.65. May substitute for generic or insurance covered product     Dispense:  150 strip     Refill:  3    Lancets MISC     Si each by Does not apply route 4 times daily (before meals and nightly) Pt test 4x daily Dx E11.65. May substitute for generic or insurance covered product     Dispense:  150 each     Refill:  3     Orders Placed This Encounter   Medications    metFORMIN (GLUCOPHAGE-XR) 500 MG extended release tablet     Sig: TAKE 1 TABLET BY MOUTH 2 TIMES DAILY (BEFORE MEALS)     Dispense:  60 tablet     Refill:  5    Alcohol Swabs (ALCOHOL PADS) 70 % PADS     Si each by Does not apply route three times daily     Dispense:  100 each     Refill:  3    blood glucose monitor strips     Si strip by Other route 4 times daily (before meals and nightly) Pt test 4x daily Dx E11.65. May substitute for generic or insurance covered product     Dispense:  150 strip     Refill:  3    Lancets MISC     Si each by Does not apply route 4 times daily (before meals and nightly) Pt test 4x daily Dx E11.65.   May substitute for generic or insurance covered product     Dispense:  150 each     Refill:  3     Continue Metformin monitor prolactin level A1c follow-up in 3 to 6 months time advised to lose weight referred to pulmonary for sleep studies for sleep apnea     Orders Placed This Encounter   Procedures    Hemoglobin A1C     Standing Status:   Future     Standing Expiration Date:   8/16/2022    Basic Metabolic Panel     Standing Status:   Future     Standing Expiration Date:   8/16/2022    Prolactin     Standing Status:   Future     Standing Expiration Date:   8/16/2022   Carol Moreno MD, Pulmonology, [place]     Referral Priority:   Routine     Referral Type:   Eval and Treat     Referral Reason:   Specialty Services Required     Referred to Provider:   Riki Madrid MD     Requested Specialty:   Pulmonology     Number of Visits Requested:   1    POCT Glucose         Orders Placed This Encounter   Procedures    POCT Glucose     No orders of the defined types were placed in this encounter. No follow-ups on file. Subjective:     Chief Complaint   Patient presents with    Other     PCOS (polycystic ovarian syndrome)     Vitals:    08/16/21 1417   BP: 134/87   Pulse: 114   SpO2: 97%   Weight: 244 lb (110.7 kg)   Height: 5' 2\" (1.575 m)     Wt Readings from Last 3 Encounters:   08/16/21 244 lb (110.7 kg)   05/29/21 242 lb (109.8 kg)   05/21/21 242 lb (109.8 kg)     BP Readings from Last 3 Encounters:   08/16/21 134/87   05/29/21 (!) 161/111   05/21/21 138/88     18-month follow-up on obesity polycystic ovary syndrome patient also has had high prolactin due to Dorisann Canavan was on Risperdal before last A1c was below 6 thyroid function test chemistries were normal obesity BMI 44 has signs symptoms of sleep apnea including daytime sleepiness weight gain fatigue will refer to pulmonologist  Patient seeing OB/GYN for amenorrhea using letrozole    Other  This is a chronic (PCOS) problem. The current episode started more than 1 year ago. The problem occurs intermittently. The problem has been waxing and waning.  Exacerbated by: Obesity insulin resistance. Treatments tried: Metformin. The treatment provided mild relief. Past Medical History:   Diagnosis Date    Abnormal Pap smear of cervix     Anxiety     Asthma     Depression     [facility]) [facility])     Hypertension     PTSD (post-traumatic stress disorder)     Schizoaffective disorder ([facility])      Past Surgical History:   Procedure Laterality Date     SECTION      TONSILLECTOMY       Social History     Socioeconomic History    Marital status:      Spouse name: Not on file    Number of children: Not on file    Years of education: Not on file    Highest education level: Not on file   Occupational History    Not on file   Tobacco Use    Smoking status: Current Every Day Smoker     Packs/day: 0.25     Years: 12.00     Pack years: 3.00     Types: Cigarettes    Smokeless tobacco: Never Used    Tobacco comment: 3 cigs/day   Vaping Use    Vaping Use: Never used   Substance and Sexual Activity    Alcohol use: Not Currently     Comment: \"once in a while\"    Drug use: Not Currently    Sexual activity: Yes     Partners: Male   Other Topics Concern    Not on file   Social History Narrative    Not on file     Social Determinants of Health     Financial Resource Strain:     Difficulty of Paying Living Expenses:    Food Insecurity:     Worried About Running Out of Food in the Last Year:     Ran Out of Food in the Last Year:    Transportation Needs:     Lack of Transportation (Medical):      Lack of Transportation (Non-Medical):    Physical Activity:     Days of Exercise per Week:     Minutes of Exercise per Session:    Stress:     Feeling of Stress :    Social Connections:     Frequency of Communication with Friends and Family:     Frequency of Social Gatherings with Friends and Family:     Attends [employer] Services:     Active Member of Clubs or Organizations:     Attends Club or Organization Meetings:     Marital Status:    Intimate Partner Violence:     Fear of Current or Ex-Partner:     Emotionally Abused:     Physically Abused:     Sexually Abused:      Family History   Problem Relation Age of Onset    Depression Father     Diabetes Mother     Breast Cancer Neg Hx      Allergies   Allergen Reactions    Food Hives and Itching     Squash       Current Outpatient Medications:     metFORMIN (GLUCOPHAGE-XR) 500 MG extended release tablet, TAKE 1 TABLET BY MOUTH 2 TIMES DAILY (BEFORE MEALS), Disp: 60 tablet, Rfl: 0    ondansetron (ZOFRAN ODT) 4 MG disintegrating tablet, Take 1 tablet by mouth every 8 hours as needed for Nausea, Disp: 20 tablet, Rfl: 0    acetaminophen (TYLENOL) 325 MG tablet, TAKE 2 TABLET BY MOUTH EVERY 6 HOURS AS NEEDED FOR HEADACHE, Disp: , Rfl:     phenazopyridine (PYRIDIUM) 100 MG tablet, , Disp: , Rfl:     letrozole (FEMARA) 2.5 MG tablet, Take 1 tablet by mouth daily Day 5 through 9 of the menstrual cycle, Disp: 5 tablet, Rfl: 1    doxepin (SINEQUAN) 75 MG capsule, TAKE 1 CAPSULE BY MOUTH AT BEDTIME AS NEEDED FOR SLEEP, Disp: , Rfl:     fluticasone (FLONASE) 50 MCG/ACT nasal spray, USE 1 2 SPRAYS IN EACH NOSTRIL DAILY AS NEEDED FOR NASAL CONGESTION, Disp: , Rfl:     Alcohol Swabs (ALCOHOL PADS) 70 % PADS, 1 each by Does not apply route three times daily, Disp: 100 each, Rfl: 3    Lancets (ONETOUCH DELICA PLUS [id number]) MISC, Test 4x daily, Disp: 150 each, Rfl: 3    blood glucose monitor strips, 1 strip by Other route 4 times daily (before meals and nightly) Pt test 4x daily Dx E11.65. May substitute for generic or insurance covered product, Disp: 150 strip, Rfl: 3    blood glucose monitor kit and supplies, 1 kit by Other route 4 times daily (before meals and nightly) Pt test 4x daily Dx E11.65. May substitute for generic or insurance covered product, Disp: 1 kit, Rfl: 0    Lancets MISC, 1 each by Does not apply route 4 times daily (before meals and nightly) Pt test 4x daily Dx E11.65.   May substitute for 08/09/2021    GFRAA >60.0 08/09/2021    GLOB 3.1 08/09/2021     Lab Results   Component Value Date    WBC 15.6 (H) 08/09/2021    HGB 13.9 08/09/2021    HCT 42.2 08/09/2021    MCV 85.9 08/09/2021     08/09/2021     Lab Results   Component Value Date    LABA1C 5.8 08/09/2021    LABA1C 6.0 (H) 12/11/2019    LABA1C 5.7 10/28/2019     Lab Results   Component Value Date    HDL 54 08/09/2021    HDL 37 (L) 12/11/2019    HDL 34 (L) 08/22/2019    LDLCALC 49 08/09/2021    LDLCALC 44 12/11/2019    LDLCALC 56 08/22/2019    CHOL 123 08/09/2021    CHOL 118 12/11/2019    CHOL 160 08/22/2019    TRIG 102 08/09/2021    TRIG 183 (H) 12/11/2019    TRIG 352 (H) 08/22/2019     No results found for: TESTM  Lab Results   Component Value Date    TSH 0.563 08/09/2021    TSH 0.933 12/11/2019    TSH 0.865 08/20/2019    TSHREFLEX 1.900 11/13/2020    TSHREFLEX 1.380 01/17/2018    T4FREE 1.15 08/09/2021    T4FREE 1.46 12/11/2019    T4FREE 1.17 01/17/2018     No results found for: TPOABS    Review of Systems   Constitutional: Positive for unexpected weight change. Endocrine: Negative. Genitourinary: Positive for menstrual problem. Psychiatric/Behavioral: Positive for sleep disturbance. All other systems reviewed and are negative. Objective:   Physical Exam  Vitals reviewed. Constitutional:       Appearance: Normal appearance. She is obese. HENT:      Head: Normocephalic and atraumatic. Hair is normal.      Right Ear: External ear normal.      Left Ear: External ear normal.      Nose: Nose normal.   Eyes:      General: No scleral icterus. Right eye: No discharge. Left eye: No discharge. Extraocular Movements: Extraocular movements intact. Conjunctiva/sclera: Conjunctivae normal.   Neck:      Trachea: Trachea normal.   Cardiovascular:      Rate and Rhythm: Normal rate. Pulmonary:      Effort: Pulmonary effort is normal.   Abdominal:      Palpations: Abdomen is soft.    Musculoskeletal: General: Normal range of motion. Cervical back: Normal range of motion and neck supple. Neurological:      General: No focal deficit present. Mental Status: She is alert and oriented to person, place, and time.    Psychiatric:         Mood and Affect: Mood normal.         Behavior: Behavior normal.

## 2021-08-21 ENCOUNTER — HOSPITAL ENCOUNTER (EMERGENCY)
Age: 31
Discharge: HOME | End: 2021-08-21
Payer: COMMERCIAL

## 2021-08-21 VITALS
OXYGEN SATURATION: 97 % | BODY MASS INDEX: 44.35 KG/M2 | WEIGHT: 241 LBS | RESPIRATION RATE: 20 BRPM | DIASTOLIC BLOOD PRESSURE: 91 MMHG | HEART RATE: 91 BPM | TEMPERATURE: 98.4 F | SYSTOLIC BLOOD PRESSURE: 141 MMHG | HEIGHT: 62 IN

## 2021-08-21 DIAGNOSIS — J40 SINOBRONCHITIS: Primary | ICD-10-CM

## 2021-08-21 DIAGNOSIS — J32.9 SINOBRONCHITIS: Primary | ICD-10-CM

## 2021-08-21 LAB — SARS-COV-2, NAAT: NOT DETECTED

## 2021-08-21 PROCEDURE — 87635 SARS-COV-2 COVID-19 AMP PRB: CPT

## 2021-08-21 PROCEDURE — 6370000000 HC RX 637 (ALT 250 FOR IP): Performed by: PHYSICIAN ASSISTANT

## 2021-08-21 PROCEDURE — 99283 EMERGENCY DEPT VISIT LOW MDM: CPT

## 2021-08-21 RX ORDER — ALBUTEROL SULFATE 90 UG/1
AEROSOL, METERED RESPIRATORY (INHALATION)
Qty: 1 INHALER | Refills: 0 | Status: SHIPPED | OUTPATIENT
Start: 2021-08-21

## 2021-08-21 RX ORDER — PREDNISONE 20 MG/1
60 TABLET ORAL ONCE
Status: COMPLETED | OUTPATIENT
Start: 2021-08-21 | End: 2021-08-21

## 2021-08-21 RX ORDER — PREDNISONE 10 MG/1
60 TABLET ORAL DAILY
Qty: 30 TABLET | Refills: 0 | Status: SHIPPED | OUTPATIENT
Start: 2021-08-21 | End: 2021-08-26

## 2021-08-21 RX ORDER — GUAIFENESIN, PSEUDOEPHEDRINE HYDROCHLORIDE 600; 60 MG/1; MG/1
1 TABLET, EXTENDED RELEASE ORAL EVERY 12 HOURS
Qty: 14 TABLET | Refills: 0 | Status: SHIPPED | OUTPATIENT
Start: 2021-08-21 | End: 2021-08-28

## 2021-08-21 RX ORDER — BENZONATATE 200 MG/1
200 CAPSULE ORAL 3 TIMES DAILY PRN
Qty: 30 CAPSULE | Refills: 0 | Status: SHIPPED | OUTPATIENT
Start: 2021-08-21 | End: 2021-08-28

## 2021-08-21 RX ORDER — AZITHROMYCIN 500 MG/1
500 TABLET, FILM COATED ORAL DAILY
Qty: 5 TABLET | Refills: 0 | Status: SHIPPED | OUTPATIENT
Start: 2021-08-21 | End: 2021-10-13

## 2021-08-21 RX ORDER — AZITHROMYCIN 500 MG/1
500 TABLET, FILM COATED ORAL ONCE
Status: COMPLETED | OUTPATIENT
Start: 2021-08-21 | End: 2021-08-21

## 2021-08-21 RX ADMIN — AZITHROMYCIN MONOHYDRATE 500 MG: 500 TABLET ORAL at 21:18

## 2021-08-21 RX ADMIN — PREDNISONE 60 MG: 20 TABLET ORAL at 21:18

## 2021-08-21 ASSESSMENT — ENCOUNTER SYMPTOMS
SINUS PRESSURE: 1
ALLERGIC/IMMUNOLOGIC NEGATIVE: 1
ABDOMINAL PAIN: 0
SORE THROAT: 1
COLOR CHANGE: 0
TROUBLE SWALLOWING: 0
APNEA: 0
SHORTNESS OF BREATH: 0
VOMITING: 1
SINUS PAIN: 1
DIARRHEA: 1
EYE PAIN: 0

## 2021-08-21 NOTE — ED PROVIDER NOTES
[address] [facility] ED  eMERGENCYdEPARTMENT eNCOUnter      Pt Name: Tenzin Farr  MRN: [id number]  [place] 1990  Date of evaluation: 8/21/2021  Provider:Tawanda Arthur PA-C    CHIEF COMPLAINT       Chief Complaint   Patient presents with    Cough    Nasal Congestion         HISTORY OF PRESENT ILLNESS  (Location/Symptom, Timing/Onset, Context/Setting, Quality, Duration, Modifying Factors, Severity.)   Tenzin Farr is a 36 y.o. female who presents to the emergency department with a 1 week history of rhinorrhea, nasal congestion, sneezing, sore throat, cough and chest congestion, sinus pressure and pain, subjective fever and chills. Patient also states some intermittent vomiting and diarrhea. Patient has been trying to take over-the-counter medicines without improvement. Patient denies any chest pain or shortness of breath. HPI    Nursing Notes were reviewed and I agree. REVIEW OF SYSTEMS    (2-9 systems for level 4, 10 or more for level 5)     Review of Systems   Constitutional: Positive for chills, fatigue and fever. Negative for diaphoresis. HENT: Positive for congestion, postnasal drip, sinus pressure, sinus pain, sneezing and sore throat. Negative for hearing loss and trouble swallowing. Eyes: Negative for pain. Respiratory: Negative for apnea and shortness of breath. Cardiovascular: Negative for chest pain. Gastrointestinal: Positive for diarrhea and vomiting. Negative for abdominal pain. Endocrine: Negative. Genitourinary: Negative for hematuria. Musculoskeletal: Negative for neck pain and neck stiffness. Skin: Negative for color change. Allergic/Immunologic: Negative. Neurological: Negative for dizziness and numbness. Hematological: Negative. Psychiatric/Behavioral: Negative. All other systems reviewed and are negative. Except as noted above the remainder of the review of systems was reviewed and negative.        PAST MEDICAL HISTORY     Past Medical History:   Diagnosis Date    Abnormal Pap smear of cervix     Anxiety     Asthma     Depression     DVT [facility]deep venous thrombosis) ([facility])     Hypertension     PTSD (post-traumatic stress disorder)     Schizoaffective disorder ([facility])          SURGICAL HISTORY       Past Surgical History:   Procedure Laterality Date     SECTION      TONSILLECTOMY           CURRENT MEDICATIONS       Previous Medications    ACETAMINOPHEN (TYLENOL) 325 MG TABLET    TAKE 2 TABLET BY MOUTH EVERY 6 HOURS AS NEEDED FOR HEADACHE    ALCOHOL SWABS (ALCOHOL PADS) 70 % PADS    1 each by Does not apply route three times daily    ATORVASTATIN (LIPITOR) 20 MG TABLET    TAKE 1 TABLET BY MOUTH EVERY DAY    BLOOD GLUCOSE MONITOR KIT AND SUPPLIES    1 kit by Other route 4 times daily (before meals and nightly) Pt test 4x daily Dx E11.65. May substitute for generic or insurance covered product    BLOOD GLUCOSE MONITOR STRIPS    1 strip by Other route 4 times daily (before meals and nightly) Pt test 4x daily Dx E11.65. May substitute for generic or insurance covered product    DICYCLOMINE (BENTYL) 10 MG CAPSULE    Take 1 capsule by mouth every 6 hours as needed (cramps)    DOXEPIN (SINEQUAN) 75 MG CAPSULE    TAKE 1 CAPSULE BY MOUTH AT BEDTIME AS NEEDED FOR SLEEP    FLOVENT DISKUS 100 MCG/BLIST AEPB INHALER        FLUOXETINE (PROZAC) 20 MG CAPSULE        FLUTICASONE (FLONASE) 50 MCG/ACT NASAL SPRAY    USE 1 2 SPRAYS IN EACH NOSTRIL DAILY AS NEEDED FOR NASAL CONGESTION    HYDROXYZINE (VISTARIL) 25 MG CAPSULE    TAKE 1 CAPSULE BY MOUTH 3 TIMES A DAY AS NEEDED FOR ANXIETY    KETOROLAC (TORADOL) 10 MG TABLET    Take 1 tablet by mouth every 6 hours as needed for Pain    LANCETS (ONETOUCH DELICA PLUS [id number]) MISC    Test 4x daily    LANCETS MISC    1 each by Does not apply route 4 times daily (before meals and nightly) Pt test 4x daily Dx E11.65.   May substitute for generic or insurance covered product    LATUDA 80 MG TABS TABLET    TAKE 1 TABLET BY MOUTH EVERY EVENING WITH MEALS TAKE WITH AT LEAST 350 CALORIE MEAL    LETROZOLE ([address] [facility]) 2.5 MG TABLET    Take 1 tablet by mouth daily Day 5 through 9 of the menstrual cycle    LORATADINE (CLARITIN) 10 MG TABLET    TAKE 1 TABLET BY MOUTH EVERY DAY    MEDROXYPROGESTERONE (PROVERA) 10 MG TABLET    Take 1 tablet by mouth daily For 10 days    MELATONIN 10 MG CAPS        METFORMIN (GLUCOPHAGE-XR) 500 MG EXTENDED RELEASE TABLET    TAKE 1 TABLET BY MOUTH 2 TIMES DAILY (BEFORE MEALS)    MULTIPLE VITAMINS-MINERALS (MULTIVITAMIN ADULT PO)    Take by mouth    ONDANSETRON (ZOFRAN ODT) 4 MG DISINTEGRATING TABLET    Take 1 tablet by mouth every 8 hours as needed for Nausea    PHENAZOPYRIDINE (PYRIDIUM) 100 MG TABLET        PRENATAL VIT-FE FUMARATE-FA (PRENATAL VITAMIN) 27-1 MG TABS TABLET    take 1 tablet by mouth once daily    RISPERIDONE (RISPERDAL) 1 MG TABLET    TAKE 1 TABLET BY MOUTH EVERYDAY AT BEDTIME       ALLERGIES     Food    FAMILY HISTORY       Family History   Problem Relation Age of Onset    Depression Father     Diabetes Mother     Breast Cancer Neg Hx           SOCIAL HISTORY       Social History     Socioeconomic History    Marital status:      Spouse name: None    Number of children: None    Years of education: None    Highest education level: None   Occupational History    None   Tobacco Use    Smoking status: Current Every Day Smoker     Packs/day: 0.25     Years: 12.00     Pack years: 3.00     Types: Cigarettes    Smokeless tobacco: Never Used    Tobacco comment: 3 cigs/day   Vaping Use    Vaping Use: Never used   Substance and Sexual Activity    Alcohol use: Not Currently     Comment: \"once in a while\"    Drug use: Not Currently    Sexual activity: Yes     Partners: Male   Other Topics Concern    None   Social History Narrative    None     Social Determinants of Health     Financial Resource Strain:     Difficulty of Paying Living Expenses:    Food Insecurity:     Worried About Running Out of Food in the Last Year:     Tommy of Food in the Last Year:    Transportation Needs:     Lack of Transportation (Medical):  Lack of Transportation (Non-Medical):    Physical Activity:     Days of Exercise per Week:     Minutes of Exercise per Session:    Stress:     Feeling of Stress :    Social Connections:     Frequency of Communication with Friends and Family:     Frequency of Social Gatherings with Friends and Family:     Attends Protestant Services:     Active Member of Clubs or Organizations:     Attends Club or Organization Meetings:     Marital Status:    Intimate Partner Violence:     Fear of Current or Ex-Partner:     Emotionally Abused:     Physically Abused:     Sexually Abused:        SCREENINGS           PHYSICAL EXAM    (up to 7 forlevel 4, 8 or more for level 5)     ED Triage Vitals [08/21/21 1928]   BP Temp Temp Source Pulse Resp SpO2 Height Weight   (!) 141/91 98.4 °F (36.9 °C) Oral 91 20 97 % 5' 2\" (1.575 m) 241 lb (109.3 kg)       Physical Exam  Vitals and nursing note reviewed. Constitutional:       General: She is not in acute distress. Appearance: She is well-developed. She is not diaphoretic. HENT:      Head: Normocephalic and atraumatic. Nose: Mucosal edema and congestion present. Right Sinus: Maxillary sinus tenderness and frontal sinus tenderness present. Left Sinus: Maxillary sinus tenderness and frontal sinus tenderness present. Mouth/Throat:      Pharynx: No oropharyngeal exudate. Eyes:      General: No scleral icterus. Conjunctiva/sclera: Conjunctivae normal.      Pupils: Pupils are equal, round, and reactive to light. Neck:      Trachea: No tracheal deviation. Cardiovascular:      Rate and Rhythm: Normal rate. Heart sounds: Normal heart sounds. Pulmonary:      Effort: Pulmonary effort is normal. No respiratory distress. Breath sounds: Normal breath sounds.    Abdominal:      General: Bowel sounds are normal. There is no distension. Palpations: Abdomen is soft. Musculoskeletal:         General: Normal range of motion. Cervical back: Normal range of motion and neck supple. Skin:     General: Skin is warm and dry. Findings: No erythema or rash. Neurological:      Mental Status: She is alert and oriented to person, place, and time. Cranial Nerves: No cranial nerve deficit. Motor: No abnormal muscle tone. Psychiatric:         Behavior: Behavior normal.         Thought Content: Thought content normal.         Judgment: Judgment normal.           DIAGNOSTIC RESULTS     RADIOLOGY:   Non-plain film images such as CT, Ultrasound and MRI are read by the radiologist. Plain radiographic images are visualized and preliminarilyinterpreted by Azra Pulido PA-C with the below findings:      Interpretation per the Radiologist below, if available at the time of this note:    No orders to display       LABS:  Labs Reviewed   COVID-19, RAPID       All other labs were within normal range or not returnedas of this dictation. EMERGENCYDEPARTMENT COURSE and DIFFERENTIAL DIAGNOSIS/MDM:   Vitals:    Vitals:    08/21/21 1928   BP: (!) 141/91   Pulse: 91   Resp: 20   Temp: 98.4 °F (36.9 °C)   TempSrc: Oral   SpO2: 97%   Weight: 241 lb (109.3 kg)   Height: 5' 2\" (1.575 m)       REASSESSMENT        Patient presents emergency department with 1 week history of URI symptoms. Patient does have sinus pressure and pain through the bilateral maxillary sinuses as well as a moist, productive cough. Lungs are clear on exam.  Patient will be treated for sinobronchitis with antibiotics, steroids, decongestants, antitussives and PCP follow-up    MDM    PROCEDURES:    Procedures      FINAL IMPRESSION      1.  Sinobronchitis          DISPOSITION/PLAN   DISPOSITION        PATIENT REFERRED TO:  Joellen Baron  [facility], [place], operated by [facility] [id number] [phone]    Call in 2 days        DISCHARGE MEDICATIONS:  New Prescriptions    ALBUTEROL SULFATE  (90 BASE) MCG/ACT INHALER    Use 2 puffs 4 times daily for 7 days then as needed for wheezing. Dispense with Spacer and instruct in use. At patient's preference may use 60 dose MDI. May Sub Pro-Air or Proventil as needed per insurance.     AZITHROMYCIN (ZITHROMAX) 500 MG TABLET    Take 1 tablet by mouth daily    BENZONATATE (TESSALON) 200 MG CAPSULE    Take 1 capsule by mouth 3 times daily as needed for Cough    PREDNISONE (DELTASONE) 10 MG TABLET    Take 6 tablets by mouth daily for 5 doses    PSEUDOEPHEDRINE-GUAIFENESIN (MUCINEX D)  MG PER EXTENDED RELEASE TABLET    Take 1 tablet by mouth every 12 hours for 7 days       (Please note that portions of this note were completed with a voice recognition program.  Efforts were made to edit the dictations but occasionally words are mis-transcribed.)    Delta Handing, PA-C        Delta Handing, PA-C  08/21/21 8180

## 2021-08-21 NOTE — ED TRIAGE NOTES
Pt states that she has not been feeling well since 8/14. Pt states that she has had a lot of congestion and has been coughing. Pt states that she does not know if she has had a fever as she has not taken it.

## 2021-08-22 NOTE — ED NOTES
Pt given blanket per her request.  Updated that we are still waiting for Covid test results.   Call bell in reach     Yessi Nyasia, [place]  08/21/21 2034

## 2021-09-08 ENCOUNTER — HOSPITAL ENCOUNTER (EMERGENCY)
Age: 31
Discharge: HOME | End: 2021-09-08
Attending: EMERGENCY MEDICINE
Payer: COMMERCIAL

## 2021-09-08 ENCOUNTER — APPOINTMENT (OUTPATIENT)
Dept: GENERAL RADIOLOGY | Age: 31
End: 2021-09-08
Payer: COMMERCIAL

## 2021-09-08 VITALS
RESPIRATION RATE: 16 BRPM | WEIGHT: 241 LBS | HEART RATE: 104 BPM | TEMPERATURE: 98.4 F | OXYGEN SATURATION: 97 % | SYSTOLIC BLOOD PRESSURE: 147 MMHG | BODY MASS INDEX: 44.35 KG/M2 | HEIGHT: 62 IN | DIASTOLIC BLOOD PRESSURE: 89 MMHG

## 2021-09-08 DIAGNOSIS — S93.401A MODERATE RIGHT ANKLE SPRAIN, INITIAL ENCOUNTER: Primary | ICD-10-CM

## 2021-09-08 PROCEDURE — 6370000000 HC RX 637 (ALT 250 FOR IP): Performed by: NURSE PRACTITIONER

## 2021-09-08 PROCEDURE — 99285 EMERGENCY DEPT VISIT HI MDM: CPT

## 2021-09-08 PROCEDURE — 73630 X-RAY EXAM OF FOOT: CPT

## 2021-09-08 RX ORDER — IBUPROFEN 600 MG/1
600 TABLET ORAL EVERY 6 HOURS PRN
Qty: 28 TABLET | Refills: 0 | Status: SHIPPED | OUTPATIENT
Start: 2021-09-08 | End: 2021-10-13

## 2021-09-08 RX ORDER — IBUPROFEN 800 MG/1
800 TABLET ORAL ONCE
Status: COMPLETED | OUTPATIENT
Start: 2021-09-08 | End: 2021-09-08

## 2021-09-08 RX ADMIN — IBUPROFEN 800 MG: 800 TABLET, FILM COATED ORAL at 17:01

## 2021-09-08 ASSESSMENT — ENCOUNTER SYMPTOMS
TROUBLE SWALLOWING: 0
SINUS PAIN: 0
ABDOMINAL DISTENTION: 0
CHEST TIGHTNESS: 0
COUGH: 0
VOMITING: 0
ABDOMINAL PAIN: 0
CONSTIPATION: 0
WHEEZING: 0
DIARRHEA: 0
NAUSEA: 0
RHINORRHEA: 0
COLOR CHANGE: 0
BACK PAIN: 0
SHORTNESS OF BREATH: 0
SORE THROAT: 0

## 2021-09-08 ASSESSMENT — PAIN DESCRIPTION - LOCATION: LOCATION: ANKLE

## 2021-09-08 ASSESSMENT — PAIN DESCRIPTION - PAIN TYPE: TYPE: ACUTE PAIN

## 2021-09-08 ASSESSMENT — PAIN DESCRIPTION - DESCRIPTORS: DESCRIPTORS: SHARP;STABBING

## 2021-09-08 ASSESSMENT — PAIN DESCRIPTION - ORIENTATION: ORIENTATION: RIGHT

## 2021-09-08 ASSESSMENT — PAIN SCALES - GENERAL
PAINLEVEL_OUTOF10: 6
PAINLEVEL_OUTOF10: 5

## 2021-09-08 NOTE — ED PROVIDER NOTES
[address] [facility] ED  EMERGENCY DEPARTMENT ENCOUNTER      Pt Name: Susan Maddox  MRN: [id number]  [place] 1990  Date of evaluation: 9/8/2021  Provider: Alaina Parsons       Chief Complaint   Patient presents with    Ankle Pain     Tripped over dog and rolled ankle, fell to ground         HISTORY OF PRESENT ILLNESS   (Location/Symptom, Timing/Onset,Context/Setting, Quality, Duration, Modifying Factors, Severity)  Note limiting factors. Susan Maddox is a 36 y.o. female who presents to the emergency department for complaint of accidental trip and fall growing her right ankle. Patient states that she accidentally stumbled over her dog rolling her right ankle inward about 2 hours prior to coming to the ER. States pain 5 out of 10 aching throbbing made worse when she is ambulating on it with some swelling in her right ankle. Denies any open wounds. She denies any falls or injuries other areas of her body. She has not take anything for the pain or discomfort at this time. Nursing Notes were reviewed. REVIEW OF SYSTEMS    (2-9 systems for level 4, 10 or more for level 5)     Review of Systems   Constitutional: Negative for activity change, appetite change, chills, diaphoresis, fatigue and fever. HENT: Negative for congestion, ear pain, postnasal drip, rhinorrhea, sinus pain, sore throat and trouble swallowing. Eyes: Negative for visual disturbance. Respiratory: Negative for cough, chest tightness, shortness of breath and wheezing. Cardiovascular: Negative for chest pain and palpitations. Gastrointestinal: Negative for abdominal distention, abdominal pain, constipation, diarrhea, nausea and vomiting. Genitourinary: Negative for difficulty urinating, dysuria, flank pain, frequency and urgency. Musculoskeletal: Positive for arthralgias, joint swelling and myalgias. Negative for back pain, gait problem, neck pain and neck stiffness.    Skin: Negative for color change and rash. Neurological: Negative for dizziness, tremors, seizures, syncope, speech difficulty, weakness, light-headedness, numbness and headaches. Except as noted above the remainder of the review of systems was reviewed and negative. PAST MEDICAL HISTORY     Past Medical History:   Diagnosis Date    Abnormal Pap smear of cervix     Anxiety     Asthma     Depression     [facility] thrombosis) ([facility])     Hypertension     PTSD (post-traumatic stress disorder)     Schizoaffective disorder ([facility])      Past Surgical History:   Procedure Laterality Date     SECTION      TONSILLECTOMY       Social History     Socioeconomic History    Marital status:      Spouse name: Not on file    Number of children: Not on file    Years of education: Not on file    Highest education level: Not on file   Occupational History    Not on file   Tobacco Use    Smoking status: Current Every Day Smoker     Packs/day: 0.25     Years: 12.00     Pack years: 3.00     Types: Cigarettes    Smokeless tobacco: Never Used    Tobacco comment: 3 cigs/day   Vaping Use    Vaping Use: Never used   Substance and Sexual Activity    Alcohol use: Not Currently     Comment: \"once in a while\"    Drug use: Not Currently    Sexual activity: Yes     Partners: Male   Other Topics Concern    Not on file   Social History Narrative    Not on file     Social Determinants of Health     Financial Resource Strain:     Difficulty of Paying Living Expenses:    Food Insecurity:     Worried About Running Out of Food in the Last Year:     Ran Out of Food in the Last Year:    Transportation Needs:     Lack of Transportation (Medical):      Lack of Transportation (Non-Medical):    Physical Activity:     Days of Exercise per Week:     Minutes of Exercise per Session:    Stress:     Feeling of Stress :    Social Connections:     Frequency of Communication with Friends and Family:     Frequency of Social Gatherings with Friends and Family:     Attends Latter-day Services:     Active Member of Clubs or Organizations:     Attends Club or Organization Meetings:     Marital Status:    Intimate Partner Violence:     Fear of Current or Ex-Partner:     Emotionally Abused:     Physically Abused:     Sexually Abused:        SCREENINGS    [place] Coma Scale  Eye Opening: Spontaneous  Best Verbal Response: Oriented  Best Motor Response: Obeys commands  [place] Coma Scale Score: 15        PHYSICAL EXAM    (up to 7 for level 4, 8 or more for level 5)     ED Triage Vitals   BP Temp Temp src Pulse Resp SpO2 Height Weight   09/08/21 1526 09/08/21 1525 -- 09/08/21 1525 09/08/21 1525 09/08/21 1525 09/08/21 1525 09/08/21 1525   (!) 147/89 98.4 °F (36.9 °C)  104 16 97 % 5' 2\" (1.575 m) 241 lb (109.3 kg)       Physical Exam  Constitutional:       General: She is not in acute distress. Appearance: Normal appearance. She is obese. She is not ill-appearing, toxic-appearing or diaphoretic. HENT:      Head: Normocephalic and atraumatic. Right Ear: External ear normal.      Left Ear: External ear normal.      Nose: Nose normal. No congestion or rhinorrhea. Mouth/Throat:      Mouth: Mucous membranes are moist.      Pharynx: Oropharynx is clear. No oropharyngeal exudate or posterior oropharyngeal erythema. Eyes:      General:         Right eye: No discharge. Left eye: No discharge. Conjunctiva/sclera: Conjunctivae normal.      Pupils: Pupils are equal, round, and reactive to light. Cardiovascular:      Rate and Rhythm: Normal rate and regular rhythm. Pulses: Normal pulses. Pulmonary:      Effort: Pulmonary effort is normal.   Musculoskeletal:         General: Swelling, tenderness and signs of injury present. No deformity. Normal range of motion. Cervical back: Normal range of motion and neck supple. No rigidity or tenderness.       Right knee: Normal.      Left knee: Normal. Right ankle: Swelling present. Tenderness present over the lateral malleolus, ATF ligament and AITF ligament. Normal range of motion. Left ankle: Normal.        Feet:    Skin:     General: Skin is warm and dry. Capillary Refill: Capillary refill takes less than 2 seconds. Neurological:      General: No focal deficit present. Mental Status: She is alert and oriented to person, place, and time. Mental status is at baseline. Cranial Nerves: No cranial nerve deficit. Sensory: No sensory deficit. Motor: No weakness. Coordination: Coordination normal.         RESULTS     EKG: All EKG's are interpreted by the Emergency Department Physician who either signs or Co-signsthis chart in the absence of a cardiologist.        RADIOLOGY:   Miko Horn such as CT, Ultrasound and MRI are read by the radiologist. Plain radiographic images are visualized and preliminarily interpreted by the emergency physician with the below findings:        Interpretation per the Radiologist below, if available at the time ofthis note:    XR FOOT RIGHT (MIN 3 VIEWS)   Final Result                                                                                      No acute osseous abnormality. ED BEDSIDE ULTRASOUND:   Performed by ED Physician - none    LABS:  Labs Reviewed - No data to display    All other labs were within normal range or not returned as of this dictation. EMERGENCY DEPARTMENT COURSE and DIFFERENTIAL DIAGNOSIS/MDM:   Vitals:    Vitals:    09/08/21 1525 09/08/21 1526   BP:  (!) 147/89   Pulse: 104    Resp: 16    Temp: 98.4 °F (36.9 °C)    SpO2: 97%    Weight: 241 lb (109.3 kg)    Height: 5' 2\" (1.575 m)             MDM patient is afebrile nontoxic no acute distress hemodynamically stable. There is mild reproducible tenderness along the right lateral malleolus most of the tendons. X-ray shows no acute fracture.   Patient given Motrin for pain discomfort as well as provided with an ankle brace and crutches. She states use crutches before and feels comfortable being discharged home with these. Patient stable for discharge home directed to follow-up primary care provider and orthopedics as needed. Return to the ER if any onset of new concerns and worsening condition. Patient verbalized understandable given instruction education. CRITICAL CARE TIME       CONSULTS:  None    PROCEDURES:  Unless otherwise noted below, none     Procedures    FINAL IMPRESSION      1.  Moderate right ankle sprain, initial encounter          DISPOSITION/PLAN   DISPOSITION Discharge - Pending Orders Complete 09/08/2021 04:50:29 PM      PATIENT REFERRED TO:  Ed Caal  [address]  [phone]    Call in 1 day      [address]  [address] [facility]  [address]  [address] [facility] [address]  [phone]  Call in 1 day        DISCHARGE MEDICATIONS:  New Prescriptions    IBUPROFEN (ADVIL;MOTRIN) 600 MG TABLET    Take 1 tablet by mouth every 6 hours as needed for Pain          (Please notethat portions of this note were completed with a voice recognition program.  Efforts were made to edit the dictations but occasionally words are mis-transcribed.)    ALEXANDR Bee CNP (electronically signed)  Attending Emergency Physician         ALEXANDR Bee CNP  09/08/21 9430

## 2021-09-19 ENCOUNTER — OFFICE VISIT (OUTPATIENT)
Dept: FAMILY MEDICINE CLINIC | Age: 31
End: 2021-09-19
Payer: COMMERCIAL

## 2021-09-19 ENCOUNTER — APPOINTMENT (OUTPATIENT)
Dept: GENERAL RADIOLOGY | Age: 31
End: 2021-09-19
Payer: COMMERCIAL

## 2021-09-19 ENCOUNTER — HOSPITAL ENCOUNTER (EMERGENCY)
Age: 31
Discharge: HOME | End: 2021-09-19
Payer: COMMERCIAL

## 2021-09-19 VITALS
TEMPERATURE: 97.8 F | HEART RATE: 74 BPM | DIASTOLIC BLOOD PRESSURE: 100 MMHG | WEIGHT: 241 LBS | SYSTOLIC BLOOD PRESSURE: 142 MMHG | BODY MASS INDEX: 44.35 KG/M2 | OXYGEN SATURATION: 98 % | HEIGHT: 62 IN

## 2021-09-19 VITALS
OXYGEN SATURATION: 97 % | HEART RATE: 89 BPM | SYSTOLIC BLOOD PRESSURE: 139 MMHG | WEIGHT: 241 LBS | DIASTOLIC BLOOD PRESSURE: 86 MMHG | RESPIRATION RATE: 21 BRPM | TEMPERATURE: 98.7 F | HEIGHT: 62 IN | BODY MASS INDEX: 44.35 KG/M2

## 2021-09-19 DIAGNOSIS — B34.9 VIRAL ILLNESS: Primary | ICD-10-CM

## 2021-09-19 DIAGNOSIS — F41.0 ANXIETY ATTACK: Primary | ICD-10-CM

## 2021-09-19 LAB
Lab: NORMAL
PERFORMING INSTRUMENT: NORMAL
QC PASS/FAIL: NORMAL
SARS-COV-2, POC: NORMAL

## 2021-09-19 PROCEDURE — 71045 X-RAY EXAM CHEST 1 VIEW: CPT

## 2021-09-19 PROCEDURE — 6370000000 HC RX 637 (ALT 250 FOR IP): Performed by: NURSE PRACTITIONER

## 2021-09-19 PROCEDURE — 99284 EMERGENCY DEPT VISIT MOD MDM: CPT

## 2021-09-19 PROCEDURE — G8417 CALC BMI ABV UP PARAM F/U: HCPCS

## 2021-09-19 PROCEDURE — 99213 OFFICE O/P EST LOW 20 MIN: CPT

## 2021-09-19 PROCEDURE — 4004F PT TOBACCO SCREEN RCVD TLK: CPT

## 2021-09-19 PROCEDURE — 93005 ELECTROCARDIOGRAM TRACING: CPT | Performed by: NURSE PRACTITIONER

## 2021-09-19 PROCEDURE — 87426 SARSCOV CORONAVIRUS AG IA: CPT

## 2021-09-19 PROCEDURE — G8427 DOCREV CUR MEDS BY ELIG CLIN: HCPCS

## 2021-09-19 RX ORDER — CEPHALEXIN 500 MG/1
CAPSULE ORAL
COMMUNITY
Start: 2021-08-06 | End: 2021-09-19

## 2021-09-19 RX ORDER — ACETAMINOPHEN 500 MG
1000 TABLET ORAL ONCE
Status: COMPLETED | OUTPATIENT
Start: 2021-09-19 | End: 2021-09-19

## 2021-09-19 RX ORDER — IBUPROFEN 800 MG/1
800 TABLET ORAL ONCE
Status: COMPLETED | OUTPATIENT
Start: 2021-09-19 | End: 2021-09-19

## 2021-09-19 RX ADMIN — ACETAMINOPHEN 1000 MG: 500 TABLET ORAL at 18:38

## 2021-09-19 RX ADMIN — IBUPROFEN 800 MG: 800 TABLET, FILM COATED ORAL at 18:38

## 2021-09-19 ASSESSMENT — ENCOUNTER SYMPTOMS
ABDOMINAL PAIN: 0
CHEST TIGHTNESS: 1
APNEA: 0
CHEST TIGHTNESS: 0
RHINORRHEA: 0
COUGH: 1
COLOR CHANGE: 0
WHEEZING: 0
SORE THROAT: 1
COUGH: 0
NAUSEA: 0
RHINORRHEA: 1
TROUBLE SWALLOWING: 0
DIARRHEA: 0
NAUSEA: 0
WHEEZING: 0
VOMITING: 0
SINUS PAIN: 0
DIARRHEA: 0
FACIAL SWELLING: 0
SINUS PAIN: 0
EYE DISCHARGE: 0
SORE THROAT: 0
BACK PAIN: 0
VOMITING: 0
SINUS PRESSURE: 0
COLOR CHANGE: 0
EYE PAIN: 0
BACK PAIN: 0
CONSTIPATION: 0
ABDOMINAL PAIN: 0
TROUBLE SWALLOWING: 0
ABDOMINAL DISTENTION: 0
SINUS PRESSURE: 0
EYE ITCHING: 0
SHORTNESS OF BREATH: 0
SHORTNESS OF BREATH: 0

## 2021-09-19 ASSESSMENT — PAIN DESCRIPTION - LOCATION: LOCATION: CHEST

## 2021-09-19 ASSESSMENT — PAIN SCALES - GENERAL
PAINLEVEL_OUTOF10: 6
PAINLEVEL_OUTOF10: 6

## 2021-09-19 ASSESSMENT — PAIN DESCRIPTION - PAIN TYPE: TYPE: ACUTE PAIN

## 2021-09-19 ASSESSMENT — PAIN DESCRIPTION - DESCRIPTORS: DESCRIPTORS: ACHING

## 2021-09-19 ASSESSMENT — PAIN DESCRIPTION - ORIENTATION: ORIENTATION: LEFT;RIGHT;UPPER

## 2021-09-19 NOTE — ED NOTES
Pt resting quietly in bed. A&OX4. Skin pink, w/d. Resps even and unlabored on room air. No s/s of acute distress noted. No further needs at this time. Call light within reach.       Layo George RN  09/19/21 7955

## 2021-09-19 NOTE — LETTER
NOTIFICATION RETURN TO WORK / SCHOOL    9/19/2021    Ms. Terrance Knapp  [address] [facility] [address]      To Whom It May Concern:    Joseph Johnson was tested for COVID-19 on 9/19, and the result was negative. She may return to work tomorrow. I recommend:return without restrictions    If there are questions or concerns, please have the patient contact our office.         Sincerely,      Katie Slaughter, ALEXANDR - CNP

## 2021-09-19 NOTE — ED PROVIDER NOTES
[address] [facility] ED  EMERGENCY DEPARTMENT ENCOUNTER      Pt Name: Willi Roy  MRN: [id number]  [place] 1990  Date of evaluation: 9/19/2021  Provider: Alaina Evans       Chief Complaint   Patient presents with    Chest Pain         HISTORY OF PRESENT ILLNESS   (Location/Symptom, Timing/Onset,Context/Setting, Quality, Duration, Modifying Factors, Severity)  Note limiting factors. Willi Roy is a 36 y.o. female who presents to the emergency department complaint of onset of stress at work followed by chest pain tightness. Patient states that she does have a history of anxiety with similar symptoms but was concerned because the pain spread rapidly across both side of her chest.  She states it has been slowly decreasing since the sudden onset was at work and was encouraged to go to the ER for additional evaluation. She states that worse with 6 out of 10 and noticed that when she pushed on her chest or take a deep breath she had the chest pain was reproducible. Denies abdominal pain nausea vomiting diarrhea denies headache denies any other recent illnesses or injuries. She reports that she is starting to feel much better. She states that she is starting to get a mild headache which does happen after anxiety. Nursing Notes were reviewed. REVIEW OF SYSTEMS    (2-9 systems for level 4, 10 or more for level 5)     Review of Systems   Constitutional: Negative for activity change, appetite change, chills, diaphoresis, fatigue and fever. HENT: Negative for congestion, ear pain, nosebleeds, postnasal drip, rhinorrhea, sinus pressure, sinus pain, sore throat and trouble swallowing. Eyes: Negative for visual disturbance. Respiratory: Positive for chest tightness. Negative for cough, shortness of breath and wheezing. Cardiovascular: Positive for chest pain. Negative for palpitations.    Gastrointestinal: Negative for abdominal distention, abdominal (Medical):  Lack of Transportation (Non-Medical):    Physical Activity:     Days of Exercise per Week:     Minutes of Exercise per Session:    Stress:     Feeling of Stress :    Social Connections:     Frequency of Communication with Friends and Family:     Frequency of Social Gatherings with Friends and Family:     Attends Alevism Services:     Active Member of Clubs or Organizations:     Attends Club or Organization Meetings:     Marital Status:    Intimate Partner Violence:     Fear of Current or Ex-Partner:     Emotionally Abused:     Physically Abused:     Sexually Abused:        SCREENINGS             PHYSICAL EXAM    (up to 7 for level 4, 8 or more for level 5)     ED Triage Vitals [09/19/21 1747]   BP Temp Temp Source Pulse Resp SpO2 Height Weight   (!) 153/96 98.7 °F (37.1 °C) Oral 97 20 97 % 5' 2\" (1.575 m) 241 lb (109.3 kg)       Physical Exam  Constitutional:       General: She is not in acute distress. Appearance: Normal appearance. She is normal weight. She is not ill-appearing, toxic-appearing or diaphoretic. HENT:      Head: Normocephalic and atraumatic. Right Ear: External ear normal.      Left Ear: External ear normal.   Eyes:      General:         Right eye: No discharge. Left eye: No discharge. Conjunctiva/sclera: Conjunctivae normal.      Pupils: Pupils are equal, round, and reactive to light. Cardiovascular:      Rate and Rhythm: Normal rate and regular rhythm. Pulses: Normal pulses. Pulmonary:      Effort: Pulmonary effort is normal.      Breath sounds: Normal breath sounds. Chest:      Chest wall: Tenderness present. Abdominal:      General: Bowel sounds are normal. There is no distension. Palpations: Abdomen is soft. Tenderness: There is no abdominal tenderness. Musculoskeletal:         General: No tenderness or signs of injury. Normal range of motion. Cervical back: Normal range of motion and neck supple.  No rigidity or tenderness. Skin:     General: Skin is warm and dry. Capillary Refill: Capillary refill takes less than 2 seconds. Neurological:      General: No focal deficit present. Mental Status: She is alert and oriented to person, place, and time. Mental status is at baseline. Cranial Nerves: No cranial nerve deficit. Sensory: No sensory deficit. Motor: No weakness. Coordination: Coordination normal.         RESULTS     EKG: All EKG's are interpreted by the Emergency Department Physician who either signs or Co-signsthis chart in the absence of a cardiologist.    Sinus rhythm 96 bpm no apparent ST elevation deviation no ectopy good R wave progression  ms    RADIOLOGY:   Non-plain filmimages such as CT, Ultrasound and MRI are read by the radiologist. Plain radiographic images are visualized and preliminarily interpreted by the emergency physician with the below findings:    Portable chest x-ray negative for acute process no focal traits or effusions. Interpretation per the Radiologist below, if available at the time ofthis note:    XR CHEST PORTABLE    (Results Pending)         ED BEDSIDE ULTRASOUND:   Performed by ED Physician - none    LABS:  Labs Reviewed - No data to display    All other labs were within normal range or not returned as of this dictation. EMERGENCY DEPARTMENT COURSE and DIFFERENTIAL DIAGNOSIS/MDM:   Vitals:    Vitals:    09/19/21 1747 09/19/21 1806   BP: (!) 153/96 139/86   Pulse: 97 89   Resp: 20 21   Temp: 98.7 °F (37.1 °C)    TempSrc: Oral    SpO2: 97% 97%   Weight: 241 lb (109.3 kg)    Height: 5' 2\" (1.575 m)             MDM patient is afebrile nontoxic no acute distress hemodynamically stable. She is palpable reproducible chest wall pain. Patient has a known history of anxiety and states that she had an anxiety attack is already feeling better.   She states that she does have a headache which is typical after anxiety I was given Tylenol and Motrin for aches and pains. She otherwise stable for discharge home EKG shows no acute changes chest x-ray is clear. Follow-up primary care provider soon as possible for evaluation peer return to the ER if any onset of new concerns in worse condition. Patient verbalized understandable given instruction education. CRITICAL CARE TIME       CONSULTS:  None    PROCEDURES:  Unless otherwise noted below, none     Procedures    FINAL IMPRESSION      1.  Anxiety attack          DISPOSITION/PLAN   DISPOSITION        PATIENT REFERRED TO:  Joellen Led  [address] [place], [place] [address] [facility] [address] [phone]    Call in 1 day        DISCHARGE MEDICATIONS:  New Prescriptions    No medications on file          (Please notethat portions of this note were completed with a voice recognition program.  Efforts were made to edit the dictations but occasionally words are mis-transcribed.)    ALEXANDR Tomlinson CNP (electronically signed)  Attending Emergency Physician         ALEXANDR Tomlinson CNP  09/19/21 1142

## 2021-09-19 NOTE — ED TRIAGE NOTES
Pt arrives to ED via [address] [facility] in regards to chest wall pain. Pt states she had an anxiety attack at work around 1640 and the pain did not go away. Pain is reproducible with palpation. Pt tested for Covid today and was negative. A&Ox4. Skin pink, w/d. Resps even and unlabored on room air.

## 2021-09-19 NOTE — PATIENT INSTRUCTIONS
Patient Education        Viral Infections: Care Instructions  Your Care Instructions     You don't feel well, but it's not clear what's causing it. You may have a viral infection. Viruses cause many illnesses, such as the common cold, influenza, fever, rashes, and the diarrhea, nausea, and vomiting that are often called \"stomach flu. \" You may wonder if antibiotic medicines could make you feel better. But antibiotics only treat infections caused by bacteria. They don't work on viruses. The good news is that viral infections usually aren't serious. Most will go away in a few days without medical treatment. In the meantime, there are a few things you can do to make yourself more comfortable. Follow-up care is a key part of your treatment and safety. Be sure to make and go to all appointments, and call your doctor if you are having problems. It's also a good idea to know your test results and keep a list of the medicines you take. How can you care for yourself at home? · Get plenty of rest if you feel tired. · Take an over-the-counter pain medicine if needed, such as acetaminophen (Tylenol), ibuprofen (Advil, Motrin), or naproxen (Aleve). Read and follow all instructions on the label. · Be careful when taking over-the-counter cold or flu medicines and Tylenol at the same time. Many of these medicines have acetaminophen, which is Tylenol. Read the labels to make sure that you are not taking more than the recommended dose. Too much acetaminophen (Tylenol) can be harmful. · Drink plenty of fluids. If you have kidney, heart, or liver disease and have to limit fluids, talk with your doctor before you increase the amount of fluids you drink. · Stay home from work, school, and other public places while you have a fever. When should you call for help? Call 911 anytime you think you may need emergency care. For example, call if:    · You have severe trouble breathing.     · You passed out (lost consciousness).    Call your doctor now or seek immediate medical care if:    · You seem to be getting much sicker.     · You have a new or higher fever.     · You have blood in your stools.     · You have new belly pain, or your pain gets worse.     · You have a new rash. Watch closely for changes in your health, and be sure to contact your doctor if:    · You start to get better and then get worse.     · You do not get better as expected. Where can you learn more? Go to [email or web address] org and sign in to your [employer] account. Enter D975 in the [employer] box to learn more about \"Viral Infections: Care Instructions. \"     If you do not have an account, please click on the \"Sign Up Now\" link. Current as of: September 23, 2020               Content Version: 12.9  © 6207-4858 Healthwise, [employer]. Care instructions adapted under license by [facility] ([facility]). If you have questions about a medical condition or this instruction, always ask your healthcare professional. [address] any warranty or liability for your use of this information. Expect a 7 to 14-day course of the illness before symptoms to resolve. Increase water intake and watch for signs of dehydration such as feeling light headed, reduced urine output and fatigue, shortness of breath when walking  or fevers that cannot be controlled with Tylenol or ibuprofen.   Go to the ER if you experience these symptoms

## 2021-09-19 NOTE — PROGRESS NOTES
[facility] Encounter  CHIEF COMPLAINT       Chief Complaint   Patient presents with    Headache     x3 days. Co-worker covis +    Cough       HISTORY OF PRESENT ILLNESS   Nichole Sherman is a 36 y.o. female who presents with:  Headache   This is a new problem. Episode onset: 3 days. The problem has been gradually worsening. The quality of the pain is described as aching. The pain is mild. Associated symptoms include coughing, rhinorrhea and a sore throat. Pertinent negatives include no abdominal pain, back pain, dizziness, ear pain, eye pain, fever, hearing loss, nausea, sinus pressure, vomiting or weakness. The symptoms are aggravated by coughing. She has tried NSAIDs for the symptoms. The treatment provided no relief. Cough  The problem has been gradually worsening. The cough is productive of sputum. Associated symptoms include headaches, rhinorrhea and a sore throat. Pertinent negatives include no chest pain, chills, ear pain, fever, myalgias, postnasal drip, rash, shortness of breath or wheezing. REVIEW OF SYSTEMS     Review of Systems   Constitutional: Negative for appetite change, chills, diaphoresis, fatigue and fever. HENT: Positive for rhinorrhea and sore throat. Negative for congestion, ear discharge, ear pain, facial swelling, hearing loss, mouth sores, postnasal drip, sinus pressure, sinus pain and trouble swallowing. Eyes: Negative for pain, discharge and itching. Respiratory: Positive for cough. Negative for apnea, chest tightness, shortness of breath and wheezing. Cardiovascular: Negative for chest pain. Gastrointestinal: Negative for abdominal pain, diarrhea, nausea and vomiting. Endocrine: Negative for cold intolerance and heat intolerance. Genitourinary: Negative for decreased urine volume and difficulty urinating. Musculoskeletal: Negative for arthralgias, back pain and myalgias.    Skin: Negative for color change, pallor and rash.   Neurological: Positive for headaches. Negative for dizziness, syncope, weakness and light-headedness. Hematological: Negative for adenopathy. Psychiatric/Behavioral: Negative for behavioral problems, confusion and sleep disturbance. PAST MEDICAL HISTORY         Diagnosis Date    Abnormal Pap smear of cervix     Anxiety     Asthma     Depression     DVT (deep venous thrombosis) (HCC)     Hypertension     PTSD (post-traumatic stress disorder)     Schizoaffective disorder ([facility] [address]      SURGICAL HISTORY     Patient  has a past surgical history that includes Tonsillectomy and  section. CURRENT MEDICATIONS       Previous Medications    ACETAMINOPHEN (TYLENOL) 325 MG TABLET    TAKE 2 TABLET BY MOUTH EVERY 6 HOURS AS NEEDED FOR HEADACHE    ALBUTEROL SULFATE  (90 BASE) MCG/ACT INHALER    Use 2 puffs 4 times daily for 7 days then as needed for wheezing. Dispense with Spacer and instruct in use. At patient's preference may use 60 dose MDI. May Sub Pro-Air or Proventil as needed per insurance. ALCOHOL SWABS (ALCOHOL PADS) 70 % PADS    1 each by Does not apply route three times daily    ATORVASTATIN (LIPITOR) 20 MG TABLET    TAKE 1 TABLET BY MOUTH EVERY DAY    AZITHROMYCIN (ZITHROMAX) 500 MG TABLET    Take 1 tablet by mouth daily    BLOOD GLUCOSE MONITOR KIT AND SUPPLIES    1 kit by Other route 4 times daily (before meals and nightly) Pt test 4x daily Dx E11.65. May substitute for generic or insurance covered product    BLOOD GLUCOSE MONITOR STRIPS    1 strip by Other route 4 times daily (before meals and nightly) Pt test 4x daily Dx E11.65.   May substitute for generic or insurance covered product    DICYCLOMINE (BENTYL) 10 MG CAPSULE    Take 1 capsule by mouth every 6 hours as needed (cramps)    DOXEPIN (SINEQUAN) 75 MG CAPSULE    TAKE 1 CAPSULE BY MOUTH AT BEDTIME AS NEEDED FOR SLEEP    FLOVENT DISKUS 100 MCG/BLIST AEPB INHALER        FLUOXETINE (PROZAC) 20 MG CAPSULE FLUTICASONE (FLONASE) 50 MCG/ACT NASAL SPRAY    USE 1 2 SPRAYS IN EACH NOSTRIL DAILY AS NEEDED FOR NASAL CONGESTION    HYDROXYZINE (VISTARIL) 25 MG CAPSULE    TAKE 1 CAPSULE BY MOUTH 3 TIMES A DAY AS NEEDED FOR ANXIETY    IBUPROFEN (ADVIL;MOTRIN) 600 MG TABLET    Take 1 tablet by mouth every 6 hours as needed for Pain    LANCETS (ONETOUCH DELICA PLUS ZEBJVM17G) MISC    Test 4x daily    LANCETS MISC    1 each by Does not apply route 4 times daily (before meals and nightly) Pt test 4x daily Dx E11.65. May substitute for generic or insurance covered product    LATUDA 80 MG TABS TABLET    TAKE 1 TABLET BY MOUTH EVERY EVENING WITH MEALS TAKE WITH AT LEAST 350 CALORIE MEAL    LETROZOLE ([address] [facility]) 2.5 MG TABLET    Take 1 tablet by mouth daily Day 5 through 9 of the menstrual cycle    LORATADINE (CLARITIN) 10 MG TABLET    TAKE 1 TABLET BY MOUTH EVERY DAY    MEDROXYPROGESTERONE (PROVERA) 10 MG TABLET    Take 1 tablet by mouth daily For 10 days    MELATONIN 10 MG CAPS        METFORMIN (GLUCOPHAGE-XR) 500 MG EXTENDED RELEASE TABLET    TAKE 1 TABLET BY MOUTH 2 TIMES DAILY (BEFORE MEALS)    MULTIPLE VITAMINS-MINERALS (MULTIVITAMIN ADULT PO)    Take by mouth    ONDANSETRON (ZOFRAN ODT) 4 MG DISINTEGRATING TABLET    Take 1 tablet by mouth every 8 hours as needed for Nausea    PHENAZOPYRIDINE (PYRIDIUM) 100 MG TABLET        PRENATAL VIT-FE FUMARATE-FA (PRENATAL VITAMIN) 27-1 MG TABS TABLET    take 1 tablet by mouth once daily    RISPERIDONE (RISPERDAL) 1 MG TABLET    TAKE 1 TABLET BY MOUTH EVERYDAY AT BEDTIME     ALLERGIES     Patient is is allergic to food and amoxicillin. FAMILY HISTORY     Patient'sfamily history includes Depression in her father; Diabetes in her mother. HISTORY     Patient  reports that she has been smoking cigarettes. She has a 3.00 pack-year smoking history. She has never used smokeless tobacco. She reports previous alcohol use. She reports previous drug use.   PHYSICAL EXAM     VITALS  BP: (!) 142/100, Temp: 97.8 °F (36.6 °C), Pulse: 74,  , SpO2: 98 %  Physical Exam  Constitutional:       General: She is not in acute distress. Appearance: Normal appearance. She is not ill-appearing, toxic-appearing or diaphoretic. HENT:      Head: Normocephalic. Right Ear: Tympanic membrane, ear canal and external ear normal. There is no impacted cerumen. Left Ear: Tympanic membrane, ear canal and external ear normal. There is no impacted cerumen. Nose: No congestion or rhinorrhea. Mouth/Throat:      Mouth: Mucous membranes are moist.      Pharynx: Oropharynx is clear. No oropharyngeal exudate or posterior oropharyngeal erythema. Eyes:      General:         Right eye: No discharge. Left eye: No discharge. Cardiovascular:      Rate and Rhythm: Normal rate and regular rhythm. Pulses: Normal pulses. Heart sounds: Normal heart sounds. No murmur heard. No gallop. Pulmonary:      Effort: Pulmonary effort is normal. No respiratory distress. Breath sounds: Normal breath sounds. No stridor. No wheezing, rhonchi or rales. Chest:      Chest wall: No tenderness. Abdominal:      General: Abdomen is flat. There is no distension. Palpations: Abdomen is soft. Tenderness: There is no abdominal tenderness. Musculoskeletal:         General: Normal range of motion. Cervical back: No rigidity or tenderness. Lymphadenopathy:      Cervical: No cervical adenopathy. Skin:     General: Skin is warm and dry. Capillary Refill: Capillary refill takes less than 2 seconds. Coloration: Skin is not pale. Neurological:      General: No focal deficit present. Mental Status: She is alert and oriented to person, place, and time. Mental status is at baseline.    Psychiatric:         Mood and Affect: Mood normal.         Behavior: Behavior normal.       READY CARE COURSE     Orders Placed This Encounter   Procedures    POCT COVID-19, Antigen     Order Specific Question:   Is this test for diagnosis or screening? Answer:   Diagnosis of ill patient     Order Specific Question:   Symptomatic for COVID-19 as defined by CDC? Answer:   Yes     Order Specific Question:   Date of Symptom Onset     Answer:   9/17/2021     Order Specific Question:   Hospitalized for COVID-19? Answer:   No     Order Specific Question:   Admitted to ICU for COVID-19? Answer:   No     Order Specific Question:   Employed in healthcare setting? Answer:   No     Order Specific Question:   Resident in a congregate (group) care setting? Answer:   No     Order Specific Question:   Pregnant? Answer:   No     Order Specific Question:   Previously tested for COVID-19? Answer:   Yes        Labs:  No results found for this visit on 09/19/21. IMAGING:  No orders to display     Scheduled Meds:  Continuous Infusions:  PRN Meds:. PROCEDURES:  FINAL IMPRESSION      1. Viral illness        DISPOSITION/PLAN     HISTORY OF PRESENT ILLNESS   Erick Jones is a 36 y.o. female who presents with reporting sore throat runny nose and headache for 3 days. Denies productive cough nausea abdominal pain. Pt is afebrile has nontoxic appearance and VS are stable. On physical exam ears are bilaterally normal, pharynx is pink and moist, no tonsillar enlargement. Lung sounds are clear clear rhinorrhea noted. Rapid Covid is performed and is negative. Symptoms are consistent with viral illness, patient provided education on expectations for course of illness and recommended therapies. PATIENT REFERRED TO:  Return if symptoms worsen or fail to improve, for Follow up with PCP. DISCHARGE MEDICATIONS:  New Prescriptions    No medications on file     Cannot display discharge medications since this is not an admission.        Yuly Hughes, APRN - CNP

## 2021-09-20 LAB
EKG ATRIAL RATE: 96 BPM
EKG P AXIS: 20 DEGREES
EKG P-R INTERVAL: 132 MS
EKG Q-T INTERVAL: 382 MS
EKG QRS DURATION: 80 MS
EKG QTC CALCULATION (BAZETT): 482 MS
EKG R AXIS: 43 DEGREES
EKG T AXIS: 38 DEGREES
EKG VENTRICULAR RATE: 96 BPM

## 2021-09-20 PROCEDURE — 93010 ELECTROCARDIOGRAM REPORT: CPT | Performed by: INTERNAL MEDICINE

## 2021-09-24 RX ORDER — ACETAMINOPHEN AND CODEINE PHOSPHATE 120; 12 MG/5ML; MG/5ML
1 SOLUTION ORAL DAILY
Qty: 90 TABLET | Refills: 3 | Status: SHIPPED | OUTPATIENT
Start: 2021-09-24 | End: 2021-10-13

## 2021-10-13 ENCOUNTER — HOSPITAL ENCOUNTER (INPATIENT)
Age: 31
LOS: 5 days | Discharge: HOME | DRG: 750 | End: 2021-10-18
Attending: PSYCHIATRY & NEUROLOGY | Admitting: PSYCHIATRY & NEUROLOGY
Payer: COMMERCIAL

## 2021-10-13 DIAGNOSIS — F25.0 SCHIZOAFFECTIVE DISORDER, BIPOLAR TYPE (HCC): Primary | ICD-10-CM

## 2021-10-13 LAB
ACETAMINOPHEN LEVEL: <5 UG/ML (ref 10–30)
ALBUMIN SERPL-MCNC: 4 G/DL (ref 3.5–4.6)
ALP BLD-CCNC: 89 U/L (ref 40–130)
ALT SERPL-CCNC: 47 U/L (ref 0–33)
AMPHETAMINE SCREEN, URINE: NORMAL
ANION GAP SERPL CALCULATED.3IONS-SCNC: 11 MEQ/L (ref 9–15)
AST SERPL-CCNC: 24 U/L (ref 0–35)
BARBITURATE SCREEN URINE: NORMAL
BASOPHILS ABSOLUTE: 0.1 K/UL (ref 0–0.2)
BASOPHILS RELATIVE PERCENT: 0.8 %
BENZODIAZEPINE SCREEN, URINE: NORMAL
BILIRUB SERPL-MCNC: 0.3 MG/DL (ref 0.2–0.7)
BILIRUBIN URINE: NEGATIVE
BLOOD, URINE: NEGATIVE
BUN BLDV-MCNC: 9 MG/DL (ref 6–20)
CALCIUM SERPL-MCNC: 9.1 MG/DL (ref 8.5–9.9)
CANNABINOID SCREEN URINE: NORMAL
CHLORIDE BLD-SCNC: 101 MEQ/L (ref 95–107)
CHOLESTEROL, TOTAL: 208 MG/DL (ref 0–199)
CLARITY: ABNORMAL
CO2: 23 MEQ/L (ref 20–31)
COCAINE METABOLITE SCREEN URINE: NORMAL
COLOR: YELLOW
CREAT SERPL-MCNC: 0.54 MG/DL (ref 0.5–0.9)
EOSINOPHILS ABSOLUTE: 0.2 K/UL (ref 0–0.7)
EOSINOPHILS RELATIVE PERCENT: 1.9 %
ETHANOL PERCENT: NORMAL G/DL
ETHANOL: <10 MG/DL (ref 0–0.08)
GFR AFRICAN AMERICAN: >60
GFR NON-AFRICAN AMERICAN: >60
GLOBULIN: 2.5 G/DL (ref 2.3–3.5)
GLUCOSE BLD-MCNC: 83 MG/DL (ref 70–99)
GLUCOSE URINE: NEGATIVE MG/DL
HCG(URINE) PREGNANCY TEST: NEGATIVE
HCT VFR BLD CALC: 39.6 % (ref 37–47)
HDLC SERPL-MCNC: 42 MG/DL (ref 40–59)
HEMOGLOBIN: 13.8 G/DL (ref 12–16)
KETONES, URINE: NEGATIVE MG/DL
LDL CHOLESTEROL CALCULATED: 118 MG/DL (ref 0–129)
LEUKOCYTE ESTERASE, URINE: NEGATIVE
LYMPHOCYTES ABSOLUTE: 3.6 K/UL (ref 1–4.8)
LYMPHOCYTES RELATIVE PERCENT: 35.8 %
Lab: NORMAL
MAGNESIUM: 2 MG/DL (ref 1.7–2.4)
MCH RBC QN AUTO: 29.2 PG (ref 27–31.3)
MCHC RBC AUTO-ENTMCNC: 34.7 % (ref 33–37)
MCV RBC AUTO: 84.1 FL (ref 82–100)
METHADONE SCREEN, URINE: NORMAL
MONOCYTES ABSOLUTE: 0.7 K/UL (ref 0.2–0.8)
MONOCYTES RELATIVE PERCENT: 7.1 %
NEUTROPHILS ABSOLUTE: 5.5 K/UL (ref 1.4–6.5)
NEUTROPHILS RELATIVE PERCENT: 54.4 %
NITRITE, URINE: NEGATIVE
OPIATE SCREEN URINE: NORMAL
OXYCODONE URINE: NORMAL
PDW BLD-RTO: 13.8 % (ref 11.5–14.5)
PH UA: 5 (ref 5–9)
PHENCYCLIDINE SCREEN URINE: NORMAL
PLATELET # BLD: 282 K/UL (ref 130–400)
POTASSIUM SERPL-SCNC: 3.8 MEQ/L (ref 3.4–4.9)
PROPOXYPHENE SCREEN: NORMAL
PROTEIN UA: ABNORMAL MG/DL
RBC # BLD: 4.71 M/UL (ref 4.2–5.4)
SALICYLATE, SERUM: <0.3 MG/DL (ref 15–30)
SARS-COV-2, NAAT: NOT DETECTED
SODIUM BLD-SCNC: 135 MEQ/L (ref 135–144)
SPECIFIC GRAVITY UA: 1.02 (ref 1–1.03)
TOTAL CK: 47 U/L (ref 0–170)
TOTAL PROTEIN: 6.5 G/DL (ref 6.3–8)
TRIGL SERPL-MCNC: 241 MG/DL (ref 0–150)
TSH SERPL DL<=0.05 MIU/L-ACNC: 1.07 UIU/ML (ref 0.44–3.86)
URINE REFLEX TO CULTURE: ABNORMAL
UROBILINOGEN, URINE: 0.2 E.U./DL
WBC # BLD: 10.1 K/UL (ref 4.8–10.8)

## 2021-10-13 PROCEDURE — 80179 DRUG ASSAY SALICYLATE: CPT

## 2021-10-13 PROCEDURE — 84443 ASSAY THYROID STIM HORMONE: CPT

## 2021-10-13 PROCEDURE — 80061 LIPID PANEL: CPT

## 2021-10-13 PROCEDURE — 85025 COMPLETE CBC W/AUTO DIFF WBC: CPT

## 2021-10-13 PROCEDURE — 6370000000 HC RX 637 (ALT 250 FOR IP): Performed by: PSYCHIATRY & NEUROLOGY

## 2021-10-13 PROCEDURE — 84703 CHORIONIC GONADOTROPIN ASSAY: CPT

## 2021-10-13 PROCEDURE — 87635 SARS-COV-2 COVID-19 AMP PRB: CPT

## 2021-10-13 PROCEDURE — 6370000000 HC RX 637 (ALT 250 FOR IP): Performed by: PHYSICIAN ASSISTANT

## 2021-10-13 PROCEDURE — 1240000000 HC EMOTIONAL WELLNESS R&B

## 2021-10-13 PROCEDURE — 80307 DRUG TEST PRSMV CHEM ANLYZR: CPT

## 2021-10-13 PROCEDURE — 36415 COLL VENOUS BLD VENIPUNCTURE: CPT

## 2021-10-13 PROCEDURE — 80053 COMPREHEN METABOLIC PANEL: CPT

## 2021-10-13 PROCEDURE — 82550 ASSAY OF CK (CPK): CPT

## 2021-10-13 PROCEDURE — 93005 ELECTROCARDIOGRAM TRACING: CPT | Performed by: PSYCHIATRY & NEUROLOGY

## 2021-10-13 PROCEDURE — 81003 URINALYSIS AUTO W/O SCOPE: CPT

## 2021-10-13 PROCEDURE — 82077 ASSAY SPEC XCP UR&BREATH IA: CPT

## 2021-10-13 PROCEDURE — 99285 EMERGENCY DEPT VISIT HI MDM: CPT

## 2021-10-13 PROCEDURE — 80143 DRUG ASSAY ACETAMINOPHEN: CPT

## 2021-10-13 PROCEDURE — 83735 ASSAY OF MAGNESIUM: CPT

## 2021-10-13 RX ORDER — ACETAMINOPHEN 325 MG/1
650 TABLET ORAL EVERY 4 HOURS PRN
Status: DISCONTINUED | OUTPATIENT
Start: 2021-10-13 | End: 2021-10-18 | Stop reason: HOSPADM

## 2021-10-13 RX ORDER — NICOTINE 21 MG/24HR
1 PATCH, TRANSDERMAL 24 HOURS TRANSDERMAL ONCE
Status: DISCONTINUED | OUTPATIENT
Start: 2021-10-13 | End: 2021-10-14

## 2021-10-13 RX ORDER — HYDROXYZINE PAMOATE 50 MG/1
50 CAPSULE ORAL ONCE
Status: COMPLETED | OUTPATIENT
Start: 2021-10-13 | End: 2021-10-13

## 2021-10-13 RX ORDER — LANOLIN ALCOHOL/MO/W.PET/CERES
6 CREAM (GRAM) TOPICAL NIGHTLY PRN
Status: DISCONTINUED | OUTPATIENT
Start: 2021-10-13 | End: 2021-10-18 | Stop reason: HOSPADM

## 2021-10-13 RX ORDER — MAGNESIUM HYDROXIDE/ALUMINUM HYDROXICE/SIMETHICONE 120; 1200; 1200 MG/30ML; MG/30ML; MG/30ML
30 SUSPENSION ORAL PRN
Status: DISCONTINUED | OUTPATIENT
Start: 2021-10-13 | End: 2021-10-18 | Stop reason: HOSPADM

## 2021-10-13 RX ORDER — ACETAMINOPHEN 500 MG
1000 TABLET ORAL ONCE
Status: COMPLETED | OUTPATIENT
Start: 2021-10-13 | End: 2021-10-13

## 2021-10-13 RX ORDER — BENZTROPINE MESYLATE 1 MG/ML
2 INJECTION INTRAMUSCULAR; INTRAVENOUS 2 TIMES DAILY PRN
Status: DISCONTINUED | OUTPATIENT
Start: 2021-10-13 | End: 2021-10-18 | Stop reason: HOSPADM

## 2021-10-13 RX ADMIN — Medication 6 MG: at 22:05

## 2021-10-13 RX ADMIN — HYDROXYZINE PAMOATE 50 MG: 50 CAPSULE ORAL at 16:21

## 2021-10-13 RX ADMIN — ACETAMINOPHEN 1000 MG: 500 TABLET ORAL at 16:20

## 2021-10-13 ASSESSMENT — SLEEP AND FATIGUE QUESTIONNAIRES
DIFFICULTY STAYING ASLEEP: YES
DO YOU HAVE DIFFICULTY SLEEPING: YES
SLEEP PATTERN: DIFFICULTY ARISING
DIFFICULTY FALLING ASLEEP: YES
RESTFUL SLEEP: NO
DIFFICULTY ARISING: YES
AVERAGE NUMBER OF SLEEP HOURS: 8
DO YOU USE A SLEEP AID: YES

## 2021-10-13 ASSESSMENT — ENCOUNTER SYMPTOMS
COUGH: 0
APNEA: 0
VOICE CHANGE: 0
VOMITING: 0
SHORTNESS OF BREATH: 0
PHOTOPHOBIA: 0
EYE DISCHARGE: 0
NAUSEA: 0
ANAL BLEEDING: 0
ABDOMINAL DISTENTION: 0

## 2021-10-13 ASSESSMENT — PATIENT HEALTH QUESTIONNAIRE - PHQ9: SUM OF ALL RESPONSES TO PHQ QUESTIONS 1-9: 26

## 2021-10-13 ASSESSMENT — PAIN SCALES - GENERAL: PAINLEVEL_OUTOF10: 5
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
## 2021-10-13 NOTE — ED NOTES
This writer reviewed orienting RNS assessment. [facility]-checked for completeness and accuracy) By Misael Birmingham  10/13/21 1919

## 2021-10-14 LAB
EKG ATRIAL RATE: 97 BPM
EKG P AXIS: 4 DEGREES
EKG P-R INTERVAL: 154 MS
EKG Q-T INTERVAL: 378 MS
EKG QRS DURATION: 80 MS
EKG QTC CALCULATION (BAZETT): 480 MS
EKG R AXIS: 146 DEGREES
EKG T AXIS: 116 DEGREES
EKG VENTRICULAR RATE: 97 BPM
GLUCOSE BLD-MCNC: 136 MG/DL (ref 60–115)
PERFORMED ON: ABNORMAL

## 2021-10-14 PROCEDURE — 99223 1ST HOSP IP/OBS HIGH 75: CPT | Performed by: PSYCHIATRY & NEUROLOGY

## 2021-10-14 PROCEDURE — 93005 ELECTROCARDIOGRAM TRACING: CPT | Performed by: PSYCHIATRY & NEUROLOGY

## 2021-10-14 PROCEDURE — 6370000000 HC RX 637 (ALT 250 FOR IP): Performed by: PSYCHIATRY & NEUROLOGY

## 2021-10-14 PROCEDURE — 6370000000 HC RX 637 (ALT 250 FOR IP): Performed by: NURSE PRACTITIONER

## 2021-10-14 PROCEDURE — 93010 ELECTROCARDIOGRAM REPORT: CPT | Performed by: INTERNAL MEDICINE

## 2021-10-14 PROCEDURE — 1240000000 HC EMOTIONAL WELLNESS R&B

## 2021-10-14 PROCEDURE — 94640 AIRWAY INHALATION TREATMENT: CPT

## 2021-10-14 PROCEDURE — 94664 DEMO&/EVAL PT USE INHALER: CPT

## 2021-10-14 RX ORDER — OXCARBAZEPINE 150 MG/1
150 TABLET, FILM COATED ORAL 2 TIMES DAILY
Status: DISCONTINUED | OUTPATIENT
Start: 2021-10-14 | End: 2021-10-18 | Stop reason: HOSPADM

## 2021-10-14 RX ORDER — ALBUTEROL SULFATE 90 UG/1
2 AEROSOL, METERED RESPIRATORY (INHALATION) EVERY 4 HOURS PRN
Status: DISCONTINUED | OUTPATIENT
Start: 2021-10-14 | End: 2021-10-18 | Stop reason: HOSPADM

## 2021-10-14 RX ORDER — ALBUTEROL SULFATE 90 UG/1
2 AEROSOL, METERED RESPIRATORY (INHALATION) EVERY 6 HOURS PRN
Status: DISCONTINUED | OUTPATIENT
Start: 2021-10-14 | End: 2021-10-14

## 2021-10-14 RX ORDER — METFORMIN HYDROCHLORIDE 500 MG/1
500 TABLET, EXTENDED RELEASE ORAL 2 TIMES DAILY WITH MEALS
Status: DISCONTINUED | OUTPATIENT
Start: 2021-10-14 | End: 2021-10-18 | Stop reason: HOSPADM

## 2021-10-14 RX ORDER — FLUTICASONE PROPIONATE 110 UG/1
2 AEROSOL, METERED RESPIRATORY (INHALATION) 2 TIMES DAILY
Status: DISCONTINUED | OUTPATIENT
Start: 2021-10-14 | End: 2021-10-18 | Stop reason: HOSPADM

## 2021-10-14 RX ORDER — LORAZEPAM 0.5 MG/1
0.5 TABLET ORAL
Status: COMPLETED | OUTPATIENT
Start: 2021-10-14 | End: 2021-10-14

## 2021-10-14 RX ORDER — NICOTINE 21 MG/24HR
1 PATCH, TRANSDERMAL 24 HOURS TRANSDERMAL DAILY
Status: DISCONTINUED | OUTPATIENT
Start: 2021-10-14 | End: 2021-10-18 | Stop reason: HOSPADM

## 2021-10-14 RX ADMIN — METFORMIN HYDROCHLORIDE 500 MG: 500 TABLET, EXTENDED RELEASE ORAL at 12:17

## 2021-10-14 RX ADMIN — METFORMIN HYDROCHLORIDE 500 MG: 500 TABLET, EXTENDED RELEASE ORAL at 17:24

## 2021-10-14 RX ADMIN — LORAZEPAM 0.5 MG: 0.5 TABLET ORAL at 20:20

## 2021-10-14 RX ADMIN — LURASIDONE HYDROCHLORIDE 20 MG: 20 TABLET, FILM COATED ORAL at 09:51

## 2021-10-14 RX ADMIN — METFORMIN HYDROCHLORIDE 500 MG: 500 TABLET, EXTENDED RELEASE ORAL at 12:16

## 2021-10-14 RX ADMIN — ACETAMINOPHEN 650 MG: 325 TABLET ORAL at 16:20

## 2021-10-14 RX ADMIN — Medication 6 MG: at 20:49

## 2021-10-14 RX ADMIN — OXCARBAZEPINE 150 MG: 150 TABLET, FILM COATED ORAL at 12:17

## 2021-10-14 RX ADMIN — FLUTICASONE PROPIONATE 2 PUFF: 110 AEROSOL, METERED RESPIRATORY (INHALATION) at 11:30

## 2021-10-14 RX ADMIN — OXCARBAZEPINE 150 MG: 150 TABLET, FILM COATED ORAL at 20:20

## 2021-10-14 ASSESSMENT — SLEEP AND FATIGUE QUESTIONNAIRES
DO YOU HAVE DIFFICULTY SLEEPING: NO
DIFFICULTY STAYING ASLEEP: NO
SLEEP PATTERN: NORMAL
RESTFUL SLEEP: NO
DIFFICULTY ARISING: NO
DO YOU USE A SLEEP AID: NO
AVERAGE NUMBER OF SLEEP HOURS: 7
DIFFICULTY FALLING ASLEEP: NO

## 2021-10-14 ASSESSMENT — PAIN SCALES - GENERAL: PAINLEVEL_OUTOF10: 5
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
## 2021-10-14 NOTE — PROGRESS NOTES
Patient did not attend group despite staff encouragement.   Electronically signed by Jaime Christianson on 10/13/2021 at 9:24 PM
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
## 2021-10-14 NOTE — PROGRESS NOTES
Patient arrived to unit via wheelchair. Pt ambulated to shower room with steady gait. Patient's skin assessment and contraband check complete by this nurse and Kodak LAMBERT RN. Pt has multiple small red bumps on bilateral lower extremities that she says are from flea bites. No s/s of infection seen. No contraband found. Pt showered upon arriving to unit d/t being malodorous. Pt's belongings in black bag d/t fleas.  Electronically signed by Ryan Santana RN on 10/13/21 at 9:00 PM EDT

## 2021-10-14 NOTE — PROGRESS NOTES
Behavioral Services  Medicare Certification Upon Admission    I certify that this patient's inpatient psychiatric hospital admission is medically necessary for:    [x] (1) Treatment which could reasonably be expected to improve this patient's condition,       [x] (2) Or for diagnostic study;     AND     [x](2) The inpatient psychiatric services are provided while the individual is under the care of a physician and are included in the individualized plan of care.     Estimated length of stay/service 3-5 days    Plan for post-hospital care OP care    Electronically signed by Alise Loyola MD on 10/14/2021 at 11:07 AM

## 2021-10-14 NOTE — CONSULTS
Spiritual Care Services     Summary of Visit:   visited patient for a consult related to advanced directives.  explained the purpose of advanced directives and placed a copy in patient's blue chart for patient to take upon discharge.  encouraged patient to contact spiritual care if she had further questions or wanted to complete the forms while at the hospital.     Spiritual Assessment/Intervention/Outcomes:    Encounter Summary  Services provided to[de-identified] Patient  Referral/Consult From[de-identified] Patient  Support System: Parent  Continue Visiting: No  Complexity of Encounter: Moderate  Length of Encounter: 15 minutes  Advance Care Planning: Yes  Routine  Type: Initial              Primary Decision Maker (Healthcare Proxy)  [address] [facility] is[de-identified] Legal Next of [address] [place] [address],[address]:        [address] [facility]   Electronically signed by Kacy Montano on 10/14/21 at 11:06 AM EDT     To reach a  for emotional and spiritual support, place an [facility] consult request.   If a  is needed immediately, dial 0 and ask to page the on-call .
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
## 2021-10-14 NOTE — PROGRESS NOTES
Patient accepted PRN tylenol for complaint of headache rated 3/10.  Electronically signed by Dionte Garza RN on 10/14/21 at 4:21 PM EDT
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
## 2021-10-14 NOTE — PROGRESS NOTES
Pt reports she is starting to feel better, she is trying to re-center herself. Pt given the choice of music for the sensory room, pt took coloring pages with her, pt verbalized she was very stressed out r/t not being able to wear her own clothes. Pt reports she has fleas at home and is allergic to fleas. Pt denies SI at this time unsure if they will return soon. Pt concerned she does not have support at home. Pt denies HI,AVH. Pt is social with staff and peers.

## 2021-10-14 NOTE — PROGRESS NOTES
Patient did not attend group despite staff encouragement.   Electronically signed by Opajl Couch on 10/14/2021 at 9:41 AM

## 2021-10-14 NOTE — PROGRESS NOTES
[facility] EMERGENCY [facility] AT [facility] Respiratory Therapy Evaluation   Current Order:  Albuterol  q6 prn   Home Regimen:     prn  Ordering Physician: chilo  Re-evaluation Date:  10/17 Diagnosis: mental health problem     Patient Status: Stable / Unstable + Physician notified    The following MDI Criteria must be met in order to convert aerosol to MDI with spacer. If unable to meet, MDI will be converted to aerosol:  []  Patient able to demonstrate the ability to use MDI effectively  []  Patient alert and cooperative  []  Patient able to take deep breath with 5-10 second hold  []  Medication(s) available in this delivery method   []  Peak flow greater than or equal to 200 ml/min            Current Order Substituted To  (same drug, same frequency)   Aerosol to MDI [] Albuterol Sulfate 0.083% unit dose by aerosol Albuterol Sulfate MDI 2 puffs by inhalation with spacer    [] Levalbuterol 1.25 mg unit dose by aerosol Levalbuterol MDI 2 puffs by inhalation with spacer    [] Levalbuterol 0.63 mg unit dose by aerosol Levalbuterol MDI 2 puffs by inhalation with spacer    [] Ipratropium Bromide 0.02% unit dose by aerosol Ipratropium Bromide MDI 2 puffs by inhalation with spacer    [] Duoneb (Ipratropium + Albuterol) unit dose by aerosol Ipratropium MDI + Albuterol MDI 2 puffs by inhalation w/spacer   MDI to Aerosol [] Albuterol Sulfate MDI Albuterol Sulfate 0.083% unit dose by aerosol    [] Levalbuterol MDI 2 puffs by inhalation Levalbuterol 1.25 mg unit dose by aerosol    [] Ipratropium Bromide MDI by inhalation Ipratropium Bromide 0.02% unit dose by aerosol    [] Combivent (Ipratropium + Albuterol) MDI by inhalation Duoneb (Ipratropium + Albuterol) unit dose by aerosol       Treatment Assessment [Frequency/Schedule]:  Change frequency to: __________________________) Kamar Olsen Q4prn________________________per Protocol, P&T, MEC      Points 0 1 2 3 4   Pulmonary Status  Non-Smoker  []   Smoking history   < 20 pack years  [x]   Smoking history  ?  21 pack years  []   Pulmonary Disorder  (acute or chronic)  []   Severe or Chronic w/ Exacerbation  []     Surgical Status No [x]   Surgeries     General []   Surgery Lower []   Abdominal Thoracic or []   Upper Abdominal Thoracic with  PulmonaryDisorder  []     Chest X-ray Clear/Not  Ordered     [x]  Chronic Changes  Results Pending  []  Infiltrates, atelectasis, pleural effusion, or edema  []  Infiltrates in more than one lobe []  Infiltrate + Atelectasis, &/or pleural effusion  []    Respiratory Pattern Regular,  RR = 12-20 [x]  Increased,  RR = 21-25 []  RAMIRES, irregular,  or RR = 26-30 []  Decreased FEV1  or RR = 31-35 []  Severe SOB, use  of accessory muscles, or RR ? 35  []    Mental Status Alert, oriented,  Cooperative [x]  Confused but Follows commands []  Lethargic or unable to follow commands []  Obtunded  []  Comatose  []    Breath Sounds Clear to  auscultation  [x]  Decreased unilaterally or  in bases only []  Decreased  bilaterally  []  Crackles or intermittent wheezes []  Wheezes []    Cough Strong, Spontan., & nonproductive [x]  Strong,  spontaneous, &  productive []  Weak,  Nonproductive []  Weak, productive or  with wheezes []  No spontaneous  cough or may require suctioning []    Level of Activity Ambulatory [x]  Ambulatory w/ Assist  []  Non-ambulatory []  Paraplegic []  Quadriplegic []    Total    Score:____1___     Triage Score:___5_____      Tri       Triage:     1. (>20) Freq: Q3    2. (16-20) Freq: Q4   3. (11-15) Freq: QID & Albuterol Q2 PRN    4. (6-10) Freq: TID & Albuterol Q2 PRN    5. (0-5) Freq Q4prn
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
## 2021-10-14 NOTE — CARE COORDINATION
Brief Intervention and Referral to Treatment Summary    Patient was provided PHQ-9, AUDIT and DAST Screening:      PHQ-9 Score: 26  AUDIT Score:   0  DAST Score:    0    Patients substance use is considered     Low Risk/Healthy x  Moderate Risk  Harmful  Dependent    Patients depression is considered:     Minimal  Mild   Moderate  Moderately Severe   Severe X    Brief Education Was Provided N/A    Patient was receptive  Patient was not receptive      Brief Intervention Is Provided (Only for AUDIT or DAST) N/A    Patient reports readiness to decrease and/or stop use and a plan was discussed   Patient denies readiness to decrease and/or stop use and a plan was not discussed      Recommendations/Referrals for Brief and/or Specialized Treatment Provided to Patient   Patient denied drug and alcohol use. As a result, no recommendations or referrals were provided to the patient at this time.

## 2021-10-14 NOTE — PROGRESS NOTES
Pt is noted up on the unit with periods of retreating to her room, pt reports her depression is #5, anxiety #3, denied any voices or suicidal thoughts, pt reports poor sleep hard to get to sleep and stay asleep. Pt reports she attends some groups.

## 2021-10-14 NOTE — PROGRESS NOTES
Patient did not attend group despite staff encouragement.   Electronically signed by [employer] on 10/14/2021 at 12:02 PM

## 2021-10-14 NOTE — ED NOTES
Patient reviewed with Dr. Abbey Meza. Discussed events leading to admit, current assessment, labs and EKG. Orders received to admit to 3W.       David Veronica RN  10/13/21 2006

## 2021-10-14 NOTE — GROUP NOTE
Group Therapy Note    Date: 10/14/2021    Group Start Time: 3260  Group End Time: 1700  Group Topic: Recreational    [facility]    Brittany Gray RN        Group Therapy Note    Attendees: 8/13         Patient's Goal:  To attend recreational group    Notes:  Pt participated    Status After Intervention:  Improved    Participation Level:  Active Listener and Interactive    Participation Quality: Appropriate, Attentive, Sharing and Supportive      Speech:  normal      Thought Process/Content: Logical  Linear      Affective Functioning: Congruent      Mood: elevated      Level of consciousness:  Alert, Oriented x4 and Attentive      Response to Learning: Progressing to goal      Endings: None Reported    Modes of Intervention: Socialization and Activity      Discipline Responsible: Registered Nurse      Signature:  Brittany Gray RN

## 2021-10-15 LAB
EKG ATRIAL RATE: 94 BPM
EKG P AXIS: 12 DEGREES
EKG P-R INTERVAL: 160 MS
EKG Q-T INTERVAL: 386 MS
EKG QRS DURATION: 76 MS
EKG QTC CALCULATION (BAZETT): 482 MS
EKG R AXIS: 54 DEGREES
EKG T AXIS: 34 DEGREES
EKG VENTRICULAR RATE: 94 BPM
GLUCOSE BLD-MCNC: 140 MG/DL (ref 60–115)
PERFORMED ON: ABNORMAL

## 2021-10-15 PROCEDURE — 6370000000 HC RX 637 (ALT 250 FOR IP): Performed by: PSYCHIATRY & NEUROLOGY

## 2021-10-15 PROCEDURE — 94640 AIRWAY INHALATION TREATMENT: CPT

## 2021-10-15 PROCEDURE — 99232 SBSQ HOSP IP/OBS MODERATE 35: CPT | Performed by: PSYCHIATRY & NEUROLOGY

## 2021-10-15 PROCEDURE — 94761 N-INVAS EAR/PLS OXIMETRY MLT: CPT

## 2021-10-15 PROCEDURE — 1240000000 HC EMOTIONAL WELLNESS R&B

## 2021-10-15 PROCEDURE — 93010 ELECTROCARDIOGRAM REPORT: CPT | Performed by: INTERNAL MEDICINE

## 2021-10-15 PROCEDURE — 6370000000 HC RX 637 (ALT 250 FOR IP): Performed by: NURSE PRACTITIONER

## 2021-10-15 RX ORDER — LISINOPRIL 10 MG/1
10 TABLET ORAL DAILY
Status: DISCONTINUED | OUTPATIENT
Start: 2021-10-15 | End: 2021-10-18 | Stop reason: HOSPADM

## 2021-10-15 RX ORDER — PRAZOSIN HYDROCHLORIDE 1 MG/1
1 CAPSULE ORAL NIGHTLY
Status: DISCONTINUED | OUTPATIENT
Start: 2021-10-15 | End: 2021-10-18 | Stop reason: HOSPADM

## 2021-10-15 RX ADMIN — FLUTICASONE PROPIONATE 2 PUFF: 110 AEROSOL, METERED RESPIRATORY (INHALATION) at 21:01

## 2021-10-15 RX ADMIN — LURASIDONE HYDROCHLORIDE 20 MG: 20 TABLET, FILM COATED ORAL at 08:27

## 2021-10-15 RX ADMIN — LISINOPRIL 10 MG: 10 TABLET ORAL at 19:05

## 2021-10-15 RX ADMIN — OXCARBAZEPINE 150 MG: 150 TABLET, FILM COATED ORAL at 08:27

## 2021-10-15 RX ADMIN — FLUTICASONE PROPIONATE 2 PUFF: 110 AEROSOL, METERED RESPIRATORY (INHALATION) at 08:22

## 2021-10-15 RX ADMIN — PRAZOSIN HYDROCHLORIDE 1 MG: 1 CAPSULE ORAL at 21:24

## 2021-10-15 RX ADMIN — METFORMIN HYDROCHLORIDE 500 MG: 500 TABLET, EXTENDED RELEASE ORAL at 17:05

## 2021-10-15 RX ADMIN — METFORMIN HYDROCHLORIDE 500 MG: 500 TABLET, EXTENDED RELEASE ORAL at 08:28

## 2021-10-15 RX ADMIN — OXCARBAZEPINE 150 MG: 150 TABLET, FILM COATED ORAL at 21:24

## 2021-10-15 ASSESSMENT — SLEEP AND FATIGUE QUESTIONNAIRES
DO YOU USE A SLEEP AID: NO
AVERAGE NUMBER OF SLEEP HOURS: 7
SLEEP PATTERN: NORMAL
DO YOU HAVE DIFFICULTY SLEEPING: NO

## 2021-10-15 NOTE — CARE COORDINATION
Group Therapy Note    Date: 10/15/2021  Start Time:1100  End Time:  1155  Number of Participants: 6    Type of Group: Psychotherapy    Wellness Binder Information  Module Name:    Session Number:      Patient's Goal:  Coping with fear    Notes:  Pt engaged with peer and group    Status After Intervention:  Improved    Participation Level:  Active Listener and Interactive    Participation Quality: Appropriate, Attentive and Sharing      Speech:  normal      Thought Process/Content: Logical      Affective Functioning: Congruent      Mood: anxious      Level of consciousness:  Oriented x4      Response to Learning: Able to verbalize current knowledge/experience and Able to verbalize/acknowledge new learning      Endings: None Reported    Modes of Intervention: Education and Support      Discipline Responsible: /Counselor      Signature:  Ara Downing

## 2021-10-15 NOTE — PROGRESS NOTES
Pt. declined to attend the 0900 community meeting, despite staff encouragement. Electronically signed by Nora Orozco, [address] on 10/15/2021 at 11:29 AM

## 2021-10-15 NOTE — PROGRESS NOTES
Patient voiced anxiety, rate 5/10, with 10 being the worst, medicated with 1 time order for Ativan. Will continue to monitor.

## 2021-10-15 NOTE — PROGRESS NOTES
EKG completed and copy placed in paper chart. Unable to transmit to Epic from EKG machine at this time. Dr. Marcia Arias notified. New order received for 1 x dose of lorazepam 0.5 mg for c/o anxiety. Pt informed and is accepting of this.

## 2021-10-15 NOTE — GROUP NOTE
Group Therapy Note    Date: 10/15/2021    Group Start Time: 1630  Group End Time: 1700  Group Topic: Healthy Living/Wellness    [facility]    [address] [place]        Group Therapy Note    Attendees: 9         Patient's Goal:  To learn about self esteem and ways to have a positive self esteem     Notes:  Pt participated in group      Status After Intervention:  Improved    Participation Level:  Active Listener and Interactive    Participation Quality: Appropriate, Attentive and Sharing      Speech:  normal      Thought Process/Content: Logical      Affective Functioning: Congruent      Mood: euthymic      Level of consciousness:  Alert and Oriented x4      Response to Learning: Able to verbalize current knowledge/experience      Endings: None Reported    Modes of Intervention: Activity      Discipline Responsible: Behavorial Health Tech      Signature:  Kaylynn Arriola

## 2021-10-15 NOTE — PROGRESS NOTES
[facility]. FOLLOW-UP NOTE       10/15/2021     Patient was seen and examined in person, Chart reviewed   Patient's case discussed with staff/team    Chief Complaint: Depression    Interim History:     No change in her symptoms since yesterday  Feel depressed  Anxious and restless  Decrease  Motivation  Passive SI  Has been attending groups  Appetite:   [] Normal/Unchanged  [] Increased  [x] Decreased      Sleep:       [] Normal/Unchanged  [x] Fair       [] Poor              Energy:    [] Normal/Unchanged  [] Increased  [x] Decreased        SI [x] Present  [] Absent    HI  []Present  [] Absent     Aggression:  [] yes  [] no    Patient is [] able  [x] unable to CONTRACT FOR SAFETY     PAST MEDICAL/PSYCHIATRIC HISTORY:   Past Medical History:   Diagnosis Date    Abnormal Pap smear of cervix     Anxiety     Asthma     Depression     DVT (deep venous thrombosis) (HCC)     Hypertension     Pre-diabetes     PTSD (post-traumatic stress disorder)     Schizoaffective disorder (HCC)        FAMILY/SOCIAL HISTORY:  Family History   Problem Relation Age of Onset    Diabetes Mother     Depression Father     Asthma Brother     Diabetes Maternal Grandmother     Breast Cancer Maternal Grandmother     Thyroid Cancer Maternal Grandfather     Diabetes Paternal Grandmother     Other Paternal Grandfather         testicular cancer     Social History     Socioeconomic History    Marital status:      Spouse name: Not on file    Number of children: Not on file    Years of education: Not on file    Highest education level: Not on file   Occupational History    Not on file   Tobacco Use    Smoking status: Current Every Day Smoker     Packs/day: 1.00     Years: 12.00     Pack years: 12.00     Types: Cigarettes    Smokeless tobacco: Never Used    Tobacco comment: 1 ppd   Vaping Use    Vaping Use: Never used   Substance and Sexual Activity    Alcohol use: Not Currently Comment: \"once in a while\"    Drug use: Yes     Types: Marijuana     Comment: \"cut down since I'm trying to get pregnant\"    Sexual activity: Yes     Partners: Male   Other Topics Concern    Not on file   Social History Narrative    Not on file     Social Determinants of Health     Financial Resource Strain:     Difficulty of Paying Living Expenses:    Food Insecurity:     Worried About Running Out of Food in the Last Year:     [address] in the Last Year:    Transportation Needs:     Lack of Transportation (Medical):  Lack of Transportation (Non-Medical):    Physical Activity:     Days of Exercise per Week:     Minutes of Exercise per Session:    Stress:     Feeling of Stress :    Social Connections:     Frequency of Communication with Friends and Family:     Frequency of Social Gatherings with Friends and Family:     Attends Anabaptist Services:     Active Member of Clubs or Organizations:     Attends Club or Organization Meetings:     Marital Status:    Intimate Partner Violence:     Fear of Current or Ex-Partner:     Emotionally Abused:     Physically Abused:     Sexually Abused:            ROS:  [x] All negative/unchanged except if checked.  Explain positive(checked items) below:  [] Constitutional  [] Eyes  [] Ear/Nose/Mouth/Throat  [] Respiratory  [] CV  [] GI  []   [] Musculoskeletal  [] Skin/Breast  [] Neurological  [] Endocrine  [] Heme/Lymph  [] Allergic/Immunologic    Explanation:     MEDICATIONS:    Current Facility-Administered Medications:     prazosin (MINIPRESS) capsule 1 mg, 1 mg, Oral, Nightly, Steve Zavala MD    metFORMIN (GLUCOPHAGE-XR) extended release tablet 500 mg, 500 mg, Oral, BID WC, ALEXANDR Wyatt - CNP, 500 mg at 10/15/21 0828    fluticasone (FLOVENT HFA) 110 MCG/ACT inhaler 2 puff, 2 puff, Inhalation, BID, ALEXANDR Wyatt - CNP, 2 puff at 10/15/21 0822    nicotine (NICODERM CQ) 21 MG/24HR 1 patch, 1 patch, TransDERmal, Daily, Aly Correia Abbey Mckeon MD, 1 patch at 10/15/21 0828    albuterol sulfate  (90 Base) MCG/ACT inhaler 2 puff, 2 puff, Inhalation, Q4H PRN, Iqra Wilson, APRN - CNP    OXcarbazepine (TRILEPTAL) tablet 150 mg, 150 mg, Oral, BID, Christiana Boeck, MD, 150 mg at 10/15/21 0827    lurasidone (LATUDA) tablet 20 mg, 20 mg, Oral, Daily, Aracely Murphy MD, 20 mg at 10/15/21 0827    acetaminophen (TYLENOL) tablet 650 mg, 650 mg, Oral, Q4H PRN, Aracely Murphy MD, 650 mg at 10/14/21 1620    magnesium hydroxide (MILK OF MAGNESIA) 400 MG/5ML suspension 30 mL, 30 mL, Oral, Daily PRN, Aracely Murphy MD    aluminum & magnesium hydroxide-simethicone (MAALOX) 200-200-20 MG/5ML suspension 30 mL, 30 mL, Oral, PRN, Aracely Murphy MD    benztropine mesylate (COGENTIN) injection 2 mg, 2 mg, IntraMUSCular, BID PRN, Aracely Murphy MD    melatonin tablet 6 mg, 6 mg, Oral, Nightly PRN, Aracely Murphy MD, 6 mg at 10/14/21 2049      Examination:  BP (!) 149/99   Pulse 102   Temp 97.8 °F (36.6 °C) (Oral)   Resp 18   Ht 5' 2\" (1.575 m)   Wt 241 lb (109.3 kg)   SpO2 100%   BMI 44.08 kg/m²   Gait - steady  Medication side effects(SE): no    Mental Status Examination:    Level of consciousness:  within normal limits   Appearance:  fair grooming and fair hygiene  Behavior/Motor:  psychomotor retardation  Attitude toward examiner:  cooperative  Speech:  slow   Mood: depressed and dysthymic  Affect:  blunted  Thought processes:  coherent   Thought content:  Suicidal Ideation:  passive  Cognition:  oriented to person, place, and time   Concentration poor  Insight poor   Judgement poor     ASSESSMENT: \  Patient symptoms are:  [] Well controlled  [] Improving  [] Worsening  [] No change      Diagnosis:   Active Problems:    Schizoaffective disorder, bipolar type ([facility] [address]  Resolved Problems:    * No resolved hospital problems.  *      LABS:    Recent Labs     10/13/21  1527   WBC 10.1   HGB 13.8        Recent Labs     10/13/21  1527    K 3.8      CO2 23   BUN 9   CREATININE 0.54   GLUCOSE 83     Recent Labs     10/13/21  1527   BILITOT 0.3   ALKPHOS 89   AST 24   ALT 47*     Lab Results   Component Value Date    LABAMPH Neg 10/13/2021    BARBSCNU Neg 10/13/2021    LABBENZ Neg 10/13/2021    LABBENZ DTCD 02/24/2012    LABMETH Neg 10/13/2021    OPIATESCREENURINE Neg 10/13/2021    PHENCYCLIDINESCREENURINE Neg 10/13/2021    ETOH <10 10/13/2021     Lab Results   Component Value Date    TSH 1.070 10/13/2021     No results found for: LITHIUM  Lab Results   Component Value Date    VALPROATE 43.5 (L) 03/06/2015       RISK ASSESSMENT:     Treatment Plan:  Reviewed current Medications with the patient. Risks, benefits, side effects, drug-to-drug interactions and alternatives to treatment were discussed. Collateral information:   CD evaluation  Encourage patient to attend group and other milieu activities.   Discharge planning discussed with the patient and treatment team.    PSYCHOTHERAPY/COUNSELING:  [x] Therapeutic interview  [x] Supportive  [] CBT  [] Ongoing  [] Other    [x] Patient continues to need, on a daily basis, active treatment furnished directly by or requiring the supervision of inpatient psychiatric personnel      Anticipated Length of stay            Electronically signed by Jose D Arauz MD on 10/15/2021 at 4:13 PM

## 2021-10-15 NOTE — GROUP NOTE
Group Therapy Note    Date: 10/15/2021    Group Start Time: 1000  Group End Time: 1050  Group Topic: Psychoeducation    [facility] KAVEH Whiteside        Group Therapy Note    Attendees: 8         Patient's Goal:  \"Stay calm\"    Notes:  Patient attended the 1000 skills group. Patient was attentive, she enjoy working on her project and overall participation was fair in group.     Status After Intervention:  Unchanged    Participation Level: Fair    Participation Quality: Appropriate and Attentive      Speech:  normal      Thought Process/Content: Linear      Affective Functioning: Flat      Mood: calm      Level of consciousness:  Alert      Response to Learning: Progressing to goal      Endings: None Reported    Modes of Intervention: Education, Socialization and Activity      Discipline Responsible: Psychoeducational Specialist      Signature:  Adrian Whiteside

## 2021-10-15 NOTE — GROUP NOTE
Group Therapy Note    Date: 10/14/2021    Group Start Time: 2045  Group End Time: 2115  Group Topic: Healthy Living/Wellness    [facility] CAREY Martínez        Group Therapy Note    Attendees: 9/14         Patient's Goal:  To learn about healthy communication. Notes:  Patient attended group briefly and left prematurely with minimally participation.     Status After Intervention:  Unchanged    Participation Level: Minimal    Participation Quality: Appropriate and Attentive      Speech:  normal      Thought Process/Content: Logical      Affective Functioning: Congruent      Mood: euthymic      Level of consciousness:  Alert and Attentive      Response to Learning: Able to verbalize current knowledge/experience      Endings: None Reported    Modes of Intervention: Education      Discipline Responsible: [address] [employer]      Signature:  Bayron Martínez

## 2021-10-15 NOTE — GROUP NOTE
Group Therapy Note    Date: 10/14/2021    Group Start Time: 2115  Group End Time: 2130  Group Topic: Wrap-Up    [facility]    Christina Ashby        Group Therapy Note    Attendees: 9/14         Patient's Goal:  \"to come to the acceptance that I'm not in control\"    Notes:  Patient reported meeting their goal for the day. This tech talked to patient about how we are all in control of ourselves and our reaction to what goes on around us. Patient did not seem to benefit from this discussion. Patient shared being happy she got 2 shirts.     Status After Intervention:  Unchanged    Participation Level: Interactive    Participation Quality: Attentive and Sharing      Speech:  pressured      Thought Process/Content: Linear      Affective Functioning: Congruent      Mood: euthymic      Level of consciousness:  Alert and Attentive      Response to Learning: Progressing to goal      Endings: None Reported    Modes of Intervention: Support      Discipline Responsible: [employer]      Signature:  Christina Ashby

## 2021-10-15 NOTE — FLOWSHEET NOTE
Patient has been out in evening more. Patient has bright affect, social with some peers, able to make needs known. Patient denies any anxiety and depression at this time. She also denies SI/HI/AVH.

## 2021-10-15 NOTE — PLAN OF CARE
Problem: Depressive Behavior With or Without Suicide Precautions:  Goal: Able to verbalize acceptance of life and situations over which he or she has no control  Description: Able to verbalize acceptance of life and situations over which he or she has no control  Outcome: Ongoing  Goal: Able to verbalize and/or display a decrease in depressive symptoms  Description: Able to verbalize and/or display a decrease in depressive symptoms  Outcome: Ongoing  Note: Mood stable. Goal: Able to verbalize support systems  Description: Able to verbalize support systems  Outcome: Ongoing  Goal: Absence of self-harm  Description: Absence of self-harm  Outcome: Ongoing  Note: No evidence of self harm observed or reported.

## 2021-10-16 LAB
GLUCOSE BLD-MCNC: 109 MG/DL (ref 60–115)
PERFORMED ON: NORMAL

## 2021-10-16 PROCEDURE — 6370000000 HC RX 637 (ALT 250 FOR IP): Performed by: PSYCHIATRY & NEUROLOGY

## 2021-10-16 PROCEDURE — 6370000000 HC RX 637 (ALT 250 FOR IP): Performed by: NURSE PRACTITIONER

## 2021-10-16 PROCEDURE — 94761 N-INVAS EAR/PLS OXIMETRY MLT: CPT

## 2021-10-16 PROCEDURE — 94640 AIRWAY INHALATION TREATMENT: CPT

## 2021-10-16 PROCEDURE — 1240000000 HC EMOTIONAL WELLNESS R&B

## 2021-10-16 RX ADMIN — OXCARBAZEPINE 150 MG: 150 TABLET, FILM COATED ORAL at 08:47

## 2021-10-16 RX ADMIN — LISINOPRIL 10 MG: 10 TABLET ORAL at 08:47

## 2021-10-16 RX ADMIN — OXCARBAZEPINE 150 MG: 150 TABLET, FILM COATED ORAL at 21:48

## 2021-10-16 RX ADMIN — LURASIDONE HYDROCHLORIDE 20 MG: 20 TABLET, FILM COATED ORAL at 08:47

## 2021-10-16 RX ADMIN — PRAZOSIN HYDROCHLORIDE 1 MG: 1 CAPSULE ORAL at 21:48

## 2021-10-16 RX ADMIN — ACETAMINOPHEN 650 MG: 325 TABLET ORAL at 16:46

## 2021-10-16 RX ADMIN — METFORMIN HYDROCHLORIDE 500 MG: 500 TABLET, EXTENDED RELEASE ORAL at 17:02

## 2021-10-16 RX ADMIN — FLUTICASONE PROPIONATE 2 PUFF: 110 AEROSOL, METERED RESPIRATORY (INHALATION) at 19:04

## 2021-10-16 RX ADMIN — METFORMIN HYDROCHLORIDE 500 MG: 500 TABLET, EXTENDED RELEASE ORAL at 08:47

## 2021-10-16 RX ADMIN — Medication 6 MG: at 21:51

## 2021-10-16 ASSESSMENT — PAIN SCALES - GENERAL
PAINLEVEL_OUTOF10: 3
PAINLEVEL_OUTOF10: 5

## 2021-10-16 NOTE — PROGRESS NOTES
Pt c/o inability to sleep, states she will ask the doctor for an increase in her melatonin. Noted that pt did not request PRN melatonin @ HS. Encouraged to ask for medication at bedtime and informed that it is too late for medication at this current time (3338). Encouraged pt to practice deep breathing and offered sensory room. Pt returns to assigned room. Negative

## 2021-10-16 NOTE — PROGRESS NOTES
Hospitalist Yumiko Torres NP notified of pt's elevated BPs and new orders/first dose of lisinopril this evening shift and of prazosin scheduled @ HS. Advised to recheck BP 2 hours after HS meds and report BP. BP rechecked and are stable. Results reported to NP. No new orders at this time.

## 2021-10-16 NOTE — PROGRESS NOTES
Pt. attended the 0900 community meeting. Electronically signed by Roger Flores, [address] on 10/16/2021 at 9:51 AM

## 2021-10-16 NOTE — FLOWSHEET NOTE
Patient has been napping frequently today due to poor sleep last night. Patient reports good appetite, she denies SI/HI and hallucinations. Patient has bright affect and is social with peers when out on unit. She denies anxiety and depression.

## 2021-10-16 NOTE — GROUP NOTE
Group Therapy Note    Date: 10/16/2021    Group Start Time: 1100  Group End Time: 1768  Group Topic: Psychoeducation    [facility]    JAVIER Baer, LSW        Group Therapy Note    Attendees: 9/11         Patient's Goal:  To participate in a goal oriented group    Notes:  Patient met her goal of getting stabilized on medication. She is excited about her upcoming discharge and the possibility of being pregnant.      Status After Intervention:  Improved    Participation Level: Interactive    Participation Quality: Sharing      Speech:  normal      Thought Process/Content: Logical      Affective Functioning: Congruent      Mood: calm      Level of consciousness:  Alert      Response to Learning: Able to verbalize current knowledge/experience      Endings: None Reported    Modes of Intervention: Education      Discipline Responsible: /Counselor      Signature:  JAVIER Baer, CRISTHIAN

## 2021-10-16 NOTE — PROGRESS NOTES
Patient did not attend group despite staff encouragement.   Electronically signed by Nayana So on 10/16/2021 at 7:00 PM

## 2021-10-16 NOTE — GROUP NOTE
Group Therapy Note    Date: 10/16/2021    Group Start Time: 1000  Group End Time: 1055  Group Topic: Psychoeducation    [facility]    Flip Wray        Group Therapy Note    Attendees: 8         Patient's Goal:  \"Stay positive\"    Notes:  Patient attended the 1000 skills group. Patient was attentive, she work diligently and independently on her task. Status After Intervention:  Improved    Participation Level:  Active Listener    Participation Quality: Appropriate      Speech:  Mostly quiet      Thought Process/Content: Linear      Affective Functioning: Flat      Mood: calm      Level of consciousness:  Alert      Response to Learning: Progressing to goal      Endings: None Reported    Modes of Intervention: Education, Socialization and Activity      Discipline Responsible: Psychoeducational Specialist      Signature:  Flip Wray

## 2021-10-16 NOTE — PLAN OF CARE
Problem: Depressive Behavior With or Without Suicide Precautions:  Goal: Able to verbalize and/or display a decrease in depressive symptoms  Description: Able to verbalize and/or display a decrease in depressive symptoms  Outcome: Met This Shift  Note: Pt reports a decrease in depression. Problem: Depressive Behavior With or Without Suicide Precautions:  Goal: Absence of self-harm  Description: Absence of self-harm  Outcome: Met This Shift  Note: No evidence of self harm observed or reported.

## 2021-10-16 NOTE — GROUP NOTE
Group Therapy Note    Date: 10/15/2021    Group Start Time: 1930  Group End Time: 2015  Group Topic: Recreational    [facility] I    Jhon Gunn        Group Therapy Note    Attendees: 9         Patient's Goal:  To participate in a game of headbands with peers    Notes:  Pt participated in group    Status After Intervention:  Unchanged    Participation Level:  Active Listener and Interactive    Participation Quality: Appropriate, Attentive and Sharing      Speech:  normal      Thought Process/Content: Logical      Affective Functioning: Congruent      Mood: euthymic      Level of consciousness:  Alert and Oriented x4      Response to Learning: Able to verbalize current knowledge/experience      Endings: None Reported    Modes of Intervention: Activity      Discipline Responsible: Behavorial Health Tech      Signature:  Jhon Gunn

## 2021-10-16 NOTE — FLOWSHEET NOTE
Patient is alert and orient x 4, out at times and social with select peers. Patient is denying anxiety, depression, SI/HI and hallucinations. Patient states she did not sleep well  Therefore she is very tired. Patient has good appetite, bright affect, and showers.

## 2021-10-17 LAB
GLUCOSE BLD-MCNC: 122 MG/DL (ref 60–115)
GONADOTROPIN, CHORIONIC (HCG) QUANT: <0.1 MIU/ML
PERFORMED ON: ABNORMAL

## 2021-10-17 PROCEDURE — 94640 AIRWAY INHALATION TREATMENT: CPT

## 2021-10-17 PROCEDURE — 94761 N-INVAS EAR/PLS OXIMETRY MLT: CPT

## 2021-10-17 PROCEDURE — 1240000000 HC EMOTIONAL WELLNESS R&B

## 2021-10-17 PROCEDURE — 6370000000 HC RX 637 (ALT 250 FOR IP): Performed by: PSYCHIATRY & NEUROLOGY

## 2021-10-17 PROCEDURE — 36415 COLL VENOUS BLD VENIPUNCTURE: CPT

## 2021-10-17 PROCEDURE — 84702 CHORIONIC GONADOTROPIN TEST: CPT

## 2021-10-17 PROCEDURE — 6370000000 HC RX 637 (ALT 250 FOR IP): Performed by: NURSE PRACTITIONER

## 2021-10-17 RX ORDER — PRENATAL VIT/IRON FUM/FOLIC AC 27MG-0.8MG
1 TABLET ORAL DAILY
Status: DISCONTINUED | OUTPATIENT
Start: 2021-10-17 | End: 2021-10-18 | Stop reason: HOSPADM

## 2021-10-17 RX ADMIN — PRAZOSIN HYDROCHLORIDE 1 MG: 1 CAPSULE ORAL at 20:51

## 2021-10-17 RX ADMIN — FLUTICASONE PROPIONATE 2 PUFF: 110 AEROSOL, METERED RESPIRATORY (INHALATION) at 20:33

## 2021-10-17 RX ADMIN — Medication 6 MG: at 20:51

## 2021-10-17 RX ADMIN — METFORMIN HYDROCHLORIDE 500 MG: 500 TABLET, EXTENDED RELEASE ORAL at 16:32

## 2021-10-17 RX ADMIN — METFORMIN HYDROCHLORIDE 500 MG: 500 TABLET, EXTENDED RELEASE ORAL at 08:54

## 2021-10-17 RX ADMIN — LURASIDONE HYDROCHLORIDE 20 MG: 20 TABLET, FILM COATED ORAL at 08:54

## 2021-10-17 RX ADMIN — OXCARBAZEPINE 150 MG: 150 TABLET, FILM COATED ORAL at 08:54

## 2021-10-17 RX ADMIN — OXCARBAZEPINE 150 MG: 150 TABLET, FILM COATED ORAL at 20:51

## 2021-10-17 RX ADMIN — LISINOPRIL 10 MG: 10 TABLET ORAL at 08:54

## 2021-10-17 RX ADMIN — MAGNESIUM HYDROXIDE/ALUMINUM HYDROXICE/SIMETHICONE 30 ML: 120; 1200; 1200 SUSPENSION ORAL at 15:07
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
## 2021-10-17 NOTE — PROGRESS NOTES
Pt denies SI/HI/AVH. She denies depression and anxiety. She has a flat affect. She is child like and talks in a soft high pitched voice. She says she sleeps well. She does have complaints regarding her diet. She has been placed on a carb restricted diet and does not think it is enough and she feels she is always hungry.

## 2021-10-17 NOTE — GROUP NOTE
Group Therapy Note    Date: 10/17/2021    Group Start Time: 1100  Group End Time: 0355  Group Topic: Group Therapy    [facility]    NED Cifuentes        Group Therapy Note    Attendees: 5         Patient's Goal:  To participate in a goal oriented group. Notes:  Patient stated her goal is to establish firm boundaries between her and her in laws. Status After Intervention:  Improved    Participation Level: Active Listener    Participation Quality: Appropriate and Attentive      Speech:  normal      Thought Process/Content: Logical      Affective Functioning: Congruent      Mood: elevated      Level of consciousness:  Alert      Response to Learning: Able to verbalize current knowledge/experience      Endings: None Reported    Modes of Intervention: Education      Discipline Responsible: /Counselor      Signature:   NED Cifuentes

## 2021-10-17 NOTE — PROGRESS NOTES
Pt. attended the 0900 community meeting. Electronically signed by Alice Ford, [place] [facility] on 10/17/2021 at 9:34 AM

## 2021-10-17 NOTE — GROUP NOTE
Group Therapy Note    Date: 10/17/2021    Group Start Time: 1000  Group End Time: 1050  Group Topic: Psychoeducation    [facility] 3[facility] CAREY Gifford        Group Therapy Note    Attendees: 9         Patient's Goal:  \"To be positive and go home\"    Notes:  Patient attended the 1000 skills group. Patient had a brighter affect, she was talkative and sociable in group. She work actively on her task. Status After Intervention:  Improved    Participation Level:  Active Listener    Participation Quality: Appropriate and Attentive      Speech:  normal      Thought Process/Content: Linear      Affective Functioning: Congruent      Mood: improved      Level of consciousness:  Alert      Response to Learning: Able to retain information      Endings: None Reported    Modes of Intervention: Education, Socialization and Activity      Discipline Responsible: Psychoeducational Specialist      Signature:  Roger Flores

## 2021-10-17 NOTE — PROGRESS NOTES
Pt isolates to room only coming out for meals and groups. When out she has limited to no interactions with peers. She denied depression, anxiety, SI, HI and AVH. Reports good appetite and improved sleep pattern.

## 2021-10-17 NOTE — GROUP NOTE
Group Therapy Note    Date: 10/16/2021    Group Start Time: 2120  Group End Time: 2130  Group Topic: Wrap-Up    [facility]    Ivory Liming        Group Therapy Note    Attendees: 6/13         Patient's Goal:  \"to be more positive\"    Notes:  Patient reported meeting their goal for the day. Patient shared enjoying seeing her \"hubby\" today, and is looking forward to seeing him as well as her \"boyfriend and girlfriend\" upon discharge.     Status After Intervention:  Unchanged    Participation Level: Interactive    Participation Quality: Appropriate, Attentive and Sharing      Speech:  pressured      Thought Process/Content: Logical      Affective Functioning: Congruent      Mood: euthymic      Level of consciousness:  Alert and Attentive      Response to Learning: Progressing to goal      Endings: None Reported    Modes of Intervention: Support      Discipline Responsible: [employer]      Signature:  Twyla Galan

## 2021-10-18 VITALS
RESPIRATION RATE: 16 BRPM | SYSTOLIC BLOOD PRESSURE: 116 MMHG | BODY MASS INDEX: 44.35 KG/M2 | HEIGHT: 62 IN | DIASTOLIC BLOOD PRESSURE: 84 MMHG | WEIGHT: 241 LBS | HEART RATE: 114 BPM | OXYGEN SATURATION: 98 % | TEMPERATURE: 97.8 F

## 2021-10-18 LAB
GLUCOSE BLD-MCNC: 119 MG/DL (ref 60–115)
PERFORMED ON: ABNORMAL

## 2021-10-18 PROCEDURE — 6370000000 HC RX 637 (ALT 250 FOR IP): Performed by: PSYCHIATRY & NEUROLOGY

## 2021-10-18 PROCEDURE — 6370000000 HC RX 637 (ALT 250 FOR IP): Performed by: NURSE PRACTITIONER

## 2021-10-18 PROCEDURE — 94640 AIRWAY INHALATION TREATMENT: CPT

## 2021-10-18 PROCEDURE — 99239 HOSP IP/OBS DSCHRG MGMT >30: CPT | Performed by: PSYCHIATRY & NEUROLOGY

## 2021-10-18 RX ORDER — LISINOPRIL 10 MG/1
10 TABLET ORAL DAILY
Qty: 15 TABLET | Refills: 3 | Status: SHIPPED | OUTPATIENT
Start: 2021-10-19

## 2021-10-18 RX ORDER — OXCARBAZEPINE 150 MG/1
150 TABLET, FILM COATED ORAL 2 TIMES DAILY
Qty: 30 TABLET | Refills: 3 | Status: SHIPPED | OUTPATIENT
Start: 2021-10-18

## 2021-10-18 RX ORDER — PRAZOSIN HYDROCHLORIDE 1 MG/1
1 CAPSULE ORAL NIGHTLY
Qty: 15 CAPSULE | Refills: 3 | Status: SHIPPED | OUTPATIENT
Start: 2021-10-18

## 2021-10-18 RX ADMIN — LURASIDONE HYDROCHLORIDE 20 MG: 20 TABLET, FILM COATED ORAL at 08:40

## 2021-10-18 RX ADMIN — FLUTICASONE PROPIONATE 2 PUFF: 110 AEROSOL, METERED RESPIRATORY (INHALATION) at 07:40

## 2021-10-18 RX ADMIN — PRENATAL VIT W/ FE FUMARATE-FA TAB 27-0.8 MG 1 TABLET: 27-0.8 TAB at 08:45

## 2021-10-18 RX ADMIN — METFORMIN HYDROCHLORIDE 500 MG: 500 TABLET, EXTENDED RELEASE ORAL at 08:39

## 2021-10-18 RX ADMIN — OXCARBAZEPINE 150 MG: 150 TABLET, FILM COATED ORAL at 08:39

## 2021-10-18 RX ADMIN — LISINOPRIL 10 MG: 10 TABLET ORAL at 08:40

## 2021-10-18 NOTE — PROGRESS NOTES
Pt did not attend the 0900 morning community meeting despite staff encouragement.     Electronically signed by Omero Grey OT on 10/18/2021 at 9:41 AM

## 2021-10-18 NOTE — PROGRESS NOTES
Patient did not attend group despite staff encouragement.   Electronically signed by Marybel Ray on 10/17/2021 at 8:14 PM

## 2021-10-18 NOTE — SUICIDE SAFETY PLAN
SAFETY PLAN    A suicide Safety Plan is a document that supports someone when they are having thoughts of suicide. Warning Signs that indicate a suicidal crisis may be developing: What (situations, thoughts, feelings, body sensations, behaviors, etc.) do you experience that lets you know you are beginning to think about suicide? 1. SI  2. isolate  3. Sleep less    Internal Coping Strategies:  What things can I do (relaxation techniques, hobbies, physical activities, etc.) to take my mind off my problems without contacting another person? 1. Play games  2. Arts and crafts  3. music    People and social settings that provide distraction: Who can I call or where can I go to distract me? 1. Name: BF  Phone: has#  2. Name: son    1. Place: park            4. Place: store    People whom I can ask for help: Who can I call when I need help - for example, friends, family, clergy, someone else? 1. Name: Demario            Phone: has#  2. Name: mom Phone: has#  3. Name: Yehuda India- cousinPhone: has#    Professionals or [facility] agencies I can contact during a crisis: Who can I call for help - for example, my doctor, my psychiatrist, my psychologist, a mental health provider, a suicide hotline? 1. Clinician Name: [facility] Phone: [phone]    2. Suicide Prevention Lifeline: 6-083-913-TALK (4307)    3. [address] [place] [address] Emergency Services -  for example, [facility] suicide hotline, [address] [place] [address] Avenue: [facility]      Emergency Services Address: [address] [facility]       Emergency Services Phone: [phone]    Making the environment safe: How can I make my environment (house/apartment/living space) safer? For example, can I remove guns, medications, and other items?   1. dog  2. cat
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
## 2021-10-18 NOTE — PROGRESS NOTES
Pt noted to be up on the unit for breakfast, bright and cheery, states she misses her dog and cant wait to see them, reports she feels better from when she came to the hospital and thinks her new meds, mood stabilizers have worked.  Pt denies all depression anxiety or suicidal thoughts,

## 2021-10-18 NOTE — GROUP NOTE
Group Therapy Note    Date: 10/17/2021    Group Start Time: 2000  Group End Time: 2050  Group Topic: Recreational    [facility] 3W I    Bel Duncan        Group Therapy Note    Attendees: 6/14         Patient's Goal:  To watch a movie and/or socialize with peers. Notes:  Patient participated in activity group.     Status After Intervention:  Unchanged    Participation Level: Interactive    Participation Quality: Appropriate and Attentive      Speech:  normal      Thought Process/Content: Logical      Affective Functioning: Congruent      Mood: euthymic      Level of consciousness:  Alert and Attentive      Response to Learning: Progressing to goal      Endings: None Reported    Modes of Intervention: Activity      Discipline Responsible: [address], [employer]      Signature:  Bel Duncan

## 2021-10-18 NOTE — PROGRESS NOTES
Patient was discharged in good condition. She denies SI, HI, or hallucinations. She is future oriented. Left for home positive and future oriented.

## 2021-10-18 NOTE — PROGRESS NOTES
Patient did not attend group despite staff encouragement.   Electronically signed by Akiko Ng on 10/18/2021 at 2:21 PM

## 2021-10-18 NOTE — GROUP NOTE
Group Therapy Note    Date: 10/18/2021    Group Start Time: 1100  Group End Time: 1200  Group Topic: Recreational    [facility] 3W I    [place]        Group Therapy Note    Attendees: 10/17         Patient's Goal:  \"to go home\"    Notes:  Patient actively participated in [employer] with group. Patient was social with peers. Status After Intervention:  Improved    Participation Level:  Active Listener and Interactive    Participation Quality: Appropriate and Attentive      Speech:  normal      Thought Process/Content: Logical      Affective Functioning: Congruent      Mood: elevated      Level of consciousness:  Alert and Attentive      Response to Learning: Progressing to goal      Endings: None Reported    Modes of Intervention: Activity      Discipline Responsible: [address] [email or web address]      Signature:  Jerome Elmore

## 2021-10-19 NOTE — GROUP NOTE
Group Therapy Note    Date: 10/18/2021    Group Start Time: 1000  Group End Time: 1050  Group Topic: Psychotherapy    [facility]    [facility]        [facility]    Attendees: 12         Patient's Goal:  ***    Notes:  ***    Status After Intervention:  {Status After Intervention:105061904}    Participation Level: {Participation Level:291421709}    Participation Quality: {[facility] PARTICIPATION QUALITY:149037831}      Speech:  {[facility] CD_SPEECH:25346}      Thought Process/Content: {Thought Process/Content:657063782}      Affective Functioning: {Affective Functionin}      Mood: {Mood:266943567}      Level of consciousness:  {Level of consciousness:377253362}      Response to Learnin Ren[facility] Responses to Learnin}      Endings: {[facility] Endings:78606}    Modes of Intervention: {[facility] Modes of Intervention:615596087}      Discipline Responsible: {[facility] Multidisciplinary:451405330}      Signature:  [facility]

## 2021-10-26 ENCOUNTER — HOSPITAL ENCOUNTER (EMERGENCY)
Age: 31
Discharge: HOME | End: 2021-10-26
Payer: COMMERCIAL

## 2021-10-26 VITALS
WEIGHT: 241 LBS | OXYGEN SATURATION: 99 % | HEIGHT: 62 IN | DIASTOLIC BLOOD PRESSURE: 84 MMHG | SYSTOLIC BLOOD PRESSURE: 138 MMHG | HEART RATE: 99 BPM | TEMPERATURE: 98.3 F | BODY MASS INDEX: 44.35 KG/M2 | RESPIRATION RATE: 16 BRPM

## 2021-10-26 DIAGNOSIS — S39.012A STRAIN OF LUMBAR REGION, INITIAL ENCOUNTER: Primary | ICD-10-CM

## 2021-10-26 PROCEDURE — 96372 THER/PROPH/DIAG INJ SC/IM: CPT

## 2021-10-26 PROCEDURE — 99283 EMERGENCY DEPT VISIT LOW MDM: CPT

## 2021-10-26 PROCEDURE — 6360000002 HC RX W HCPCS: Performed by: STUDENT IN AN ORGANIZED HEALTH CARE EDUCATION/TRAINING PROGRAM

## 2021-10-26 RX ORDER — CYCLOBENZAPRINE HCL 10 MG
10 TABLET ORAL NIGHTLY PRN
Qty: 10 TABLET | Refills: 0 | Status: SHIPPED | OUTPATIENT
Start: 2021-10-26 | End: 2021-11-05

## 2021-10-26 RX ORDER — KETOROLAC TROMETHAMINE 30 MG/ML
30 INJECTION, SOLUTION INTRAMUSCULAR; INTRAVENOUS ONCE
Status: COMPLETED | OUTPATIENT
Start: 2021-10-26 | End: 2021-10-26

## 2021-10-26 RX ORDER — ORPHENADRINE CITRATE 30 MG/ML
60 INJECTION INTRAMUSCULAR; INTRAVENOUS ONCE
Status: COMPLETED | OUTPATIENT
Start: 2021-10-26 | End: 2021-10-26

## 2021-10-26 RX ADMIN — KETOROLAC TROMETHAMINE 30 MG: 30 INJECTION, SOLUTION INTRAMUSCULAR at 20:03

## 2021-10-26 RX ADMIN — ORPHENADRINE CITRATE 60 MG: 30 INJECTION INTRAMUSCULAR; INTRAVENOUS at 20:03

## 2021-10-26 ASSESSMENT — ENCOUNTER SYMPTOMS
BACK PAIN: 1
EYE PAIN: 0
NAUSEA: 0
SHORTNESS OF BREATH: 0
CHEST TIGHTNESS: 0
DIARRHEA: 0
SORE THROAT: 0

## 2021-10-26 ASSESSMENT — PAIN SCALES - GENERAL
PAINLEVEL_OUTOF10: 10
PAINLEVEL_OUTOF10: 10

## 2021-10-26 ASSESSMENT — PAIN DESCRIPTION - ORIENTATION: ORIENTATION: LOWER

## 2021-10-26 ASSESSMENT — PAIN DESCRIPTION - LOCATION: LOCATION: BACK

## 2021-10-26 ASSESSMENT — PAIN DESCRIPTION - DESCRIPTORS: DESCRIPTORS: RADIATING

## 2021-10-26 ASSESSMENT — PAIN DESCRIPTION - FREQUENCY: FREQUENCY: CONTINUOUS

## 2021-10-26 ASSESSMENT — PAIN DESCRIPTION - PAIN TYPE: TYPE: ACUTE PAIN

## 2021-10-26 NOTE — ED PROVIDER NOTES
[address] [facility] ED  eMERGENCYdEPARTMENT eNCOUnter      Pt Name: Declan Killian  MRN: [id number]  [place] 1990  Date of evaluation: 10/26/2021  Provider:Raghav Brady PA-C    CHIEF COMPLAINT           HPI  Declan Killian is a 36 y.o. female per chart review has a h/o SOB, PCOS, schizoaffective, anxiety. Patient states she has been having back pain for the past 3 months. She reports severe, sharp, constant but worsening back pain that radiates down her right gluteal region. She denies saddle anesthesia, foot drop, leg strength loss, and bladder function loss. ROS  Review of Systems   Constitutional: Negative for chills, fatigue and fever. HENT: Negative for ear pain, hearing loss and sore throat. Eyes: Negative for pain and visual disturbance. Respiratory: Negative for chest tightness and shortness of breath. Cardiovascular: Negative for chest pain. Gastrointestinal: Negative for diarrhea and nausea. Endocrine: Negative for cold intolerance. Genitourinary: Negative for hematuria. Musculoskeletal: Positive for back pain. Skin: Negative for rash and wound. Neurological: Negative for dizziness and headaches. Psychiatric/Behavioral: Negative for behavioral problems and confusion. Except as noted above the remainder of the review of systems was reviewed and negative.         PAST MEDICAL HISTORY     Past Medical History:   Diagnosis Date    Abnormal Pap smear of cervix     Anxiety     Asthma     Depression     [facility]) [facility])     Hypertension     Pre-diabetes     PTSD (post-traumatic stress disorder)     Schizoaffective disorder (HCC)          SURGICAL HISTORY       Past Surgical History:   Procedure Laterality Date     SECTION      TONSILLECTOMY           CURRENTMEDICATIONS       Discharge Medication List as of 10/26/2021  8:17 PM      CONTINUE these medications which have NOT CHANGED    Details   OXcarbazepine (TRILEPTAL) 150 MG tablet Take 1 tablet by mouth 2 times daily, Disp-30 tablet, R-3Normal      lurasidone (LATUDA) 40 MG TABS tablet Take 1 tablet by mouth daily, Disp-15 tablet, R-2Normal      lisinopril (PRINIVIL;ZESTRIL) 10 MG tablet Take 1 tablet by mouth daily, Disp-15 tablet, R-3Normal      prazosin (MINIPRESS) 1 MG capsule Take 1 capsule by mouth nightly, Disp-15 capsule, R-3Normal      albuterol sulfate  (90 Base) MCG/ACT inhaler Use 2 puffs 4 times daily for 7 days then as needed for wheezing. Dispense with Spacer and instruct in use. At patient's preference may use 60 dose MDI. May Sub Pro-Air or Proventil as needed per insurance., Disp-1 Inhaler, R-0, DAWPrint      metFORMIN (GLUCOPHAGE-XR) 500 MG extended release tablet TAKE 1 TABLET BY MOUTH 2 TIMES DAILY (BEFORE MEALS), Disp-60 tablet, R-5Normal      Alcohol Swabs (ALCOHOL PADS) 70 % PADS 3 TIMES DAILY Starting Mon 8/16/2021, Disp-100 each, R-3, Normal      !! Lancets MISC 4 TIMES DAILY BEFORE MEALS & NIGHTLY Starting Mon 8/16/2021, Disp-150 each, R-3, NormalPt test 4x daily Dx E11.65. May substitute for generic or insurance covered product      !! Lancets (ONETOUCH DELICA PLUS [id number]) MISC Test 4x daily, Disp-150 each, R-3Normal      blood glucose monitor kit and supplies 1 kit by Other route 4 times daily (before meals and nightly) Pt test 4x daily Dx E11.65. May substitute for generic or insurance covered product, Other, 4 TIMES DAILY BEFORE MEALS & NIGHTLY Starting Mon 2/22/2021, Disp-1 kit, R-0, Normal      Prenatal Vit-Fe Fumarate-FA (PRENATAL VITAMIN) 27-1 MG TABS tablet take 1 tablet by mouth once dailyHistorical Med      FLOVENT DISKUS 100 MCG/BLIST AEPB inhaler Inhale 1 puff into the lungs daily , DAWHistorical Med      Melatonin 10 MG CAPS Historical Med       !! - Potential duplicate medications found. Please discuss with provider.           ALLERGIES     Food and Amoxicillin    FAMILY HISTORY       Family History   Problem Relation Age of Onset    Physically Abused:     Sexually Abused:          PHYSICAL EXAM       ED Triage Vitals   BP Temp Temp src Pulse Resp SpO2 Height Weight   10/26/21 1907 10/26/21 1906 -- 10/26/21 1906 10/26/21 1906 10/26/21 1906 10/26/21 1906 10/26/21 1906   138/84 98.3 °F (36.8 °C)  99 16 99 % 5' 2\" (1.575 m) 241 lb (109.3 kg)       Physical Exam  Constitutional:       Appearance: Normal appearance. HENT:      Head: Normocephalic and atraumatic. Right Ear: External ear normal.      Left Ear: External ear normal.      Nose: Nose normal.      Mouth/Throat:      Mouth: Mucous membranes are moist.   Eyes:      Extraocular Movements: Extraocular movements intact. Conjunctiva/sclera: Conjunctivae normal.   Cardiovascular:      Rate and Rhythm: Normal rate and regular rhythm. Heart sounds: Normal heart sounds. Pulmonary:      Effort: Pulmonary effort is normal.      Breath sounds: Normal breath sounds. No stridor. No wheezing or rhonchi. Abdominal:      Palpations: Abdomen is soft. Tenderness: There is no abdominal tenderness. Genitourinary:      Musculoskeletal:         General: Normal range of motion. Cervical back: Normal range of motion and neck supple. No tenderness. Comments: 5/5 leg strength bilaterally. Some tenderness to palpation of lumbar spine. Skin:     General: Skin is warm and dry. Neurological:      General: No focal deficit present. Mental Status: She is alert and oriented to person, place, and time. Psychiatric:         Mood and Affect: Mood normal.         Behavior: Behavior normal.           MDM  This is a 36year old female presenting with back pain. Pt is afebrile and hemodynamically stable. Pt given IM toradol, IM norflex. Pt reevaluated and is feeling better. Pt given work note. Pt agreeable to discharge with follow up with ortho as needed. Will return if symptoms change or worsen. FINAL IMPRESSION      1.  Strain of lumbar region, initial encounter DISPOSITION/PLAN   DISPOSITION Decision To Discharge 10/26/2021 07:58:10 PM        DISCHARGE MEDICATIONS:  [unfilled]         Nathalie Cortes PA-C(electronically signed)  Attending Emergency Physician           Nathalie Cortes PA-C  10/27/21 21 Nataly Chávez PA-C  10/29/21 1013

## 2021-10-26 NOTE — ED NOTES
Hemoccult test completed by JADE Francis witnessed by Nurse Jelly Peace, test was negative for blood.       Desmond Del Valle RN  10/26/21 1926

## 2021-10-26 NOTE — Clinical Note
Karma Joaquin was seen and treated in our emergency department on 10/26/2021. She may return to work on 10/28/2021. May return to work on 10/28/21. If you have any questions or concerns, please don't hesitate to call.       Lyudmila Crawford PA-C

## 2021-11-11 DIAGNOSIS — E28.2 PCOS (POLYCYSTIC OVARIAN SYNDROME): Primary | ICD-10-CM

## 2021-11-11 DIAGNOSIS — E88.81 INSULIN RESISTANCE: ICD-10-CM

## 2021-11-11 RX ORDER — METFORMIN HYDROCHLORIDE 500 MG/1
TABLET, EXTENDED RELEASE ORAL
Qty: 60 TABLET | Refills: 1 | Status: SHIPPED | OUTPATIENT
Start: 2021-11-11 | End: 2021-12-14 | Stop reason: SDUPTHER

## 2021-11-11 RX ORDER — LANCETS 30 GAUGE
1 EACH MISCELLANEOUS
Qty: 150 EACH | Refills: 1 | Status: SHIPPED | OUTPATIENT
Start: 2021-11-11 | End: 2022-06-14

## 2021-11-11 RX ORDER — BLOOD SUGAR DIAGNOSTIC
1 STRIP MISCELLANEOUS 3 TIMES DAILY
Qty: 100 EACH | Refills: 1 | Status: SHIPPED | OUTPATIENT
Start: 2021-11-11 | End: 2022-09-13 | Stop reason: SDUPTHER

## 2021-11-11 RX ORDER — LANCETS 30 GAUGE
EACH MISCELLANEOUS
Qty: 150 EACH | Refills: 1 | Status: SHIPPED | OUTPATIENT
Start: 2021-11-11 | End: 2022-06-14

## 2021-11-11 NOTE — TELEPHONE ENCOUNTER
Patient of Dr. Eddie Allen requesting refills    LOV 08/16/2021  Scheduled 12/15/21    Please approve or deny

## 2021-11-12 ENCOUNTER — HOSPITAL ENCOUNTER (EMERGENCY)
Age: 31
Discharge: HOME | End: 2021-11-12
Payer: COMMERCIAL

## 2021-11-12 ENCOUNTER — APPOINTMENT (OUTPATIENT)
Dept: GENERAL RADIOLOGY | Age: 31
End: 2021-11-12
Payer: COMMERCIAL

## 2021-11-12 VITALS
HEIGHT: 62 IN | TEMPERATURE: 98.4 F | RESPIRATION RATE: 18 BRPM | DIASTOLIC BLOOD PRESSURE: 84 MMHG | HEART RATE: 102 BPM | SYSTOLIC BLOOD PRESSURE: 133 MMHG | WEIGHT: 241 LBS | OXYGEN SATURATION: 98 % | BODY MASS INDEX: 44.35 KG/M2

## 2021-11-12 DIAGNOSIS — K52.9 GASTROENTERITIS: Primary | ICD-10-CM

## 2021-11-12 LAB
INFLUENZA A BY PCR: NEGATIVE
INFLUENZA B BY PCR: NEGATIVE
SARS-COV-2, NAAT: NOT DETECTED

## 2021-11-12 PROCEDURE — 6370000000 HC RX 637 (ALT 250 FOR IP)

## 2021-11-12 PROCEDURE — 99284 EMERGENCY DEPT VISIT MOD MDM: CPT

## 2021-11-12 PROCEDURE — 87502 INFLUENZA DNA AMP PROBE: CPT

## 2021-11-12 PROCEDURE — 87635 SARS-COV-2 COVID-19 AMP PRB: CPT

## 2021-11-12 PROCEDURE — 71045 X-RAY EXAM CHEST 1 VIEW: CPT

## 2021-11-12 RX ORDER — ACETAMINOPHEN 325 MG/1
650 TABLET ORAL ONCE
Status: COMPLETED | OUTPATIENT
Start: 2021-11-12 | End: 2021-11-12

## 2021-11-12 RX ORDER — CIPROFLOXACIN 500 MG/1
500 TABLET, FILM COATED ORAL 2 TIMES DAILY
Qty: 14 TABLET | Refills: 0 | Status: SHIPPED | OUTPATIENT
Start: 2021-11-12 | End: 2021-11-19

## 2021-11-12 RX ORDER — ONDANSETRON 4 MG/1
4 TABLET, ORALLY DISINTEGRATING ORAL 3 TIMES DAILY PRN
Qty: 21 TABLET | Refills: 0 | Status: SHIPPED | OUTPATIENT
Start: 2021-11-12

## 2021-11-12 RX ORDER — ONDANSETRON 4 MG/1
4 TABLET, ORALLY DISINTEGRATING ORAL ONCE
Status: COMPLETED | OUTPATIENT
Start: 2021-11-12 | End: 2021-11-12

## 2021-11-12 RX ADMIN — ONDANSETRON 4 MG: 4 TABLET, ORALLY DISINTEGRATING ORAL at 14:32

## 2021-11-12 RX ADMIN — ACETAMINOPHEN 650 MG: 325 TABLET ORAL at 14:32

## 2021-11-12 ASSESSMENT — PAIN DESCRIPTION - LOCATION: LOCATION: HEAD

## 2021-11-12 ASSESSMENT — PAIN SCALES - GENERAL
PAINLEVEL_OUTOF10: 8
PAINLEVEL_OUTOF10: 8

## 2021-11-12 NOTE — ED NOTES
Bed: 07  Expected date:   Expected time:   Means of arrival:   Comments:     Nupur Sherman RN  11/12/21 6915

## 2021-11-12 NOTE — ED TRIAGE NOTES
Pt comes to er with multiple symptoms. She states that it started with a  Scratchy throat that would lead into coughing fits and  Has had n/v/d x 1 week.   Pt  Also c/o h/a

## 2021-11-12 NOTE — ED PROVIDER NOTES
[address] [facility] ED  eMERGENCY dEPARTMENT eNCOUnter      Pt Name: Roberto Banuelos  MRN: [id number]  [place] 1990  Date of evaluation: 2021  Provider: JADE Obregon        HISTORY OF PRESENT ILLNESS    Roberto Banuelos is a 36 y.o. female per chart review has ah/o asthma, HTN. Patient presents with 1 week history of nausea, diarrhea, vomiting, sore throat, dry cough, and headache. States she typically coughs when her \"throat feels scratchy. \" Reports decreased oral intake. Patient denies dyspnea, chest pain, fever, chills, abdominal pain. Reports covid-19 vaccination. Denies sick contacts. REVIEW OF SYSTEMS       Review of Systems   Constitutional: Positive for fatigue. Negative for chills and fever. HENT: Positive for sore throat. Negative for congestion and rhinorrhea. Eyes: Negative for photophobia. Respiratory: Negative for cough, choking, shortness of breath and wheezing. Cardiovascular: Negative for chest pain. Gastrointestinal: Positive for diarrhea, nausea and vomiting. Negative for abdominal pain and constipation. Genitourinary: Negative for difficulty urinating, dysuria, flank pain, frequency and hematuria. Musculoskeletal: Negative for back pain. Neurological: Positive for headaches. Negative for dizziness and light-headedness. Psychiatric/Behavioral: Negative for confusion. Except as noted above the remainder of the review of systems was reviewed and negative.        PAST MEDICAL HISTORY     Past Medical History:   Diagnosis Date    Abnormal Pap smear of cervix     Anxiety     Asthma     Depression     [facility]) ([facility])     Hypertension     Pre-diabetes     PTSD (post-traumatic stress disorder)     Schizoaffective disorder ([facility])          SURGICAL HISTORY       Past Surgical History:   Procedure Laterality Date     SECTION      TONSILLECTOMY           CURRENT MEDICATIONS       Discharge Medication List as of 2021 4:17 PM      CONTINUE these medications which have NOT CHANGED    Details   !! Lancets (ONETOUCH DELICA PLUS [id number]) MISC Test 4x daily, Disp-150 each, R-1Normal      !! Lancets MISC 4 TIMES DAILY BEFORE MEALS & NIGHTLY Starting Thu 11/11/2021, Disp-150 each, R-1, NormalPt test 4x daily Dx E11.65. May substitute for generic or insurance covered product      Alcohol Swabs (ALCOHOL PADS) 70 % PADS 3 TIMES DAILY Starting Thu 11/11/2021, Disp-100 each, R-1, Normal      metFORMIN (GLUCOPHAGE-XR) 500 MG extended release tablet TAKE 1 TABLET BY MOUTH 2 TIMES DAILY (BEFORE MEALS), Disp-60 tablet, R-1Normal      OXcarbazepine (TRILEPTAL) 150 MG tablet Take 1 tablet by mouth 2 times daily, Disp-30 tablet, R-3Normal      lurasidone (LATUDA) 40 MG TABS tablet Take 1 tablet by mouth daily, Disp-15 tablet, R-2Normal      lisinopril (PRINIVIL;ZESTRIL) 10 MG tablet Take 1 tablet by mouth daily, Disp-15 tablet, R-3Normal      prazosin (MINIPRESS) 1 MG capsule Take 1 capsule by mouth nightly, Disp-15 capsule, R-3Normal      albuterol sulfate  (90 Base) MCG/ACT inhaler Use 2 puffs 4 times daily for 7 days then as needed for wheezing. Dispense with Spacer and instruct in use. At patient's preference may use 60 dose MDI. May Sub Pro-Air or Proventil as needed per insurance., Disp-1 Inhaler, R-0, DAWPrint      blood glucose monitor kit and supplies 1 kit by Other route 4 times daily (before meals and nightly) Pt test 4x daily Dx E11.65. May substitute for generic or insurance covered product, Other, 4 TIMES DAILY BEFORE MEALS & NIGHTLY Starting Mon 2/22/2021, Disp-1 kit, R-0, Normal      Prenatal Vit-Fe Fumarate-FA (PRENATAL VITAMIN) 27-1 MG TABS tablet take 1 tablet by mouth once dailyHistorical Med      FLOVENT DISKUS 100 MCG/BLIST AEPB inhaler Inhale 1 puff into the lungs daily , DAWHistorical Med      Melatonin 10 MG CAPS Historical Med       !! - Potential duplicate medications found. Please discuss with provider. ALLERGIES     Food and Amoxicillin    FAMILY HISTORY       Family History   Problem Relation Age of Onset    Diabetes Mother     Depression Father     Asthma Brother     Diabetes Maternal Grandmother     Breast Cancer Maternal Grandmother     Thyroid Cancer Maternal Grandfather     Diabetes Paternal Grandmother     Other Paternal Grandfather         testicular cancer          SOCIAL HISTORY       Social History     Socioeconomic History    Marital status:      Spouse name: None    Number of children: None    Years of education: None    Highest education level: None   Occupational History    None   Tobacco Use    Smoking status: Current Every Day Smoker     Packs/day: 1.00     Years: 12.00     Pack years: 12.00     Types: Cigarettes    Smokeless tobacco: Never Used    Tobacco comment: 1 ppd   Vaping Use    Vaping Use: Never used   Substance and Sexual Activity    Alcohol use: Not Currently     Comment:      Drug use: Not Currently     Comment: \"cut down since I'm trying to get pregnant\"    Sexual activity: Yes     Partners: Male   Other Topics Concern    None   Social History Narrative    None     Social Determinants of Health     Financial Resource Strain:     Difficulty of Paying Living Expenses: Not on file   Food Insecurity:     Worried About Running Out of Food in the Last Year: Not on file    Tommy of Food in the Last Year: Not on file   Transportation Needs:     Lack of Transportation (Medical): Not on file    Lack of Transportation (Non-Medical):  Not on file   Physical Activity:     Days of Exercise per Week: Not on file    Minutes of Exercise per Session: Not on file   Stress:     Feeling of Stress : Not on file   Social Connections:     Frequency of Communication with Friends and Family: Not on file    Frequency of Social Gatherings with Friends and Family: Not on file    Attends [employer] Services: Not on file    Active Member of Clubs or Organizations: Not on file    Attends Club or Organization Meetings: Not on file    Marital Status: Not on file   Intimate Partner Violence:     Fear of Current or Ex-Partner: Not on file    Emotionally Abused: Not on file    Physically Abused: Not on file    Sexually Abused: Not on file   Housing Stability:     Unable to Pay for Housing in the Last Year: Not on file    Number of [place] in the Last Year: Not on file    Unstable Housing in the Last Year: Not on file         PHYSICAL EXAM        ED Triage Vitals [11/12/21 1249]   BP Temp Temp Source Pulse Resp SpO2 Height Weight   133/84 98.4 °F (36.9 °C) Oral 102 18 98 % 5' 2\" (1.575 m) 241 lb (109.3 kg)       Physical Exam  Constitutional:       Appearance: Normal appearance. HENT:      Head: Normocephalic and atraumatic. Right Ear: External ear normal.      Left Ear: External ear normal.      Nose: No congestion or rhinorrhea. Mouth/Throat:      Mouth: Mucous membranes are moist.      Pharynx: Oropharynx is clear. No oropharyngeal exudate or posterior oropharyngeal erythema. Eyes:      Extraocular Movements: Extraocular movements intact. Conjunctiva/sclera: Conjunctivae normal.   Cardiovascular:      Rate and Rhythm: Normal rate and regular rhythm. Pulmonary:      Effort: Pulmonary effort is normal. No respiratory distress. Breath sounds: Normal breath sounds. No wheezing. Chest:      Chest wall: No tenderness. Abdominal:      General: Bowel sounds are normal. There is no distension. Palpations: Abdomen is soft. Tenderness: There is no abdominal tenderness. There is no guarding. Musculoskeletal:         General: Normal range of motion. Cervical back: Normal range of motion. Skin:     General: Skin is warm. Capillary Refill: Capillary refill takes less than 2 seconds. Coloration: Skin is not pale.       Comments: Good skin turgor   Neurological:      Mental Status: She is alert and oriented to person, place, and

## 2021-11-13 ASSESSMENT — ENCOUNTER SYMPTOMS
COUGH: 0
CONSTIPATION: 0
ABDOMINAL PAIN: 0
VOMITING: 1
SORE THROAT: 1
PHOTOPHOBIA: 0
NAUSEA: 1
DIARRHEA: 1
SHORTNESS OF BREATH: 0
BACK PAIN: 0
WHEEZING: 0
CHOKING: 0
RHINORRHEA: 0

## 2021-11-19 ENCOUNTER — HOSPITAL ENCOUNTER (EMERGENCY)
Age: 31
Discharge: HOME | End: 2021-11-19
Attending: EMERGENCY MEDICINE
Payer: COMMERCIAL

## 2021-11-19 ENCOUNTER — APPOINTMENT (OUTPATIENT)
Dept: GENERAL RADIOLOGY | Age: 31
End: 2021-11-19
Payer: COMMERCIAL

## 2021-11-19 VITALS
HEART RATE: 112 BPM | BODY MASS INDEX: 46.38 KG/M2 | HEIGHT: 62 IN | DIASTOLIC BLOOD PRESSURE: 99 MMHG | SYSTOLIC BLOOD PRESSURE: 144 MMHG | RESPIRATION RATE: 16 BRPM | OXYGEN SATURATION: 98 % | WEIGHT: 252 LBS | TEMPERATURE: 97.6 F

## 2021-11-19 DIAGNOSIS — K59.00 CONSTIPATION, UNSPECIFIED CONSTIPATION TYPE: Primary | ICD-10-CM

## 2021-11-19 LAB
HCG, URINE, POC: NEGATIVE
Lab: NORMAL
NEGATIVE QC PASS/FAIL: NORMAL
POSITIVE QC PASS/FAIL: NORMAL
REASON FOR REJECTION: NORMAL
REJECTED TEST: NORMAL

## 2021-11-19 PROCEDURE — 74022 RADEX COMPL AQT ABD SERIES: CPT

## 2021-11-19 PROCEDURE — 99283 EMERGENCY DEPT VISIT LOW MDM: CPT

## 2021-11-19 RX ORDER — DOCUSATE SODIUM 100 MG/1
100 CAPSULE, LIQUID FILLED ORAL 2 TIMES DAILY
Qty: 20 CAPSULE | Refills: 0 | Status: SHIPPED | OUTPATIENT
Start: 2021-11-19

## 2021-11-19 RX ORDER — POLYETHYLENE GLYCOL 3350 17 G/17G
17 POWDER, FOR SOLUTION ORAL DAILY PRN
Qty: 510 G | Refills: 0 | Status: SHIPPED | OUTPATIENT
Start: 2021-11-19 | End: 2021-12-19

## 2021-11-19 ASSESSMENT — ENCOUNTER SYMPTOMS
ABDOMINAL PAIN: 1
RECTAL PAIN: 1

## 2021-11-19 ASSESSMENT — PAIN SCALES - GENERAL: PAINLEVEL_OUTOF10: 6

## 2021-11-19 ASSESSMENT — PAIN DESCRIPTION - ORIENTATION: ORIENTATION: LEFT

## 2021-11-19 ASSESSMENT — PAIN DESCRIPTION - LOCATION: LOCATION: GROIN

## 2021-11-19 ASSESSMENT — PAIN DESCRIPTION - PAIN TYPE: TYPE: ACUTE PAIN

## 2021-11-19 NOTE — Clinical Note
Lindy Amaro was seen and treated in our emergency department on 11/19/2021. She may return to work on 11/21/2021. If you have any questions or concerns, please don't hesitate to call.       Simone, DO

## 2021-11-19 NOTE — ED TRIAGE NOTES
[facility] c/o lt groin area and \"butt hole\" burning x 3 days. Pt brought entire pizza box with her. Pt in nad.

## 2021-11-20 NOTE — ED PROVIDER NOTES
[address] [facility] ED  EMERGENCY MEDICINE     Pt Name: Aracelis Cunningham  MRN: [id number]  Deangfguera 1990  Date of evaluation: 2021  PCP:    Zahraa Reyes  Provider: Loni Sarah DO    CHIEF COMPLAINT       Chief Complaint   Patient presents with    Abdominal Pain     \"my butt hole is burning\"       HISTORY OF PRESENT ILLNESS    HPI     51-year-old female with history of schizoaffective, depression, PCOS, mood disorder presents to the emergency department with complaint of \"my butt hole is burning\". Patient states that she had a hard stool earlier today. She does not have a history of constipation. Does not take any laxative. She does state that she is having left lower quadrant cramping as well. States her last period was a month ago. She is actively trying to get pregnant but states that she does not think she is right now. She denies any nausea or vomiting. She actually called EMS from her place of work, [employer], to pick her up and bring her to the hospital for this reason. She did bring a large pizza with her. Triage notes and Nursing notes were reviewed by myself. Any discrepancies are addressed above. PAST MEDICAL HISTORY     Past Medical History:   Diagnosis Date    Abnormal Pap smear of cervix     Anxiety     Asthma     Depression     DVT (deep venous thrombosis) (HCC)     Hypertension     Pre-diabetes     PTSD (post-traumatic stress disorder)     Schizoaffective disorder (HCC)        SURGICAL HISTORY       Past Surgical History:   Procedure Laterality Date     SECTION      TONSILLECTOMY         CURRENT MEDICATIONS       Previous Medications    ALBUTEROL SULFATE  (90 BASE) MCG/ACT INHALER    Use 2 puffs 4 times daily for 7 days then as needed for wheezing. Dispense with Spacer and instruct in use. At patient's preference may use 60 dose MDI. May Sub Pro-Air or Proventil as needed per insurance.     ALCOHOL SWABS (ALCOHOL PADS) 70 % PADS 1 each by Does not apply route three times daily    BLOOD GLUCOSE MONITOR KIT AND SUPPLIES    1 kit by Other route 4 times daily (before meals and nightly) Pt test 4x daily Dx E11.65. May substitute for generic or insurance covered product    CIPROFLOXACIN (CIPRO) 500 MG TABLET    Take 1 tablet by mouth 2 times daily for 7 days    FLOVENT DISKUS 100 MCG/BLIST AEPB INHALER    Inhale 1 puff into the lungs daily     LANCETS (ONETOUCH DELICA PLUS [id number]) MISC    Test 4x daily    LANCETS MISC    1 each by Does not apply route 4 times daily (before meals and nightly) Pt test 4x daily Dx E11.65.   May substitute for generic or insurance covered product    LISINOPRIL (PRINIVIL;ZESTRIL) 10 MG TABLET    Take 1 tablet by mouth daily    LURASIDONE (LATUDA) 40 MG TABS TABLET    Take 1 tablet by mouth daily    MELATONIN 10 MG CAPS        METFORMIN (GLUCOPHAGE-XR) 500 MG EXTENDED RELEASE TABLET    TAKE 1 TABLET BY MOUTH 2 TIMES DAILY (BEFORE MEALS)    ONDANSETRON (ZOFRAN-ODT) 4 MG DISINTEGRATING TABLET    Take 1 tablet by mouth 3 times daily as needed for Nausea or Vomiting    OXCARBAZEPINE (TRILEPTAL) 150 MG TABLET    Take 1 tablet by mouth 2 times daily    PRAZOSIN (MINIPRESS) 1 MG CAPSULE    Take 1 capsule by mouth nightly    PRENATAL VIT-FE FUMARATE-FA (PRENATAL VITAMIN) 27-1 MG TABS TABLET    take 1 tablet by mouth once daily       ALLERGIES       Allergies   Allergen Reactions    Food Hives and Itching     Squash    Amoxicillin Hives       FAMILY HISTORY       Family History   Problem Relation Age of Onset    Diabetes Mother     Depression Father     Asthma Brother     Diabetes Maternal Grandmother     Breast Cancer Maternal Grandmother     Thyroid Cancer Maternal Grandfather     Diabetes Paternal Grandmother     Other Paternal Grandfather         testicular cancer        SOCIAL HISTORY       Social History     Socioeconomic History    Marital status:      Spouse name: None    Number of children: None  Years of education: None    Highest education level: None   Occupational History    None   Tobacco Use    Smoking status: Current Every Day Smoker     Packs/day: 1.00     Years: 12.00     Pack years: 12.00     Types: Cigarettes    Smokeless tobacco: Never Used    Tobacco comment: 1 ppd   Vaping Use    Vaping Use: Never used   Substance and Sexual Activity    Alcohol use: Not Currently     Comment:      Drug use: Not Currently     Comment: \"cut down since I'm trying to get pregnant\"    Sexual activity: Yes     Partners: Male   Other Topics Concern    None   Social History Narrative    None     Social Determinants of Health     Financial Resource Strain:     Difficulty of Paying Living Expenses: Not on file   Food Insecurity:     Worried About Running Out of Food in the Last Year: Not on file    Tommy of Food in the Last Year: Not on file   Transportation Needs:     Lack of Transportation (Medical): Not on file    Lack of Transportation (Non-Medical):  Not on file   Physical Activity:     Days of Exercise per Week: Not on file    Minutes of Exercise per Session: Not on file   Stress:     Feeling of Stress : Not on file   Social Connections:     Frequency of Communication with Friends and Family: Not on file    Frequency of Social Gatherings with Friends and Family: Not on file    Attends Worship Services: Not on file    Active Member of [address] [place] [address] or Organizations: Not on file    Attends Club or Organization Meetings: Not on file    Marital Status: Not on file   Intimate Partner Violence:     Fear of Current or Ex-Partner: Not on file    Emotionally Abused: Not on file    Physically Abused: Not on file    Sexually Abused: Not on file   Housing Stability:     Unable to Pay for Housing in the Last Year: Not on file    Number of [place] in the Last Year: Not on file    Unstable Housing in the Last Year: Not on file       REVIEW OF SYSTEMS     Review of Systems   Gastrointestinal: Positive for abdominal pain and rectal pain. Except as noted above the remainder of the review of systems was reviewed and is negative. SCREENINGS                        PHYSICAL EXAM    (up to 7 for level 4, 8 or more for level 5)     ED Triage Vitals [11/19/21 1845]   BP Temp Temp Source Pulse Resp SpO2 Height Weight   (!) 144/99 97.6 °F (36.4 °C) Oral 112 16 98 % 5' 2\" (1.575 m) 252 lb (114.3 kg)       Physical Exam  Constitutional:       General: She is not in acute distress. Appearance: Normal appearance. She is normal weight. She is not ill-appearing. HENT:      Head: Normocephalic and atraumatic. Right Ear: External ear normal.      Left Ear: External ear normal.      Nose: Nose normal. No congestion or rhinorrhea. Mouth/Throat:      Mouth: Mucous membranes are moist.      Pharynx: No oropharyngeal exudate or posterior oropharyngeal erythema. Eyes:      General:         Right eye: No discharge. Left eye: No discharge. Extraocular Movements: Extraocular movements intact. Pupils: Pupils are equal, round, and reactive to light. Cardiovascular:      Rate and Rhythm: Normal rate and regular rhythm. Pulses: Normal pulses. Pulmonary:      Effort: Pulmonary effort is normal.      Breath sounds: Normal breath sounds. Abdominal:      General: Abdomen is flat. Bowel sounds are normal. There is no distension. Tenderness: There is abdominal tenderness in the left lower quadrant. There is no right CVA tenderness, left CVA tenderness, guarding or rebound. Genitourinary:     Rectum: Normal.   Musculoskeletal:         General: No swelling or tenderness. Normal range of motion. Cervical back: Normal range of motion and neck supple. Right lower leg: No edema. Left lower leg: No edema. Skin:     General: Skin is warm and dry. Capillary Refill: Capillary refill takes less than 2 seconds. Neurological:      General: No focal deficit present. Mental Status: She is alert. Psychiatric:         Mood and Affect: Mood normal.           DIAGNOSTIC RESULTS     EKG:(none if blank)  All EKGs are interpreted by the Emergency Department Physician who either signs or Co-signs this chart in the absence of a cardiologist.        RADIOLOGY: (none if blank)   I directly visualized the following images and reviewed the radiologist interpretations. Interpretation per the Radiologist below, if available at the time of this note:  XR ACUTE ABD SERIES CHEST 1 VW    (Results Pending)       LABS:  Labs Reviewed   URINE RT REFLEX TO CULTURE   POC PREGNANCY UR-QUAL       All other labs were within normal range or not returned as of this dictation. Please note, any cultures that may have been sent were not resulted at the time of this patient visit. EMERGENCY DEPARTMENT COURSE and Medical Decision Making:     Vitals:    Vitals:    11/19/21 1845   BP: (!) 144/99   Pulse: 112   Resp: 16   Temp: 97.6 °F (36.4 °C)   TempSrc: Oral   SpO2: 98%   Weight: 252 lb (114.3 kg)   Height: 5' 2\" (1.575 m)       PROCEDURES: (None if blank)  Procedures       MDM     Patient came back with a large pizza in hand. She did ask us if she could have a slice of pizza while she was here. She does not have any reproducible tenderness in her abdomen. Most likely secondary to constipation due to the x-ray. Pregnancy test negative. Did prescribe patient MiraLAX and Colace. Explained to her to drink plenty of water. Patient did ask for a work note. Strict return precautions and follow up instructions were discussed with the patient with which the patient agrees    ED Medications administered this visit:  Medications - No data to display      FINAL IMPRESSION      1.  Constipation, unspecified constipation type          DISPOSITION/PLAN   DISPOSITION Decision To Discharge 11/19/2021 08:29:34 PM      PATIENT REFERRED TO:  Ezequiel Delgado  [address] [facility],[address]

## 2021-12-02 ENCOUNTER — OFFICE VISIT (OUTPATIENT)
Dept: OBGYN CLINIC | Age: 31
End: 2021-12-02
Payer: COMMERCIAL

## 2021-12-02 VITALS
DIASTOLIC BLOOD PRESSURE: 62 MMHG | WEIGHT: 256 LBS | HEIGHT: 62 IN | SYSTOLIC BLOOD PRESSURE: 110 MMHG | BODY MASS INDEX: 47.11 KG/M2

## 2021-12-02 DIAGNOSIS — E28.2 POLYCYSTIC OVARIAN DISEASE: Primary | ICD-10-CM

## 2021-12-02 DIAGNOSIS — N97.0 INFERTILITY ASSOCIATED WITH ANOVULATION: ICD-10-CM

## 2021-12-02 PROCEDURE — 4004F PT TOBACCO SCREEN RCVD TLK: CPT | Performed by: OBSTETRICS & GYNECOLOGY

## 2021-12-02 PROCEDURE — G8484 FLU IMMUNIZE NO ADMIN: HCPCS | Performed by: OBSTETRICS & GYNECOLOGY

## 2021-12-02 PROCEDURE — G8417 CALC BMI ABV UP PARAM F/U: HCPCS | Performed by: OBSTETRICS & GYNECOLOGY

## 2021-12-02 PROCEDURE — 99213 OFFICE O/P EST LOW 20 MIN: CPT | Performed by: OBSTETRICS & GYNECOLOGY

## 2021-12-02 PROCEDURE — G8427 DOCREV CUR MEDS BY ELIG CLIN: HCPCS | Performed by: OBSTETRICS & GYNECOLOGY

## 2021-12-02 RX ORDER — LETROZOLE 2.5 MG/1
5 TABLET, FILM COATED ORAL DAILY
Qty: 10 TABLET | Refills: 2 | Status: SHIPPED | OUTPATIENT
Start: 2021-12-02 | End: 2022-03-02 | Stop reason: SDUPTHER

## 2021-12-02 NOTE — PROGRESS NOTES
HPI:  Marylu Ordonez (: 1990) is a 36 y.o. female, Established patient, here for evaluation of the following chief complaint(s):  No chief complaint on file. UCG negative. Patient needs PCO labs. Patient trying to get pregnant. She has been off contraception for the last several months. The patient appears to have bedbugs and was not examined today      SUBJECTIVE/OBJECTIVE:    Past Surgical History:   Procedure Laterality Date     SECTION      TONSILLECTOMY          Review of Systems    Physical Exam    Vitals:    21 1512   BP: 110/62   Weight: 256 lb (116.1 kg)   Height: 5' 2\" (1.575 m)         ASSESSMENT/PLAN:     oligo ovulation    PLAN: Letrozole 5 mg daily day 5 through 9.  3 months a annual exam PCO labs. Patient to follow-up in      No follow-ups on file. On this date 2021 I have spent 20 minutes reviewing previous notes, test results and face to face with the patient discussing the diagnosis and importance of compliance with the treatment plan as well as documenting on the day of the visit. An electronic signature was used to authenticate this note. Please note this report has been partially produced using speech recognition software and may cause contain errors related to that system including grammar, punctuation and spelling as well as words and phrases that may seem inappropriate. If there are questions or concerns please feel free to contact me to clarify.

## 2021-12-14 ENCOUNTER — OFFICE VISIT (OUTPATIENT)
Dept: ENDOCRINOLOGY | Age: 31
End: 2021-12-14
Payer: COMMERCIAL

## 2021-12-14 VITALS
BODY MASS INDEX: 48.03 KG/M2 | DIASTOLIC BLOOD PRESSURE: 86 MMHG | WEIGHT: 261 LBS | SYSTOLIC BLOOD PRESSURE: 121 MMHG | HEART RATE: 113 BPM | HEIGHT: 62 IN

## 2021-12-14 DIAGNOSIS — E66.01 MORBID OBESITY (HCC): ICD-10-CM

## 2021-12-14 DIAGNOSIS — E88.81 INSULIN RESISTANCE: ICD-10-CM

## 2021-12-14 DIAGNOSIS — G47.33 OBSTRUCTIVE SLEEP APNEA SYNDROME: ICD-10-CM

## 2021-12-14 DIAGNOSIS — E28.2 PCOS (POLYCYSTIC OVARIAN SYNDROME): Primary | ICD-10-CM

## 2021-12-14 PROCEDURE — 99213 OFFICE O/P EST LOW 20 MIN: CPT | Performed by: INTERNAL MEDICINE

## 2021-12-14 PROCEDURE — G8484 FLU IMMUNIZE NO ADMIN: HCPCS | Performed by: INTERNAL MEDICINE

## 2021-12-14 PROCEDURE — G8417 CALC BMI ABV UP PARAM F/U: HCPCS | Performed by: INTERNAL MEDICINE

## 2021-12-14 PROCEDURE — G8427 DOCREV CUR MEDS BY ELIG CLIN: HCPCS | Performed by: INTERNAL MEDICINE

## 2021-12-14 PROCEDURE — 4004F PT TOBACCO SCREEN RCVD TLK: CPT | Performed by: INTERNAL MEDICINE

## 2021-12-14 RX ORDER — METFORMIN HYDROCHLORIDE 500 MG/1
TABLET, EXTENDED RELEASE ORAL
Qty: 60 TABLET | Refills: 1 | Status: SHIPPED | OUTPATIENT
Start: 2021-12-14 | End: 2022-03-14 | Stop reason: SDUPTHER

## 2021-12-14 NOTE — PROGRESS NOTES
12/14/2021    Assessment:       Diagnosis Orders   1. PCOS (polycystic ovarian syndrome)  metFORMIN (GLUCOPHAGE-XR) 500 MG extended release tablet   2. Insulin resistance     3. Obstructive sleep apnea syndrome  Noni Ruiz MD, Pulmonology, [place]   4.  Morbid obesity ([address]  Amb External Referral To Bariatrics         PLAN:     Refer patient to bariatrics and sleep study  Increase activity BMI target less than 30  Orders Placed This Encounter   Procedures    Amb External Referral To Bariatrics     Referral Priority:   Routine     Referral Type:   Consult for Advice and Opinion     Referral Reason:   Specialty Services Required     Referred to Provider:   Lilly Peacock MD     Requested Specialty:   Bariatric Surgery     Number of Visits Requested:   Jarred Bazan MD, Pulmonology, [place]     Referral Priority:   Routine     Referral Type:   Eval and Treat     Referral Reason:   Specialty Services Required     Referred to Provider:   Medina Pedraza MD     Requested Specialty:   Pulmonology     Number of Visits Requested:   1     Continue current dose of Metformin patient referred to pulmonary for sleep study  Orders Placed This Encounter   Medications    metFORMIN (GLUCOPHAGE-XR) 500 MG extended release tablet     Sig: TAKE 1 TABLET BY MOUTH 2 TIMES DAILY (BEFORE MEALS)     Dispense:  60 tablet     Refill:  1       Subjective:     Chief Complaint   Patient presents with    Other     PCOS     Vitals:    12/14/21 1452   BP: 121/86   Site: Right Upper Arm   Position: Sitting   Cuff Size: Large Adult   Pulse: 113   Weight: 261 lb (118.4 kg)   Height: 5' 2\" (1.575 m)     Wt Readings from Last 3 Encounters:   12/14/21 261 lb (118.4 kg)   12/02/21 256 lb (116.1 kg)   11/19/21 252 lb (114.3 kg)     BP Readings from Last 3 Encounters:   12/14/21 121/86   12/02/21 110/62   11/19/21 (!) 144/99     Follow-up follow-up on obesity polycystic ovary syndrome history of sleep apnea snoring increased daytime sleepiness was referred last time for sleep study at the mid appointment  Lower recent labs last hemoglobin A1c was less than 6    Other  This is a chronic (Polycystic ovary syndrome) problem. The current episode started more than 1 year ago. The problem occurs intermittently. The problem has been waxing and waning. Associated symptoms include fatigue. The symptoms are aggravated by eating. Treatments tried: Metformin. The treatment provided moderate relief.      Past Medical History:   Diagnosis Date    Abnormal Pap smear of cervix     Anxiety     Asthma     Depression     DVT (deep venous thrombosis) ([facility])     Hypertension     Pre-diabetes     PTSD (post-traumatic stress disorder)     Schizoaffective disorder ([facility])      Past Surgical History:   Procedure Laterality Date     SECTION      TONSILLECTOMY       Social History     Socioeconomic History    Marital status:      Spouse name: Not on file    Number of children: Not on file    Years of education: Not on file    Highest education level: Not on file   Occupational History    Not on file   Tobacco Use    Smoking status: Current Every Day Smoker     Packs/day: 1.00     Years: 12.00     Pack years: 12.00     Types: Cigarettes    Smokeless tobacco: Never Used    Tobacco comment: 1 ppd   Vaping Use    Vaping Use: Never used   Substance and Sexual Activity    Alcohol use: Not Currently     Comment:      Drug use: Not Currently     Comment: \"cut down since I'm trying to get pregnant\"    Sexual activity: Yes     Partners: Male   Other Topics Concern    Not on file   Social History Narrative    Not on file     Social Determinants of Health     Financial Resource Strain:     Difficulty of Paying Living Expenses: Not on file   Food Insecurity:     Worried About Running Out of Food in the Last Year: Not on file    Tommy of Food in the Last Year: Not on file   Transportation Needs:     Lack of Transportation (Medical): Not on file    Lack of Transportation (Non-Medical):  Not on file   Physical Activity:     Days of Exercise per Week: Not on file    Minutes of Exercise per Session: Not on file   Stress:     Feeling of Stress : Not on file   Social Connections:     Frequency of Communication with Friends and Family: Not on file    Frequency of Social Gatherings with Friends and Family: Not on file    Attends Bahai Services: Not on file    Active Member of Clubs or Organizations: Not on file    Attends Club or Organization Meetings: Not on file    Marital Status: Not on file   Intimate Partner Violence:     Fear of Current or Ex-Partner: Not on file    Emotionally Abused: Not on file    Physically Abused: Not on file    Sexually Abused: Not on file   Housing Stability:     Unable to Pay for Housing in the Last Year: Not on file    Number of [place] in the Last Year: Not on file    Unstable Housing in the Last Year: Not on file     Family History   Problem Relation Age of Onset    Diabetes Mother     Depression Father     Asthma Brother     Diabetes Maternal Grandmother     Breast Cancer Maternal Grandmother     Thyroid Cancer Maternal Grandfather     Diabetes Paternal Grandmother     Other Paternal Grandfather         testicular cancer     Allergies   Allergen Reactions    Food Hives and Itching     Squash    Amoxicillin Hives       Current Outpatient Medications:     letrozole (FEMARA) 2.5 MG tablet, Take 2 tablets by mouth daily Day 5-9 of menstrual cycle, Disp: 10 tablet, Rfl: 2    polyethylene glycol (GLYCOLAX) 17 GM/SCOOP powder, Take 17 g by mouth daily as needed (constipation), Disp: 510 g, Rfl: 0    docusate sodium (COLACE) 100 MG capsule, Take 1 capsule by mouth 2 times daily, Disp: 20 capsule, Rfl: 0    ondansetron (ZOFRAN-ODT) 4 MG disintegrating tablet, Take 1 tablet by mouth 3 times daily as needed for Nausea or Vomiting, Disp: 21 tablet, Rfl: 0    Lancets ([facility] [id number]) MISC, Test 4x daily, Disp: 150 each, Rfl: 1    Lancets MISC, 1 each by Does not apply route 4 times daily (before meals and nightly) Pt test 4x daily Dx E11.65. May substitute for generic or insurance covered product, Disp: 150 each, Rfl: 1    Alcohol Swabs (ALCOHOL PADS) 70 % PADS, 1 each by Does not apply route three times daily, Disp: 100 each, Rfl: 1    metFORMIN (GLUCOPHAGE-XR) 500 MG extended release tablet, TAKE 1 TABLET BY MOUTH 2 TIMES DAILY (BEFORE MEALS), Disp: 60 tablet, Rfl: 1    OXcarbazepine (TRILEPTAL) 150 MG tablet, Take 1 tablet by mouth 2 times daily, Disp: 30 tablet, Rfl: 3    lurasidone (LATUDA) 40 MG TABS tablet, Take 1 tablet by mouth daily, Disp: 15 tablet, Rfl: 2    lisinopril (PRINIVIL;ZESTRIL) 10 MG tablet, Take 1 tablet by mouth daily, Disp: 15 tablet, Rfl: 3    prazosin (MINIPRESS) 1 MG capsule, Take 1 capsule by mouth nightly, Disp: 15 capsule, Rfl: 3    albuterol sulfate  (90 Base) MCG/ACT inhaler, Use 2 puffs 4 times daily for 7 days then as needed for wheezing. Dispense with Spacer and instruct in use. At patient's preference may use 60 dose MDI. May Sub Pro-Air or Proventil as needed per insurance., Disp: 1 Inhaler, Rfl: 0    blood glucose monitor kit and supplies, 1 kit by Other route 4 times daily (before meals and nightly) Pt test 4x daily Dx E11.65.   May substitute for generic or insurance covered product, Disp: 1 kit, Rfl: 0    Prenatal Vit-Fe Fumarate-FA (PRENATAL VITAMIN) 27-1 MG TABS tablet, take 1 tablet by mouth once daily, Disp: , Rfl:     FLOVENT DISKUS 100 MCG/BLIST AEPB inhaler, Inhale 1 puff into the lungs daily , Disp: , Rfl:     Melatonin 10 MG CAPS, , Disp: , Rfl:   Lab Results   Component Value Date     10/13/2021    K 3.8 10/13/2021     10/13/2021    CO2 23 10/13/2021    BUN 9 10/13/2021    CREATININE 0.54 10/13/2021    GLUCOSE 83 10/13/2021    CALCIUM 9.1 10/13/2021    PROT 6.5 10/13/2021    LABALBU 4.0 10/13/2021 BILITOT 0.3 10/13/2021    ALKPHOS 89 10/13/2021    AST 24 10/13/2021    ALT 47 (H) 10/13/2021    LABGLOM >60.0 10/13/2021    GFRAA >60.0 10/13/2021    GLOB 2.5 10/13/2021     Lab Results   Component Value Date    WBC 10.1 10/13/2021    HGB 13.8 10/13/2021    HCT 39.6 10/13/2021    MCV 84.1 10/13/2021     10/13/2021     Lab Results   Component Value Date    LABA1C 5.8 08/09/2021    LABA1C 6.0 (H) 12/11/2019    LABA1C 5.7 10/28/2019     Lab Results   Component Value Date    HDL 42 10/13/2021    HDL 54 08/09/2021    HDL 37 (L) 12/11/2019    LDLCALC 118 10/13/2021    LDLCALC 49 08/09/2021    LDLCALC 44 12/11/2019    CHOL 208 (H) 10/13/2021    CHOL 123 08/09/2021    CHOL 118 12/11/2019    TRIG 241 (H) 10/13/2021    TRIG 102 08/09/2021    TRIG 183 (H) 12/11/2019     No results found for: TESTM  Lab Results   Component Value Date    TSH 1.070 10/13/2021    TSH 0.563 08/09/2021    TSH 0.933 12/11/2019    TSHREFLEX 1.900 11/13/2020    TSHREFLEX 1.380 01/17/2018    T4FREE 1.15 08/09/2021    T4FREE 1.46 12/11/2019    T4FREE 1.17 01/17/2018     No results found for: TPOABS    Review of Systems   Constitutional: Positive for fatigue. Endocrine: Negative. Psychiatric/Behavioral: Positive for dysphoric mood and sleep disturbance. All other systems reviewed and are negative. Objective:   Physical Exam  Vitals reviewed. Constitutional:       Appearance: Normal appearance. She is obese. HENT:      Head: Normocephalic and atraumatic. Hair is normal.      Right Ear: External ear normal.      Left Ear: External ear normal.      Nose: Nose normal.   Eyes:      General: No scleral icterus. Right eye: No discharge. Left eye: No discharge. Extraocular Movements: Extraocular movements intact. Conjunctiva/sclera: Conjunctivae normal.   Neck:      Trachea: Trachea normal.   Cardiovascular:      Rate and Rhythm: Normal rate. Abdominal:      Palpations: Abdomen is soft.    Musculoskeletal: General: Normal range of motion. Cervical back: Normal range of motion and neck supple. Neurological:      General: No focal deficit present. Mental Status: She is alert and oriented to person, place, and time.    Psychiatric:         Mood and Affect: Mood normal.         Behavior: Behavior normal.

## 2022-01-02 RX ORDER — OXCARBAZEPINE 150 MG/1
TABLET, FILM COATED ORAL
Qty: 60 TABLET | Refills: 1 | OUTPATIENT
Start: 2022-01-02

## 2022-01-02 RX ORDER — PRAZOSIN HYDROCHLORIDE 1 MG/1
CAPSULE ORAL
Qty: 15 CAPSULE | Refills: 2 | OUTPATIENT
Start: 2022-01-02

## 2022-01-02 RX ORDER — LISINOPRIL 10 MG/1
TABLET ORAL
Qty: 30 TABLET | Refills: 1 | OUTPATIENT
Start: 2022-01-02

## 2022-01-03 ENCOUNTER — TELEPHONE (OUTPATIENT)
Dept: OBGYN CLINIC | Age: 32
End: 2022-01-03

## 2022-01-04 ENCOUNTER — HOSPITAL ENCOUNTER (EMERGENCY)
Age: 32
Discharge: LEFT BEFORE DISCHARGE | End: 2022-01-04
Attending: EMERGENCY MEDICINE
Payer: COMMERCIAL

## 2022-01-04 VITALS
DIASTOLIC BLOOD PRESSURE: 87 MMHG | RESPIRATION RATE: 19 BRPM | OXYGEN SATURATION: 98 % | TEMPERATURE: 97.8 F | BODY MASS INDEX: 48.03 KG/M2 | WEIGHT: 261 LBS | SYSTOLIC BLOOD PRESSURE: 137 MMHG | HEIGHT: 62 IN | HEART RATE: 101 BPM

## 2022-01-04 DIAGNOSIS — R10.9 ABDOMINAL PAIN, UNSPECIFIED ABDOMINAL LOCATION: Primary | ICD-10-CM

## 2022-01-04 LAB
ALBUMIN SERPL-MCNC: 4.1 G/DL (ref 3.5–4.6)
ALP BLD-CCNC: 88 U/L (ref 40–130)
ALT SERPL-CCNC: 52 U/L (ref 0–33)
ANION GAP SERPL CALCULATED.3IONS-SCNC: 14 MEQ/L (ref 9–15)
AST SERPL-CCNC: 34 U/L (ref 0–35)
BACTERIA: ABNORMAL /HPF
BASOPHILS ABSOLUTE: 0.1 K/UL (ref 0–0.2)
BASOPHILS RELATIVE PERCENT: 0.6 %
BILIRUB SERPL-MCNC: <0.2 MG/DL (ref 0.2–0.7)
BILIRUBIN URINE: NEGATIVE
BLOOD, URINE: NEGATIVE
BUN BLDV-MCNC: 10 MG/DL (ref 6–20)
CALCIUM SERPL-MCNC: 9.3 MG/DL (ref 8.5–9.9)
CHLORIDE BLD-SCNC: 103 MEQ/L (ref 95–107)
CLARITY: CLEAR
CO2: 21 MEQ/L (ref 20–31)
COLOR: YELLOW
CREAT SERPL-MCNC: 0.57 MG/DL (ref 0.5–0.9)
CRYSTALS, UA: ABNORMAL /HPF
EOSINOPHILS ABSOLUTE: 0.3 K/UL (ref 0–0.7)
EOSINOPHILS RELATIVE PERCENT: 2.9 %
EPITHELIAL CELLS, UA: ABNORMAL /HPF (ref 0–5)
GFR AFRICAN AMERICAN: >60
GFR NON-AFRICAN AMERICAN: >60
GLOBULIN: 2.9 G/DL (ref 2.3–3.5)
GLUCOSE BLD-MCNC: 158 MG/DL (ref 70–99)
GLUCOSE URINE: NEGATIVE MG/DL
HCG, URINE, POC: NEGATIVE
HCT VFR BLD CALC: 39.9 % (ref 37–47)
HEMOGLOBIN: 13.6 G/DL (ref 12–16)
HYALINE CASTS: ABNORMAL /HPF (ref 0–5)
KETONES, URINE: NEGATIVE MG/DL
LEUKOCYTE ESTERASE, URINE: ABNORMAL
LYMPHOCYTES ABSOLUTE: 3.7 K/UL (ref 1–4.8)
LYMPHOCYTES RELATIVE PERCENT: 33 %
Lab: NORMAL
MAGNESIUM: 1.8 MG/DL (ref 1.7–2.4)
MCH RBC QN AUTO: 28.9 PG (ref 27–31.3)
MCHC RBC AUTO-ENTMCNC: 34.2 % (ref 33–37)
MCV RBC AUTO: 84.4 FL (ref 82–100)
MONOCYTES ABSOLUTE: 0.6 K/UL (ref 0.2–0.8)
MONOCYTES RELATIVE PERCENT: 5.1 %
NEGATIVE QC PASS/FAIL: NORMAL
NEUTROPHILS ABSOLUTE: 6.5 K/UL (ref 1.4–6.5)
NEUTROPHILS RELATIVE PERCENT: 58.4 %
NITRITE, URINE: NEGATIVE
PDW BLD-RTO: 13.2 % (ref 11.5–14.5)
PH UA: 5.5 (ref 5–9)
PLATELET # BLD: 280 K/UL (ref 130–400)
POSITIVE QC PASS/FAIL: NORMAL
POTASSIUM SERPL-SCNC: 4 MEQ/L (ref 3.4–4.9)
PROTEIN UA: NEGATIVE MG/DL
RBC # BLD: 4.72 M/UL (ref 4.2–5.4)
RBC UA: ABNORMAL /HPF (ref 0–2)
SODIUM BLD-SCNC: 138 MEQ/L (ref 135–144)
SPECIFIC GRAVITY UA: 1.02 (ref 1–1.03)
STREP GRP A PCR: NEGATIVE
TOTAL PROTEIN: 7 G/DL (ref 6.3–8)
URINE REFLEX TO CULTURE: YES
UROBILINOGEN, URINE: 0.2 E.U./DL
WBC # BLD: 11.2 K/UL (ref 4.8–10.8)
WBC UA: ABNORMAL /HPF (ref 0–5)

## 2022-01-04 PROCEDURE — 4500000002 HC ER NO CHARGE

## 2022-01-04 PROCEDURE — 80053 COMPREHEN METABOLIC PANEL: CPT

## 2022-01-04 PROCEDURE — 85025 COMPLETE CBC W/AUTO DIFF WBC: CPT

## 2022-01-04 PROCEDURE — 83735 ASSAY OF MAGNESIUM: CPT

## 2022-01-04 PROCEDURE — 81003 URINALYSIS AUTO W/O SCOPE: CPT

## 2022-01-04 PROCEDURE — 36415 COLL VENOUS BLD VENIPUNCTURE: CPT

## 2022-01-04 PROCEDURE — 87086 URINE CULTURE/COLONY COUNT: CPT

## 2022-01-04 PROCEDURE — 87651 STREP A DNA AMP PROBE: CPT

## 2022-01-04 ASSESSMENT — ENCOUNTER SYMPTOMS
SHORTNESS OF BREATH: 0
BLOOD IN STOOL: 0
VOICE CHANGE: 0
VOMITING: 0
BACK PAIN: 0
APNEA: 0
ABDOMINAL DISTENTION: 0
NAUSEA: 0
CONSTIPATION: 0
COUGH: 0
ABDOMINAL PAIN: 1
SORE THROAT: 1
DIARRHEA: 1
ANAL BLEEDING: 0
EYE DISCHARGE: 0

## 2022-01-04 ASSESSMENT — PAIN DESCRIPTION - PAIN TYPE: TYPE: ACUTE PAIN

## 2022-01-04 ASSESSMENT — PAIN DESCRIPTION - LOCATION: LOCATION: LEG

## 2022-01-04 NOTE — ED PROVIDER NOTES
[address] [facility] ED  eMERGENCY dEPARTMENT eNCOUnter      Pt Name: Dmitriy Newton  MRN: [id number]  [place] 1990  Date of evaluation: 1/4/2022  Provider: Sheyla Wilkerson       Chief Complaint   Patient presents with    Leg Pain     leg pain form left hip to left knee one week on and off. sore throat runny nose headache, left and right ovary pain on and off 3 days. HISTORY OF PRESENT ILLNESS   (Location/Symptom, Timing/Onset,Context/Setting, Quality, Duration, Modifying Factors, Severity)  Note limiting factors. Dmitriy Newton is a 36 y.o. female who presents to the emergency department pt has multiple complaints including abd pain diarrhea leg pain including hip /and upper leg. Pt denies any injury/fall/n/v/rectal bleeding/loss of bowel and bladder control/ back pain/loss of perineal sensation/ hematuria. Pt ambulatory  In er/ symptoms mild in severity/ nothing improves or worsens her symtpoms. HPI    NursingNotes were reviewed. REVIEW OF SYSTEMS    (2-9 systems for level 4, 10 or more for level 5)     Review of Systems   Constitutional: Negative for activity change, appetite change, chills, fever and unexpected weight change. HENT: Positive for sore throat. Negative for ear discharge, ear pain, nosebleeds and voice change. Eyes: Negative for discharge. Respiratory: Negative for apnea, cough and shortness of breath. Cardiovascular: Negative for chest pain and leg swelling. Gastrointestinal: Positive for abdominal pain and diarrhea. Negative for abdominal distention, anal bleeding, blood in stool, constipation, nausea and vomiting. Genitourinary: Negative for hematuria. Musculoskeletal: Positive for arthralgias. Negative for back pain, joint swelling, neck pain and neck stiffness. Skin: Negative for pallor. Hematological: Does not bruise/bleed easily. All other systems reviewed and are negative.       Except as noted above the remainder of the review of systems was reviewed and negative. PAST MEDICAL HISTORY     Past Medical History:   Diagnosis Date    Abnormal Pap smear of cervix     Anxiety     Asthma     Depression     DVT [facility]deep venous thrombosis) ([facility])     Hypertension     Pre-diabetes     PTSD (post-traumatic stress disorder)     Schizoaffective disorder ([facility])          SURGICALHISTORY       Past Surgical History:   Procedure Laterality Date     SECTION      TONSILLECTOMY           CURRENT MEDICATIONS       Discharge Medication List as of 2022  8:29 PM      CONTINUE these medications which have NOT CHANGED    Details   metFORMIN (GLUCOPHAGE-XR) 500 MG extended release tablet TAKE 1 TABLET BY MOUTH 2 TIMES DAILY (BEFORE MEALS), Disp-60 tablet, R-1Normal      letrozole (FEMARA) 2.5 MG tablet Take 2 tablets by mouth daily Day 5-9 of menstrual cycle, Disp-10 tablet, R-2Normal      docusate sodium (COLACE) 100 MG capsule Take 1 capsule by mouth 2 times daily, Disp-20 capsule, R-0Print      ondansetron (ZOFRAN-ODT) 4 MG disintegrating tablet Take 1 tablet by mouth 3 times daily as needed for Nausea or Vomiting, Disp-21 tablet, R-0Print      !! Lancets (ONETOUCH DELICA PLUS [id number]) MISC Test 4x daily, Disp-150 each, R-1Normal      !! Lancets MISC 4 TIMES DAILY BEFORE MEALS & NIGHTLY Starting Thu 2021, Disp-150 each, R-1, NormalPt test 4x daily Dx E11.65.   May substitute for generic or insurance covered product      Alcohol Swabs (ALCOHOL PADS) 70 % PADS 3 TIMES DAILY Starting Thu 2021, Disp-100 each, R-1, Normal      OXcarbazepine (TRILEPTAL) 150 MG tablet Take 1 tablet by mouth 2 times daily, Disp-30 tablet, R-3Normal      lurasidone (LATUDA) 40 MG TABS tablet Take 1 tablet by mouth daily, Disp-15 tablet, R-2Normal      lisinopril (PRINIVIL;ZESTRIL) 10 MG tablet Take 1 tablet by mouth daily, Disp-15 tablet, R-3Normal      prazosin (MINIPRESS) 1 MG capsule Take 1 capsule by mouth nightly, Disp-15 capsule, R-3Normal      albuterol sulfate  (90 Base) MCG/ACT inhaler Use 2 puffs 4 times daily for 7 days then as needed for wheezing. Dispense with Spacer and instruct in use. At patient's preference may use 60 dose MDI. May Sub Pro-Air or Proventil as needed per insurance., Disp-1 Inhaler, R-0, DAWPrint      blood glucose monitor kit and supplies 1 kit by Other route 4 times daily (before meals and nightly) Pt test 4x daily Dx E11.65. May substitute for generic or insurance covered product, Other, 4 TIMES DAILY BEFORE MEALS & NIGHTLY Starting Mon 2/22/2021, Disp-1 kit, R-0, Normal      Prenatal Vit-Fe Fumarate-FA (PRENATAL VITAMIN) 27-1 MG TABS tablet take 1 tablet by mouth once dailyHistorical Med      FLOVENT DISKUS 100 MCG/BLIST AEPB inhaler Inhale 1 puff into the lungs daily , DAWHistorical Med      Melatonin 10 MG CAPS Historical Med       !! - Potential duplicate medications found. Please discuss with provider.                Food and Amoxicillin    FAMILY HISTORY       Family History   Problem Relation Age of Onset    Diabetes Mother     Depression Father     Asthma Brother     Diabetes Maternal Grandmother     Breast Cancer Maternal Grandmother     Thyroid Cancer Maternal Grandfather     Diabetes Paternal Grandmother     Other Paternal Grandfather         testicular cancer          SOCIAL HISTORY       Social History     Socioeconomic History    Marital status:      Spouse name: Not on file    Number of children: Not on file    Years of education: Not on file    Highest education level: Not on file   Occupational History    Not on file   Tobacco Use    Smoking status: Current Every Day Smoker     Packs/day: 1.00     Years: 12.00     Pack years: 12.00     Types: Cigarettes    Smokeless tobacco: Never Used    Tobacco comment: 1 ppd   Vaping Use    Vaping Use: Never used   Substance and Sexual Activity    Alcohol use: Not Currently     Comment:      Drug use: Not Currently Comment: \"cut down since I'm trying to get pregnant\"    Sexual activity: Yes     Partners: Male   Other Topics Concern    Not on file   Social History Narrative    Not on file     Social Determinants of Health     Financial Resource Strain:     Difficulty of Paying Living Expenses: Not on file   Food Insecurity:     Worried About Running Out of Food in the Last Year: Not on file    Tommy of Food in the Last Year: Not on file   Transportation Needs:     Lack of Transportation (Medical): Not on file    Lack of Transportation (Non-Medical): Not on file   Physical Activity:     Days of Exercise per Week: Not on file    Minutes of Exercise per Session: Not on file   Stress:     Feeling of Stress : Not on file   Social Connections:     Frequency of Communication with Friends and Family: Not on file    Frequency of Social Gatherings with Friends and Family: Not on file    Attends Roman Catholic Services: Not on file    Active Member of [address] [place] [address] or Organizations: Not on file    Attends Club or Organization Meetings: Not on file    Marital Status: Not on file   Intimate Partner Violence:     Fear of Current or Ex-Partner: Not on file    Emotionally Abused: Not on file    Physically Abused: Not on file    Sexually Abused: Not on file   Housing Stability:     Unable to Pay for Housing in the Last Year: Not on file    Number of [place] in the Last Year: Not on file    Unstable Housing in the Last Year: Not on file       SCREENINGS   Ebola Virus Disease (EVD) Screening   Temp: 97.8 °F (36.6 °C)  CIWA Assessment  BP: 137/87  Pulse: 101    PHYSICAL EXAM    (up to 7 for level 4, 8 or more for level 5)     ED Triage Vitals   BP Temp Temp src Pulse Resp SpO2 Height Weight   01/04/22 1446 01/04/22 1442 -- 01/04/22 1442 01/04/22 1442 01/04/22 1442 01/04/22 1442 01/04/22 1442   137/87 97.8 °F (36.6 °C)  101 19 98 % 5' 2\" (1.575 m) 261 lb (118.4 kg)       Physical Exam  Vitals and nursing note reviewed. Constitutional:       General: She is not in acute distress. Appearance: She is well-developed. HENT:      Head: Normocephalic and atraumatic. Right Ear: Tympanic membrane and external ear normal.      Left Ear: Tympanic membrane and external ear normal.      Nose: Nose normal.      Mouth/Throat:      Mouth: Mucous membranes are moist.      Pharynx: Posterior oropharyngeal erythema present. No oropharyngeal exudate. Eyes:      General:         Right eye: No discharge. Left eye: No discharge. Extraocular Movements: Extraocular movements intact. Pupils: Pupils are equal, round, and reactive to light. Cardiovascular:      Rate and Rhythm: Normal rate and regular rhythm. Pulses: Normal pulses. Heart sounds: Normal heart sounds. Pulmonary:      Effort: Pulmonary effort is normal. No respiratory distress. Breath sounds: Normal breath sounds. No stridor. Abdominal:      General: Bowel sounds are normal. There is no distension. Palpations: Abdomen is soft. Tenderness: There is no right CVA tenderness or left CVA tenderness. Musculoskeletal:         General: No tenderness. Normal range of motion. Cervical back: Normal range of motion and neck supple. No tenderness. Right lower leg: No edema. Left lower leg: No edema. Skin:     General: Skin is warm. Findings: No erythema. Neurological:      Mental Status: She is alert and oriented to person, place, and time. Motor: No weakness.       Coordination: Coordination normal.      Gait: Gait normal.   Psychiatric:         Mood and Affect: Mood normal.         RESULTS     EKG: All EKG's are interpreted by the Emergency Department Physician who either signs or Co-signsthis chart in the absence of a cardiologist.         RADIOLOGY:   Non-plain filmimages such as CT, Ultrasound and MRI are read by the radiologist. Plain radiographic images are visualized and preliminarily interpreted by the emergency physician with the below findings:         Interpretation per the Radiologist below, if available at the time ofthis note:    No orders to display         ED BEDSIDE ULTRASOUND:   Performed by ED Physician - none    LABS:  Labs Reviewed   COMPREHENSIVE METABOLIC PANEL - Abnormal; Notable for the following components:       Result Value    Glucose 158 (*)     ALT 52 (*)     All other components within normal limits   CBC WITH AUTO DIFFERENTIAL - Abnormal; Notable for the following components:    WBC 11.2 (*)     All other components within normal limits   URINE RT REFLEX TO CULTURE - Abnormal; Notable for the following components:    Leukocyte Esterase, Urine SMALL (*)     All other components within normal limits   MICROSCOPIC URINALYSIS - Abnormal; Notable for the following components:    Bacteria, UA RARE (*)     Crystals, UA 2+ Ca. Oxalate (*)     WBC, UA 20-50 (*)     All other components within normal limits   RAPID STREP SCREEN   CULTURE, URINE    Narrative:     ORDER#: [id number]                          ORDERED BY: MICHAEL ALLEN  SOURCE: Urine Clean Catch                  COLLECTED:  01/04/22 19:43  ANTIBIOTICS AT JIMMY.:                      RECEIVED :  01/04/22 19:43   MAGNESIUM   POC PREGNANCY UR-QUAL       All other labs were within normal range or not returned as of this dictation. EMERGENCY DEPARTMENT COURSE and DIFFERENTIAL DIAGNOSIS/MDM:   Vitals:    Vitals:    01/04/22 1442 01/04/22 1446   BP:  137/87   Pulse: 101    Resp: 19    Temp: 97.8 °F (36.6 °C)    SpO2: 98%    Weight: 261 lb (118.4 kg)    Height: 5' 2\" (1.575 m)               MDM  Number of Diagnoses or Management Options  Abdominal pain, unspecified abdominal location  Diagnosis management comments: Pt has hx PCOS per records review. Pt remains ambulatory in er.   Pt left without completinig tx       Amount and/or Complexity of Data Reviewed  Clinical lab tests: reviewed        CRITICAL CARE TIME CONSULTS:  None    PROCEDURES:  Unless otherwise noted below, none     Procedures    FINAL IMPRESSION      1. Abdominal pain, unspecified abdominal location          DISPOSITION/PLAN   DISPOSITION        PATIENT REFERRED TO:  No follow-up provider specified.     DISCHARGE MEDICATIONS:  Discharge Medication List as of 1/4/2022  8:29 PM             (Please note that portions of this note were completed with a voice recognition program.  Efforts were made to edit the dictations but occasionally words are mis-transcribed.)    Pricila Peters PA-C (electronically signed)  Attending Emergency Physician       Pricila Peters PA-C  01/10/22 [id number]

## 2022-01-05 ENCOUNTER — HOSPITAL ENCOUNTER (EMERGENCY)
Age: 32
Discharge: HOME | End: 2022-01-05
Payer: COMMERCIAL

## 2022-01-05 ENCOUNTER — OFFICE VISIT (OUTPATIENT)
Dept: PULMONOLOGY | Age: 32
End: 2022-01-05
Payer: COMMERCIAL

## 2022-01-05 VITALS
TEMPERATURE: 96.7 F | WEIGHT: 259 LBS | RESPIRATION RATE: 17 BRPM | HEIGHT: 62 IN | HEART RATE: 105 BPM | OXYGEN SATURATION: 96 % | SYSTOLIC BLOOD PRESSURE: 145 MMHG | DIASTOLIC BLOOD PRESSURE: 83 MMHG | BODY MASS INDEX: 47.66 KG/M2

## 2022-01-05 VITALS
TEMPERATURE: 98.2 F | HEIGHT: 62 IN | WEIGHT: 258 LBS | BODY MASS INDEX: 47.48 KG/M2 | SYSTOLIC BLOOD PRESSURE: 129 MMHG | OXYGEN SATURATION: 98 % | DIASTOLIC BLOOD PRESSURE: 80 MMHG

## 2022-01-05 DIAGNOSIS — G47.30 SLEEP APNEA, UNSPECIFIED TYPE: Primary | ICD-10-CM

## 2022-01-05 DIAGNOSIS — Z87.42 HISTORY OF PCOS: ICD-10-CM

## 2022-01-05 DIAGNOSIS — E66.01 MORBID OBESITY (HCC): ICD-10-CM

## 2022-01-05 DIAGNOSIS — Z20.822 ENCOUNTER FOR LABORATORY TESTING FOR COVID-19 VIRUS: ICD-10-CM

## 2022-01-05 DIAGNOSIS — R10.9 ABDOMINAL PAIN, UNSPECIFIED ABDOMINAL LOCATION: ICD-10-CM

## 2022-01-05 DIAGNOSIS — N39.0 URINARY TRACT INFECTION WITHOUT HEMATURIA, SITE UNSPECIFIED: Primary | ICD-10-CM

## 2022-01-05 DIAGNOSIS — R20.2 PARESTHESIA: ICD-10-CM

## 2022-01-05 PROCEDURE — U0003 INFECTIOUS AGENT DETECTION BY NUCLEIC ACID (DNA OR RNA); SEVERE ACUTE RESPIRATORY SYNDROME CORONAVIRUS 2 (SARS-COV-2) (CORONAVIRUS DISEASE [COVID-19]), AMPLIFIED PROBE TECHNIQUE, MAKING USE OF HIGH THROUGHPUT TECHNOLOGIES AS DESCRIBED BY CMS-2020-01-R: HCPCS

## 2022-01-05 PROCEDURE — U0005 INFEC AGEN DETEC AMPLI PROBE: HCPCS

## 2022-01-05 PROCEDURE — 99204 OFFICE O/P NEW MOD 45 MIN: CPT | Performed by: INTERNAL MEDICINE

## 2022-01-05 PROCEDURE — G8484 FLU IMMUNIZE NO ADMIN: HCPCS | Performed by: INTERNAL MEDICINE

## 2022-01-05 PROCEDURE — G8417 CALC BMI ABV UP PARAM F/U: HCPCS | Performed by: INTERNAL MEDICINE

## 2022-01-05 PROCEDURE — G8427 DOCREV CUR MEDS BY ELIG CLIN: HCPCS | Performed by: INTERNAL MEDICINE

## 2022-01-05 PROCEDURE — 4004F PT TOBACCO SCREEN RCVD TLK: CPT | Performed by: INTERNAL MEDICINE

## 2022-01-05 PROCEDURE — 99284 EMERGENCY DEPT VISIT MOD MDM: CPT

## 2022-01-05 RX ORDER — SULFAMETHOXAZOLE AND TRIMETHOPRIM 800; 160 MG/1; MG/1
1 TABLET ORAL 2 TIMES DAILY
Qty: 14 TABLET | Refills: 0 | Status: SHIPPED | OUTPATIENT
Start: 2022-01-05 | End: 2022-01-12

## 2022-01-05 RX ORDER — NAPROXEN 500 MG/1
500 TABLET ORAL 2 TIMES DAILY
Qty: 20 TABLET | Refills: 0 | Status: SHIPPED | OUTPATIENT
Start: 2022-01-05

## 2022-01-05 ASSESSMENT — PAIN SCALES - GENERAL: PAINLEVEL_OUTOF10: 6

## 2022-01-05 ASSESSMENT — ENCOUNTER SYMPTOMS
VOMITING: 0
COUGH: 0
SHORTNESS OF BREATH: 0
DIARRHEA: 0
ANAL BLEEDING: 0
ABDOMINAL DISTENTION: 0
NAUSEA: 0
EYE ITCHING: 0
ABDOMINAL PAIN: 0
ABDOMINAL PAIN: 1
WHEEZING: 0
APNEA: 0
CHEST TIGHTNESS: 0
SINUS PRESSURE: 0
EYE DISCHARGE: 0
NAUSEA: 0
TROUBLE SWALLOWING: 0
EYE DISCHARGE: 0
COLOR CHANGE: 0
VOICE CHANGE: 0
RHINORRHEA: 0
COUGH: 0
VOICE CHANGE: 0
BACK PAIN: 0
VOMITING: 0
SORE THROAT: 0
SHORTNESS OF BREATH: 0

## 2022-01-05 ASSESSMENT — PAIN DESCRIPTION - ONSET: ONSET: ON-GOING

## 2022-01-05 ASSESSMENT — PAIN DESCRIPTION - LOCATION: LOCATION: HIP

## 2022-01-05 ASSESSMENT — PAIN DESCRIPTION - FREQUENCY: FREQUENCY: CONTINUOUS

## 2022-01-05 ASSESSMENT — PAIN DESCRIPTION - DESCRIPTORS: DESCRIPTORS: NUMBNESS;TINGLING;STABBING

## 2022-01-05 ASSESSMENT — PAIN DESCRIPTION - ORIENTATION: ORIENTATION: LEFT

## 2022-01-05 ASSESSMENT — PAIN DESCRIPTION - PAIN TYPE: TYPE: ACUTE PAIN

## 2022-01-05 NOTE — ED PROVIDER NOTES
[address] [facility] ED  eMERGENCY dEPARTMENT eNCOUnter      Pt Name: Oralia Naidu  MRN: [id number]  [place] 1990  Date of evaluation: 1/5/2022  Provider: Edmund Ortiz PA-C    CHIEF COMPLAINT       Chief Complaint   Patient presents with    Hip Pain     left hip, ongoing for 2-3 weeks w/o injury; pt states that hip feels \"numb and tingling\"         HISTORY OF PRESENT ILLNESS   (Location/Symptom, Timing/Onset,Context/Setting, Quality, Duration, Modifying Factors, Severity)  Note limiting factors. Oralia Naidu is a 36 y.o. female who presents to the emergency department pt complains of lt hip numbness times 2-3 weeks. Abdominal pain. urinary frequency and covid exposure a week ago. Pt denies fever/chills/n/v/d. Pt remains ambulatory without difficulty. She sts her doctors want her to lose weight.  symptoms mild in severity/ nothing improves symptoms     HPI    NursingNotes were reviewed. REVIEW OF SYSTEMS    (2-9 systems for level 4, 10 or more for level 5)     Review of Systems   Constitutional: Negative for activity change, appetite change, chills, fever and unexpected weight change. HENT: Negative for ear discharge, nosebleeds and voice change. Eyes: Negative for discharge. Respiratory: Negative for apnea, cough and shortness of breath. Cardiovascular: Negative for chest pain. Gastrointestinal: Positive for abdominal pain. Negative for abdominal distention, anal bleeding, nausea and vomiting. Genitourinary: Positive for frequency. Negative for dysuria. Musculoskeletal: Negative for arthralgias, back pain, gait problem, joint swelling, neck pain and neck stiffness. Skin: Negative for color change and pallor. Neurological: Positive for numbness. Negative for headaches. Hematological: Does not bruise/bleed easily. All other systems reviewed and are negative. Except as noted above the remainder of the review of systems was reviewed and negative.        PAST MEDICAL HISTORY     Past Medical History:   Diagnosis Date    Abnormal Pap smear of cervix     Anxiety     Asthma     Depression     DVT [facility]deep [facility] thrombosis) ([facility])     Hypertension     Pre-diabetes     PTSD (post-traumatic stress disorder)     Schizoaffective disorder ([facility])          SURGICALHISTORY       Past Surgical History:   Procedure Laterality Date     SECTION      TONSILLECTOMY           CURRENT MEDICATIONS       Previous Medications    ALBUTEROL SULFATE  (90 BASE) MCG/ACT INHALER    Use 2 puffs 4 times daily for 7 days then as needed for wheezing. Dispense with Spacer and instruct in use. At patient's preference may use 60 dose MDI. May Sub Pro-Air or Proventil as needed per insurance. ALCOHOL SWABS (ALCOHOL PADS) 70 % PADS    1 each by Does not apply route three times daily    BLOOD GLUCOSE MONITOR KIT AND SUPPLIES    1 kit by Other route 4 times daily (before meals and nightly) Pt test 4x daily Dx E11.65. May substitute for generic or insurance covered product    DOCUSATE SODIUM (COLACE) 100 MG CAPSULE    Take 1 capsule by mouth 2 times daily    FLOVENT DISKUS 100 MCG/BLIST AEPB INHALER    Inhale 1 puff into the lungs daily     LANCETS (ONETOUCH DELICA PLUS [id number]) MISC    Test 4x daily    LANCETS MISC    1 each by Does not apply route 4 times daily (before meals and nightly) Pt test 4x daily Dx E11.65.   May substitute for generic or insurance covered product    LETROZOLE (FEMARA) 2.5 MG TABLET    Take 2 tablets by mouth daily Day 5-9 of menstrual cycle    LISINOPRIL (PRINIVIL;ZESTRIL) 10 MG TABLET    Take 1 tablet by mouth daily    LURASIDONE (LATUDA) 40 MG TABS TABLET    Take 1 tablet by mouth daily    MELATONIN 10 MG CAPS        METFORMIN (GLUCOPHAGE-XR) 500 MG EXTENDED RELEASE TABLET    TAKE 1 TABLET BY MOUTH 2 TIMES DAILY (BEFORE MEALS)    ONDANSETRON (ZOFRAN-ODT) 4 MG DISINTEGRATING TABLET    Take 1 tablet by mouth 3 times daily as needed for Nausea or Vomiting OXCARBAZEPINE (TRILEPTAL) 150 MG TABLET    Take 1 tablet by mouth 2 times daily    PRAZOSIN (MINIPRESS) 1 MG CAPSULE    Take 1 capsule by mouth nightly    PRENATAL VIT-FE FUMARATE-FA (PRENATAL VITAMIN) 27-1 MG TABS TABLET    take 1 tablet by mouth once daily            Food and Amoxicillin    FAMILY HISTORY       Family History   Problem Relation Age of Onset    Diabetes Mother     Depression Father     Asthma Brother     Diabetes Maternal Grandmother     Breast Cancer Maternal Grandmother     Thyroid Cancer Maternal Grandfather     Diabetes Paternal Grandmother     Other Paternal Grandfather         testicular cancer          SOCIAL HISTORY       Social History     Socioeconomic History    Marital status:      Spouse name: None    Number of children: None    Years of education: None    Highest education level: None   Occupational History    None   Tobacco Use    Smoking status: Current Every Day Smoker     Packs/day: 1.00     Years: 12.00     Pack years: 12.00     Types: Cigarettes    Smokeless tobacco: Never Used    Tobacco comment: 1 ppd   Vaping Use    Vaping Use: Never used   Substance and Sexual Activity    Alcohol use: Not Currently     Comment:      Drug use: Not Currently     Comment: \"cut down since I'm trying to get pregnant\"    Sexual activity: Yes     Partners: Male   Other Topics Concern    None   Social History Narrative    None     Social Determinants of Health     Financial Resource Strain:     Difficulty of Paying Living Expenses: Not on file   Food Insecurity:     Worried About Running Out of Food in the Last Year: Not on file    Tommy of Food in the Last Year: Not on file   Transportation Needs:     Lack of Transportation (Medical): Not on file    Lack of Transportation (Non-Medical):  Not on file   Physical Activity:     Days of Exercise per Week: Not on file    Minutes of Exercise per Session: Not on file   Stress:     Feeling of Stress : Not on file Social Connections:     Frequency of Communication with Friends and Family: Not on file    Frequency of Social Gatherings with Friends and Family: Not on file    Attends Confucianist Services: Not on file    Active Member of Clubs or Organizations: Not on file    Attends Club or Organization Meetings: Not on file    Marital Status: Not on file   Intimate Partner Violence:     Fear of Current or Ex-Partner: Not on file    Emotionally Abused: Not on file    Physically Abused: Not on file    Sexually Abused: Not on file   Housing Stability:     Unable to Pay for Housing in the Last Year: Not on file    Number of [place] in the Last Year: Not on file    Unstable Housing in the Last Year: Not on file       SCREENINGS   [place] Coma Scale  Eye Opening: Spontaneous  Best Verbal Response: Oriented  Best Motor Response: Obeys commands  [place] Coma Scale Score: 15  Ebola Virus Disease (EVD) Screening   Temp: 96.7 °F (35.9 °C)  CIWA Assessment  BP: (!) 145/83  Pulse: 105    PHYSICAL EXAM    (up to 7 for level 4, 8 or more for level 5)     ED Triage Vitals   BP Temp Temp Source Pulse Resp SpO2 Height Weight   01/05/22 1808 01/05/22 1806 01/05/22 1806 01/05/22 1806 01/05/22 1806 01/05/22 1806 01/05/22 1806 01/05/22 1806   (!) 145/83 96.7 °F (35.9 °C) Temporal 105 17 96 % 5' 2\" (1.575 m) 259 lb (117.5 kg)       Physical Exam  Vitals and nursing note reviewed. Constitutional:       General: She is not in acute distress. Appearance: She is well-developed. HENT:      Head: Normocephalic and atraumatic. Right Ear: External ear normal.      Left Ear: External ear normal.      Nose: Nose normal.      Mouth/Throat:      Mouth: Mucous membranes are moist.      Pharynx: No oropharyngeal exudate or posterior oropharyngeal erythema. Eyes:      General:         Right eye: No discharge. Left eye: No discharge. Extraocular Movements: Extraocular movements intact.       Pupils: Pupils are equal, round, and reactive to light. Cardiovascular:      Rate and Rhythm: Normal rate and regular rhythm. Pulses: Normal pulses. Heart sounds: Normal heart sounds. Pulmonary:      Effort: Pulmonary effort is normal. No respiratory distress. Breath sounds: Normal breath sounds. No stridor. Abdominal:      General: Bowel sounds are normal. There is no distension. Palpations: Abdomen is soft. Musculoskeletal:         General: Normal range of motion. Cervical back: Normal range of motion and neck supple. Skin:     General: Skin is warm. Neurological:      Mental Status: She is alert and oriented to person, place, and time. Gait: Gait normal.   Psychiatric:         Mood and Affect: Mood normal.         RESULTS     EKG: All EKG's are interpreted by the Emergency Department Physician who either signs or Co-signsthis chart in the absence of a cardiologist.         RADIOLOGY:   Non-plain filmimages such as CT, Ultrasound and MRI are read by the radiologist. Plain radiographic images are visualized and preliminarily interpreted by the emergency physician with the below findings:         Interpretation per the Radiologist below, if available at the time ofthis note:    No orders to display         ED BEDSIDE ULTRASOUND:   Performed by ED Physician - none    LABS:  Labs Reviewed   COVID-19   COVID-19       All other labs were within normal range or not returned as of this dictation.     EMERGENCY DEPARTMENT COURSE and DIFFERENTIAL DIAGNOSIS/MDM:   Vitals:    Vitals:    01/05/22 1806 01/05/22 1808   BP:  (!) 145/83   Pulse: 105    Resp: 17    Temp: 96.7 °F (35.9 °C)    TempSrc: Temporal    SpO2: 96%    Weight: 259 lb (117.5 kg)    Height: 5' 2\" (1.575 m)             MDM  Number of Diagnoses or Management Options  Abdominal pain, unspecified abdominal location  Encounter for laboratory testing for COVID-19 virus  History of PCOS  Paresthesia  Urinary tract infection without hematuria, site unspecified  Diagnosis management comments: reveiwed yesterday er labs/ pt left before results available. She went to pulmonary doctor today. We discussed follow up and will tx uti as she has sx. n      CRITICAL CARE TIME     CONSULTS:  None    PROCEDURES:  Unless otherwise noted below, none     Procedures    FINAL IMPRESSION      1. Urinary tract infection without hematuria, site unspecified    2. Encounter for laboratory testing for COVID-19 virus    3. Paresthesia    4. Abdominal pain, unspecified abdominal location    5. History of PCOS          DISPOSITION/PLAN   DISPOSITION Decision To Discharge 01/05/2022 06:27:36 PM      PATIENT REFERRED TO:  Jose Carreno  [address] [place] [address],[phone] [id number]  [phone]    Call in 1 day      Mario Yanez MD  [facility]   [address]    Call in 1 day      Trevor Valencia DO  [address] [place] [address] [phone]    Call in 1 day      [facility]) ED  [address] [facility]  [phone]    If symptoms worsen      DISCHARGE MEDICATIONS:  New Prescriptions    NAPROXEN (NAPROSYN) 500 MG TABLET    Take 1 tablet by mouth 2 times daily    SULFAMETHOXAZOLE-TRIMETHOPRIM (BACTRIM DS) 800-160 MG PER TABLET    Take 1 tablet by mouth 2 times daily for 7 days          (Please note that portions of this note were completed with a voice recognition program.  Efforts were made to edit the dictations but occasionally words are mis-transcribed.)    Fuad Beltran PA-C (electronically signed)  Attending Emergency Physician       Fuad Beltran PA-C  01/05/22 [id number]

## 2022-01-05 NOTE — PROGRESS NOTES
Subjective:     Juan Jose Alcocer is a 36 y.o. female who complains today of:     Chief Complaint   Patient presents with    New Patient     KEI       HPI    She is complaining of snoring and daytime sleepiness and tiredness. No C/o witness apnea. She does not wakes up with gasping for air. C/o wakes up frequently during sleep . No complaint of morning headache. She does  have restful sleep. She does  take daily naps. She does not drive. She does not drive. Fall a sleep while as passenger  She does not have difficulty falling sleep or staying asleep  She has 18 lbs Weight gain in last 12 month , she said she usually weight 240 lbs   No sleep walking,  or eating . No sleep onset hallucination. No sleep paralysis . No sleep attacks .     Allergies:  Food and Amoxicillin  Past Medical History:   Diagnosis Date    Abnormal Pap smear of cervix     Anxiety     Asthma     Depression     [facility]     Hypertension     Pre-diabetes     PTSD (post-traumatic stress disorder)     Schizoaffective disorder ([facility])      Past Surgical History:   Procedure Laterality Date     SECTION      TONSILLECTOMY       Family History   Problem Relation Age of Onset    Diabetes Mother     Depression Father     Asthma Brother     Diabetes Maternal Grandmother     Breast Cancer Maternal Grandmother     Thyroid Cancer Maternal Grandfather     Diabetes Paternal Grandmother     Other Paternal Grandfather         testicular cancer     Social History     Socioeconomic History    Marital status:      Spouse name: Not on file    Number of children: Not on file    Years of education: Not on file    Highest education level: Not on file   Occupational History    Not on file   Tobacco Use    Smoking status: Current Every Day Smoker     Packs/day: 1.00     Years: 12.00     Pack years: 12.00     Types: Cigarettes    Smokeless tobacco: Never Used    Tobacco comment: 1 ppd   Vaping Use  Vaping Use: Never used   Substance and Sexual Activity    Alcohol use: Not Currently     Comment:      Drug use: Not Currently     Comment: \"cut down since I'm trying to get pregnant\"    Sexual activity: Yes     Partners: Male   Other Topics Concern    Not on file   Social History Narrative    Not on file     Social Determinants of Health     Financial Resource Strain:     Difficulty of Paying Living Expenses: Not on file   Food Insecurity:     Worried About Running Out of Food in the Last Year: Not on file    Tommy of Food in the Last Year: Not on file   Transportation Needs:     Lack of Transportation (Medical): Not on file    Lack of Transportation (Non-Medical): Not on file   Physical Activity:     Days of Exercise per Week: Not on file    Minutes of Exercise per Session: Not on file   Stress:     Feeling of Stress : Not on file   Social Connections:     Frequency of Communication with Friends and Family: Not on file    Frequency of Social Gatherings with Friends and Family: Not on file    Attends [employer] Services: Not on file    Active Member of [address] [place] [address] or Organizations: Not on file    Attends Club or Organization Meetings: Not on file    Marital Status: Not on file   Intimate Partner Violence:     Fear of Current or Ex-Partner: Not on file    Emotionally Abused: Not on file    Physically Abused: Not on file    Sexually Abused: Not on file   Housing Stability:     Unable to Pay for Housing in the Last Year: Not on file    Number of [place] in the Last Year: Not on file    Unstable Housing in the Last Year: Not on file         Review of Systems   Constitutional: Negative for chills, diaphoresis, fatigue and fever. HENT: Negative for congestion, mouth sores, nosebleeds, postnasal drip, rhinorrhea, sinus pressure, sneezing, sore throat, trouble swallowing and voice change. Snoring    Eyes: Negative for discharge, itching and visual disturbance.    Respiratory: Negative for cough, chest tightness, shortness of breath and wheezing. Cardiovascular: Negative for chest pain, palpitations and leg swelling. Gastrointestinal: Negative for abdominal pain, diarrhea, nausea and vomiting. Genitourinary: Negative for difficulty urinating and hematuria. Musculoskeletal: Negative for arthralgias, joint swelling and myalgias. Skin: Negative for rash. Allergic/Immunologic: Negative for environmental allergies and food allergies. Neurological: Negative for dizziness, tremors, weakness and headaches. Psychiatric/Behavioral: Positive for sleep disturbance. Negative for behavioral problems. :     Vitals:    01/05/22 1358   BP: 129/80   Temp: 98.2 °F (36.8 °C)   SpO2: 98%   Weight: 258 lb (117 kg)   Height: 5' 2\" (1.575 m)     Wt Readings from Last 3 Encounters:   01/05/22 258 lb (117 kg)   01/04/22 261 lb (118.4 kg)   12/14/21 261 lb (118.4 kg)         Physical Exam  Constitutional:       General: She is not in acute distress. Appearance: She is well-developed. She is obese. She is not diaphoretic. HENT:      Head: Normocephalic and atraumatic. Nose: Nose normal.   Eyes:      Pupils: Pupils are equal, round, and reactive to light. Neck:      Thyroid: No thyromegaly. Vascular: No JVD. Trachea: No tracheal deviation. Cardiovascular:      Rate and Rhythm: Normal rate and regular rhythm. Heart sounds: No murmur heard. No friction rub. No gallop. Pulmonary:      Effort: No respiratory distress. Breath sounds: No wheezing or rales. Chest:      Chest wall: No tenderness. Abdominal:      General: There is no distension. Tenderness: There is no abdominal tenderness. There is no rebound. Musculoskeletal:         General: Normal range of motion. Lymphadenopathy:      Cervical: No cervical adenopathy. Skin:     General: Skin is warm and dry. Neurological:      Mental Status: She is alert and oriented to person, place, and time. Coordination: Coordination normal.         Current Outpatient Medications   Medication Sig Dispense Refill    metFORMIN (GLUCOPHAGE-XR) 500 MG extended release tablet TAKE 1 TABLET BY MOUTH 2 TIMES DAILY (BEFORE MEALS) 60 tablet 1    docusate sodium (COLACE) 100 MG capsule Take 1 capsule by mouth 2 times daily 20 capsule 0    ondansetron (ZOFRAN-ODT) 4 MG disintegrating tablet Take 1 tablet by mouth 3 times daily as needed for Nausea or Vomiting 21 tablet 0    Lancets (ONETOUCH DELICA PLUS LIRURD33O) MISC Test 4x daily 150 each 1    Lancets MISC 1 each by Does not apply route 4 times daily (before meals and nightly) Pt test 4x daily Dx E11.65. May substitute for generic or insurance covered product 150 each 1    Alcohol Swabs (ALCOHOL PADS) 70 % PADS 1 each by Does not apply route three times daily 100 each 1    OXcarbazepine (TRILEPTAL) 150 MG tablet Take 1 tablet by mouth 2 times daily 30 tablet 3    lurasidone (LATUDA) 40 MG TABS tablet Take 1 tablet by mouth daily 15 tablet 2    prazosin (MINIPRESS) 1 MG capsule Take 1 capsule by mouth nightly 15 capsule 3    albuterol sulfate  (90 Base) MCG/ACT inhaler Use 2 puffs 4 times daily for 7 days then as needed for wheezing. Dispense with Spacer and instruct in use. At patient's preference may use 60 dose MDI. May Sub Pro-Air or Proventil as needed per insurance. 1 Inhaler 0    blood glucose monitor kit and supplies 1 kit by Other route 4 times daily (before meals and nightly) Pt test 4x daily Dx E11.65.   May substitute for generic or insurance covered product 1 kit 0    Prenatal Vit-Fe Fumarate-FA (PRENATAL VITAMIN) 27-1 MG TABS tablet take 1 tablet by mouth once daily      FLOVENT DISKUS 100 MCG/BLIST AEPB inhaler Inhale 1 puff into the lungs daily       Melatonin 10 MG CAPS       letrozole (FEMARA) 2.5 MG tablet Take 2 tablets by mouth daily Day 5-9 of menstrual cycle (Patient not taking: Reported on 1/5/2022) 10 tablet 2    lisinopril (PRINIVIL;ZESTRIL) 10 MG tablet Take 1 tablet by mouth daily (Patient not taking: Reported on 1/5/2022) 15 tablet 3     No current facility-administered medications for this visit. Results for orders placed during the hospital encounter of 07/29/19    XR CHEST STANDARD (2 VW)    Narrative  EXAMINATION: XR CHEST (2 VW)    CLINICAL HISTORY:  cough    COMPARISONS: August 30, 2016    FINDINGS:    Two views of the chest are submitted. The cardiac silhouette is of normal size configuration. The mediastinum is unremarkable. Pulmonary vascular attenuated. Right sided trachea. Area of atelectasis, and/or infiltrate left lower lobe. No effusions. No Pneumothoraces. Impression  AREA OF ATELECTASIS, and/or INFILTRATE LEFT LOWER LOBE. CORRELATE CLINICALLY. RADIOGRAPHIC FINDINGS OF COPD      Results for orders placed during the hospital encounter of 08/30/16    XR Chest Standard TWO VW    Narrative  EXAMINATION CHEST TWO VIEWS    CLINICAL HISTORY Shortness of breath    COMPARISONS 11/4/12    FINDINGS Heart, mediastinum, and hilum are unremarkable. Lungs are  clear. Pulmonary vasculature is normal.  No effusions are seen. Bones  are intact. IMPRESSION NO ACTIVE CHEST DISEASE. Sharma Donita Orourke M.D. Released Ann-Marie Orourke M.D. Released Date Time- 08/30/16 2596  This document has been electronically signed. ------------------------------------------------------------------------------  ]  Results for orders placed during the hospital encounter of 11/12/21    XR CHEST PORTABLE    Narrative  Exam: XR CHEST PORTABLE    History:  cough    Technique: AP portable view of the chest obtained. Comparison: Chest x-ray from September 19, 2021    Chest x-ray portable    Findings: The cardiomediastinal silhouette is within normal limits. There are no infiltrates, consolidations or effusions. Bones of the thorax appear intact.     Impression  No radiographic evidence of acute intrathoracic process. Results for orders placed during the hospital encounter of 09/19/21    XR CHEST PORTABLE    Narrative  EXAMINATION: CHEST PORTABLE VIEW    CLINICAL HISTORY: Midsternal chest pain    COMPARISONS: July 29, 2018 2214 hours    FINDINGS:    Single  views of the chest is submitted. The cardiac silhouette is of normal size configuration. Pulmonary vascular unremarkable. Right sided trachea. No focal infiltrates. No Pneumothoraces. Impression  NO ACUTE ACTIVE CARDIOPULMONARY PROCESS      Assessment/Plan:     1. Sleep apnea, unspecified type  She is complaining of snoring and daytime sleepiness and tiredness. No C/o witness apnea. She does not wakes up with gasping for air. C/o wakes up frequently during sleep . She does  take daily naps. She does not drive. Fall a sleep while as passenger  She has 18 lbs Weight gain in last 12 month , she said she usually weight 240 lbs    -PSG to r/o sleep apnea    2. Morbid obesity ([address]  She is advised try to lose weight. obesity related risk explained to the patient ,  Current weight:  258 lb (117 kg) Lbs. BMI:  Body mass index is 47.19 kg/m². Suggested weight control approaches, including dietary changes , exercise, behavioral modification. Return in about 4 weeks (around 2/2/2022) for linda, sleep study.       Sander Santa MD

## 2022-01-05 NOTE — Clinical Note
Emmanuel Durbin was seen and treated in our emergency department on 1/5/2022. She may return to work on 01/12/2022. If you have any questions or concerns, please don't hesitate to call.       Jana Frederick PA-C

## 2022-01-05 NOTE — ED TRIAGE NOTES
Pt presents to ED from home with c/o left hip pain. Pt states that she was seen yesterday for same c/o. Pt denies injury to hip; c/o \"numbness and tingling\" to palpation. Pt also c/o h/a, runny nose following exposure to COVID-19. Upon assessment, pt is A/Ox4, skin p/w/d, resp even and unlabored, msp's intact. Pt denies cp, sob, n/v/d, fever, or chills.

## 2022-01-06 LAB — URINE CULTURE, ROUTINE: NORMAL

## 2022-01-07 LAB
SARS-COV-2: NOT DETECTED
SOURCE: NORMAL

## 2022-02-16 PROBLEM — R09.89 OTHER SPECIFIED SYMPTOMS AND SIGNS INVOLVING THE CIRCULATORY AND RESPIRATORY SYSTEMS: Status: ACTIVE | Noted: 2018-02-22

## 2022-02-16 RX ORDER — ACETAMINOPHEN AND CODEINE PHOSPHATE 120; 12 MG/5ML; MG/5ML
SOLUTION ORAL
COMMUNITY
Start: 2021-12-19 | End: 2022-10-11

## 2022-02-16 RX ORDER — HYDROXYZINE PAMOATE 25 MG/1
CAPSULE ORAL
COMMUNITY
Start: 2021-12-10

## 2022-02-16 RX ORDER — BLOOD SUGAR DIAGNOSTIC
STRIP MISCELLANEOUS
COMMUNITY
Start: 2021-12-29 | End: 2022-09-13 | Stop reason: SDUPTHER

## 2022-02-17 ENCOUNTER — OFFICE VISIT (OUTPATIENT)
Dept: OBGYN CLINIC | Age: 32
End: 2022-02-17
Payer: COMMERCIAL

## 2022-02-17 DIAGNOSIS — E28.2 POLYCYSTIC OVARIAN DISEASE: Primary | ICD-10-CM

## 2022-02-17 DIAGNOSIS — E28.2 POLYCYSTIC OVARIAN DISEASE: ICD-10-CM

## 2022-02-17 PROCEDURE — 99213 OFFICE O/P EST LOW 20 MIN: CPT | Performed by: OBSTETRICS & GYNECOLOGY

## 2022-02-17 PROCEDURE — G8427 DOCREV CUR MEDS BY ELIG CLIN: HCPCS | Performed by: OBSTETRICS & GYNECOLOGY

## 2022-02-17 PROCEDURE — 4004F PT TOBACCO SCREEN RCVD TLK: CPT | Performed by: OBSTETRICS & GYNECOLOGY

## 2022-02-17 PROCEDURE — G8417 CALC BMI ABV UP PARAM F/U: HCPCS | Performed by: OBSTETRICS & GYNECOLOGY

## 2022-02-17 PROCEDURE — G8484 FLU IMMUNIZE NO ADMIN: HCPCS | Performed by: OBSTETRICS & GYNECOLOGY

## 2022-02-17 ASSESSMENT — ENCOUNTER SYMPTOMS
ABDOMINAL PAIN: 0
CHEST TIGHTNESS: 0
VOICE CHANGE: 0
NAUSEA: 0
ABDOMINAL DISTENTION: 0
COLOR CHANGE: 0
BLOOD IN STOOL: 0
WHEEZING: 0
SORE THROAT: 0
COUGH: 0
BACK PAIN: 0
TROUBLE SWALLOWING: 0
CONSTIPATION: 0
VOMITING: 0
SHORTNESS OF BREATH: 0

## 2022-02-17 NOTE — PROGRESS NOTES
HPI:  Maricruz Bocanegra (: 1990) is a 36 y.o. female, Established patient, here for evaluation of the following chief complaint(s): Annual Exam (wants to switch to clomid)  Menstrual period was 120. She states her cycles have been regular. She has PCO. He wanted to start her on letrozole but they would not cover it. SUBJECTIVE/OBJECTIVE:    Past Surgical History:   Procedure Laterality Date     SECTION      TONSILLECTOMY          Review of Systems   Constitutional: Negative for activity change, appetite change, fatigue and unexpected weight change. HENT: Negative for dental problem, ear pain, hearing loss, nosebleeds, sore throat, trouble swallowing and voice change. Eyes: Negative for visual disturbance. Respiratory: Negative for cough, chest tightness, shortness of breath and wheezing. Cardiovascular: Negative for chest pain and palpitations. Gastrointestinal: Negative for abdominal distention, abdominal pain, blood in stool, constipation, nausea and vomiting. Endocrine: Negative for cold intolerance, heat intolerance, polydipsia, polyphagia and polyuria. Genitourinary: Positive for menstrual problem. Negative for difficulty urinating, dyspareunia, dysuria, flank pain, frequency, genital sores, hematuria, pelvic pain, urgency, vaginal bleeding, vaginal discharge and vaginal pain. Musculoskeletal: Negative for arthralgias, back pain, joint swelling and myalgias. Skin: Negative for color change and rash. Allergic/Immunologic: Negative for environmental allergies, food allergies and immunocompromised state. Neurological: Negative for dizziness, seizures, syncope, speech difficulty, weakness, numbness and headaches. Hematological: Negative for adenopathy. Does not bruise/bleed easily. Psychiatric/Behavioral: Negative for agitation, behavioral problems, confusion, decreased concentration, dysphoric mood and suicidal ideas.  The patient is not nervous/anxious and is not hyperactive. Physical Exam  Vitals and nursing note reviewed. Exam conducted with a chaperone present. Genitourinary:                  There were no vitals filed for this visit. ASSESSMENT/PLAN:     Diagnosis Orders   1. Polycystic ovarian disease  PAP SMEAR    CBC with Auto Differential    Follicle Stimulating Hormone    Glucose    Insulin, total    Luteinizing Hormone    Prolactin    Testosterone Free and Total, Non-Male    TSH with Reflex       PLAN:  papSmear obtained. Will call and see if Clomid would be covered      No follow-ups on file. On this date 2/17/2022 I have spent 20 minutes reviewing previous notes, test results and face to face with the patient discussing the diagnosis and importance of compliance with the treatment plan as well as documenting on the day of the visit. An electronic signature was used to authenticate this note. Please note this report has been partially produced using speech recognition software and may cause contain errors related to that system including grammar, punctuation and spelling as well as words and phrases that may seem inappropriate. If there are questions or concerns please feel free to contact me to clarify.

## 2022-03-01 ENCOUNTER — TELEPHONE (OUTPATIENT)
Dept: OBGYN CLINIC | Age: 32
End: 2022-03-01

## 2022-03-01 NOTE — TELEPHONE ENCOUNTER
Pt states that someone was supposed to look into what medication she can be switched to that is covered by her caresource and let her know. Please advise.

## 2022-03-02 RX ORDER — LETROZOLE 2.5 MG/1
5 TABLET, FILM COATED ORAL DAILY
Qty: 5 TABLET | Refills: 3 | Status: SHIPPED | OUTPATIENT
Start: 2022-03-02

## 2022-03-14 ENCOUNTER — HOSPITAL ENCOUNTER (OUTPATIENT)
Dept: SLEEP CENTER | Age: 32
Discharge: HOME | End: 2022-03-16
Payer: COMMERCIAL

## 2022-03-14 ENCOUNTER — OFFICE VISIT (OUTPATIENT)
Dept: ENDOCRINOLOGY | Age: 32
End: 2022-03-14
Payer: COMMERCIAL

## 2022-03-14 VITALS
HEART RATE: 101 BPM | SYSTOLIC BLOOD PRESSURE: 138 MMHG | WEIGHT: 256 LBS | OXYGEN SATURATION: 96 % | BODY MASS INDEX: 47.11 KG/M2 | HEIGHT: 62 IN | DIASTOLIC BLOOD PRESSURE: 88 MMHG

## 2022-03-14 DIAGNOSIS — E28.2 PCOS (POLYCYSTIC OVARIAN SYNDROME): Primary | ICD-10-CM

## 2022-03-14 DIAGNOSIS — G47.33 OBSTRUCTIVE SLEEP APNEA SYNDROME: ICD-10-CM

## 2022-03-14 DIAGNOSIS — E66.01 MORBID OBESITY (HCC): ICD-10-CM

## 2022-03-14 PROCEDURE — G8417 CALC BMI ABV UP PARAM F/U: HCPCS | Performed by: INTERNAL MEDICINE

## 2022-03-14 PROCEDURE — 95810 POLYSOM 6/> YRS 4/> PARAM: CPT

## 2022-03-14 PROCEDURE — G8427 DOCREV CUR MEDS BY ELIG CLIN: HCPCS | Performed by: INTERNAL MEDICINE

## 2022-03-14 PROCEDURE — G8484 FLU IMMUNIZE NO ADMIN: HCPCS | Performed by: INTERNAL MEDICINE

## 2022-03-14 PROCEDURE — 99213 OFFICE O/P EST LOW 20 MIN: CPT | Performed by: INTERNAL MEDICINE

## 2022-03-14 PROCEDURE — 4004F PT TOBACCO SCREEN RCVD TLK: CPT | Performed by: INTERNAL MEDICINE

## 2022-03-14 RX ORDER — METFORMIN HYDROCHLORIDE 500 MG/1
TABLET, EXTENDED RELEASE ORAL
Qty: 60 TABLET | Refills: 1 | Status: SHIPPED | OUTPATIENT
Start: 2022-03-14 | End: 2022-06-14 | Stop reason: SDUPTHER

## 2022-03-14 NOTE — PROGRESS NOTES
3/14/2022    Assessment:       Diagnosis Orders   1. PCOS (polycystic ovarian syndrome)     2. Obstructive sleep apnea syndrome     3.  Morbid obesity ([facility] [address]           PLAN:     Continue current dose of Metformin patient made referral for bariatric and pulmonary for obesity and sleep apnea  Orders Placed This Encounter   Procedures    Basic Metabolic Panel     Standing Status:   Future     Standing Expiration Date:   3/14/2023    Hemoglobin A1C     Standing Status:   Future     Standing Expiration Date:   3/14/2023    Amb External Referral To Bariatric Surgery     Referral Priority:   Routine     Referral Type:   Consult for Advice and Opinion     Referral Reason:   Specialty Services Required     Referred to Provider:   Joey Cintron MD     Requested Specialty:   Bariatric Surgery     Number of Visits Requested:   Daysi Francis MD, Pulmonology, [facility]     Referral Priority:   Routine     Referral Type:   Eval and Treat     Referral Reason:   Specialty Services Required     Referred to Provider:   Ivory Roe MD     Requested Specialty:   Pulmonology     Number of Visits Requested:   1     Orders Placed This Encounter   Medications    metFORMIN (GLUCOPHAGE-XR) 500 MG extended release tablet     Sig: TAKE 1 TABLET BY MOUTH 2 TIMES DAILY (BEFORE MEALS)     Dispense:  60 tablet     Refill:  1       Subjective:     Chief Complaint   Patient presents with    Follow-up     Vitals:    03/14/22 1332   BP: 138/88   Pulse: 101   SpO2: 96%   Weight: 256 lb (116.1 kg)   Height: 5' 2\" (1.575 m)     Wt Readings from Last 3 Encounters:   03/14/22 256 lb (116.1 kg)   01/05/22 259 lb (117.5 kg)   01/05/22 258 lb (117 kg)     BP Readings from Last 3 Encounters:   03/14/22 138/88   01/05/22 (!) 145/83   01/05/22 129/80     Follow-up on morbid obesity history of polycystic ovary syndrome history of hypoglycemia A1c's have been between 5.8 and 6 range  Symptoms of sleep apnea  BMI is at 46  History of hypertension prediabetes    Other  This is a chronic (Obesity weight gain) problem. The current episode started more than 1 year ago. The problem occurs intermittently. Associated symptoms include fatigue. The symptoms are aggravated by eating and stress. Treatments tried: Metformin. The treatment provided no relief. Past Medical History:   Diagnosis Date    Abnormal Pap smear of cervix     Anxiety     Asthma     Depression     [facility] thrombosis[facility] ([facility]     Hypertension     Pre-diabetes     PTSD (post-traumatic stress disorder)     Schizoaffective disorder ([facility])      Past Surgical History:   Procedure Laterality Date     SECTION      TONSILLECTOMY       Social History     Socioeconomic History    Marital status:      Spouse name: Not on file    Number of children: Not on file    Years of education: Not on file    Highest education level: Not on file   Occupational History    Not on file   Tobacco Use    Smoking status: Current Every Day Smoker     Packs/day: 1.00     Years: 12.00     Pack years: 12.00     Types: Cigarettes    Smokeless tobacco: Never Used    Tobacco comment: 1 ppd   Vaping Use    Vaping Use: Never used   Substance and Sexual Activity    Alcohol use: Not Currently     Comment:      Drug use: Not Currently     Comment: \"cut down since I'm trying to get pregnant\"    Sexual activity: Yes     Partners: Male   Other Topics Concern    Not on file   Social History Narrative    Not on file     Social Determinants of Health     Financial Resource Strain:     Difficulty of Paying Living Expenses: Not on file   Food Insecurity:     Worried About Running Out of Food in the Last Year: Not on file    Tommy of Food in the Last Year: Not on file   Transportation Needs:     Lack of Transportation (Medical): Not on file    Lack of Transportation (Non-Medical):  Not on file   Physical Activity:     Days of Exercise per Week: Not on file    Minutes of Exercise per Session: Not on file   Stress:     Feeling of Stress : Not on file   Social Connections:     Frequency of Communication with Friends and Family: Not on file    Frequency of Social Gatherings with Friends and Family: Not on file    Attends Roman [employer] Services: Not on file    Active Member of Clubs or Organizations: Not on file    Attends Club or Organization Meetings: Not on file    Marital Status: Not on file   Intimate Partner Violence:     Fear of Current or Ex-Partner: Not on file    Emotionally Abused: Not on file    Physically Abused: Not on file    Sexually Abused: Not on file   Housing Stability:     Unable to Pay for Housing in the Last Year: Not on file    Number of [place] in the Last Year: Not on file    Unstable Housing in the Last Year: Not on file     Family History   Problem Relation Age of Onset    Diabetes Mother     Depression Father     Asthma Brother     Diabetes Maternal Grandmother     Breast Cancer Maternal Grandmother     Thyroid Cancer Maternal Grandfather     Diabetes Paternal Grandmother     Other Paternal Grandfather         testicular cancer     Allergies   Allergen Reactions    Food Hives and Itching     Squash    Amoxicillin Hives       Current Outpatient Medications:     letrozole (FEMARA) 2.5 MG tablet, Take 2 tablets by mouth daily Day 5-9 of menstrual cycle, Disp: 5 tablet, Rfl: 3    norethindrone (MICRONOR) 0.35 MG tablet, , Disp: , Rfl:     hydrOXYzine (VISTARIL) 25 MG capsule, , Disp: , Rfl:     ONETOUCH ULTRA strip, , Disp: , Rfl:     naproxen (NAPROSYN) 500 MG tablet, Take 1 tablet by mouth 2 times daily, Disp: 20 tablet, Rfl: 0    metFORMIN (GLUCOPHAGE-XR) 500 MG extended release tablet, TAKE 1 TABLET BY MOUTH 2 TIMES DAILY (BEFORE MEALS), Disp: 60 tablet, Rfl: 1    ondansetron (ZOFRAN-ODT) 4 MG disintegrating tablet, Take 1 tablet by mouth 3 times daily as needed for Nausea or Vomiting, Disp: 21 tablet, Rfl: 0    Lancets (ONETOUCH DELICA PLUS [id number]) MISC, Test 4x daily, Disp: 150 each, Rfl: 1    Lancets MISC, 1 each by Does not apply route 4 times daily (before meals and nightly) Pt test 4x daily Dx E11.65. May substitute for generic or insurance covered product, Disp: 150 each, Rfl: 1    Alcohol Swabs (ALCOHOL PADS) 70 % PADS, 1 each by Does not apply route three times daily, Disp: 100 each, Rfl: 1    OXcarbazepine (TRILEPTAL) 150 MG tablet, Take 1 tablet by mouth 2 times daily, Disp: 30 tablet, Rfl: 3    lisinopril (PRINIVIL;ZESTRIL) 10 MG tablet, Take 1 tablet by mouth daily, Disp: 15 tablet, Rfl: 3    prazosin (MINIPRESS) 1 MG capsule, Take 1 capsule by mouth nightly, Disp: 15 capsule, Rfl: 3    albuterol sulfate  (90 Base) MCG/ACT inhaler, Use 2 puffs 4 times daily for 7 days then as needed for wheezing. Dispense with Spacer and instruct in use. At patient's preference may use 60 dose MDI. May Sub Pro-Air or Proventil as needed per insurance., Disp: 1 Inhaler, Rfl: 0    blood glucose monitor kit and supplies, 1 kit by Other route 4 times daily (before meals and nightly) Pt test 4x daily Dx E11.65.   May substitute for generic or insurance covered product, Disp: 1 kit, Rfl: 0    Prenatal Vit-Fe Fumarate-FA (PRENATAL VITAMIN) 27-1 MG TABS tablet, take 1 tablet by mouth once daily, Disp: , Rfl:     FLOVENT DISKUS 100 MCG/BLIST AEPB inhaler, Inhale 1 puff into the lungs daily , Disp: , Rfl:     Melatonin 10 MG CAPS, , Disp: , Rfl:     docusate sodium (COLACE) 100 MG capsule, Take 1 capsule by mouth 2 times daily (Patient not taking: Reported on 3/14/2022), Disp: 20 capsule, Rfl: 0  Lab Results   Component Value Date     01/04/2022    K 4.0 01/04/2022     01/04/2022    CO2 21 01/04/2022    BUN 10 01/04/2022    CREATININE 0.57 01/04/2022    GLUCOSE 158 (H) 01/04/2022    CALCIUM 9.3 01/04/2022    PROT 7.0 01/04/2022    LABALBU 4.1 01/04/2022    BILITOT <0.2 01/04/2022    ALKPHOS 88 01/04/2022    AST 34 01/04/2022    ALT 52 (H) 01/04/2022    LABGLOM >60.0 01/04/2022    GFRAA >60.0 01/04/2022    GLOB 2.9 01/04/2022     Lab Results   Component Value Date    WBC 11.2 (H) 01/04/2022    HGB 13.6 01/04/2022    HCT 39.9 01/04/2022    MCV 84.4 01/04/2022     01/04/2022     Lab Results   Component Value Date    LABA1C 5.8 08/09/2021    LABA1C 6.0 (H) 12/11/2019    LABA1C 5.7 10/28/2019     Lab Results   Component Value Date    HDL 42 10/13/2021    HDL 54 08/09/2021    HDL 37 (L) 12/11/2019    LDLCALC 118 10/13/2021    LDLCALC 49 08/09/2021    LDLCALC 44 12/11/2019    CHOL 208 (H) 10/13/2021    CHOL 123 08/09/2021    CHOL 118 12/11/2019    TRIG 241 (H) 10/13/2021    TRIG 102 08/09/2021    TRIG 183 (H) 12/11/2019     No results found for: TESTM  Lab Results   Component Value Date    TSH 1.070 10/13/2021    TSH 0.563 08/09/2021    TSH 0.933 12/11/2019    TSHREFLEX 1.900 11/13/2020    TSHREFLEX 1.380 01/17/2018    T4FREE 1.15 08/09/2021    T4FREE 1.46 12/11/2019    T4FREE 1.17 01/17/2018     No results found for: TPOABS    Review of Systems   Constitutional: Positive for fatigue and unexpected weight change. Cardiovascular: Negative. Endocrine: Negative. Psychiatric/Behavioral: Positive for sleep disturbance. All other systems reviewed and are negative. Objective:   Physical Exam  Vitals reviewed. Constitutional:       Appearance: Normal appearance. She is obese. HENT:      Head: Normocephalic and atraumatic. Right Ear: External ear normal.      Left Ear: External ear normal.      Nose: Nose normal.   Eyes:      General: No scleral icterus. Right eye: No discharge. Left eye: No discharge. Extraocular Movements: Extraocular movements intact. Conjunctiva/sclera: Conjunctivae normal.   Cardiovascular:      Rate and Rhythm: Normal rate. Abdominal:      Palpations: Abdomen is soft. Musculoskeletal:         General: Normal range of motion.       Cervical back: Normal range of motion and neck supple. Neurological:      General: No focal deficit present. Mental Status: She is alert and oriented to person, place, and time.    Psychiatric:         Mood and Affect: Mood normal.         Behavior: Behavior normal.

## 2022-03-24 PROCEDURE — 95810 POLYSOM 6/> YRS 4/> PARAM: CPT | Performed by: INTERNAL MEDICINE

## 2022-03-30 DIAGNOSIS — G47.30 SLEEP APNEA, UNSPECIFIED TYPE: ICD-10-CM

## 2022-04-01 ENCOUNTER — TELEMEDICINE (OUTPATIENT)
Dept: PULMONOLOGY | Age: 32
End: 2022-04-01
Payer: COMMERCIAL

## 2022-04-01 DIAGNOSIS — E66.01 MORBID OBESITY (HCC): ICD-10-CM

## 2022-04-01 DIAGNOSIS — G47.33 OSA (OBSTRUCTIVE SLEEP APNEA): Primary | ICD-10-CM

## 2022-04-01 PROCEDURE — 99443 PR PHYS/QHP TELEPHONE EVALUATION 21-30 MIN: CPT | Performed by: INTERNAL MEDICINE

## 2022-04-01 ASSESSMENT — ENCOUNTER SYMPTOMS
NAUSEA: 0
VOICE CHANGE: 0
CHEST TIGHTNESS: 0
SHORTNESS OF BREATH: 0
DIARRHEA: 0
EYE ITCHING: 0
TROUBLE SWALLOWING: 0
WHEEZING: 0
VOMITING: 0
ABDOMINAL PAIN: 0
RHINORRHEA: 0
COUGH: 0
EYE DISCHARGE: 0
SINUS PRESSURE: 0
SORE THROAT: 0

## 2022-04-01 NOTE — PROGRESS NOTES
Subjective:     Rylie Luong is a 36 y.o. female who complains today of:     Chief Complaint   Patient presents with    Sleep Apnea     2 month f/u   SS results       Patient and/or health care decision maker is aware that that he/she may receive a bill for this telephone service, depending on his insurance coverage, and has provided verbal consent to proceed. HPI  She has PSG with AHI 9.6 , low O2 sat below  89 for 11.1  She said she will go for CPAP study but may not be able to go right away need ride. She is complaining of snoring and daytime sleepiness and tiredness. C/o wakes up frequently during sleep . No complaint of morning headache. She does have restful sleep for most of the time . She does  take daily naps. She does not drive. She does not drive.  Fall a sleep while as passenger  She does not have difficulty falling sleep or staying asleep         Allergies:  Food and Amoxicillin  Past Medical History:   Diagnosis Date    Abnormal Pap smear of cervix     Anxiety     Asthma     Depression     [facility] ([facility])     Hypertension     Pre-diabetes     PTSD (post-traumatic stress disorder)     Schizoaffective disorder ([facility])      Past Surgical History:   Procedure Laterality Date     SECTION      TONSILLECTOMY       Family History   Problem Relation Age of Onset    Diabetes Mother     Depression Father     Asthma Brother     Diabetes Maternal Grandmother     Breast Cancer Maternal Grandmother     Thyroid Cancer Maternal Grandfather     Diabetes Paternal Grandmother     Other Paternal Grandfather         testicular cancer     Social History     Socioeconomic History    Marital status:      Spouse name: Not on file    Number of children: Not on file    Years of education: Not on file    Highest education level: Not on file   Occupational History    Not on file   Tobacco Use    Smoking status: Current Every Day Smoker     Packs/day: 1.00 Years: 12.00     Pack years: 12.00     Types: Cigarettes    Smokeless tobacco: Never Used    Tobacco comment: 1 ppd   Vaping Use    Vaping Use: Never used   Substance and Sexual Activity    Alcohol use: Not Currently     Comment:      Drug use: Not Currently     Comment: \"cut down since I'm trying to get pregnant\"    Sexual activity: Yes     Partners: Male   Other Topics Concern    Not on file   Social History Narrative    Not on file     Social Determinants of Health     Financial Resource Strain:     Difficulty of Paying Living Expenses: Not on file   Food Insecurity:     Worried About Running Out of Food in the Last Year: Not on file    Tommy of Food in the Last Year: Not on file   Transportation Needs:     Lack of Transportation (Medical): Not on file    Lack of Transportation (Non-Medical): Not on file   Physical Activity:     Days of Exercise per Week: Not on file    Minutes of Exercise per Session: Not on file   Stress:     Feeling of Stress : Not on file   Social Connections:     Frequency of Communication with Friends and Family: Not on file    Frequency of Social Gatherings with Friends and Family: Not on file    Attends Church Services: Not on file    Active Member of [address] [place] [address] [employer] or Organizations: Not on file    Attends Club or Organization Meetings: Not on file    Marital Status: Not on file   Intimate Partner Violence:     Fear of Current or Ex-Partner: Not on file    Emotionally Abused: Not on file    Physically Abused: Not on file    Sexually Abused: Not on file   Housing Stability:     Unable to Pay for Housing in the Last Year: Not on file    Number of [place] in the Last Year: Not on file    Unstable Housing in the Last Year: Not on file         Review of Systems   Constitutional: Negative for chills, diaphoresis, fatigue and fever.    HENT: Negative for congestion, mouth sores, nosebleeds, postnasal drip, rhinorrhea, sinus pressure, sneezing, sore throat, trouble swallowing and voice change. Eyes: Negative for discharge, itching and visual disturbance. Respiratory: Negative for cough, chest tightness, shortness of breath and wheezing. Cardiovascular: Negative for chest pain, palpitations and leg swelling. Gastrointestinal: Negative for abdominal pain, diarrhea, nausea and vomiting. Genitourinary: Negative for difficulty urinating and hematuria. Musculoskeletal: Negative for arthralgias, joint swelling and myalgias. Skin: Negative for rash. Allergic/Immunologic: Negative for environmental allergies and food allergies. Neurological: Negative for dizziness, tremors, weakness and headaches. Psychiatric/Behavioral: Positive for sleep disturbance. Negative for behavioral problems.         :   There were no vitals filed for this visit. Wt Readings from Last 3 Encounters:   03/14/22 256 lb (116.1 kg)   01/05/22 259 lb (117.5 kg)   01/05/22 258 lb (117 kg)         Physical Exam phone visit     Current Outpatient Medications   Medication Sig Dispense Refill    metFORMIN (GLUCOPHAGE-XR) 500 MG extended release tablet TAKE 1 TABLET BY MOUTH 2 TIMES DAILY (BEFORE MEALS) 60 tablet 1    letrozole (FEMARA) 2.5 MG tablet Take 2 tablets by mouth daily Day 5-9 of menstrual cycle 5 tablet 3    norethindrone (MICRONOR) 0.35 MG tablet       hydrOXYzine (VISTARIL) 25 MG capsule       ONETOUCH ULTRA strip       naproxen (NAPROSYN) 500 MG tablet Take 1 tablet by mouth 2 times daily 20 tablet 0    ondansetron (ZOFRAN-ODT) 4 MG disintegrating tablet Take 1 tablet by mouth 3 times daily as needed for Nausea or Vomiting 21 tablet 0    Lancets (ONETOUCH DELICA PLUS [id number]) MISC Test 4x daily 150 each 1    Lancets MISC 1 each by Does not apply route 4 times daily (before meals and nightly) Pt test 4x daily Dx E11.65.   May substitute for generic or insurance covered product 150 each 1    Alcohol Swabs (ALCOHOL PADS) 70 % PADS 1 each by Does not apply route three times daily 100 each 1    OXcarbazepine (TRILEPTAL) 150 MG tablet Take 1 tablet by mouth 2 times daily 30 tablet 3    lisinopril (PRINIVIL;ZESTRIL) 10 MG tablet Take 1 tablet by mouth daily 15 tablet 3    prazosin (MINIPRESS) 1 MG capsule Take 1 capsule by mouth nightly 15 capsule 3    albuterol sulfate  (90 Base) MCG/ACT inhaler Use 2 puffs 4 times daily for 7 days then as needed for wheezing. Dispense with Spacer and instruct in use. At patient's preference may use 60 dose MDI. May Sub Pro-Air or Proventil as needed per insurance. 1 Inhaler 0    blood glucose monitor kit and supplies 1 kit by Other route 4 times daily (before meals and nightly) Pt test 4x daily Dx E11.65. May substitute for generic or insurance covered product 1 kit 0    Prenatal Vit-Fe Fumarate-FA (PRENATAL VITAMIN) 27-1 MG TABS tablet take 1 tablet by mouth once daily      FLOVENT DISKUS 100 MCG/BLIST AEPB inhaler Inhale 1 puff into the lungs daily       Melatonin 10 MG CAPS       docusate sodium (COLACE) 100 MG capsule Take 1 capsule by mouth 2 times daily (Patient not taking: Reported on 3/14/2022) 20 capsule 0     No current facility-administered medications for this visit. Results for orders placed during the hospital encounter of 07/29/19    XR CHEST STANDARD (2 VW)    Narrative  EXAMINATION: XR CHEST (2 VW)    CLINICAL HISTORY:  cough    COMPARISONS: August 30, 2016    FINDINGS:    Two views of the chest are submitted. The cardiac silhouette is of normal size configuration. The mediastinum is unremarkable. Pulmonary vascular attenuated. Right sided trachea. Area of atelectasis, and/or infiltrate left lower lobe. No effusions. No Pneumothoraces. Impression  AREA OF ATELECTASIS, and/or INFILTRATE LEFT LOWER LOBE. CORRELATE CLINICALLY.   RADIOGRAPHIC FINDINGS OF COPD      Results for orders placed during the hospital encounter of 08/30/16    XR Chest Standard TWO VW    Narrative  EXAMINATION CHEST TWO VIEWS    CLINICAL HISTORY Shortness of breath    COMPARISONS 11/4/12    FINDINGS Heart, mediastinum, and hilum are unremarkable. Lungs are  clear. Pulmonary vasculature is normal.  No effusions are seen. Bones  are intact. IMPRESSION NO ACTIVE CHEST DISEASE. Griselda Solomons Devyn [email or web address] Released Devyn Mathew M.D. Released Date Time- 08/30/16 6070  This document has been electronically signed. ------------------------------------------------------------------------------  ]  Results for orders placed during the hospital encounter of 11/12/21    XR CHEST PORTABLE    Narrative  Exam: XR CHEST PORTABLE    History:  cough    Technique: AP portable view of the chest obtained. Comparison: Chest x-ray from September 19, 2021    Chest x-ray portable    Findings: The cardiomediastinal silhouette is within normal limits. There are no infiltrates, consolidations or effusions. Bones of the thorax appear intact. Impression  No radiographic evidence of acute intrathoracic process. Results for orders placed during the hospital encounter of 09/19/21    XR CHEST PORTABLE    Narrative  EXAMINATION: CHEST PORTABLE VIEW    CLINICAL HISTORY: Midsternal chest pain    COMPARISONS: July 29, 2018 2214 hours    FINDINGS:    Single  views of the chest is submitted. The cardiac silhouette is of normal size configuration. Pulmonary vascular unremarkable. Right sided trachea. No focal infiltrates. No Pneumothoraces. Impression  NO ACUTE ACTIVE CARDIOPULMONARY PROCESS      Assessment/Plan:     1. KEI (obstructive sleep apnea)  She has PSG with AHI 9.6 , low O2 sat below  89 for 11.1She said she will go for CPAP study but may not be able to go right away need ride. She is complaining of snoring and daytime sleepiness and tiredness. C/o wakes up frequently during sleep . She does have restful sleep for most of the time . She does  take daily naps. She does not drive.  CPAP study is requested. - Sleep Study with PAP Titration    2. Morbid obesity ([address]  She is advised try to lose weight. obesity related risk explained to the patient ,  Suggested weight control approaches, including dietary changes , exercise, behavioral modification. Patient notified that this is a billable service and has given verbal consent for telehealth services. Time spent with patient  22 minutes. Return in about 2 months (around 6/1/2022) for linda, sleep study.       Addis Marsh MD

## 2022-06-01 ENCOUNTER — HOSPITAL ENCOUNTER (OUTPATIENT)
Dept: SLEEP CENTER | Age: 32
Discharge: HOME | End: 2022-06-03
Payer: COMMERCIAL

## 2022-06-01 PROCEDURE — 95811 POLYSOM 6/>YRS CPAP 4/> PARM: CPT

## 2022-06-02 PROCEDURE — 95811 POLYSOM 6/>YRS CPAP 4/> PARM: CPT | Performed by: INTERNAL MEDICINE

## 2022-06-06 ENCOUNTER — TELEMEDICINE (OUTPATIENT)
Dept: PULMONOLOGY | Age: 32
End: 2022-06-06
Payer: COMMERCIAL

## 2022-06-06 DIAGNOSIS — G47.33 OSA (OBSTRUCTIVE SLEEP APNEA): Primary | ICD-10-CM

## 2022-06-06 DIAGNOSIS — E66.01 MORBID OBESITY (HCC): ICD-10-CM

## 2022-06-06 PROCEDURE — 99214 OFFICE O/P EST MOD 30 MIN: CPT | Performed by: INTERNAL MEDICINE

## 2022-06-06 ASSESSMENT — ENCOUNTER SYMPTOMS
DIARRHEA: 0
EYE ITCHING: 0
ABDOMINAL PAIN: 0
EYE DISCHARGE: 0
VOICE CHANGE: 0
RHINORRHEA: 0
COUGH: 0
CHEST TIGHTNESS: 0
WHEEZING: 0
SHORTNESS OF BREATH: 0
SINUS PRESSURE: 0
TROUBLE SWALLOWING: 0
NAUSEA: 0
SORE THROAT: 0
VOMITING: 0

## 2022-06-06 NOTE — PROGRESS NOTES
Subjective:     Skye Villalpando is a 29 y.o. female who complains today of:     No chief complaint on file. HPI  PSG show AHI 9.6 and in REM 44.7 she had CPAP  titration study   CPAP study done show therapeutic at [facility] 12/8  Cm   She is complaining of snoring and daytime sleepiness and tiredness. C/o wakes up frequently during sleep . No complaint of morning headache. She does  have restful sleep. She does  take daily naps. She does not drive. She does not drive. Fall a sleep while as passenger .     Allergies:  Food and Amoxicillin  Past Medical History:   Diagnosis Date    Abnormal Pap smear of cervix     Anxiety     Asthma     Depression     [facility]     Hypertension     Pre-diabetes     PTSD (post-traumatic stress disorder)     Schizoaffective disorder (HCC)      Past Surgical History:   Procedure Laterality Date     SECTION      TONSILLECTOMY       Family History   Problem Relation Age of Onset    Diabetes Mother     Depression Father     Asthma Brother     Diabetes Maternal Grandmother     Breast Cancer Maternal Grandmother     Thyroid Cancer Maternal Grandfather     Diabetes Paternal Grandmother     Other Paternal Grandfather         testicular cancer     Social History     Socioeconomic History    Marital status:      Spouse name: Not on file    Number of children: Not on file    Years of education: Not on file    Highest education level: Not on file   Occupational History    Not on file   Tobacco Use    Smoking status: Current Every Day Smoker     Packs/day: 1.00     Years: 12.00     Pack years: 12.00     Types: Cigarettes    Smokeless tobacco: Never Used    Tobacco comment: 1 ppd   Vaping Use    Vaping Use: Never used   Substance and Sexual Activity    Alcohol use: Not Currently     Comment:      Drug use: Not Currently     Comment: \"cut down since I'm trying to get pregnant\"    Sexual activity: Yes     Partners: Male Other Topics Concern    Not on file   Social History Narrative    Not on file     Social Determinants of Health     Financial Resource Strain:     Difficulty of Paying Living Expenses: Not on file   Food Insecurity:     Worried About Running Out of Food in the Last Year: Not on file    Tommy of Food in the Last Year: Not on file   Transportation Needs:     Lack of Transportation (Medical): Not on file    Lack of Transportation (Non-Medical): Not on file   Physical Activity:     Days of Exercise per Week: Not on file    Minutes of Exercise per Session: Not on file   Stress:     Feeling of Stress : Not on file   Social Connections:     Frequency of Communication with Friends and Family: Not on file    Frequency of Social Gatherings with Friends and Family: Not on file    Attends [employer] Services: Not on file    Active Member of [address] [place] [address] or Organizations: Not on file    Attends Club or Organization Meetings: Not on file    Marital Status: Not on file   Intimate Partner Violence:     Fear of Current or Ex-Partner: Not on file    Emotionally Abused: Not on file    Physically Abused: Not on file    Sexually Abused: Not on file   Housing Stability:     Unable to Pay for Housing in the Last Year: Not on file    Number of [place] in the Last Year: Not on file    Unstable Housing in the Last Year: Not on file         Review of Systems   Constitutional: Negative for chills, diaphoresis, fatigue and fever. HENT: Negative for congestion, mouth sores, nosebleeds, postnasal drip, rhinorrhea, sinus pressure, sneezing, sore throat, trouble swallowing and voice change. Snoring   Eyes: Negative for discharge, itching and visual disturbance. Respiratory: Negative for cough, chest tightness, shortness of breath and wheezing. Cardiovascular: Negative for chest pain, palpitations and leg swelling. Gastrointestinal: Negative for abdominal pain, diarrhea, nausea and vomiting.    Genitourinary: Negative for difficulty urinating and hematuria. Musculoskeletal: Negative for arthralgias, joint swelling and myalgias. Skin: Negative for rash. Allergic/Immunologic: Negative for environmental allergies and food allergies. Neurological: Negative for dizziness, tremors, weakness and headaches. Psychiatric/Behavioral: Positive for sleep disturbance. Negative for behavioral problems.         :   There were no vitals filed for this visit. Wt Readings from Last 3 Encounters:   03/14/22 256 lb (116.1 kg)   01/05/22 259 lb (117.5 kg)   01/05/22 258 lb (117 kg)         Physical Exam phone visit     Current Outpatient Medications   Medication Sig Dispense Refill    lurasidone (LATUDA) 120 MG tablet Take 120 mg by mouth daily      CPAP Machine MISC by Does not apply route New BIPAP with setting 12/8 cm 1 each 0    metFORMIN (GLUCOPHAGE-XR) 500 MG extended release tablet TAKE 1 TABLET BY MOUTH 2 TIMES DAILY (BEFORE MEALS) 60 tablet 1    letrozole (FEMARA) 2.5 MG tablet Take 2 tablets by mouth daily Day 5-9 of menstrual cycle 5 tablet 3    norethindrone (MICRONOR) 0.35 MG tablet       hydrOXYzine (VISTARIL) 25 MG capsule       ONETOUCH ULTRA strip       naproxen (NAPROSYN) 500 MG tablet Take 1 tablet by mouth 2 times daily 20 tablet 0    docusate sodium (COLACE) 100 MG capsule Take 1 capsule by mouth 2 times daily 20 capsule 0    ondansetron (ZOFRAN-ODT) 4 MG disintegrating tablet Take 1 tablet by mouth 3 times daily as needed for Nausea or Vomiting 21 tablet 0    Lancets (ONETOUCH DELICA PLUS OLUZEV14C) MISC Test 4x daily 150 each 1    Lancets MISC 1 each by Does not apply route 4 times daily (before meals and nightly) Pt test 4x daily Dx E11.65.   May substitute for generic or insurance covered product 150 each 1    Alcohol Swabs (ALCOHOL PADS) 70 % PADS 1 each by Does not apply route three times daily 100 each 1    OXcarbazepine (TRILEPTAL) 150 MG tablet Take 1 tablet by mouth 2 times daily 30 tablet 3    lisinopril (PRINIVIL;ZESTRIL) 10 MG tablet Take 1 tablet by mouth daily 15 tablet 3    prazosin (MINIPRESS) 1 MG capsule Take 1 capsule by mouth nightly 15 capsule 3    albuterol sulfate  (90 Base) MCG/ACT inhaler Use 2 puffs 4 times daily for 7 days then as needed for wheezing. Dispense with Spacer and instruct in use. At patient's preference may use 60 dose MDI. May Sub Pro-Air or Proventil as needed per insurance. 1 Inhaler 0    blood glucose monitor kit and supplies 1 kit by Other route 4 times daily (before meals and nightly) Pt test 4x daily Dx E11.65. May substitute for generic or insurance covered product 1 kit 0    Prenatal Vit-Fe Fumarate-FA (PRENATAL VITAMIN) 27-1 MG TABS tablet take 1 tablet by mouth once daily      FLOVENT DISKUS 100 MCG/BLIST AEPB inhaler Inhale 1 puff into the lungs daily       Melatonin 10 MG CAPS        No current facility-administered medications for this visit. Results for orders placed during the hospital encounter of 07/29/19    XR CHEST STANDARD (2 VW)    Narrative  EXAMINATION: XR CHEST (2 VW)    CLINICAL HISTORY:  cough    COMPARISONS: August 30, 2016    FINDINGS:    Two views of the chest are submitted. The cardiac silhouette is of normal size configuration. The mediastinum is unremarkable. Pulmonary vascular attenuated. Right sided trachea. Area of atelectasis, and/or infiltrate left lower lobe. No effusions. No Pneumothoraces. Impression  AREA OF ATELECTASIS, and/or INFILTRATE LEFT LOWER LOBE. CORRELATE CLINICALLY. RADIOGRAPHIC FINDINGS OF COPD      Results for orders placed during the hospital encounter of 08/30/16    XR Chest Standard TWO VW    Narrative  EXAMINATION CHEST TWO VIEWS    CLINICAL HISTORY Shortness of breath    COMPARISONS 11/4/12    FINDINGS Heart, mediastinum, and hilum are unremarkable. Lungs are  clear. Pulmonary vasculature is normal.  No effusions are seen. Bones  are intact.     IMPRESSION NO ACTIVE CHEST DISEASE. Chely Lopez Jefferson Wadsworth M.D. Released Jefferson Wadsworth M.D. Released Date Time- 08/30/16 1320  This document has been electronically signed. ------------------------------------------------------------------------------  ]  Results for orders placed during the hospital encounter of 11/12/21    XR CHEST PORTABLE    Narrative  Exam: XR CHEST PORTABLE    History:  cough    Technique: AP portable view of the chest obtained. Comparison: Chest x-ray from September 19, 2021    Chest x-ray portable    Findings: The cardiomediastinal silhouette is within normal limits. There are no infiltrates, consolidations or effusions. Bones of the thorax appear intact. Impression  No radiographic evidence of acute intrathoracic process. Results for orders placed during the hospital encounter of 09/19/21    XR CHEST PORTABLE    Narrative  EXAMINATION: CHEST PORTABLE VIEW    CLINICAL HISTORY: Midsternal chest pain    COMPARISONS: July 29, 2018 2214 hours    FINDINGS:    Single  views of the chest is submitted. The cardiac silhouette is of normal size configuration. Pulmonary vascular unremarkable. Right sided trachea. No focal infiltrates. No Pneumothoraces. Impression  NO ACUTE ACTIVE CARDIOPULMONARY PROCESS      Assessment/Plan:     1. KEI (obstructive sleep apnea)  PSG show AHI 9.6 and in REM 44.7 she had CPAP  titration study   CPAP study done show therapeutic at [facility] 12/8  Cm . She is complaining of snoring and daytime sleepiness and tiredness. C/o wakes up frequently during sleep . She does  have restful sleep. She does  take daily naps. She does not drive. she will have BIPAP 12/8 for KEI    - CPAP Machine MISC; by Does not apply route New BIPAP with setting 12/8 cm  Dispense: 1 each; Refill: 0    Counseling: BiPAP uses, She advised to use BiPAP at least 5-6 hours every night. Sleep hygiene:Avoid supine sleep, sleep on  sides. Avoid  sleep deprivation. Explained sleep hygiene. Advice to avoid Alcohol and sedatives. 2. Morbid obesity ([address]  She is advised try to lose weight. obesity related risk explained to the patient ,  Suggested weight control approaches, including dietary changes , exercise, behavioral modification. Patient notified that this is a billable service and has given verbal consent for telehealth services. Time spent with patient  23  minutes. Return in about 2 months (around 8/6/2022) for linda.       Natali Laura MD

## 2022-06-14 ENCOUNTER — OFFICE VISIT (OUTPATIENT)
Dept: ENDOCRINOLOGY | Age: 32
End: 2022-06-14
Payer: COMMERCIAL

## 2022-06-14 VITALS
WEIGHT: 260 LBS | OXYGEN SATURATION: 97 % | HEART RATE: 108 BPM | SYSTOLIC BLOOD PRESSURE: 130 MMHG | HEIGHT: 62 IN | BODY MASS INDEX: 47.84 KG/M2 | DIASTOLIC BLOOD PRESSURE: 80 MMHG

## 2022-06-14 DIAGNOSIS — E88.81 INSULIN RESISTANCE: Primary | ICD-10-CM

## 2022-06-14 DIAGNOSIS — E28.2 PCOS (POLYCYSTIC OVARIAN SYNDROME): ICD-10-CM

## 2022-06-14 DIAGNOSIS — E88.81 INSULIN RESISTANCE: ICD-10-CM

## 2022-06-14 DIAGNOSIS — E66.01 MORBID OBESITY (HCC): ICD-10-CM

## 2022-06-14 LAB
ANION GAP SERPL CALCULATED.3IONS-SCNC: 14 MEQ/L (ref 9–15)
BUN BLDV-MCNC: 12 MG/DL (ref 6–20)
CALCIUM SERPL-MCNC: 9.4 MG/DL (ref 8.5–9.9)
CHLORIDE BLD-SCNC: 103 MEQ/L (ref 95–107)
CHP ED QC CHECK: NORMAL
CO2: 22 MEQ/L (ref 20–31)
CREAT SERPL-MCNC: 0.93 MG/DL (ref 0.5–0.9)
GFR AFRICAN AMERICAN: >60
GFR NON-AFRICAN AMERICAN: >60
GLUCOSE BLD-MCNC: 159 MG/DL (ref 70–99)
GLUCOSE BLD-MCNC: 197 MG/DL
HBA1C MFR BLD: 6.1 %
HBA1C MFR BLD: 6.5 % (ref 4.8–5.9)
POTASSIUM SERPL-SCNC: 3.9 MEQ/L (ref 3.4–4.9)
SODIUM BLD-SCNC: 139 MEQ/L (ref 135–144)

## 2022-06-14 PROCEDURE — 99213 OFFICE O/P EST LOW 20 MIN: CPT | Performed by: INTERNAL MEDICINE

## 2022-06-14 PROCEDURE — 82962 GLUCOSE BLOOD TEST: CPT | Performed by: INTERNAL MEDICINE

## 2022-06-14 PROCEDURE — 4004F PT TOBACCO SCREEN RCVD TLK: CPT | Performed by: INTERNAL MEDICINE

## 2022-06-14 PROCEDURE — 83036 HEMOGLOBIN GLYCOSYLATED A1C: CPT | Performed by: INTERNAL MEDICINE

## 2022-06-14 PROCEDURE — G8427 DOCREV CUR MEDS BY ELIG CLIN: HCPCS | Performed by: INTERNAL MEDICINE

## 2022-06-14 PROCEDURE — G8417 CALC BMI ABV UP PARAM F/U: HCPCS | Performed by: INTERNAL MEDICINE

## 2022-06-14 RX ORDER — GLUCOSAMINE HCL/CHONDROITIN SU 500-400 MG
CAPSULE ORAL
Qty: 100 STRIP | Refills: 3 | Status: SHIPPED | OUTPATIENT
Start: 2022-06-14 | End: 2022-09-13

## 2022-06-14 RX ORDER — UBIQUINOL 100 MG
CAPSULE ORAL
Qty: 100 EACH | Refills: 11 | Status: SHIPPED | OUTPATIENT
Start: 2022-06-14 | End: 2022-09-13 | Stop reason: SDUPTHER

## 2022-06-14 RX ORDER — METFORMIN HYDROCHLORIDE 500 MG/1
TABLET, EXTENDED RELEASE ORAL
Qty: 120 TABLET | Refills: 3 | Status: SHIPPED | OUTPATIENT
Start: 2022-06-14 | End: 2022-09-13 | Stop reason: SDUPTHER

## 2022-06-14 RX ORDER — LANCETS 30 GAUGE
1 EACH MISCELLANEOUS DAILY
Qty: 100 EACH | Refills: 3 | Status: SHIPPED | OUTPATIENT
Start: 2022-06-14

## 2022-06-14 NOTE — PROGRESS NOTES
2022    Assessment:       Diagnosis Orders   1. Insulin resistance  POCT Glucose    POCT glycosylated hemoglobin (Hb A1C)    blood glucose monitor kit and supplies    blood glucose monitor strips    Lancets MISC    Basic Metabolic Panel    Hemoglobin A1C   2. PCOS (polycystic ovarian syndrome)  metFORMIN (GLUCOPHAGE-XR) 500 MG extended release tablet   3. Morbid obesity ([address]           PLAN:     Orders Placed This Encounter   Procedures    Basic Metabolic Panel     Standing Status:   Future     Number of Occurrences:   1     Standing Expiration Date:   2023    Hemoglobin A1C     Standing Status:   Future     Number of Occurrences:   1     Standing Expiration Date:   2023    POCT Glucose    POCT glycosylated hemoglobin (Hb A1C)     Orders Placed This Encounter   Medications    blood glucose monitor kit and supplies     Sig: Dispense one meter that insurance will cover.      Dispense:  1 kit     Refill:  0    blood glucose monitor strips     Sig: Test 2xdaily     Dispense:  100 strip     Refill:  3    Lancets MISC     Si each by Does not apply route daily     Dispense:  100 each     Refill:  3    metFORMIN (GLUCOPHAGE-XR) 500 MG extended release tablet     Sig: TAKE 2 TABLET BY MOUTH 2 TIMES DAILY (BEFORE MEALS)     Dispense:  120 tablet     Refill:  3    Alcohol Swabs (ALCOHOL PREP) 70 % PADS     Sig: bid     Dispense:  100 each     Refill:  11     Continue current dose of metformin monitor blood sugar follow-up in 3 months  To follow-up with pulmonary for sleep apnea and also was referred to bariatric surgery last time    Orders Placed This Encounter   Procedures    POCT Glucose    POCT glycosylated hemoglobin (Hb A1C)       Subjective:     Chief Complaint   Patient presents with    Other     PCOS,Insulin resistance     Vitals:    22 1306   BP: 130/80   Pulse: (!) 108   SpO2: 97%   Weight: 260 lb (117.9 kg)   Height: 5' 2\" (1.575 m)     Wt Readings from Last 3 Encounters: 22 260 lb (117.9 kg)   22 256 lb (116.1 kg)   22 259 lb (117.5 kg)     BP Readings from Last 3 Encounters:   22 130/80   22 138/88   22 (!) 145/83     Follow-up on obesity insulin resistance labs done today showed a hemoglobin A1c of 6.5 obesity BMI of 47 patient currently on metformin    Other  This is a chronic (Obesity weight gain) problem. The current episode started more than 1 year ago. The problem occurs intermittently. The problem has been waxing and waning. Associated symptoms include fatigue. Exacerbated by: PCOS. Treatments tried: Metformin. The treatment provided no relief.      Past Medical History:   Diagnosis Date    Abnormal Pap smear of cervix     Anxiety     Asthma     Depression     [facility]     Hypertension     Pre-diabetes     PTSD (post-traumatic stress disorder)     Schizoaffective disorder ([facility])      Past Surgical History:   Procedure Laterality Date     SECTION      TONSILLECTOMY       Social History     Socioeconomic History    Marital status:      Spouse name: Not on file    Number of children: Not on file    Years of education: Not on file    Highest education level: Not on file   Occupational History    Not on file   Tobacco Use    Smoking status: Current Every Day Smoker     Packs/day: 1.00     Years: 12.00     Pack years: 12.00     Types: Cigarettes    Smokeless tobacco: Never Used    Tobacco comment: 1 ppd   Vaping Use    Vaping Use: Never used   Substance and Sexual Activity    Alcohol use: Not Currently     Comment:      Drug use: Not Currently     Comment: \"cut down since I'm trying to get pregnant\"    Sexual activity: Yes     Partners: Male   Other Topics Concern    Not on file   Social History Narrative    Not on file     Social Determinants of Health     Financial Resource Strain:     Difficulty of Paying Living Expenses: Not on file   Food Insecurity:     Worried About Running Out of Food in the Last Year: Not on file    Ran Out of Food in the Last Year: Not on file   Transportation Needs:     Lack of Transportation (Medical): Not on file    Lack of Transportation (Non-Medical):  Not on file   Physical Activity:     Days of Exercise per Week: Not on file    Minutes of Exercise per Session: Not on file   Stress:     Feeling of Stress : Not on file   Social Connections:     Frequency of Communication with Friends and Family: Not on file    Frequency of Social Gatherings with Friends and Family: Not on file    Attends [employer] Services: Not on file    Active Member of [address] [place] [address] [employer] or Organizations: Not on file    Attends Club or Organization Meetings: Not on file    Marital Status: Not on file   Intimate Partner Violence:     Fear of Current or Ex-Partner: Not on file    Emotionally Abused: Not on file    Physically Abused: Not on file    Sexually Abused: Not on file   Housing Stability:     Unable to Pay for Housing in the Last Year: Not on file    Number of [place] in the Last Year: Not on file    Unstable Housing in the Last Year: Not on file     Family History   Problem Relation Age of Onset    Diabetes Mother     Depression Father     Asthma Brother     Diabetes Maternal Grandmother     Breast Cancer Maternal Grandmother     Thyroid Cancer Maternal Grandfather     Diabetes Paternal Grandmother     Other Paternal Grandfather         testicular cancer     Allergies   Allergen Reactions    Food Hives and Itching     Squash    Amoxicillin Hives       Current Outpatient Medications:     lurasidone (LATUDA) 120 MG tablet, Take 120 mg by mouth daily, Disp: , Rfl:     CPAP Machine MISC, by Does not apply route New BIPAP with setting 12/8 cm, Disp: 1 each, Rfl: 0    metFORMIN (GLUCOPHAGE-XR) 500 MG extended release tablet, TAKE 1 TABLET BY MOUTH 2 TIMES DAILY (BEFORE MEALS), Disp: 60 tablet, Rfl: 1    letrozole (FEMARA) 2.5 MG tablet, Take 2 tablets by mouth daily Day 5-9 of menstrual cycle, Disp: 5 tablet, Rfl: 3    norethindrone (MICRONOR) 0.35 MG tablet, , Disp: , Rfl:     hydrOXYzine (VISTARIL) 25 MG capsule, , Disp: , Rfl:     ONETOUCH ULTRA strip, , Disp: , Rfl:     naproxen (NAPROSYN) 500 MG tablet, Take 1 tablet by mouth 2 times daily, Disp: 20 tablet, Rfl: 0    docusate sodium (COLACE) 100 MG capsule, Take 1 capsule by mouth 2 times daily, Disp: 20 capsule, Rfl: 0    ondansetron (ZOFRAN-ODT) 4 MG disintegrating tablet, Take 1 tablet by mouth 3 times daily as needed for Nausea or Vomiting, Disp: 21 tablet, Rfl: 0    Alcohol Swabs (ALCOHOL PADS) 70 % PADS, 1 each by Does not apply route three times daily, Disp: 100 each, Rfl: 1    OXcarbazepine (TRILEPTAL) 150 MG tablet, Take 1 tablet by mouth 2 times daily, Disp: 30 tablet, Rfl: 3    lisinopril (PRINIVIL;ZESTRIL) 10 MG tablet, Take 1 tablet by mouth daily, Disp: 15 tablet, Rfl: 3    prazosin (MINIPRESS) 1 MG capsule, Take 1 capsule by mouth nightly, Disp: 15 capsule, Rfl: 3    albuterol sulfate  (90 Base) MCG/ACT inhaler, Use 2 puffs 4 times daily for 7 days then as needed for wheezing. Dispense with Spacer and instruct in use. At patient's preference may use 60 dose MDI.  May Sub Pro-Air or Proventil as needed per insurance., Disp: 1 Inhaler, Rfl: 0    Prenatal Vit-Fe Fumarate-FA (PRENATAL VITAMIN) 27-1 MG TABS tablet, take 1 tablet by mouth once daily, Disp: , Rfl:     FLOVENT DISKUS 100 MCG/BLIST AEPB inhaler, Inhale 1 puff into the lungs daily , Disp: , Rfl:     Melatonin 10 MG CAPS, , Disp: , Rfl:   Lab Results   Component Value Date     01/04/2022    K 4.0 01/04/2022     01/04/2022    CO2 21 01/04/2022    BUN 10 01/04/2022    CREATININE 0.57 01/04/2022    GLUCOSE 197 06/14/2022    CALCIUM 9.3 01/04/2022    PROT 7.0 01/04/2022    LABALBU 4.1 01/04/2022    BILITOT <0.2 01/04/2022    ALKPHOS 88 01/04/2022    AST 34 01/04/2022    ALT 52 (H) 01/04/2022    LABGLOM >60.0 01/04/2022    GFRAA >60.0 01/04/2022    GLOB 2.9 01/04/2022     Lab Results   Component Value Date    WBC 11.2 (H) 01/04/2022    HGB 13.6 01/04/2022    HCT 39.9 01/04/2022    MCV 84.4 01/04/2022     01/04/2022     Lab Results   Component Value Date    LABA1C 6.1 06/14/2022    LABA1C 5.8 08/09/2021    LABA1C 6.0 (H) 12/11/2019     Lab Results   Component Value Date    HDL 42 10/13/2021    HDL 54 08/09/2021    HDL 37 (L) 12/11/2019    LDLCALC 118 10/13/2021    LDLCALC 49 08/09/2021    LDLCALC 44 12/11/2019    CHOL 208 (H) 10/13/2021    CHOL 123 08/09/2021    CHOL 118 12/11/2019    TRIG 241 (H) 10/13/2021    TRIG 102 08/09/2021    TRIG 183 (H) 12/11/2019     No results found for: TESTM  Lab Results   Component Value Date    TSH 1.070 10/13/2021    TSH 0.563 08/09/2021    TSH 0.933 12/11/2019    TSHREFLEX 1.900 11/13/2020    TSHREFLEX 1.380 01/17/2018    T4FREE 1.15 08/09/2021    T4FREE 1.46 12/11/2019    T4FREE 1.17 01/17/2018     No results found for: TPOABS    Review of Systems   Constitutional: Positive for fatigue and unexpected weight change. Cardiovascular: Negative. Endocrine: Negative. Psychiatric/Behavioral: Positive for sleep disturbance. All other systems reviewed and are negative. Objective:   Physical Exam  Vitals reviewed. Constitutional:       General: She is not in acute distress. Appearance: Normal appearance. She is obese. HENT:      Head: Normocephalic and atraumatic. Right Ear: External ear normal.      Left Ear: External ear normal.      Nose: Nose normal.   Eyes:      General: No scleral icterus. Right eye: No discharge. Left eye: No discharge. Extraocular Movements: Extraocular movements intact. Conjunctiva/sclera: Conjunctivae normal.   Cardiovascular:      Rate and Rhythm: Normal rate. Pulmonary:      Effort: Pulmonary effort is normal.   Musculoskeletal:         General: Normal range of motion.       Cervical back: Normal range of

## 2022-07-21 ENCOUNTER — TELEPHONE (OUTPATIENT)
Dept: ENDOCRINOLOGY | Age: 32
End: 2022-07-21

## 2022-07-21 NOTE — TELEPHONE ENCOUNTER
Patient is calling because she needs prior authorization for one touch meter. She would also like someone to contact her when this is approved. Thanks!

## 2022-07-21 NOTE — TELEPHONE ENCOUNTER
PA was denied on covermymeds, spoke to pharmacy and patient is not able to get a new meter that insurance will pay for unto feb 2023. Left a message for patient.

## 2022-08-08 ENCOUNTER — OFFICE VISIT (OUTPATIENT)
Dept: PULMONOLOGY | Age: 32
End: 2022-08-08
Payer: COMMERCIAL

## 2022-08-08 VITALS
DIASTOLIC BLOOD PRESSURE: 78 MMHG | BODY MASS INDEX: 47.11 KG/M2 | TEMPERATURE: 97.6 F | OXYGEN SATURATION: 98 % | WEIGHT: 256 LBS | HEIGHT: 62 IN | SYSTOLIC BLOOD PRESSURE: 130 MMHG | HEART RATE: 98 BPM

## 2022-08-08 DIAGNOSIS — E66.01 MORBID OBESITY (HCC): ICD-10-CM

## 2022-08-08 DIAGNOSIS — G47.33 OSA (OBSTRUCTIVE SLEEP APNEA): Primary | ICD-10-CM

## 2022-08-08 PROCEDURE — 99213 OFFICE O/P EST LOW 20 MIN: CPT | Performed by: INTERNAL MEDICINE

## 2022-08-08 PROCEDURE — G8427 DOCREV CUR MEDS BY ELIG CLIN: HCPCS | Performed by: INTERNAL MEDICINE

## 2022-08-08 PROCEDURE — 4004F PT TOBACCO SCREEN RCVD TLK: CPT | Performed by: INTERNAL MEDICINE

## 2022-08-08 PROCEDURE — G8417 CALC BMI ABV UP PARAM F/U: HCPCS | Performed by: INTERNAL MEDICINE

## 2022-08-08 ASSESSMENT — ENCOUNTER SYMPTOMS
NAUSEA: 0
CHEST TIGHTNESS: 0
ABDOMINAL PAIN: 0
VOICE CHANGE: 0
EYE DISCHARGE: 0
EYE ITCHING: 0
SORE THROAT: 0
SINUS PRESSURE: 0
DIARRHEA: 0
RHINORRHEA: 0
COUGH: 0
TROUBLE SWALLOWING: 0
SHORTNESS OF BREATH: 0
VOMITING: 0
WHEEZING: 0

## 2022-08-08 NOTE — PROGRESS NOTES
Subjective:     Hima Noble is a 29 y.o. female who complains today of:     Chief Complaint   Patient presents with    Follow-up     2 month    Sleep Apnea       HPI  CPAP study done show therapeutic at BIPAP 12/8 cm. She said she got it sometime in July last month. She is claustrophobic  and she is going try while walking TV . She is complaining of snoring and daytime sleepiness and tiredness. C/o wakes up frequently during sleep . No complaint of morning headache. She does  have restful sleep. She does  take daily naps. She does not drive. Fall a sleep while as passenger.      Allergies:  Food and Amoxicillin  Past Medical History:   Diagnosis Date    Abnormal Pap smear of cervix     Anxiety     Asthma     Depression     DVT (deep venous thrombosis) (HCC)     Hypertension     Pre-diabetes     PTSD (post-traumatic stress disorder)     Schizoaffective disorder (HCC)      Past Surgical History:   Procedure Laterality Date     SECTION      TONSILLECTOMY       Family History   Problem Relation Age of Onset    Diabetes Mother     Depression Father     Asthma Brother     Diabetes Maternal Grandmother     Breast Cancer Maternal Grandmother     Thyroid Cancer Maternal Grandfather     Diabetes Paternal Grandmother     Other Paternal Grandfather         testicular cancer     Social History     Socioeconomic History    Marital status:      Spouse name: Not on file    Number of children: Not on file    Years of education: Not on file    Highest education level: Not on file   Occupational History    Not on file   Tobacco Use    Smoking status: Every Day     Packs/day: 1.00     Years: 12.00     Pack years: 12.00     Types: Cigarettes    Smokeless tobacco: Never    Tobacco comments:     1 ppd   Vaping Use    Vaping Use: Never used   Substance and Sexual Activity    Alcohol use: Not Currently     Comment:      Drug use: Not Currently     Comment: \"cut down since I'm trying to get pregnant\"    Sexual Nose normal.   Eyes:      Pupils: Pupils are equal, round, and reactive to light. Neck:      Thyroid: No thyromegaly. Vascular: No JVD. Trachea: No tracheal deviation. Cardiovascular:      Rate and Rhythm: Normal rate and regular rhythm. Heart sounds: No murmur heard. No friction rub. No gallop. Pulmonary:      Effort: No respiratory distress. Breath sounds: No wheezing or rales. Chest:      Chest wall: No tenderness. Abdominal:      General: There is no distension. Tenderness: There is no abdominal tenderness. There is no rebound. Musculoskeletal:         General: Normal range of motion. Lymphadenopathy:      Cervical: No cervical adenopathy. Skin:     General: Skin is warm and dry. Neurological:      Mental Status: She is alert and oriented to person, place, and time. Coordination: Coordination normal.   Psychiatric:         Mood and Affect: Mood normal.         Behavior: Behavior normal.       Current Outpatient Medications   Medication Sig Dispense Refill    blood glucose monitor kit and supplies Dispense one meter that insurance will cover.  1 kit 0    blood glucose monitor strips Test 2xdaily 100 strip 3    Lancets MISC 1 each by Does not apply route daily 100 each 3    metFORMIN (GLUCOPHAGE-XR) 500 MG extended release tablet TAKE 2 TABLET BY MOUTH 2 TIMES DAILY (BEFORE MEALS) 120 tablet 3    Alcohol Swabs (ALCOHOL PREP) 70 % PADS bid 100 each 11    lurasidone (LATUDA) 120 MG tablet Take 120 mg by mouth daily      CPAP Machine MISC by Does not apply route New BIPAP with setting 12/8 cm 1 each 0    letrozole (FEMARA) 2.5 MG tablet Take 2 tablets by mouth daily Day 5-9 of menstrual cycle 5 tablet 3    norethindrone (MICRONOR) 0.35 MG tablet       hydrOXYzine (VISTARIL) 25 MG capsule       ONETOUCH ULTRA strip       naproxen (NAPROSYN) 500 MG tablet Take 1 tablet by mouth 2 times daily 20 tablet 0    docusate sodium (COLACE) 100 MG capsule Take 1 capsule by mouth 2 times daily 20 capsule 0    ondansetron (ZOFRAN-ODT) 4 MG disintegrating tablet Take 1 tablet by mouth 3 times daily as needed for Nausea or Vomiting 21 tablet 0    Alcohol Swabs (ALCOHOL PADS) 70 % PADS 1 each by Does not apply route three times daily 100 each 1    OXcarbazepine (TRILEPTAL) 150 MG tablet Take 1 tablet by mouth 2 times daily 30 tablet 3    lisinopril (PRINIVIL;ZESTRIL) 10 MG tablet Take 1 tablet by mouth daily 15 tablet 3    prazosin (MINIPRESS) 1 MG capsule Take 1 capsule by mouth nightly 15 capsule 3    albuterol sulfate  (90 Base) MCG/ACT inhaler Use 2 puffs 4 times daily for 7 days then as needed for wheezing. Dispense with Spacer and instruct in use. At patient's preference may use 60 dose MDI. May Sub Pro-Air or Proventil as needed per insurance. 1 Inhaler 0    Prenatal Vit-Fe Fumarate-FA (PRENATAL VITAMIN) 27-1 MG TABS tablet take 1 tablet by mouth once daily      FLOVENT DISKUS 100 MCG/BLIST AEPB inhaler Inhale 1 puff into the lungs daily       Melatonin 10 MG CAPS        No current facility-administered medications for this visit. Results for orders placed during the hospital encounter of 07/29/19    XR CHEST STANDARD (2 VW)    Narrative  EXAMINATION: XR CHEST (2 VW)    CLINICAL HISTORY:  cough    COMPARISONS: August 30, 2016    FINDINGS:    Two views of the chest are submitted. The cardiac silhouette is of normal size configuration. The mediastinum is unremarkable. Pulmonary vascular attenuated. Right sided trachea. Area of atelectasis, and/or infiltrate left lower lobe. No effusions. No Pneumothoraces. Impression  AREA OF ATELECTASIS, and/or INFILTRATE LEFT LOWER LOBE. CORRELATE CLINICALLY.   RADIOGRAPHIC FINDINGS OF COPD      Results for orders placed during the hospital encounter of 08/30/16    XR Chest Standard TWO VW    Narrative  EXAMINATION CHEST TWO VIEWS    CLINICAL HISTORY Shortness of breath    COMPARISONS 11/4/12    FINDINGS Heart, mediastinum, and hilum are unremarkable. Lungs are  clear. Pulmonary vasculature is normal.  No effusions are seen. Bones  are intact. IMPRESSION NO ACTIVE CHEST DISEASE. Jeimy Cartwright Kadeem Roach M.D. Released Kadeem Roach M.D. Released Date Time- 08/30/16 0872  This document has been electronically signed. ------------------------------------------------------------------------------  ]  Results for orders placed during the hospital encounter of 11/12/21    XR CHEST PORTABLE    Narrative  Exam: XR CHEST PORTABLE    History:  cough    Technique: AP portable view of the chest obtained. Comparison: Chest x-ray from September 19, 2021    Chest x-ray portable    Findings: The cardiomediastinal silhouette is within normal limits. There are no infiltrates, consolidations or effusions. Bones of the thorax appear intact. Impression  No radiographic evidence of acute intrathoracic process. Results for orders placed during the hospital encounter of 09/19/21    XR CHEST PORTABLE    Narrative  EXAMINATION: CHEST PORTABLE VIEW    CLINICAL HISTORY: Midsternal chest pain    COMPARISONS: July 29, 2018 2214 hours    FINDINGS:    Single  views of the chest is submitted. The cardiac silhouette is of normal size configuration. Pulmonary vascular unremarkable. Right sided trachea. No focal infiltrates. No Pneumothoraces. Impression  NO ACUTE ACTIVE CARDIOPULMONARY PROCESS      Assessment/Plan:     1. KEI (obstructive sleep apnea)  CPAP study done show therapeutic at BIPAP 12/8 cm. She said she got it sometime in July last month. She is claustrophobic  and not able to use much . she is going try while walking TV . She is complaining of snoring and daytime sleepiness and tiredness. C/o wakes up frequently during sleep . Continue BIPAP therapy. 2. Morbid obesity ([address]  She is advised try to lose weight.  obesity related risk explained to the patient ,  Current weight:  256 lb (116.1 kg) Lbs. BMI:  Body mass index is 46.82 kg/m². Suggested weight control approaches, including dietary changes , exercise, behavioral modification. Return in about 3 months (around 11/8/2022) for linda.       Cecil Torres MD

## 2022-09-07 ENCOUNTER — TELEPHONE (OUTPATIENT)
Dept: OBGYN CLINIC | Age: 32
End: 2022-09-07

## 2022-09-07 DIAGNOSIS — N91.2 AMENORRHEA: Primary | ICD-10-CM

## 2022-09-07 NOTE — TELEPHONE ENCOUNTER
Pt is scheduled for amenorrhea visit beginning of October. She is asking if she can have orders to get blood work to confirm pregnancy a while.   Please advise

## 2022-09-13 ENCOUNTER — OFFICE VISIT (OUTPATIENT)
Dept: ENDOCRINOLOGY | Age: 32
End: 2022-09-13
Payer: COMMERCIAL

## 2022-09-13 VITALS
DIASTOLIC BLOOD PRESSURE: 85 MMHG | HEART RATE: 93 BPM | OXYGEN SATURATION: 97 % | SYSTOLIC BLOOD PRESSURE: 130 MMHG | BODY MASS INDEX: 47.48 KG/M2 | HEIGHT: 62 IN | WEIGHT: 258 LBS

## 2022-09-13 DIAGNOSIS — R73.9 HYPERGLYCEMIA: ICD-10-CM

## 2022-09-13 DIAGNOSIS — E88.81 INSULIN RESISTANCE: Primary | ICD-10-CM

## 2022-09-13 DIAGNOSIS — E66.01 MORBID OBESITY (HCC): ICD-10-CM

## 2022-09-13 DIAGNOSIS — E28.2 PCOS (POLYCYSTIC OVARIAN SYNDROME): ICD-10-CM

## 2022-09-13 LAB
CHP ED QC CHECK: NORMAL
GLUCOSE BLD-MCNC: 183 MG/DL
HBA1C MFR BLD: 6.3 %

## 2022-09-13 PROCEDURE — G8417 CALC BMI ABV UP PARAM F/U: HCPCS | Performed by: INTERNAL MEDICINE

## 2022-09-13 PROCEDURE — 99213 OFFICE O/P EST LOW 20 MIN: CPT | Performed by: INTERNAL MEDICINE

## 2022-09-13 PROCEDURE — 4004F PT TOBACCO SCREEN RCVD TLK: CPT | Performed by: INTERNAL MEDICINE

## 2022-09-13 PROCEDURE — 82962 GLUCOSE BLOOD TEST: CPT | Performed by: INTERNAL MEDICINE

## 2022-09-13 PROCEDURE — 83036 HEMOGLOBIN GLYCOSYLATED A1C: CPT | Performed by: INTERNAL MEDICINE

## 2022-09-13 PROCEDURE — G8427 DOCREV CUR MEDS BY ELIG CLIN: HCPCS | Performed by: INTERNAL MEDICINE

## 2022-09-13 RX ORDER — UBIQUINOL 100 MG
CAPSULE ORAL
Qty: 100 EACH | Refills: 11 | Status: SHIPPED | OUTPATIENT
Start: 2022-09-13

## 2022-09-13 RX ORDER — DULAGLUTIDE 0.75 MG/.5ML
0.75 INJECTION, SOLUTION SUBCUTANEOUS WEEKLY
Qty: 4 ADJUSTABLE DOSE PRE-FILLED PEN SYRINGE | Refills: 3 | Status: SHIPPED | OUTPATIENT
Start: 2022-09-13

## 2022-09-13 RX ORDER — BLOOD SUGAR DIAGNOSTIC
STRIP MISCELLANEOUS
Qty: 100 EACH | Refills: 3 | Status: SHIPPED | OUTPATIENT
Start: 2022-09-13

## 2022-09-13 RX ORDER — METFORMIN HYDROCHLORIDE 500 MG/1
TABLET, EXTENDED RELEASE ORAL
Qty: 120 TABLET | Refills: 3 | Status: SHIPPED | OUTPATIENT
Start: 2022-09-13

## 2022-09-13 NOTE — PROGRESS NOTES
9/13/2022    Assessment:       Diagnosis Orders   1. Insulin resistance  POCT Glucose    POCT glycosylated hemoglobin (Hb A1C)    Hemoglobin E1N    Basic Metabolic Panel, Fasting    Alcohol Swabs (ALCOHOL PREP) 70 % PADS    metFORMIN (GLUCOPHAGE-XR) 500 MG extended release tablet    ONETOUCH ULTRA strip      2. PCOS (polycystic ovarian syndrome)  metFORMIN (GLUCOPHAGE-XR) 500 MG extended release tablet      3. Morbid obesity [facility])  Kavya Plasencia MD, [place]      4.  Hyperglycemia              PLAN:     Continue current dose of metformin  Continue Trulicity  Repeat labs  Increase activity lower weight  Refer patient to bariatric surgery  Orders Placed This Encounter   Procedures    Hemoglobin A1C     Standing Status:   Future     Standing Expiration Date:   1/38/4878    Basic Metabolic Panel, Fasting     Standing Status:   Future     Standing Expiration Date:   9/13/2023    Kavya Plasencia MD, [place]     Referral Priority:   Routine     Referral Type:   Eval and Treat     Referral Reason:   Specialty Services Required     Referred to Provider:   Juli Leigh MD     Requested Specialty:   Bariatric Surgery     Number of Visits Requested:   1    POCT Glucose    POCT glycosylated hemoglobin (Hb A1C)       Orders Placed This Encounter   Procedures    Hemoglobin A1C     Standing Status:   Future     Standing Expiration Date:   5/93/8112    Basic Metabolic Panel, Fasting     Standing Status:   Future     Standing Expiration Date:   9/13/2023    POCT Glucose    POCT glycosylated hemoglobin (Hb A1C)     Orders Placed This Encounter   Medications    Alcohol Swabs (ALCOHOL PREP) 70 % PADS     Sig: bid     Dispense:  100 each     Refill:  11    metFORMIN (GLUCOPHAGE-XR) 500 MG extended release tablet     Sig: TAKE 2 TABLET BY MOUTH 2 TIMES DAILY (BEFORE MEALS)     Dispense:  120 tablet     Refill:  3    ONETOUCH ULTRA strip     Sig: bid     Dispense:  100 each     Refill:  3    Dulaglutide (TRULICITY) 5.99 PX/8.9OH SOPN     Sig: Inject 0.75 mg into the skin once a week     Dispense:  4 Adjustable Dose Pre-filled Pen Syringe     Refill:  3     No follow-ups on file. Subjective:     Chief Complaint   Patient presents with    Other     Insulin resistance ,PCOS     Obesity     Vitals:    22 1256   BP: 130/85   Pulse: 93   SpO2: 97%   Weight: 258 lb (117 kg)   Height: 5' 2\" (1.575 m)     Wt Readings from Last 3 Encounters:   22 258 lb (117 kg)   22 256 lb (116.1 kg)   22 260 lb (117.9 kg)     BP Readings from Last 3 Encounters:   22 130/85   22 130/78   22 130/80     Follow-up on obesity hyperglycemia early type 2 diabetes A1c has come down from 6.5-6.3    Other  This is a chronic (Obesity PCOS) problem. The current episode started more than 1 year ago. The problem occurs intermittently. The problem has been waxing and waning. Associated symptoms include fatigue. The symptoms are aggravated by stress. Treatments tried: Trulicity metformin.    Past Medical History:   Diagnosis Date    Abnormal Pap smear of cervix     Anxiety     Asthma     Depression     DVT (deep venous thrombosis) ([facility])     Hypertension     Pre-diabetes     PTSD (post-traumatic stress disorder)     Schizoaffective disorder (HCC)      Past Surgical History:   Procedure Laterality Date     SECTION      TONSILLECTOMY       Social History     Socioeconomic History    Marital status:      Spouse name: Not on file    Number of children: Not on file    Years of education: Not on file    Highest education level: Not on file   Occupational History    Not on file   Tobacco Use    Smoking status: Every Day     Packs/day: 1.00     Years: 12.00     Pack years: 12.00     Types: Cigarettes    Smokeless tobacco: Never    Tobacco comments:     1 ppd   Vaping Use    Vaping Use: Never used   Substance and Sexual Activity    Alcohol use: Not Currently     Comment:      Drug use: Not Currently (COLACE) 100 MG capsule, Take 1 capsule by mouth 2 times daily, Disp: 20 capsule, Rfl: 0    ondansetron (ZOFRAN-ODT) 4 MG disintegrating tablet, Take 1 tablet by mouth 3 times daily as needed for Nausea or Vomiting, Disp: 21 tablet, Rfl: 0    OXcarbazepine (TRILEPTAL) 150 MG tablet, Take 1 tablet by mouth 2 times daily, Disp: 30 tablet, Rfl: 3    lisinopril (PRINIVIL;ZESTRIL) 10 MG tablet, Take 1 tablet by mouth daily, Disp: 15 tablet, Rfl: 3    prazosin (MINIPRESS) 1 MG capsule, Take 1 capsule by mouth nightly, Disp: 15 capsule, Rfl: 3    albuterol sulfate  (90 Base) MCG/ACT inhaler, Use 2 puffs 4 times daily for 7 days then as needed for wheezing. Dispense with Spacer and instruct in use. At patient's preference may use 60 dose MDI.  May Sub Pro-Air or Proventil as needed per insurance., Disp: 1 Inhaler, Rfl: 0    Prenatal Vit-Fe Fumarate-FA (PRENATAL VITAMIN) 27-1 MG TABS tablet, take 1 tablet by mouth once daily, Disp: , Rfl:     FLOVENT DISKUS 100 MCG/BLIST AEPB inhaler, Inhale 1 puff into the lungs daily , Disp: , Rfl:     Melatonin 10 MG CAPS, , Disp: , Rfl:   Lab Results   Component Value Date     06/14/2022    K 3.9 06/14/2022     06/14/2022    CO2 22 06/14/2022    BUN 12 06/14/2022    CREATININE 0.93 (H) 06/14/2022    GLUCOSE 183 09/13/2022    CALCIUM 9.4 06/14/2022    PROT 7.0 01/04/2022    LABALBU 4.1 01/04/2022    BILITOT <0.2 01/04/2022    ALKPHOS 88 01/04/2022    AST 34 01/04/2022    ALT 52 (H) 01/04/2022    LABGLOM >60.0 06/14/2022    GFRAA >60.0 06/14/2022    GLOB 2.9 01/04/2022     Lab Results   Component Value Date    WBC 11.2 (H) 01/04/2022    HGB 13.6 01/04/2022    HCT 39.9 01/04/2022    MCV 84.4 01/04/2022     01/04/2022     Lab Results   Component Value Date    LABA1C 6.3 09/13/2022    LABA1C 6.5 (H) 06/14/2022    LABA1C 6.1 06/14/2022     Lab Results   Component Value Date    HDL 42 10/13/2021    HDL 54 08/09/2021    HDL 37 (L) 12/11/2019    LDLCALC 118 10/13/2021 LDLCALC 49 08/09/2021    LDLCALC 44 12/11/2019    CHOL 208 (H) 10/13/2021    CHOL 123 08/09/2021    CHOL 118 12/11/2019    TRIG 241 (H) 10/13/2021    TRIG 102 08/09/2021    TRIG 183 (H) 12/11/2019     No results found for: TESTM  Lab Results   Component Value Date    TSH 1.070 10/13/2021    TSH 0.563 08/09/2021    TSH 0.933 12/11/2019    TSHREFLEX 1.900 11/13/2020    TSHREFLEX 1.380 01/17/2018    T4FREE 1.15 08/09/2021    T4FREE 1.46 12/11/2019    T4FREE 1.17 01/17/2018     No results found for: TPOABS    Review of Systems   Constitutional:  Positive for fatigue. Cardiovascular: Negative. Endocrine: Negative. Psychiatric/Behavioral:  Positive for dysphoric mood. All other systems reviewed and are negative. Objective:   Physical Exam  Vitals reviewed. Constitutional:       General: She is not in acute distress. Appearance: Normal appearance. She is obese. HENT:      Head: Normocephalic and atraumatic. Right Ear: External ear normal.      Left Ear: External ear normal.      Nose: Nose normal.   Eyes:      General: No scleral icterus. Right eye: No discharge. Left eye: No discharge. Extraocular Movements: Extraocular movements intact. Conjunctiva/sclera: Conjunctivae normal.   Cardiovascular:      Rate and Rhythm: Normal rate. Pulmonary:      Effort: Pulmonary effort is normal.   Musculoskeletal:         General: Normal range of motion. Cervical back: Normal range of motion and neck supple. Neurological:      General: No focal deficit present. Mental Status: She is alert and oriented to person, place, and time.    Psychiatric:         Mood and Affect: Mood normal.         Behavior: Behavior normal.

## 2022-09-22 ENCOUNTER — TELEPHONE (OUTPATIENT)
Dept: OBGYN CLINIC | Age: 32
End: 2022-09-22

## 2022-09-22 NOTE — TELEPHONE ENCOUNTER
Pt is asking if she can have a prescription for bc. She used the norethindrone 0.35. She is not feeling well and did not want to schedule an appt. If needed we can do VV.

## 2022-10-11 ENCOUNTER — TELEMEDICINE (OUTPATIENT)
Dept: OBGYN CLINIC | Age: 32
End: 2022-10-11
Payer: COMMERCIAL

## 2022-10-11 DIAGNOSIS — N93.9 ABNORMAL UTERINE BLEEDING (AUB): Primary | ICD-10-CM

## 2022-10-11 PROCEDURE — 99212 OFFICE O/P EST SF 10 MIN: CPT | Performed by: OBSTETRICS & GYNECOLOGY

## 2022-10-11 RX ORDER — DOXEPIN HYDROCHLORIDE 75 MG/1
CAPSULE ORAL
COMMUNITY
Start: 2022-10-11

## 2022-10-11 RX ORDER — ACETAMINOPHEN AND CODEINE PHOSPHATE 120; 12 MG/5ML; MG/5ML
1 SOLUTION ORAL DAILY
Qty: 30 TABLET | Refills: 11 | Status: SHIPPED | OUTPATIENT
Start: 2022-10-11

## 2022-10-11 RX ORDER — DOXEPIN HYDROCHLORIDE 25 MG/1
CAPSULE ORAL
COMMUNITY
Start: 2022-08-08

## 2022-10-11 RX ORDER — HYDROCHLOROTHIAZIDE 12.5 MG/1
CAPSULE, GELATIN COATED ORAL
COMMUNITY
Start: 2022-10-10

## 2022-10-11 NOTE — PROGRESS NOTES
HPI:  Debra Mims (: 1990) is a 29 y.o. female, Established patient, here for evaluation of the following chief complaint(s):  Discuss Medications (Going through steps to have bariatric surgery and wants to avoid pregnancy and to regulate cycles)  Virtual visit. Pt wants OCs before she has bariatric surgery      SUBJECTIVE/OBJECTIVE:    Past Surgical History:   Procedure Laterality Date     SECTION      TONSILLECTOMY          Review of Systems    Physical Exam    There were no vitals filed for this visit. ASSESSMENT/PLAN  AUB    PLAN:  micronor      No follow-ups on file. On this date 10/11/2022 I have spent 20 minutes reviewing previous notes, test results and face to face with the patient discussing the diagnosis and importance of compliance with the treatment plan as well as documenting on the day of the visit. An electronic signature was used to authenticate this note. Please note this report has been partially produced using speech recognition software and may cause contain errors related to that system including grammar, punctuation and spelling as well as words and phrases that may seem inappropriate. If there are questions or concerns please feel free to contact me to clarify.

## 2022-10-22 NOTE — GROUP NOTE
Dr Gentry Diop notified bladder scan showed [id number] Group Therapy Note    Date: 10/17/2021    Group Start Time: 2115  Group End Time: 2130  Group Topic: Wrap-Up    [facility]    Kerri Hidalgo        Group Therapy Note    Attendees: 5/15         Patient's Goal:  \"be positive\"    Notes:  Patient reported meeting their goal for the day. Patient shared enjoying watching movies with peers today.     Status After Intervention:  Unchanged    Participation Level: Interactive    Participation Quality: Appropriate, Attentive and Sharing      Speech:  normal      Thought Process/Content: Logical      Affective Functioning: Congruent      Mood: euthymic      Level of consciousness:  Alert and Attentive      Response to Learning: Progressing to goal      Endings: None Reported    Modes of Intervention: Support      Discipline Responsible: Spyder Lynk      Signature:  Kerri Hidalgo

## 2022-11-08 ENCOUNTER — OFFICE VISIT (OUTPATIENT)
Dept: PULMONOLOGY | Age: 32
End: 2022-11-08
Payer: COMMERCIAL

## 2022-11-08 VITALS
WEIGHT: 253 LBS | SYSTOLIC BLOOD PRESSURE: 132 MMHG | OXYGEN SATURATION: 98 % | DIASTOLIC BLOOD PRESSURE: 88 MMHG | HEART RATE: 88 BPM | BODY MASS INDEX: 46.27 KG/M2

## 2022-11-08 DIAGNOSIS — E66.01 MORBID OBESITY (HCC): ICD-10-CM

## 2022-11-08 DIAGNOSIS — G47.33 OSA (OBSTRUCTIVE SLEEP APNEA): Primary | ICD-10-CM

## 2022-11-08 PROCEDURE — G8427 DOCREV CUR MEDS BY ELIG CLIN: HCPCS | Performed by: INTERNAL MEDICINE

## 2022-11-08 PROCEDURE — 99213 OFFICE O/P EST LOW 20 MIN: CPT | Performed by: INTERNAL MEDICINE

## 2022-11-08 PROCEDURE — G8484 FLU IMMUNIZE NO ADMIN: HCPCS | Performed by: INTERNAL MEDICINE

## 2022-11-08 PROCEDURE — G8417 CALC BMI ABV UP PARAM F/U: HCPCS | Performed by: INTERNAL MEDICINE

## 2022-11-08 PROCEDURE — 4004F PT TOBACCO SCREEN RCVD TLK: CPT | Performed by: INTERNAL MEDICINE

## 2022-11-08 ASSESSMENT — ENCOUNTER SYMPTOMS
EYE DISCHARGE: 0
WHEEZING: 0
RHINORRHEA: 0
VOMITING: 0
DIARRHEA: 0
EYE ITCHING: 0
NAUSEA: 0
VOICE CHANGE: 0
SHORTNESS OF BREATH: 0
COUGH: 0
SORE THROAT: 0
ABDOMINAL PAIN: 0
CHEST TIGHTNESS: 0
TROUBLE SWALLOWING: 0
SINUS PRESSURE: 0

## 2022-11-08 NOTE — PROGRESS NOTES
Subjective:     Panda Carrington is a 29 y.o. female who complains today of:     Chief Complaint   Patient presents with    Follow-up     3m f/u     Sleep Apnea     No data available for compliance report - needs to take SD card to [address]       [facility]  CPAP study done show therapeutic at BIPAP 12/8 cm. She said she is trying to use at night but not getting used to it. She is claustrophobic  and she is going try while walking TV . She is complaining of snoring and daytime sleepiness and tiredness. No complaint of morning headache. She does have restful sleep. She does take daily naps, 1 time about 1 hour . She does not drive. Fall a sleep while as passenger.     She said she is going for bariatric surgery at [facility] in dec 2022    Allergies:  Food and Amoxicillin  Past Medical History:   Diagnosis Date    Abnormal Pap smear of cervix     Anxiety     Asthma     Depression     DVT (deep venous thrombosis) (HCC)     Hypertension     Pre-diabetes     PTSD (post-traumatic stress disorder)     Schizoaffective disorder (HCC)      Past Surgical History:   Procedure Laterality Date     SECTION      TONSILLECTOMY       Family History   Problem Relation Age of Onset    Diabetes Mother     Depression Father     Asthma Brother     Diabetes Maternal Grandmother     Breast Cancer Maternal Grandmother     Thyroid Cancer Maternal Grandfather     Diabetes Paternal Grandmother     Other Paternal Grandfather         testicular cancer     Social History     Socioeconomic History    Marital status:      Spouse name: Not on file    Number of children: Not on file    Years of education: Not on file    Highest education level: Not on file   Occupational History    Not on file   Tobacco Use    Smoking status: Every Day     Packs/day: 1.00     Years: 12.00     Pack years: 12.00     Types: Cigarettes    Smokeless tobacco: Never    Tobacco comments:     1 ppd   Vaping Use    Vaping Use: Never used   Substance and Sexual Activity Alcohol use: Not Currently     Comment:      Drug use: Not Currently     Comment: \"cut down since I'm trying to get pregnant\"    Sexual activity: Yes     Partners: Male   Other Topics Concern    Not on file   Social History Narrative    Not on file     Social Determinants of Health     Financial Resource Strain: Not on file   Food Insecurity: Not on file   Transportation Needs: Not on file   Physical Activity: Not on file   Stress: Not on file   Social Connections: Not on file   Intimate Partner Violence: Not on file   Housing Stability: Not on file         Review of Systems   Constitutional:  Negative for chills, diaphoresis, fatigue and fever. HENT:  Negative for congestion, mouth sores, nosebleeds, postnasal drip, rhinorrhea, sinus pressure, sneezing, sore throat, trouble swallowing and voice change. Eyes:  Negative for discharge, itching and visual disturbance. Respiratory:  Negative for cough, chest tightness, shortness of breath and wheezing. Cardiovascular:  Negative for chest pain, palpitations and leg swelling. Gastrointestinal:  Negative for abdominal pain, diarrhea, nausea and vomiting. Genitourinary:  Negative for difficulty urinating and hematuria. Musculoskeletal:  Negative for arthralgias, joint swelling and myalgias. Skin:  Negative for rash. Allergic/Immunologic: Negative for environmental allergies and food allergies. Neurological:  Negative for dizziness, tremors, weakness and headaches. Psychiatric/Behavioral:  Positive for sleep disturbance. Negative for behavioral problems. :     Vitals:    11/08/22 1528   BP: 132/88   Site: Right Upper Arm   Position: Sitting   Cuff Size: Medium Adult   Pulse: 88   SpO2: 98%   Weight: 253 lb (114.8 kg)     Wt Readings from Last 3 Encounters:   11/08/22 253 lb (114.8 kg)   09/13/22 258 lb (117 kg)   08/08/22 256 lb (116.1 kg)         Physical Exam  Constitutional:       General: She is not in acute distress.      Appearance: She is well-developed. She is obese. She is not diaphoretic. HENT:      Head: Normocephalic and atraumatic. Nose: Nose normal.   Eyes:      Pupils: Pupils are equal, round, and reactive to light. Neck:      Thyroid: No thyromegaly. Vascular: No JVD. Trachea: No tracheal deviation. Cardiovascular:      Rate and Rhythm: Normal rate and regular rhythm. Heart sounds: No murmur heard. No friction rub. No gallop. Pulmonary:      Effort: No respiratory distress. Breath sounds: No wheezing or rales. Chest:      Chest wall: No tenderness. Abdominal:      General: There is no distension. Tenderness: There is no abdominal tenderness. There is no rebound. Musculoskeletal:         General: Normal range of motion. Lymphadenopathy:      Cervical: No cervical adenopathy. Skin:     General: Skin is warm and dry. Neurological:      Mental Status: She is alert and oriented to person, place, and time. Coordination: Coordination normal.   Psychiatric:         Mood and Affect: Mood normal.         Behavior: Behavior normal.       Current Outpatient Medications   Medication Sig Dispense Refill    metoprolol tartrate (LOPRESSOR) 25 MG tablet TAKE 1 TABLET BY ORAL ROUTE 2 TIMES EVERY DAY FOR HIGH BLOOD PRESSURE/HR      doxepin (SINEQUAN) 25 MG capsule 2 (EACH) BEDTIME (AS NEEDED) TAKE 1 OR 2 CAPSULES AT BEDTIME AS NEEDED FOR SLEEP.       doxepin (SINEQUAN) 75 MG capsule       hydroCHLOROthiazide (MICROZIDE) 12.5 MG capsule       norethindrone (MICRONOR) 0.35 MG tablet Take 1 tablet by mouth daily 30 tablet 11    Alcohol Swabs (ALCOHOL PREP) 70 % PADS bid 100 each 11    metFORMIN (GLUCOPHAGE-XR) 500 MG extended release tablet TAKE 2 TABLET BY MOUTH 2 TIMES DAILY (BEFORE MEALS) 120 tablet 3    ONETOUCH ULTRA strip bid 100 each 3    Dulaglutide (TRULICITY) 2.39 ZS/6.3FW SOPN Inject 0.75 mg into the skin once a week 4 Adjustable Dose Pre-filled Pen Syringe 3    Lancets MISC 1 each by Does not apply route daily 100 each 3    lurasidone (LATUDA) 120 MG tablet Take 120 mg by mouth daily      CPAP Machine MISC by Does not apply route New BIPAP with setting 12/8 cm 1 each 0    letrozole (FEMARA) 2.5 MG tablet Take 2 tablets by mouth daily Day 5-9 of menstrual cycle 5 tablet 3    hydrOXYzine (VISTARIL) 25 MG capsule       naproxen (NAPROSYN) 500 MG tablet Take 1 tablet by mouth 2 times daily 20 tablet 0    docusate sodium (COLACE) 100 MG capsule Take 1 capsule by mouth 2 times daily 20 capsule 0    ondansetron (ZOFRAN-ODT) 4 MG disintegrating tablet Take 1 tablet by mouth 3 times daily as needed for Nausea or Vomiting 21 tablet 0    OXcarbazepine (TRILEPTAL) 150 MG tablet Take 1 tablet by mouth 2 times daily 30 tablet 3    lisinopril (PRINIVIL;ZESTRIL) 10 MG tablet Take 1 tablet by mouth daily 15 tablet 3    prazosin (MINIPRESS) 1 MG capsule Take 1 capsule by mouth nightly 15 capsule 3    albuterol sulfate  (90 Base) MCG/ACT inhaler Use 2 puffs 4 times daily for 7 days then as needed for wheezing. Dispense with Spacer and instruct in use. At patient's preference may use 60 dose MDI. May Sub Pro-Air or Proventil as needed per insurance. 1 Inhaler 0    Prenatal Vit-Fe Fumarate-FA (PRENATAL VITAMIN) 27-1 MG TABS tablet take 1 tablet by mouth once daily      FLOVENT DISKUS 100 MCG/BLIST AEPB inhaler Inhale 1 puff into the lungs daily       Melatonin 10 MG CAPS        No current facility-administered medications for this visit. Results for orders placed during the hospital encounter of 07/29/19    XR CHEST STANDARD (2 VW)    Narrative  EXAMINATION: XR CHEST (2 VW)    CLINICAL HISTORY:  cough    COMPARISONS: August 30, 2016    FINDINGS:    Two views of the chest are submitted. The cardiac silhouette is of normal size configuration. The mediastinum is unremarkable. Pulmonary vascular attenuated. Right sided trachea. Area of atelectasis, and/or infiltrate left lower lobe. No effusions.   No Pneumothoraces. Impression  AREA OF ATELECTASIS, and/or INFILTRATE LEFT LOWER LOBE. CORRELATE CLINICALLY. RADIOGRAPHIC FINDINGS OF COPD      Results for orders placed during the hospital encounter of 08/30/16    XR Chest Standard TWO VW    Narrative  EXAMINATION CHEST TWO VIEWS    CLINICAL HISTORY Shortness of breath    COMPARISONS 11/4/12    FINDINGS Heart, mediastinum, and hilum are unremarkable. Lungs are  clear. Pulmonary vasculature is normal.  No effusions are seen. Bones  are intact. IMPRESSION NO ACTIVE CHEST DISEASE. Monalisa Foster Ronald Javier M.D. Released Ronald Javier M.D. Released Date Time- 08/30/16 7220  This document has been electronically signed. ------------------------------------------------------------------------------  ]  Results for orders placed during the hospital encounter of 11/12/21    XR CHEST PORTABLE    Narrative  Exam: XR CHEST PORTABLE    History:  cough    Technique: AP portable view of the chest obtained. Comparison: Chest x-ray from September 19, 2021    Chest x-ray portable    Findings: The cardiomediastinal silhouette is within normal limits. There are no infiltrates, consolidations or effusions. Bones of the thorax appear intact. Impression  No radiographic evidence of acute intrathoracic process. Results for orders placed during the hospital encounter of 09/19/21    XR CHEST PORTABLE    Narrative  EXAMINATION: CHEST PORTABLE VIEW    CLINICAL HISTORY: Midsternal chest pain    COMPARISONS: July 29, 2018 2214 hours    FINDINGS:    Single  views of the chest is submitted. The cardiac silhouette is of normal size configuration. Pulmonary vascular unremarkable. Right sided trachea. No focal infiltrates. No Pneumothoraces. Impression  NO ACUTE ACTIVE CARDIOPULMONARY PROCESS    Assessment/Plan:     1. KEI (obstructive sleep apnea)  She is on BIPAP 12/8 cm.  She said she is trying to use at night but not getting used to it. She is claustrophobic  and she is going try while walking TV . She is complaining of snoring and daytime sleepiness and tiredness. She does take daily naps, 1 time about 1 hour . She does not drive. Fall a sleep while as passenger. Recommend to continue trying to use BiPAP therapy    Counseling: BiPAP uses, She advised to use BiPAP at least 5-6 hours every night. Sleep hygiene:Avoid supine sleep, sleep on  sides. Avoid  sleep deprivation. Explained sleep hygiene. Advice to avoid Alcohol and sedatives. 2. Morbid obesity ([address]  She is advised try to lose weight. obesity related risk explained to the patient ,  Current weight:  253 lb (114.8 kg) Lbs. BMI:  Body mass index is 46.27 kg/m². Suggested weight control approaches, including dietary changes , exercise, behavioral modification. She said she is going for bariatric surgery at [facility] in dec 2022    Return in about 3 months (around 2/8/2023) for linda.       Teodoro Downs MD

## 2022-12-02 PROBLEM — N39.0 URINARY TRACT INFECTION, SITE NOT SPECIFIED: Status: ACTIVE | Noted: 2022-08-10

## 2022-12-02 PROBLEM — I10 ESSENTIAL (PRIMARY) HYPERTENSION: Status: ACTIVE | Noted: 2022-08-10

## 2022-12-02 PROBLEM — F43.10 POST-TRAUMATIC STRESS DISORDER, UNSPECIFIED: Status: ACTIVE | Noted: 2022-09-27

## 2022-12-02 PROBLEM — F10.99 ALCOHOL USE, UNSPECIFIED WITH UNSPECIFIED ALCOHOL-INDUCED DISORDER (HCC): Status: ACTIVE | Noted: 2022-09-27

## 2022-12-02 PROBLEM — F43.23 ADJUSTMENT DISORDER WITH MIXED ANXIETY AND DEPRESSED MOOD: Status: ACTIVE | Noted: 2022-09-27

## 2022-12-02 PROBLEM — Z59.6 LOW INCOME: Status: ACTIVE | Noted: 2022-09-27

## 2022-12-02 PROBLEM — R30.0 DYSURIA: Status: ACTIVE | Noted: 2022-08-10

## 2022-12-02 PROBLEM — Z63.8 OTHER SPECIFIED PROBLEMS RELATED TO PRIMARY SUPPORT GROUP: Status: ACTIVE | Noted: 2022-09-27

## 2022-12-02 PROBLEM — Z59.00 HOMELESSNESS: Status: ACTIVE | Noted: 2022-09-27

## 2022-12-02 PROBLEM — Z62.820: Status: ACTIVE | Noted: 2022-09-27

## 2022-12-02 PROBLEM — F79 UNSPECIFIED INTELLECTUAL DISABILITIES: Status: ACTIVE | Noted: 2022-09-27

## 2022-12-02 PROBLEM — R35.0 FREQUENCY OF MICTURITION: Status: ACTIVE | Noted: 2022-08-10

## 2022-12-02 PROBLEM — F12.99 CANNABIS USE, UNSPECIFIED WITH UNSPECIFIED CANNABIS-INDUCED DISORDER (HCC): Status: ACTIVE | Noted: 2022-09-27

## 2022-12-08 DIAGNOSIS — E28.2 POLYCYSTIC OVARIAN DISEASE: ICD-10-CM

## 2022-12-08 DIAGNOSIS — N91.2 AMENORRHEA: ICD-10-CM

## 2022-12-08 DIAGNOSIS — E88.81 INSULIN RESISTANCE: ICD-10-CM

## 2022-12-08 LAB
ANION GAP SERPL CALCULATED.3IONS-SCNC: 17 MEQ/L (ref 9–15)
BASOPHILS ABSOLUTE: 0.1 K/UL (ref 0–0.2)
BASOPHILS RELATIVE PERCENT: 1 %
BUN BLDV-MCNC: 7 MG/DL (ref 6–20)
CALCIUM SERPL-MCNC: 9 MG/DL (ref 8.5–9.9)
CHLORIDE BLD-SCNC: 102 MEQ/L (ref 95–107)
CO2: 20 MEQ/L (ref 20–31)
CREAT SERPL-MCNC: 0.62 MG/DL (ref 0.5–0.9)
EOSINOPHILS ABSOLUTE: 0.4 K/UL (ref 0–0.7)
EOSINOPHILS RELATIVE PERCENT: 3.8 %
FOLLICLE STIMULATING HORMONE: 7.1 MIU/ML (ref 1.7–21.5)
GFR SERPL CREATININE-BSD FRML MDRD: >60 ML/MIN/{1.73_M2}
GLUCOSE BLD-MCNC: 168 MG/DL (ref 70–99)
GLUCOSE FASTING: 170 MG/DL (ref 70–99)
GONADOTROPIN, CHORIONIC (HCG) QUANT: <0.1 MIU/ML
HBA1C MFR BLD: 6.1 % (ref 4.8–5.9)
HCT VFR BLD CALC: 39.7 % (ref 37–47)
HEMOGLOBIN: 13 G/DL (ref 12–16)
INSULIN COMMENT: NORMAL
INSULIN REFERENCE RANGE:: NORMAL
INSULIN: 127.7 MU/L
LH: 12.7 MIU/ML (ref 1–95.6)
LYMPHOCYTES ABSOLUTE: 4.1 K/UL (ref 1–4.8)
LYMPHOCYTES RELATIVE PERCENT: 38.9 %
MCH RBC QN AUTO: 27.3 PG (ref 27–31.3)
MCHC RBC AUTO-ENTMCNC: 32.6 % (ref 33–37)
MCV RBC AUTO: 83.7 FL (ref 79.4–94.8)
MONOCYTES ABSOLUTE: 0.5 K/UL (ref 0.2–0.8)
MONOCYTES RELATIVE PERCENT: 4.5 %
NEUTROPHILS ABSOLUTE: 5.5 K/UL (ref 1.4–6.5)
NEUTROPHILS RELATIVE PERCENT: 51.8 %
PDW BLD-RTO: 14.9 % (ref 11.5–14.5)
PLATELET # BLD: 332 K/UL (ref 130–400)
POTASSIUM SERPL-SCNC: 4 MEQ/L (ref 3.4–4.9)
PROLACTIN: 17.6 NG/ML
RBC # BLD: 4.74 M/UL (ref 4.2–5.4)
SEX HORMONE BINDING GLOBULIN: 22 NMOL/L (ref 30–135)
SODIUM BLD-SCNC: 139 MEQ/L (ref 135–144)
TESTOSTERONE FREE-NONMALE: 6.5 PG/ML (ref 0.8–7.4)
TESTOSTERONE TOTAL: 29 NG/DL (ref 20–70)
TSH REFLEX: 0.96 UIU/ML (ref 0.44–3.86)
WBC # BLD: 10.6 K/UL (ref 4.8–10.8)

## 2022-12-16 ENCOUNTER — HOSPITAL ENCOUNTER (EMERGENCY)
Age: 32
Discharge: HOME | End: 2022-12-17
Attending: EMERGENCY MEDICINE
Payer: COMMERCIAL

## 2022-12-16 VITALS
TEMPERATURE: 97.8 F | HEART RATE: 113 BPM | SYSTOLIC BLOOD PRESSURE: 147 MMHG | OXYGEN SATURATION: 100 % | WEIGHT: 252 LBS | DIASTOLIC BLOOD PRESSURE: 103 MMHG | RESPIRATION RATE: 20 BRPM | BODY MASS INDEX: 46.38 KG/M2 | HEIGHT: 62 IN

## 2022-12-16 DIAGNOSIS — R11.2 NAUSEA AND VOMITING, UNSPECIFIED VOMITING TYPE: Primary | ICD-10-CM

## 2022-12-16 PROCEDURE — 80053 COMPREHEN METABOLIC PANEL: CPT

## 2022-12-16 PROCEDURE — 81003 URINALYSIS AUTO W/O SCOPE: CPT

## 2022-12-16 PROCEDURE — 96374 THER/PROPH/DIAG INJ IV PUSH: CPT

## 2022-12-16 PROCEDURE — 6360000002 HC RX W HCPCS: Performed by: EMERGENCY MEDICINE

## 2022-12-16 PROCEDURE — 2580000003 HC RX 258: Performed by: EMERGENCY MEDICINE

## 2022-12-16 PROCEDURE — 36415 COLL VENOUS BLD VENIPUNCTURE: CPT

## 2022-12-16 PROCEDURE — 85025 COMPLETE CBC W/AUTO DIFF WBC: CPT

## 2022-12-16 PROCEDURE — 99284 EMERGENCY DEPT VISIT MOD MDM: CPT

## 2022-12-16 RX ORDER — ONDANSETRON 2 MG/ML
4 INJECTION INTRAMUSCULAR; INTRAVENOUS ONCE
Status: COMPLETED | OUTPATIENT
Start: 2022-12-16 | End: 2022-12-16

## 2022-12-16 RX ORDER — 0.9 % SODIUM CHLORIDE 0.9 %
1000 INTRAVENOUS SOLUTION INTRAVENOUS ONCE
Status: COMPLETED | OUTPATIENT
Start: 2022-12-16 | End: 2022-12-17

## 2022-12-16 RX ADMIN — SODIUM CHLORIDE 1000 ML: 9 INJECTION, SOLUTION INTRAVENOUS at 23:48

## 2022-12-16 RX ADMIN — ONDANSETRON 4 MG: 2 INJECTION INTRAMUSCULAR; INTRAVENOUS at 23:50

## 2022-12-16 ASSESSMENT — ENCOUNTER SYMPTOMS
VOMITING: 0
ABDOMINAL PAIN: 0
NAUSEA: 1
ABDOMINAL DISTENTION: 0
CHEST TIGHTNESS: 0
PHOTOPHOBIA: 0
EYE DISCHARGE: 0
BACK PAIN: 0
COUGH: 0
SORE THROAT: 0
SHORTNESS OF BREATH: 0
WHEEZING: 0

## 2022-12-16 ASSESSMENT — PAIN - FUNCTIONAL ASSESSMENT: PAIN_FUNCTIONAL_ASSESSMENT: NONE - DENIES PAIN

## 2022-12-17 LAB
ALBUMIN SERPL-MCNC: 3.9 G/DL (ref 3.5–4.6)
ALP BLD-CCNC: 75 U/L (ref 40–130)
ALT SERPL-CCNC: 57 U/L (ref 0–33)
ANION GAP SERPL CALCULATED.3IONS-SCNC: 14 MEQ/L (ref 9–15)
ANISOCYTOSIS: ABNORMAL
AST SERPL-CCNC: 30 U/L (ref 0–35)
BANDED NEUTROPHILS RELATIVE PERCENT: 1 % (ref 5–11)
BASOPHILS ABSOLUTE: 0 K/UL (ref 0–0.2)
BASOPHILS RELATIVE PERCENT: 1.3 %
BILIRUB SERPL-MCNC: <0.2 MG/DL (ref 0.2–0.7)
BILIRUBIN URINE: NEGATIVE
BLOOD, URINE: NEGATIVE
BUN BLDV-MCNC: 10 MG/DL (ref 6–20)
CALCIUM SERPL-MCNC: 9 MG/DL (ref 8.5–9.9)
CHLORIDE BLD-SCNC: 101 MEQ/L (ref 95–107)
CLARITY: ABNORMAL
CO2: 23 MEQ/L (ref 20–31)
COLOR: YELLOW
CREAT SERPL-MCNC: 0.69 MG/DL (ref 0.5–0.9)
EOSINOPHILS ABSOLUTE: 0.3 K/UL (ref 0–0.7)
EOSINOPHILS RELATIVE PERCENT: 2 %
GFR SERPL CREATININE-BSD FRML MDRD: >60 ML/MIN/{1.73_M2}
GLOBULIN: 3 G/DL (ref 2.3–3.5)
GLUCOSE BLD-MCNC: 137 MG/DL (ref 70–99)
GLUCOSE URINE: NEGATIVE MG/DL
HCT VFR BLD CALC: 38.8 % (ref 37–47)
HEMOGLOBIN: 12.6 G/DL (ref 12–16)
KETONES, URINE: ABNORMAL MG/DL
LEUKOCYTE ESTERASE, URINE: NEGATIVE
LYMPHOCYTES ABSOLUTE: 5.4 K/UL (ref 1–4.8)
LYMPHOCYTES RELATIVE PERCENT: 40 %
MCH RBC QN AUTO: 26.8 PG (ref 27–31.3)
MCHC RBC AUTO-ENTMCNC: 32.4 % (ref 33–37)
MCV RBC AUTO: 82.7 FL (ref 79.4–94.8)
MONOCYTES ABSOLUTE: 0.3 K/UL (ref 0.2–0.8)
MONOCYTES RELATIVE PERCENT: 1.9 %
NEUTROPHILS ABSOLUTE: 7.6 K/UL (ref 1.4–6.5)
NEUTROPHILS RELATIVE PERCENT: 55 %
NITRITE, URINE: NEGATIVE
PDW BLD-RTO: 15 % (ref 11.5–14.5)
PH UA: 6.5 (ref 5–9)
PLATELET # BLD: 290 K/UL (ref 130–400)
PLATELET SLIDE REVIEW: NORMAL
POTASSIUM SERPL-SCNC: 3.8 MEQ/L (ref 3.4–4.9)
PROTEIN UA: NEGATIVE MG/DL
RBC # BLD: 4.69 M/UL (ref 4.2–5.4)
SODIUM BLD-SCNC: 138 MEQ/L (ref 135–144)
SPECIFIC GRAVITY UA: 1.02 (ref 1–1.03)
TOTAL PROTEIN: 6.9 G/DL (ref 6.3–8)
URINE REFLEX TO CULTURE: ABNORMAL
UROBILINOGEN, URINE: 1 E.U./DL
WBC # BLD: 13.5 K/UL (ref 4.8–10.8)

## 2022-12-17 RX ORDER — ONDANSETRON 4 MG/1
4 TABLET, FILM COATED ORAL EVERY 8 HOURS PRN
Qty: 15 TABLET | Refills: 0 | Status: SHIPPED | OUTPATIENT
Start: 2022-12-17

## 2022-12-17 NOTE — ED PROVIDER NOTES
[facility] ED  eMERGENCY dEPARTMENT eNCOUnter      Pt Name: Nany Landeros  MRN: [id number]  [place] 1990  Date of evaluation: 12/16/2022  Provider: Alireza Kaplan MD    CHIEF COMPLAINT       Chief Complaint   Patient presents with    Nausea         HISTORY OF PRESENT ILLNESS   (Location/Symptom, Timing/Onset,Context/Setting, Quality, Duration, Modifying Factors, Severity)  Note limiting factors. Nany Landeros is a 29 y.o. female who presents to the emergency department valuation of nausea with vomiting. Patient states symptoms of been intermittent for \"a while\". However, she had 3 episodes of vomiting today so comes in for evaluation of the nausea with vomiting. No related abdominal pain. She has had increased frequency of urination today. No fever or chills. No rash. No other complaints. Denies abdominal pain    HPI    NursingNotes were reviewed. REVIEW OF SYSTEMS    (2-9 systems for level 4, 10 or more for level 5)     Review of Systems   Constitutional:  Negative for chills and diaphoresis. HENT:  Negative for congestion, ear pain, mouth sores and sore throat. Eyes:  Negative for photophobia and discharge. Respiratory:  Negative for cough, chest tightness, shortness of breath and wheezing. Cardiovascular:  Negative for chest pain and palpitations. Gastrointestinal:  Positive for nausea. Negative for abdominal distention, abdominal pain and vomiting. Endocrine: Negative for cold intolerance. Genitourinary:  Positive for frequency. Negative for difficulty urinating and dysuria. Musculoskeletal:  Negative for arthralgias and back pain. Skin:  Negative for pallor and rash. Allergic/Immunologic: Negative for immunocompromised state. Neurological:  Negative for dizziness and syncope. Hematological:  Negative for adenopathy. Psychiatric/Behavioral:  Negative for agitation and hallucinations. The patient is not nervous/anxious.     All other systems reviewed and are negative. Except as noted above the remainder of the review of systems was reviewed and negative. PAST MEDICAL HISTORY     Past Medical History:   Diagnosis Date    Abnormal Pap smear of cervix     Anxiety     Asthma     Depression     DVT (deep venous thrombosis) ([facility])     Hypertension     Pre-diabetes     PTSD (post-traumatic stress disorder)     Schizoaffective disorder ([facility])          SURGICALHISTORY       Past Surgical History:   Procedure Laterality Date     SECTION      TONSILLECTOMY           CURRENT MEDICATIONS       Previous Medications    ALBUTEROL SULFATE  (90 BASE) MCG/ACT INHALER    Use 2 puffs 4 times daily for 7 days then as needed for wheezing. Dispense with Spacer and instruct in use. At patient's preference may use 60 dose MDI. May Sub Pro-Air or Proventil as needed per insurance. ALCOHOL SWABS (ALCOHOL PREP) 70 % PADS    bid    CPAP MACHINE MISC    by Does not apply route New BIPAP with setting 12/8 cm    DOCUSATE SODIUM (COLACE) 100 MG CAPSULE    Take 1 capsule by mouth 2 times daily    DOXEPIN (SINEQUAN) 25 MG CAPSULE    2 (EACH) BEDTIME (AS NEEDED) TAKE 1 OR 2 CAPSULES AT BEDTIME AS NEEDED FOR SLEEP.     DOXEPIN (SINEQUAN) 75 MG CAPSULE        DULAGLUTIDE (TRULICITY) 2.62 SS/0.2GG SOPN    Inject 0.75 mg into the skin once a week    FLOVENT DISKUS 100 MCG/BLIST AEPB INHALER    Inhale 1 puff into the lungs daily     HYDROCHLOROTHIAZIDE (MICROZIDE) 12.5 MG CAPSULE        HYDROXYZINE (VISTARIL) 25 MG CAPSULE        LANCETS MISC    1 each by Does not apply route daily    LETROZOLE (FEMARA) 2.5 MG TABLET    Take 2 tablets by mouth daily Day 5-9 of menstrual cycle    LISINOPRIL (PRINIVIL;ZESTRIL) 10 MG TABLET    Take 1 tablet by mouth daily    LURASIDONE (LATUDA) 120 MG TABLET    Take 120 mg by mouth daily    MELATONIN 10 MG CAPS        METFORMIN (GLUCOPHAGE-XR) 500 MG EXTENDED RELEASE TABLET    TAKE 2 TABLET BY MOUTH 2 TIMES DAILY (BEFORE MEALS)    METOPROLOL TARTRATE (LOPRESSOR) 25 MG TABLET    TAKE 1 TABLET BY ORAL ROUTE 2 TIMES EVERY DAY FOR HIGH BLOOD PRESSURE/HR    NAPROXEN (NAPROSYN) 500 MG TABLET    Take 1 tablet by mouth 2 times daily    NORETHINDRONE (MICRONOR) 0.35 MG TABLET    Take 1 tablet by mouth daily    ONDANSETRON (ZOFRAN-ODT) 4 MG DISINTEGRATING TABLET    Take 1 tablet by mouth 3 times daily as needed for Nausea or Vomiting    ONETOUCH ULTRA STRIP    bid    OXCARBAZEPINE (TRILEPTAL) 150 MG TABLET    Take 1 tablet by mouth 2 times daily    PRAZOSIN (MINIPRESS) 1 MG CAPSULE    Take 1 capsule by mouth nightly    PRENATAL VIT-FE FUMARATE-FA (PRENATAL VITAMIN) 27-1 MG TABS TABLET    take 1 tablet by mouth once daily       ALLERGIES     Food and Amoxicillin    FAMILY HISTORY       Family History   Problem Relation Age of Onset    Diabetes Mother     Depression Father     Asthma Brother     Diabetes Maternal Grandmother     Breast Cancer Maternal Grandmother     Thyroid Cancer Maternal Grandfather     Diabetes Paternal Grandmother     Other Paternal Grandfather         testicular cancer          SOCIAL HISTORY       Social History     Socioeconomic History    Marital status:    Tobacco Use    Smoking status: Every Day     Packs/day: 0.25     Years: 12.00     Pack years: 3.00     Types: Cigarettes    Smokeless tobacco: Never    Tobacco comments:     1 ppd   Vaping Use    Vaping Use: Never used   Substance and Sexual Activity    Alcohol use: Not Currently     Comment:      Drug use: Not Currently     Comment: \"cut down since I'm trying to get pregnant\"    Sexual activity: Yes     Partners: Male       SCREENINGS    Jose Luis Coma Scale  Eye Opening: Spontaneous  Best Verbal Response: Oriented  Best Motor Response: Obeys commands  [place] Coma Scale Score: 15 @FLOW([id number])@      PHYSICAL EXAM    (up to 7 for level 4, 8 or more for level 5)     ED Triage Vitals [12/16/22 2323]   BP Temp Temp Source Heart Rate Resp SpO2 Height Weight   (!) 147/103 97.8 °F (36.6 °C) Oral (!) 113 20 100 % 5' 2\" (1.575 m) 252 lb (114.3 kg)       Physical Exam  Vitals and nursing note reviewed. Constitutional:       Appearance: She is well-developed. HENT:      Head: Normocephalic. Right Ear: Tympanic membrane normal.      Left Ear: Tympanic membrane normal.      Nose: Nose normal.   Eyes:      Conjunctiva/sclera: Conjunctivae normal.      Pupils: Pupils are equal, round, and reactive to light. Cardiovascular:      Rate and Rhythm: Normal rate and regular rhythm. Heart sounds: Normal heart sounds. Pulmonary:      Effort: Pulmonary effort is normal.      Breath sounds: Normal breath sounds. Abdominal:      General: Bowel sounds are normal.      Palpations: Abdomen is soft. Tenderness: There is no abdominal tenderness. There is no guarding. Musculoskeletal:         General: Normal range of motion. Cervical back: Normal range of motion and neck supple. Skin:     General: Skin is warm and dry. Capillary Refill: Capillary refill takes less than 2 seconds. Neurological:      General: No focal deficit present. Mental Status: She is alert and oriented to person, place, and time.    Psychiatric:         Mood and Affect: Mood normal.       DIAGNOSTIC RESULTS     EKG: All EKG's are interpreted by the Emergency Department Physician who either signs or Co-signsthis chart in the absence of a cardiologist.      RADIOLOGY:   [place] such as CT, Ultrasound and MRI are read by the radiologist. Plain radiographic images are visualized and preliminarily interpreted by the emergency physician with the below findings:      Interpretation per the Radiologist below, if available at the time ofthis note:    No orders to display         ED BEDSIDE ULTRASOUND:   Performed by ED Physician - none    LABS:  Labs Reviewed   COMPREHENSIVE METABOLIC PANEL - Abnormal; Notable for the following components:       Result Value    Glucose 137 (*)     ALT 57 (*) All other components within normal limits   URINALYSIS WITH REFLEX TO CULTURE - Abnormal; Notable for the following components:    Clarity, UA CLOUDY (*)     Ketones, Urine TRACE (*)     All other components within normal limits   CBC WITH AUTO DIFFERENTIAL - Abnormal; Notable for the following components:    WBC 13.5 (*)     MCH 26.8 (*)     MCHC 32.4 (*)     RDW 15.0 (*)     All other components within normal limits       All other labs were within normal range or not returned as of this dictation. EMERGENCY DEPARTMENT COURSE and DIFFERENTIAL DIAGNOSIS/MDM:   Vitals:    Vitals:    12/16/22 2323   BP: (!) 147/103   Pulse: (!) 113   Resp: 20   Temp: 97.8 °F (36.6 °C)   TempSrc: Oral   SpO2: 100%   Weight: 252 lb (114.3 kg)   Height: 5' 2\" (1.575 m)          MDM    On recheck. Doing much better. Urinalysis was negative. Will be treated with nausea medication at home    CONSULTS:  None    PROCEDURES:  Unless otherwise noted below, none     Procedures    FINAL IMPRESSION      1.  Nausea and vomiting, unspecified vomiting type          DISPOSITION/PLAN   DISPOSITION Decision To Discharge 12/17/2022 12:57:05 AM      PATIENT REFERRED TO:  Heather Yanetdigna  [address] [place] [address],[phone] [id number] [phone]    In 3 days      DISCHARGE MEDICATIONS:  New Prescriptions    ONDANSETRON (ZOFRAN) 4 MG TABLET    Take 1 tablet by mouth every 8 hours as needed for Nausea          (Please note that portions of this note were completed with a voice recognition program.  Efforts were made to edit the dictations but occasionally words are mis-transcribed.)    Aneesh Bo MD (electronically signed)  Attending Emergency Physician         Aneesh Bo MD  12/17/22 6209

## 2022-12-17 NOTE — ED TRIAGE NOTES
Patient presents for complaint of nausea starting today with three episodes of vomiting. States she is \"peeing like a [place] race horse. \" However denies any urinary symptoms. No meds or treatment PTA.

## 2022-12-17 NOTE — ED NOTES
Pt states \"I am starting to feel better from getting that medication. Would I be able to having something to drink. \"  MD Ruth Choudhary made aware and okay if pt has something to drink.   Kat Tristan RN  12/17/22 4095

## 2022-12-21 ENCOUNTER — HOSPITAL ENCOUNTER (EMERGENCY)
Age: 32
Discharge: LEFT BEFORE DISCHARGE | End: 2022-12-22
Payer: COMMERCIAL

## 2022-12-21 ENCOUNTER — TELEPHONE (OUTPATIENT)
Dept: OBGYN CLINIC | Age: 32
End: 2022-12-21

## 2022-12-21 VITALS
TEMPERATURE: 98.4 F | OXYGEN SATURATION: 99 % | SYSTOLIC BLOOD PRESSURE: 154 MMHG | RESPIRATION RATE: 18 BRPM | DIASTOLIC BLOOD PRESSURE: 107 MMHG | HEART RATE: 110 BPM
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
## 2022-12-21 LAB
HCG, URINE, POC: NEGATIVE
Lab: NORMAL
NEGATIVE QC PASS/FAIL: NORMAL
POSITIVE QC PASS/FAIL: NORMAL

## 2022-12-21 PROCEDURE — 99285 EMERGENCY DEPT VISIT HI MDM: CPT

## 2022-12-21 RX ORDER — ACETAMINOPHEN 500 MG
1000 TABLET ORAL ONCE
Status: DISCONTINUED | OUTPATIENT
Start: 2022-12-21 | End: 2022-12-22

## 2022-12-21 ASSESSMENT — ENCOUNTER SYMPTOMS
BACK PAIN: 1
SHORTNESS OF BREATH: 0
NAUSEA: 0
SORE THROAT: 0
DIARRHEA: 0
ABDOMINAL PAIN: 0
COUGH: 0
RHINORRHEA: 0
BLOOD IN STOOL: 0
COLOR CHANGE: 0
VOMITING: 0

## 2022-12-22 ENCOUNTER — APPOINTMENT (OUTPATIENT)
Dept: GENERAL RADIOLOGY | Age: 32
End: 2022-12-22
Payer: COMMERCIAL

## 2022-12-22 ENCOUNTER — APPOINTMENT (OUTPATIENT)
Dept: CT IMAGING | Age: 32
End: 2022-12-22
Payer: COMMERCIAL

## 2022-12-22 NOTE — ED NOTES
PT WAS ROOMED AND HAS LEFT WITHOUT FINISHING TESTING OR BEING SEEN BY RN  PA [facility] AWARE        Davonte Alvarado RN  12/22/22 6036

## 2022-12-22 NOTE — ED PROVIDER NOTES
[address] [facility] ED  eMERGENCY dEPARTMENT eNCOUnter      Pt Name: Salvador Best  MRN: [id number]  [place] 1990  Date of evaluation: 12/21/2022  Provider: JADE Mccartney      HISTORY OF PRESENT ILLNESS    Salvador Best is a 29 y.o. female with PMHx of anxiety, asthma, depression, DVT, HTN, PTSD, schizoaffective presents to the emergency department with fall. Around 10pm, patient had an at home pregnancy test and was running downstairs to tell her mother when she got dizzy and fell down 8 carpeted steps. She hit her head several times, no loss of consciousness. She does have back pain, minimal headaches. She has been having nausea, dizzy spells, and lower abdominal pain since October. She denies blood thinner use. She denies chest pain, shortness of breath, diarrhea, urinary symptoms. No extremity pain. No numbness or paresthesias distally. Did not take anything at home for pain. HPI    Nursing Notes were reviewed. REVIEW OF SYSTEMS       Review of Systems   Constitutional:  Negative for appetite change, chills and fever. HENT:  Negative for congestion, rhinorrhea and sore throat. Respiratory:  Negative for cough and shortness of breath. Cardiovascular:  Negative for chest pain. Gastrointestinal:  Negative for abdominal pain, blood in stool, diarrhea, nausea and vomiting. Genitourinary:  Negative for difficulty urinating. Musculoskeletal:  Positive for back pain and neck pain. Negative for neck stiffness. Skin:  Negative for color change and rash. Neurological:  Positive for headaches. Negative for dizziness, syncope, weakness, light-headedness and numbness. All other systems reviewed and are negative.           PAST MEDICAL HISTORY     Past Medical History:   Diagnosis Date    Abnormal Pap smear of cervix     Anxiety     Asthma     Depression     DVT (deep venous thrombosis) ([facility])     Hypertension     Pre-diabetes     PTSD (post-traumatic stress disorder) Schizoaffective disorder ([facility] [address]          SURGICAL HISTORY       Past Surgical History:   Procedure Laterality Date     SECTION      TONSILLECTOMY           CURRENT MEDICATIONS       Discharge Medication List as of 2022 12:24 AM        CONTINUE these medications which have NOT CHANGED    Details   ondansetron (ZOFRAN) 4 MG tablet Take 1 tablet by mouth every 8 hours as needed for Nausea, Disp-15 tablet, R-0Normal      metoprolol tartrate (LOPRESSOR) 25 MG tablet TAKE 1 TABLET BY ORAL ROUTE 2 TIMES EVERY DAY FOR HIGH BLOOD PRESSURE/HRHistorical Med      !! doxepin (SINEQUAN) 25 MG capsule 2 (EACH) BEDTIME (AS NEEDED) TAKE 1 OR 2 CAPSULES AT BEDTIME AS NEEDED FOR SLEEP. Historical Med      !! doxepin (SINEQUAN) 75 MG capsule Historical Med      hydroCHLOROthiazide (MICROZIDE) 12.5 MG capsule Historical Med      norethindrone (MICRONOR) 0.35 MG tablet Take 1 tablet by mouth daily, Disp-30 tablet, R-11Normal      Alcohol Swabs (ALCOHOL PREP) 70 % PADS Disp-100 each, R-11, Normalbid      metFORMIN (GLUCOPHAGE-XR) 500 MG extended release tablet TAKE 2 TABLET BY MOUTH 2 TIMES DAILY (BEFORE MEALS), Disp-120 tablet, R-3Normal      ONETOUCH ULTRA strip bid, Disp-100 each, R-3, DAWNormal      Dulaglutide (TRULICITY) 1.33 HF/4.0QL SOPN Inject 0.75 mg into the skin once a week, Disp-4 Adjustable Dose Pre-filled Pen Syringe, R-3Normal      Lancets MISC DAILY Starting 2022, Disp-100 each, R-3, Normal      lurasidone (LATUDA) 120 MG tablet Take 120 mg by mouth dailyHistorical Med      CPAP Machine MISC Starting Mon 2022, Disp-1 each, R-0, PrintNew BIPAP with setting 12/8 cm      letrozole (FEMARA) 2.5 MG tablet Take 2 tablets by mouth daily Day 5-9 of menstrual cycle, Disp-5 tablet, R-3Normal      hydrOXYzine (VISTARIL) 25 MG capsule Historical Med      naproxen (NAPROSYN) 500 MG tablet Take 1 tablet by mouth 2 times daily, Disp-20 tablet, R-0Normal      docusate sodium (COLACE) 100 MG capsule Take 1 capsule by mouth 2 times daily, Disp-20 capsule, R-0Print      ondansetron (ZOFRAN-ODT) 4 MG disintegrating tablet Take 1 tablet by mouth 3 times daily as needed for Nausea or Vomiting, Disp-21 tablet, R-0Print      OXcarbazepine (TRILEPTAL) 150 MG tablet Take 1 tablet by mouth 2 times daily, Disp-30 tablet, R-3Normal      lisinopril (PRINIVIL;ZESTRIL) 10 MG tablet Take 1 tablet by mouth daily, Disp-15 tablet, R-3Normal      prazosin (MINIPRESS) 1 MG capsule Take 1 capsule by mouth nightly, Disp-15 capsule, R-3Normal      albuterol sulfate  (90 Base) MCG/ACT inhaler Use 2 puffs 4 times daily for 7 days then as needed for wheezing. Dispense with Spacer and instruct in use. At patient's preference may use 60 dose MDI. May Sub Pro-Air or Proventil as needed per insurance., Disp-1 Inhaler, R-0, DAWPrint      Prenatal Vit-Fe Fumarate-FA (PRENATAL VITAMIN) 27-1 MG TABS tablet take 1 tablet by mouth once dailyHistorical Med      FLOVENT DISKUS 100 MCG/BLIST AEPB inhaler Inhale 1 puff into the lungs daily , DAWHistorical Med      Melatonin 10 MG CAPS Historical Med       !! - Potential duplicate medications found. Please discuss with provider.           ALLERGIES     Food and Amoxicillin    FAMILY HISTORY       Family History   Problem Relation Age of Onset    Diabetes Mother     Depression Father     Asthma Brother     Diabetes Maternal Grandmother     Breast Cancer Maternal Grandmother     Thyroid Cancer Maternal Grandfather     Diabetes Paternal Grandmother     Other Paternal Grandfather         testicular cancer          SOCIAL HISTORY       Social History     Socioeconomic History    Marital status:    Tobacco Use    Smoking status: Every Day     Packs/day: 0.25     Years: 12.00     Pack years: 3.00     Types: Cigarettes    Smokeless tobacco: Never    Tobacco comments:     1 ppd   Vaping Use    Vaping Use: Never used   Substance and Sexual Activity    Alcohol use: Not Currently     Comment:      Drug use: Not Currently     Comment: \"cut down since I'm trying to get pregnant\"    Sexual activity: Yes     Partners: Male         PHYSICAL EXAM         ED Triage Vitals   BP Temp Temp Source Heart Rate Resp SpO2 Height Weight   12/21/22 2219 12/21/22 2216 12/21/22 2216 12/21/22 2219 12/21/22 2219 12/21/22 2219 -- --   (!) 154/107 98.4 °F (36.9 °C) Oral (!) 110 18 99 %         Physical Exam  Constitutional:       Appearance: She is well-developed. HENT:      Head: Normocephalic and atraumatic. Comments: No raccoon eyes or wiley sign, no hemotympanums, no hematomas     Right Ear: Tympanic membrane normal.      Left Ear: Tympanic membrane normal.   Eyes:      Conjunctiva/sclera: Conjunctivae normal.      Pupils: Pupils are equal, round, and reactive to light. Neck:      Trachea: No tracheal deviation. Comments: Mild tenderness palpation over C and L-spine and bilateral paraspinal muscles. Minimal thoracic midline tenderness and bilateral paraspinal muscles. No signs of trauma  Cardiovascular:      Heart sounds: Normal heart sounds. Pulmonary:      Effort: Pulmonary effort is normal. No respiratory distress. Breath sounds: Normal breath sounds. No stridor. Chest:      Chest wall: No tenderness. Abdominal:      General: Bowel sounds are normal. There is no distension. Palpations: Abdomen is soft. There is no mass. Tenderness: There is no abdominal tenderness. There is no guarding or rebound. Musculoskeletal:         General: Normal range of motion. Cervical back: Normal range of motion and neck supple. Tenderness present. Skin:     General: Skin is warm and dry. Capillary Refill: Capillary refill takes less than 2 seconds. Findings: No rash. Neurological:      Mental Status: She is alert and oriented to person, place, and time. Deep Tendon Reflexes: Reflexes are normal and symmetric. Psychiatric:         Behavior: Behavior normal.         Thought Content:  Thought content normal.         Judgment: Judgment normal.       DIAGNOSTIC RESULTS     EKG:All EKG's are interpreted by the Emergency Department Physician who either signs or Co-signs this chart in the absence of a cardiologist.        RADIOLOGY:   Non-plain film images such as CT, Ultrasound and MRI are read by theradiologist. Plain radiographic images are visualized and preliminarily interpreted by the emergency physician with the below findings:    Interpretation per theRadiologist below, if available at the time of this note:    XR CHEST PORTABLE    (Results Pending)           LABS:  Labs Reviewed   HCG, SERUM, QUALITATIVE   HCG, QUANTITATIVE, PREGNANCY   POC PREGNANCY UR-QUAL       All other labs were within normal range or not returned as of this dictation. EMERGENCY DEPARTMENT COURSE and DIFFERENTIAL DIAGNOSIS/MDM:   Vitals:    Vitals:    12/21/22 2216 12/21/22 2219   BP:  (!) 154/107   Pulse:  (!) 110   Resp:  18   Temp: 98.4 °F (36.9 °C)    TempSrc: Oral    SpO2:  99%         MDM      POC urine preg test negative. CTs and XRs were ordered with Tylenol p.o. and hCG quant and qualitative through serum. Patient then was not found in the room and must have eloped. Mother was with patient. No physical evidence of trauma, patient laughing and smiling in the room, nontoxic in no apparent distress. CRITICAL CARE TIME   Total Critical Caretime was 0 minutes, excluding separately reportable procedures. There was a high probability of clinically significant/life threatening deterioration in the patient's condition which required my urgent intervention. Procedures    FINAL IMPRESSION      1. Fall, initial encounter          DISPOSITION/PLAN   DISPOSITION Eloped - Left Before Treatment Complete 12/22/2022 12:23:25 AM      PATIENT REFERRED TO:  No follow-up provider specified.     DISCHARGE MEDICATIONS:  Discharge Medication List as of 12/22/2022 12:24 AM             (Please notethat portions of this note were completed with a voice recognition program.  Efforts were made to edit the dictations but occasionally words are mis-transcribed. )    JADE Mancilla (electronically signed)  Emergency Physician Assistant            Nicole Mehta [place]  12/22/22 [id number]

## 2022-12-22 NOTE — ED TRIAGE NOTES
Patient states she had a positive pregnancy test today, went to show her friend and was so excited she ended up falling down 8 steps. Patient remembers entire event. No head strike or LOC. No injuries related to fall. Patient says since the fall, she has been having uterine cramping. Vitals are stable, patient is awake and alert.

## 2022-12-29 PROBLEM — J02.9 ACUTE PHARYNGITIS, UNSPECIFIED: Status: ACTIVE | Noted: 2018-02-22

## 2023-01-03 DIAGNOSIS — E88.81 INSULIN RESISTANCE: ICD-10-CM

## 2023-01-03 RX ORDER — LANCETS 30 GAUGE
1 EACH MISCELLANEOUS DAILY
Qty: 100 EACH | Refills: 3 | Status: SHIPPED | OUTPATIENT
Start: 2023-01-03

## 2023-01-03 RX ORDER — DULAGLUTIDE 0.75 MG/.5ML
INJECTION, SOLUTION SUBCUTANEOUS
Qty: 4 ADJUSTABLE DOSE PRE-FILLED PEN SYRINGE | Refills: 3 | Status: SHIPPED | OUTPATIENT
Start: 2023-01-03

## 2023-01-03 NOTE — TELEPHONE ENCOUNTER
Patient requesting medication refill.  Please approve or deny this request.    Rx requested:  Requested Prescriptions     Pending Prescriptions Disp Refills    Lancets MISC 100 each 3     Si each by Does not apply route daily         Last Office Visit:   2022      Next Visit Date:  Future Appointments   Date Time Provider Milana Herrera   2023  9:45 AM Lima Golden  [address] [place] [address]   2023  2:15 PM Ying Collins, [address] [facility]   2023  1:00 PM Jose Little5 CHANDRIKA Dowd  [address] [facility]

## 2023-01-03 NOTE — TELEPHONE ENCOUNTER
Comments: This request is coming from the pharmacy. Last Office Visit (last PCP visit):   9/13/2022    Next Visit Date:  Future Appointments   Date Time Provider Milana Herrera   2/8/2023  2:15 PM Melchor Brooks MD [address]       If hasn't been seen in over a year OR hasn't followed up according to last diabetes/ADHD visit, make appointment for patient before sending refill to provider.     Rx requested:  Requested Prescriptions     Pending Prescriptions Disp Refills    CAXA 5.22 FF/4.7SN SOPN [Pharmacy Med Name: TRULICITY 7.94 JR/7.6 ML PEN]  1     Sig: INJECT 1 PEN UNDER THE SKIN ONCE PER WEEK

## 2023-01-09 ENCOUNTER — OFFICE VISIT (OUTPATIENT)
Dept: ENDOCRINOLOGY | Age: 33
End: 2023-01-09
Payer: COMMERCIAL

## 2023-01-09 VITALS
HEIGHT: 62 IN | SYSTOLIC BLOOD PRESSURE: 141 MMHG | HEART RATE: 105 BPM | OXYGEN SATURATION: 94 % | WEIGHT: 246 LBS | DIASTOLIC BLOOD PRESSURE: 104 MMHG | BODY MASS INDEX: 45.27 KG/M2

## 2023-01-09 DIAGNOSIS — E88.81 INSULIN RESISTANCE: Primary | ICD-10-CM

## 2023-01-09 DIAGNOSIS — E28.2 POLYCYSTIC OVARIAN DISEASE: ICD-10-CM

## 2023-01-09 DIAGNOSIS — E78.2 MIXED HYPERLIPIDEMIA: ICD-10-CM

## 2023-01-09 DIAGNOSIS — E66.01 MORBID OBESITY (HCC): ICD-10-CM

## 2023-01-09 PROCEDURE — 3075F SYST BP GE 130 - 139MM HG: CPT | Performed by: INTERNAL MEDICINE

## 2023-01-09 PROCEDURE — 3080F DIAST BP >= 90 MM HG: CPT | Performed by: INTERNAL MEDICINE

## 2023-01-09 PROCEDURE — G8427 DOCREV CUR MEDS BY ELIG CLIN: HCPCS | Performed by: INTERNAL MEDICINE

## 2023-01-09 PROCEDURE — 82962 GLUCOSE BLOOD TEST: CPT | Performed by: INTERNAL MEDICINE

## 2023-01-09 PROCEDURE — G8417 CALC BMI ABV UP PARAM F/U: HCPCS | Performed by: INTERNAL MEDICINE

## 2023-01-09 PROCEDURE — 99213 OFFICE O/P EST LOW 20 MIN: CPT | Performed by: INTERNAL MEDICINE

## 2023-01-09 PROCEDURE — 4004F PT TOBACCO SCREEN RCVD TLK: CPT | Performed by: INTERNAL MEDICINE

## 2023-01-09 PROCEDURE — G8482 FLU IMMUNIZE ORDER/ADMIN: HCPCS | Performed by: INTERNAL MEDICINE

## 2023-01-09 RX ORDER — ATORVASTATIN CALCIUM 10 MG/1
TABLET, FILM COATED ORAL
Qty: 30 TABLET | Refills: 3 | Status: SHIPPED | OUTPATIENT
Start: 2023-01-09

## 2023-01-09 RX ORDER — GABAPENTIN 100 MG/1
CAPSULE ORAL
COMMUNITY
Start: 2023-01-05

## 2023-01-09 RX ORDER — ATORVASTATIN CALCIUM 10 MG/1
TABLET, FILM COATED ORAL
COMMUNITY
Start: 2023-01-04 | End: 2023-01-09 | Stop reason: SDUPTHER

## 2023-01-09 ASSESSMENT — ENCOUNTER SYMPTOMS: EYES NEGATIVE: 1

## 2023-01-09 NOTE — PROGRESS NOTES
1/9/2023    Assessment:       Diagnosis Orders   1. Insulin resistance  POCT Glucose      2. Polycystic ovarian disease        3. Morbid obesity ([facility] [address]              PLAN:     Orders Placed This Encounter   Procedures    Basic Metabolic Panel     Standing Status:   Future     Standing Expiration Date:   1/9/2024    Hemoglobin A1C     Standing Status:   Future     Standing Expiration Date:   1/9/2024    Lipid Panel     Standing Status:   Future     Standing Expiration Date:   1/9/2024    POCT Glucose     Orders Placed This Encounter   Medications    atorvastatin (LIPITOR) 10 MG tablet     Sig: TAKE 1 TABLET BY MOUTH EVERY DAY FOR HIGH CHOLESTEROL     Dispense:  30 tablet     Refill:  3       Continue current dose of metformin and Trulicity  Repeat labs for lipid panel    Orders Placed This Encounter   Procedures    POCT Glucose     No orders of the defined types were placed in this encounter. No follow-ups on file. Subjective:     Chief Complaint   Patient presents with    Other     Insulin Resistance  PCOS    Obesity    Hyperglycemia     Vitals:    01/09/23 0917 01/09/23 0921   BP: (!) 138/101 (!) 141/104   Site: Right Upper Arm Right Upper Arm   Position: Sitting Sitting   Cuff Size: Large Adult Large Adult   Pulse: (!) 105    SpO2: 94%    Weight: 246 lb (111.6 kg)    Height: 5' 2\" (1.575 m)      Wt Readings from Last 3 Encounters:   01/09/23 246 lb (111.6 kg)   12/16/22 252 lb (114.3 kg)   11/08/22 253 lb (114.8 kg)     BP Readings from Last 3 Encounters:   01/09/23 (!) 141/104   12/21/22 (!) 154/107   12/16/22 (!) 147/103     Follow-up on obesity insulin resistance patient has been losing weight on metformin and Trulicity 2.09 mg Y8Z was 6.1 patient also enrolled in bariatric program BMI is at 44.9 history of hyperlipidemia on no medication was prescribed Lipitor has not been taking it  Patient lost 14 pounds over 8 months    Other  This is a chronic (Obesity insulin resistance) problem.  The current episode started more than 1 year ago. The problem occurs intermittently. The problem has been gradually improving. Treatments tried: Metformin Trulicity. The treatment provided moderate relief. Hyperglycemia  This is a chronic problem. The current episode started more than 1 year ago. The problem has been gradually improving. Hyperlipidemia  This is a chronic problem. The current episode started more than 1 year ago. The problem is uncontrolled. She is currently on no antihyperlipidemic treatment. Risk factors for coronary artery disease include obesity.    Past Medical History:   Diagnosis Date    Abnormal Pap smear of cervix     Anxiety     Asthma     Depression     DVT (deep venous thrombosis) ([facility])     Hypertension     Pre-diabetes     PTSD (post-traumatic stress disorder)     Schizoaffective disorder ([facility])      Past Surgical History:   Procedure Laterality Date     SECTION      TONSILLECTOMY       Social History     Socioeconomic History    Marital status:      Spouse name: Not on file    Number of children: Not on file    Years of education: Not on file    Highest education level: Not on file   Occupational History    Not on file   Tobacco Use    Smoking status: Every Day     Packs/day: 0.25     Years: 12.00     Pack years: 3.00     Types: Cigarettes    Smokeless tobacco: Never    Tobacco comments:     1 ppd   Vaping Use    Vaping Use: Never used   Substance and Sexual Activity    Alcohol use: Not Currently     Comment:      Drug use: Not Currently     Comment: \"cut down since I'm trying to get pregnant\"    Sexual activity: Yes     Partners: Male   Other Topics Concern    Not on file   Social History Narrative    Not on file     Social Determinants of Health     Financial Resource Strain: Not on file   Food Insecurity: Not on file   Transportation Needs: Not on file   Physical Activity: Not on file   Stress: Not on file   Social Connections: Not on file   Intimate Partner Violence: Not on file Housing Stability: Not on file     Family History   Problem Relation Age of Onset    Diabetes Mother     Depression Father     Asthma Brother     Diabetes Maternal Grandmother     Breast Cancer Maternal Grandmother     Thyroid Cancer Maternal Grandfather     Diabetes Paternal Grandmother     Other Paternal Grandfather         testicular cancer     Allergies   Allergen Reactions    Food Hives and Itching     Squash    Amoxicillin Hives       Current Outpatient Medications:     atorvastatin (LIPITOR) 10 MG tablet, TAKE 1 TABLET BY MOUTH EVERY DAY FOR HIGH CHOLESTEROL, Disp: , Rfl:     gabapentin (NEURONTIN) 100 MG capsule, , Disp: , Rfl:     TRULICITY 7.34 UB/3.4SZ SOPN, INJECT 1 PEN UNDER THE SKIN ONCE PER WEEK, Disp: 4 Adjustable Dose Pre-filled Pen Syringe, Rfl: 3    Lancets MISC, 1 each by Does not apply route daily, Disp: 100 each, Rfl: 3    ondansetron (ZOFRAN) 4 MG tablet, Take 1 tablet by mouth every 8 hours as needed for Nausea, Disp: 15 tablet, Rfl: 0    metoprolol tartrate (LOPRESSOR) 25 MG tablet, TAKE 1 TABLET BY ORAL ROUTE 2 TIMES EVERY DAY FOR HIGH BLOOD PRESSURE/HR, Disp: , Rfl:     doxepin (SINEQUAN) 25 MG capsule, 2 (EACH) BEDTIME (AS NEEDED) TAKE 1 OR 2 CAPSULES AT BEDTIME AS NEEDED FOR SLEEP., Disp: , Rfl:     doxepin (SINEQUAN) 75 MG capsule, , Disp: , Rfl:     hydroCHLOROthiazide (MICROZIDE) 12.5 MG capsule, , Disp: , Rfl:     norethindrone (MICRONOR) 0.35 MG tablet, Take 1 tablet by mouth daily, Disp: 30 tablet, Rfl: 11    Alcohol Swabs (ALCOHOL PREP) 70 % PADS, bid, Disp: 100 each, Rfl: 11    metFORMIN (GLUCOPHAGE-XR) 500 MG extended release tablet, TAKE 2 TABLET BY MOUTH 2 TIMES DAILY (BEFORE MEALS), Disp: 120 tablet, Rfl: 3    ONETOUCH ULTRA strip, bid, Disp: 100 each, Rfl: 3    lurasidone (LATUDA) 120 MG tablet, Take 120 mg by mouth daily, Disp: , Rfl:     CPAP Machine MISC, by Does not apply route New BIPAP with setting 12/8 cm, Disp: 1 each, Rfl: 0    letrozole (FEMARA) 2.5 MG tablet, Take 2 tablets by mouth daily Day 5-9 of menstrual cycle, Disp: 5 tablet, Rfl: 3    hydrOXYzine (VISTARIL) 25 MG capsule, , Disp: , Rfl:     naproxen (NAPROSYN) 500 MG tablet, Take 1 tablet by mouth 2 times daily, Disp: 20 tablet, Rfl: 0    docusate sodium (COLACE) 100 MG capsule, Take 1 capsule by mouth 2 times daily, Disp: 20 capsule, Rfl: 0    ondansetron (ZOFRAN-ODT) 4 MG disintegrating tablet, Take 1 tablet by mouth 3 times daily as needed for Nausea or Vomiting, Disp: 21 tablet, Rfl: 0    OXcarbazepine (TRILEPTAL) 150 MG tablet, Take 1 tablet by mouth 2 times daily, Disp: 30 tablet, Rfl: 3    lisinopril (PRINIVIL;ZESTRIL) 10 MG tablet, Take 1 tablet by mouth daily, Disp: 15 tablet, Rfl: 3    prazosin (MINIPRESS) 1 MG capsule, Take 1 capsule by mouth nightly, Disp: 15 capsule, Rfl: 3    albuterol sulfate  (90 Base) MCG/ACT inhaler, Use 2 puffs 4 times daily for 7 days then as needed for wheezing. Dispense with Spacer and instruct in use. At patient's preference may use 60 dose MDI.  May Sub Pro-Air or Proventil as needed per insurance., Disp: 1 Inhaler, Rfl: 0    Prenatal Vit-Fe Fumarate-FA (PRENATAL VITAMIN) 27-1 MG TABS tablet, take 1 tablet by mouth once daily, Disp: , Rfl:     FLOVENT DISKUS 100 MCG/BLIST AEPB inhaler, Inhale 1 puff into the lungs daily , Disp: , Rfl:     Melatonin 10 MG CAPS, , Disp: , Rfl:   Lab Results   Component Value Date     12/16/2022    K 3.8 12/16/2022     12/16/2022    CO2 23 12/16/2022    BUN 10 12/16/2022    CREATININE 0.69 12/16/2022    GLUCOSE 137 (H) 12/16/2022    CALCIUM 9.0 12/16/2022    PROT 6.9 12/16/2022    LABALBU 3.9 12/16/2022    BILITOT <0.2 12/16/2022    ALKPHOS 75 12/16/2022    AST 30 12/16/2022    ALT 57 (H) 12/16/2022    LABGLOM >60.0 12/16/2022    GFRAA >60.0 06/14/2022    GLOB 3.0 12/16/2022     Lab Results   Component Value Date    WBC 13.5 (H) 12/16/2022    HGB 12.6 12/16/2022    HCT 38.8 12/16/2022    MCV 82.7 12/16/2022     12/16/2022     Lab Results   Component Value Date    LABA1C 6.1 (H) 12/08/2022    LABA1C 6.3 09/13/2022    LABA1C 6.5 (H) 06/14/2022     Lab Results   Component Value Date    HDL 42 10/13/2021    HDL 54 08/09/2021    HDL 37 (L) 12/11/2019    LDLCALC 118 10/13/2021    LDLCALC 49 08/09/2021    LDLCALC 44 12/11/2019    CHOL 208 (H) 10/13/2021    CHOL 123 08/09/2021    CHOL 118 12/11/2019    TRIG 241 (H) 10/13/2021    TRIG 102 08/09/2021    TRIG 183 (H) 12/11/2019     No results found for: TESTM  Lab Results   Component Value Date    TSH 1.070 10/13/2021    TSH 0.563 08/09/2021    TSH 0.933 12/11/2019    TSHREFLEX 0.957 12/08/2022    TSHREFLEX 1.900 11/13/2020    TSHREFLEX 1.380 01/17/2018    T4FREE 1.15 08/09/2021    T4FREE 1.46 12/11/2019    T4FREE 1.17 01/17/2018     No results found for: TPOABS    Review of Systems   Eyes: Negative. Cardiovascular: Negative. Endocrine: Negative. All other systems reviewed and are negative. Objective:   Physical Exam  Vitals reviewed. Constitutional:       General: She is not in acute distress. Appearance: Normal appearance. She is obese. HENT:      Head: Normocephalic and atraumatic. Right Ear: External ear normal.      Left Ear: External ear normal.      Nose: Nose normal.   Eyes:      General: No scleral icterus. Right eye: No discharge. Left eye: No discharge. Extraocular Movements: Extraocular movements intact. Conjunctiva/sclera: Conjunctivae normal.   Cardiovascular:      Rate and Rhythm: Normal rate. Pulmonary:      Effort: Pulmonary effort is normal.   Musculoskeletal:         General: Normal range of motion. Cervical back: Normal range of motion and neck supple. Neurological:      General: No focal deficit present. Mental Status: She is alert and oriented to person, place, and time.    Psychiatric:         Mood and Affect: Mood normal.         Behavior: Behavior normal.

## 2023-02-08 ENCOUNTER — OFFICE VISIT (OUTPATIENT)
Dept: PULMONOLOGY | Age: 33
End: 2023-02-08
Payer: COMMERCIAL

## 2023-02-08 VITALS
HEART RATE: 110 BPM | DIASTOLIC BLOOD PRESSURE: 90 MMHG | OXYGEN SATURATION: 98 % | BODY MASS INDEX: 44.63 KG/M2 | WEIGHT: 244 LBS | SYSTOLIC BLOOD PRESSURE: 136 MMHG

## 2023-02-08 DIAGNOSIS — E66.01 MORBID OBESITY (HCC): ICD-10-CM

## 2023-02-08 DIAGNOSIS — R06.02 SHORTNESS OF BREATH: ICD-10-CM

## 2023-02-08 DIAGNOSIS — G47.33 OSA (OBSTRUCTIVE SLEEP APNEA): Primary | ICD-10-CM

## 2023-02-08 PROCEDURE — G8417 CALC BMI ABV UP PARAM F/U: HCPCS | Performed by: INTERNAL MEDICINE

## 2023-02-08 PROCEDURE — G8482 FLU IMMUNIZE ORDER/ADMIN: HCPCS | Performed by: INTERNAL MEDICINE

## 2023-02-08 PROCEDURE — 4004F PT TOBACCO SCREEN RCVD TLK: CPT | Performed by: INTERNAL MEDICINE

## 2023-02-08 PROCEDURE — 3074F SYST BP LT 130 MM HG: CPT | Performed by: INTERNAL MEDICINE

## 2023-02-08 PROCEDURE — 3078F DIAST BP <80 MM HG: CPT | Performed by: INTERNAL MEDICINE

## 2023-02-08 PROCEDURE — 99214 OFFICE O/P EST MOD 30 MIN: CPT | Performed by: INTERNAL MEDICINE

## 2023-02-08 PROCEDURE — G8427 DOCREV CUR MEDS BY ELIG CLIN: HCPCS | Performed by: INTERNAL MEDICINE

## 2023-02-08 RX ORDER — ALBUTEROL SULFATE 90 UG/1
AEROSOL, METERED RESPIRATORY (INHALATION)
Qty: 1 EACH | Refills: 2 | Status: SHIPPED | OUTPATIENT
Start: 2023-02-08

## 2023-02-08 ASSESSMENT — ENCOUNTER SYMPTOMS
ABDOMINAL PAIN: 0
CHEST TIGHTNESS: 0
WHEEZING: 1
SINUS PRESSURE: 0
DIARRHEA: 0
EYE ITCHING: 0
VOMITING: 0
EYE DISCHARGE: 0
SHORTNESS OF BREATH: 1
SORE THROAT: 0
NAUSEA: 0
VOICE CHANGE: 0
TROUBLE SWALLOWING: 0
RHINORRHEA: 0
COUGH: 0

## 2023-02-08 NOTE — PROGRESS NOTES
Subjective:     Marylu Ordonez is a 29 y.o. female who complains today of:     Chief Complaint   Patient presents with    Follow-up     3m f/u for KEI       HPI  CPAP study done show therapeutic at BIPAP 12/8 cm. She said she is trying to use at night 4-5 hours   She is complaining of snoring and daytime sleepiness and tiredness. She does have restful sleep. She does take daily naps, 1-2 hour   She does not drive. She said she is going for bariatric surgery at [facility] in dec 2022  She said she is trying to lose weight and if she continue to lose weight ,   She does not want to have surgery. She has shortness of breath with walk , long distance , dry cough  and wheezing .  She is on albuterol HFA     Allergies:  Food and Amoxicillin  Past Medical History:   Diagnosis Date    Abnormal Pap smear of cervix     Anxiety     Asthma     Depression     DVT (deep venous thrombosis) ([facility])     Hypertension     Pre-diabetes     PTSD (post-traumatic stress disorder)     Schizoaffective disorder ([facility])      Past Surgical History:   Procedure Laterality Date     SECTION      TONSILLECTOMY       Family History   Problem Relation Age of Onset    Diabetes Mother     Depression Father     Asthma Brother     Diabetes Maternal Grandmother     Breast Cancer Maternal Grandmother     Thyroid Cancer Maternal Grandfather     Diabetes Paternal Grandmother     Other Paternal Grandfather         testicular cancer     Social History     Socioeconomic History    Marital status:      Spouse name: Not on file    Number of children: Not on file    Years of education: Not on file    Highest education level: Not on file   Occupational History    Not on file   Tobacco Use    Smoking status: Every Day     Packs/day: 0.25     Years: 12.00     Pack years: 3.00     Types: Cigarettes    Smokeless tobacco: Never    Tobacco comments:     1 ppd   Vaping Use    Vaping Use: Never used   Substance and Sexual Activity    Alcohol use: Not Currently     Comment:      Drug use: Not Currently     Comment: \"cut down since I'm trying to get pregnant\"    Sexual activity: Yes     Partners: Male   Other Topics Concern    Not on file   Social History Narrative    Not on file     Social Determinants of Health     Financial Resource Strain: Not on file   Food Insecurity: Not on file   Transportation Needs: Not on file   Physical Activity: Not on file   Stress: Not on file   Social Connections: Not on file   Intimate Partner Violence: Not on file   Housing Stability: Not on file         Review of Systems   Constitutional:  Negative for chills, diaphoresis, fatigue and fever. HENT:  Negative for congestion, mouth sores, nosebleeds, postnasal drip, rhinorrhea, sinus pressure, sneezing, sore throat, trouble swallowing and voice change. Eyes:  Negative for discharge, itching and visual disturbance. Respiratory:  Positive for shortness of breath and wheezing. Negative for cough and chest tightness. Cardiovascular:  Negative for chest pain, palpitations and leg swelling. Gastrointestinal:  Negative for abdominal pain, diarrhea, nausea and vomiting. Genitourinary:  Negative for difficulty urinating and hematuria. Musculoskeletal:  Negative for arthralgias, joint swelling and myalgias. Skin:  Negative for rash. Allergic/Immunologic: Negative for environmental allergies and food allergies. Neurological:  Negative for dizziness, tremors, weakness and headaches. Psychiatric/Behavioral:  Positive for sleep disturbance. Negative for behavioral problems.         :     Vitals:    02/08/23 1015 02/08/23 1017   BP: (!) 140/108 (!) 136/90   Site: Left Upper Arm Right Upper Arm   Position: Sitting Sitting   Cuff Size: Medium Adult Large Adult   Pulse: (!) 110    SpO2: 98%    Weight: 244 lb (110.7 kg)      Wt Readings from Last 3 Encounters:   02/08/23 244 lb (110.7 kg)   01/09/23 246 lb (111.6 kg)   12/16/22 252 lb (114.3 kg)         Physical Exam  Constitutional:       General: She is not in acute distress. Appearance: She is well-developed. She is obese. She is not diaphoretic. HENT:      Head: Normocephalic and atraumatic. Nose: Nose normal.   Eyes:      Pupils: Pupils are equal, round, and reactive to light. Neck:      Thyroid: No thyromegaly. Vascular: No JVD. Trachea: No tracheal deviation. Cardiovascular:      Rate and Rhythm: Normal rate and regular rhythm. Heart sounds: No murmur heard. No friction rub. No gallop. Pulmonary:      Effort: No respiratory distress. Breath sounds: No wheezing or rales. Comments: diminished Breath sound bilaterally. Chest:      Chest wall: No tenderness. Abdominal:      General: There is no distension. Tenderness: There is no abdominal tenderness. There is no rebound. Musculoskeletal:         General: Normal range of motion. Lymphadenopathy:      Cervical: No cervical adenopathy. Skin:     General: Skin is warm and dry. Neurological:      Mental Status: She is alert and oriented to person, place, and time.       Coordination: Coordination normal.   Psychiatric:         Mood and Affect: Mood normal.         Behavior: Behavior normal.       Current Outpatient Medications   Medication Sig Dispense Refill    gabapentin (NEURONTIN) 100 MG capsule       atorvastatin (LIPITOR) 10 MG tablet TAKE 1 TABLET BY MOUTH EVERY DAY FOR HIGH CHOLESTEROL 30 tablet 3    TRULICITY 0.60 IA/8.6AB SOPN INJECT 1 PEN UNDER THE SKIN ONCE PER WEEK 4 Adjustable Dose Pre-filled Pen Syringe 3    Lancets MISC 1 each by Does not apply route daily 100 each 3    ondansetron (ZOFRAN) 4 MG tablet Take 1 tablet by mouth every 8 hours as needed for Nausea 15 tablet 0    metoprolol tartrate (LOPRESSOR) 25 MG tablet TAKE 1 TABLET BY ORAL ROUTE 2 TIMES EVERY DAY FOR HIGH BLOOD PRESSURE/HR      doxepin (SINEQUAN) 25 MG capsule 2 (EACH) BEDTIME (AS NEEDED) TAKE 1 OR 2 CAPSULES AT BEDTIME AS NEEDED FOR SLEEP. doxepin (SINEQUAN) 75 MG capsule       hydroCHLOROthiazide (MICROZIDE) 12.5 MG capsule       norethindrone (MICRONOR) 0.35 MG tablet Take 1 tablet by mouth daily 30 tablet 11    Alcohol Swabs (ALCOHOL PREP) 70 % PADS bid 100 each 11    metFORMIN (GLUCOPHAGE-XR) 500 MG extended release tablet TAKE 2 TABLET BY MOUTH 2 TIMES DAILY (BEFORE MEALS) 120 tablet 3    ONETOUCH ULTRA strip bid 100 each 3    lurasidone (LATUDA) 120 MG tablet Take 120 mg by mouth daily      CPAP Machine MISC by Does not apply route New BIPAP with setting 12/8 cm 1 each 0    letrozole (FEMARA) 2.5 MG tablet Take 2 tablets by mouth daily Day 5-9 of menstrual cycle 5 tablet 3    hydrOXYzine (VISTARIL) 25 MG capsule       naproxen (NAPROSYN) 500 MG tablet Take 1 tablet by mouth 2 times daily 20 tablet 0    docusate sodium (COLACE) 100 MG capsule Take 1 capsule by mouth 2 times daily 20 capsule 0    ondansetron (ZOFRAN-ODT) 4 MG disintegrating tablet Take 1 tablet by mouth 3 times daily as needed for Nausea or Vomiting 21 tablet 0    OXcarbazepine (TRILEPTAL) 150 MG tablet Take 1 tablet by mouth 2 times daily 30 tablet 3    lisinopril (PRINIVIL;ZESTRIL) 10 MG tablet Take 1 tablet by mouth daily 15 tablet 3    prazosin (MINIPRESS) 1 MG capsule Take 1 capsule by mouth nightly 15 capsule 3    albuterol sulfate  (90 Base) MCG/ACT inhaler Use 2 puffs 4 times daily for 7 days then as needed for wheezing. Dispense with Spacer and instruct in use. At patient's preference may use 60 dose MDI. May Sub Pro-Air or Proventil as needed per insurance. 1 Inhaler 0    Prenatal Vit-Fe Fumarate-FA (PRENATAL VITAMIN) 27-1 MG TABS tablet take 1 tablet by mouth once daily      FLOVENT DISKUS 100 MCG/BLIST AEPB inhaler Inhale 1 puff into the lungs daily       Melatonin 10 MG CAPS        No current facility-administered medications for this visit.        Results for orders placed during the hospital encounter of 07/29/19    XR CHEST STANDARD (2 VW)    Narrative  EXAMINATION: XR CHEST (2 VW)    CLINICAL HISTORY:  cough    COMPARISONS: August 30, 2016    FINDINGS:    Two views of the chest are submitted. The cardiac silhouette is of normal size configuration. The mediastinum is unremarkable. Pulmonary vascular attenuated. Right sided trachea. Area of atelectasis, and/or infiltrate left lower lobe. No effusions. No Pneumothoraces. Impression  AREA OF ATELECTASIS, and/or INFILTRATE LEFT LOWER LOBE. CORRELATE CLINICALLY. RADIOGRAPHIC FINDINGS OF COPD      Results for orders placed during the hospital encounter of 08/30/16    XR Chest Standard TWO VW    Narrative  EXAMINATION CHEST TWO VIEWS    CLINICAL HISTORY Shortness of breath    COMPARISONS 11/4/12    FINDINGS Heart, mediastinum, and hilum are unremarkable. Lungs are  clear. Pulmonary vasculature is normal.  No effusions are seen. Bones  are intact. IMPRESSION NO ACTIVE CHEST DISEASE. Freedom Feng M.D. Released Beltran Feng M.D. Released Date Time- 08/30/16 9019  This document has been electronically signed. ------------------------------------------------------------------------------  ]  Results for orders placed during the hospital encounter of 11/12/21    XR CHEST PORTABLE    Narrative  Exam: XR CHEST PORTABLE    History:  cough    Technique: AP portable view of the chest obtained. Comparison: Chest x-ray from September 19, 2021    Chest x-ray portable    Findings: The cardiomediastinal silhouette is within normal limits. There are no infiltrates, consolidations or effusions. Bones of the thorax appear intact. Impression  No radiographic evidence of acute intrathoracic process.       Results for orders placed during the hospital encounter of 09/19/21    XR CHEST PORTABLE    Narrative  EXAMINATION: CHEST PORTABLE VIEW    CLINICAL HISTORY: Midsternal chest pain    COMPARISONS: July 29, 2018 5112 hours    FINDINGS:    Single  views of the chest is submitted. The cardiac silhouette is of normal size configuration. Pulmonary vascular unremarkable. Right sided trachea. No focal infiltrates. No Pneumothoraces. Impression  NO ACUTE ACTIVE CARDIOPULMONARY PROCESS    Assessment/Plan:     1. KEI (obstructive sleep apnea)  CPAP study done show therapeutic at BIPAP 12/8 cm. She said she is trying to use at night 4-5 hours She is complaining of snoring and daytime sleepiness and tiredness. She does have restful sleep. She does take daily naps, 1-2 hour She does not drive. She has shortness of breath with walk , long distance , dry cough  and wheezing . She is on albuterol HFA     2. Morbid obesity ([address]  She said she was going for bariatric surgery at [facility] in dec 2022  She said she is trying to lose weight and if she continue to lose weight ,   She does not want to have surgery. 3. Shortness of breath  She having  Chronic shortness of breath which is chronic and unchanged . keep  Spo2 90% or above. Return in about 3 months (around 5/8/2023) for kei.       Pepe Anderson MD

## 2023-03-15 ENCOUNTER — OFFICE VISIT (OUTPATIENT)
Dept: OBGYN CLINIC | Age: 33
End: 2023-03-15

## 2023-03-15 VITALS
BODY MASS INDEX: 45.36 KG/M2 | HEART RATE: 86 BPM | DIASTOLIC BLOOD PRESSURE: 74 MMHG | WEIGHT: 248 LBS | SYSTOLIC BLOOD PRESSURE: 124 MMHG

## 2023-03-15 DIAGNOSIS — Z12.4 ENCOUNTER FOR PAP SMEAR OF CERVIX WITH HPV DNA COTESTING: ICD-10-CM

## 2023-03-15 DIAGNOSIS — Z20.2 POSSIBLE EXPOSURE TO STD: ICD-10-CM

## 2023-03-15 DIAGNOSIS — Z87.42 HISTORY OF OVARIAN CYST: ICD-10-CM

## 2023-03-15 DIAGNOSIS — N94.6 DYSMENORRHEA: ICD-10-CM

## 2023-03-15 DIAGNOSIS — Z30.41 ORAL CONTRACEPTIVE PILL SURVEILLANCE: ICD-10-CM

## 2023-03-15 DIAGNOSIS — Z01.419 WOMEN'S ANNUAL ROUTINE GYNECOLOGICAL EXAMINATION: Primary | ICD-10-CM

## 2023-03-15 DIAGNOSIS — Z32.02 URINE PREGNANCY TEST NEGATIVE: ICD-10-CM

## 2023-03-15 DIAGNOSIS — R10.2 PELVIC PAIN: ICD-10-CM

## 2023-03-15 DIAGNOSIS — N92.6 MISSED PERIOD: ICD-10-CM

## 2023-03-15 LAB
HCG, URINE, POC: NEGATIVE
Lab: NORMAL
NEGATIVE QC PASS/FAIL: NORMAL
POSITIVE QC PASS/FAIL: NORMAL

## 2023-03-15 RX ORDER — NICOTINE 21 MG/24HR
PATCH, TRANSDERMAL 24 HOURS TRANSDERMAL
COMMUNITY
Start: 2023-02-15

## 2023-03-15 RX ORDER — DROSPIRENONE 4 MG/1
1 TABLET, FILM COATED ORAL DAILY
Qty: 90 TABLET | Refills: 3 | Status: SHIPPED | OUTPATIENT
Start: 2023-03-15 | End: 2024-03-14

## 2023-03-15 SDOH — ECONOMIC STABILITY: FOOD INSECURITY: WITHIN THE PAST 12 MONTHS, THE FOOD YOU BOUGHT JUST DIDN'T LAST AND YOU DIDN'T HAVE MONEY TO GET MORE.: NEVER TRUE

## 2023-03-15 SDOH — ECONOMIC STABILITY: INCOME INSECURITY: HOW HARD IS IT FOR YOU TO PAY FOR THE VERY BASICS LIKE FOOD, HOUSING, MEDICAL CARE, AND HEATING?: SOMEWHAT HARD

## 2023-03-15 SDOH — ECONOMIC STABILITY: HOUSING INSECURITY
IN THE LAST 12 MONTHS, WAS THERE A TIME WHEN YOU DID NOT HAVE A STEADY PLACE TO SLEEP OR SLEPT IN A SHELTER (INCLUDING NOW)?: NO

## 2023-03-15 SDOH — ECONOMIC STABILITY: FOOD INSECURITY: WITHIN THE PAST 12 MONTHS, YOU WORRIED THAT YOUR FOOD WOULD RUN OUT BEFORE YOU GOT MONEY TO BUY MORE.: NEVER TRUE

## 2023-03-15 ASSESSMENT — ENCOUNTER SYMPTOMS
NAUSEA: 0
VOMITING: 0
DIARRHEA: 0
VOICE CHANGE: 0
RHINORRHEA: 0
TROUBLE SWALLOWING: 0
COUGH: 0
CONSTIPATION: 0
ABDOMINAL PAIN: 0
SORE THROAT: 0
SHORTNESS OF BREATH: 0

## 2023-03-15 ASSESSMENT — PATIENT HEALTH QUESTIONNAIRE - PHQ9
SUM OF ALL RESPONSES TO PHQ QUESTIONS 1-9: 0
SUM OF ALL RESPONSES TO PHQ QUESTIONS 1-9: 0
SUM OF ALL RESPONSES TO PHQ9 QUESTIONS 1 & 2: 0
SUM OF ALL RESPONSES TO PHQ QUESTIONS 1-9: 0
SUM OF ALL RESPONSES TO PHQ QUESTIONS 1-9: 0
2. FEELING DOWN, DEPRESSED OR HOPELESS: 0
1. LITTLE INTEREST OR PLEASURE IN DOING THINGS: 0

## 2023-03-15 NOTE — PROGRESS NOTES
Chief Complaint:     Luly Larson is a 29 y.o. female who presents here today for complaints of:      Chief Complaint   Patient presents with    Annual Exam     Missed menses     History of Present Illness:     Luly Larson is a 29 y.o. female who presents for her annual exam.    Concerns Today:    Possible pregnancy    Dysmenorrhea  Utilizing hormonal contraception to relieve uncomfortable menstrual symptoms. While taking her birth control pills she had a period that was much lighter than usual which caused concern that pregnancy may have occurred. Due to this, she discontinued her birth control pills. Once she discontinued them, her next period was exceptionally heavy (with clots), very painful, and lasted 7-10 days. She would like to restart her birth control pills if she is not currently pregnant. Pelvic Pain, History of Ovarian Cysts  Experiences intermittent pelvic pain which causes concern because she has had ovarian cysts in the past.  Requesting a pelvic US to rule out/in any current ovarian cysts. Past Medical History: Allergies:  Food and Amoxicillin  Patient's last menstrual period was 01/17/2023 (approximate). Obstetrical History:  [id number]     Past Medical History:   Diagnosis Date    Abnormal Pap smear of cervix     Anxiety     Asthma     Depression     DVT (deep venous thrombosis) ([facility])     Hypertension     Pre-diabetes     PTSD (post-traumatic stress disorder)     Schizoaffective disorder ([facility])      Medications:     Current Outpatient Medications on File Prior to Visit   Medication Sig Dispense Refill    nicotine (NICODERM CQ) 14 MG/24HR       albuterol sulfate HFA (PROVENTIL;VENTOLIN;PROAIR) 108 (90 Base) MCG/ACT inhaler Use 2 puffs 4 times daily for 7 days then as needed for wheezing. Dispense with Spacer and instruct in use. At patient's preference may use 60 dose MDI. May Sub Pro-Air or Proventil as needed per insurance.  1 each 2    gabapentin (NEURONTIN) 100 MG capsule atorvastatin (LIPITOR) 10 MG tablet TAKE 1 TABLET BY MOUTH EVERY DAY FOR HIGH CHOLESTEROL 30 tablet 3    TRULICITY 0.39 LS/3.6XQ SOPN INJECT 1 PEN UNDER THE SKIN ONCE PER WEEK 4 Adjustable Dose Pre-filled Pen Syringe 3    Lancets MISC 1 each by Does not apply route daily 100 each 3    ondansetron (ZOFRAN) 4 MG tablet Take 1 tablet by mouth every 8 hours as needed for Nausea 15 tablet 0    metoprolol tartrate (LOPRESSOR) 25 MG tablet TAKE 1 TABLET BY ORAL ROUTE 2 TIMES EVERY DAY FOR HIGH BLOOD PRESSURE/HR      doxepin (SINEQUAN) 25 MG capsule 2 (EACH) BEDTIME (AS NEEDED) TAKE 1 OR 2 CAPSULES AT BEDTIME AS NEEDED FOR SLEEP.       doxepin (SINEQUAN) 75 MG capsule       hydroCHLOROthiazide (MICROZIDE) 12.5 MG capsule       norethindrone (MICRONOR) 0.35 MG tablet Take 1 tablet by mouth daily 30 tablet 11    Alcohol Swabs (ALCOHOL PREP) 70 % PADS bid 100 each 11    metFORMIN (GLUCOPHAGE-XR) 500 MG extended release tablet TAKE 2 TABLET BY MOUTH 2 TIMES DAILY (BEFORE MEALS) 120 tablet 3    ONETOUCH ULTRA strip bid 100 each 3    lurasidone (LATUDA) 120 MG tablet Take 120 mg by mouth daily      CPAP Machine MISC by Does not apply route New BIPAP with setting 12/8 cm 1 each 0    letrozole (FEMARA) 2.5 MG tablet Take 2 tablets by mouth daily Day 5-9 of menstrual cycle 5 tablet 3    hydrOXYzine (VISTARIL) 25 MG capsule       naproxen (NAPROSYN) 500 MG tablet Take 1 tablet by mouth 2 times daily 20 tablet 0    docusate sodium (COLACE) 100 MG capsule Take 1 capsule by mouth 2 times daily 20 capsule 0    ondansetron (ZOFRAN-ODT) 4 MG disintegrating tablet Take 1 tablet by mouth 3 times daily as needed for Nausea or Vomiting 21 tablet 0    OXcarbazepine (TRILEPTAL) 150 MG tablet Take 1 tablet by mouth 2 times daily 30 tablet 3    lisinopril (PRINIVIL;ZESTRIL) 10 MG tablet Take 1 tablet by mouth daily 15 tablet 3    prazosin (MINIPRESS) 1 MG capsule Take 1 capsule by mouth nightly 15 capsule 3    Prenatal Vit-Fe Fumarate-FA (PRENATAL VITAMIN) 27-1 MG TABS tablet take 1 tablet by mouth once daily      FLOVENT DISKUS 100 MCG/BLIST AEPB inhaler Inhale 1 puff into the lungs daily       Melatonin 10 MG CAPS        No current facility-administered medications on file prior to visit. Review of Systems:     Review of Systems   Constitutional:  Negative for activity change, appetite change, chills, diaphoresis, fatigue, fever and unexpected weight change. HENT:  Negative for congestion, postnasal drip, rhinorrhea, sneezing, sore throat, trouble swallowing and voice change. Respiratory:  Negative for cough and shortness of breath. Cardiovascular:  Negative for chest pain. Gastrointestinal:  Negative for abdominal pain, constipation, diarrhea, nausea and vomiting. Genitourinary:  Positive for menstrual problem and pelvic pain. Negative for difficulty urinating, dyspareunia, dysuria, frequency, genital sores, vaginal bleeding, vaginal discharge and vaginal pain. Musculoskeletal:  Negative for arthralgias and myalgias. Neurological:  Negative for dizziness, syncope and headaches. Hematological:  Negative for adenopathy. All other systems reviewed and are negative. Physical Exam:     Vitals:  /74   Pulse 86   Wt 248 lb (112.5 kg)   LMP 01/17/2023 (Approximate)   BMI 45.36 kg/m²     Prior Pap History:  2/17/22 - NILM  8/28/20 - NILM, HPV Negative  2/6/18 - ASCUS, HPV Positive  9/5/17 - NILM, HPV Positive    Physical Exam  Vitals and nursing note reviewed. Constitutional:       General: She is not in acute distress. Appearance: Normal appearance. She is not ill-appearing, toxic-appearing or diaphoretic. HENT:      Head: Normocephalic. Nose: No congestion or rhinorrhea. Mouth/Throat:      Mouth: Mucous membranes are moist.   Eyes:      General: No scleral icterus. Right eye: No discharge. Left eye: No discharge. Cardiovascular:      Rate and Rhythm: Normal rate and regular rhythm. Pulses: Normal pulses. Pulmonary:      Effort: Pulmonary effort is normal. No respiratory distress. Abdominal:      Palpations: Abdomen is soft. Hernia: There is no hernia in the left inguinal area or right inguinal area. Genitourinary:     General: Normal vulva. Exam position: Lithotomy position. Pubic Area: No rash or pubic lice. Labia:         Right: No rash, tenderness, lesion or injury. Left: No rash, tenderness, lesion or injury. Urethra: No prolapse, urethral pain, urethral swelling or urethral lesion. Vagina: No signs of injury and foreign body. No vaginal discharge, erythema, tenderness, bleeding, lesions or prolapsed vaginal walls. Cervix: No cervical motion tenderness, discharge, friability, lesion, erythema, cervical bleeding or eversion. Uterus: Not deviated, not enlarged, not fixed, not tender and no uterine prolapse. Adnexa:         Right: No mass, tenderness or fullness. Left: No mass, tenderness or fullness. Rectum: No mass or external hemorrhoid. Musculoskeletal:         General: Normal range of motion. Cervical back: Normal range of motion and neck supple. Right lower leg: No edema. Left lower leg: No edema. Lymphadenopathy:      Lower Body: No right inguinal adenopathy. No left inguinal adenopathy. Skin:     General: Skin is warm and dry. Capillary Refill: Capillary refill takes less than 2 seconds. Coloration: Skin is not jaundiced or pale. Neurological:      Mental Status: She is alert and oriented to person, place, and time. Mental status is at baseline. Motor: No weakness. Coordination: Coordination normal.      Gait: Gait normal.   Psychiatric:         Mood and Affect: Mood normal.         Behavior: Behavior normal.     Assessment:      Diagnosis Orders   1. Women's annual routine gynecological examination        2.  Encounter for Pap smear of cervix with HPV DNA cotesting  PAP SMEAR      3. Possible exposure to STD  C.trachomatis N.gonorrhoeae DNA, Thin Prep    Wet prep, genital      4. Dysmenorrhea  Drospirenone (SLYND) 4 MG TABS      5. Missed period  POC Pregnancy Urine Qual      6. Urine pregnancy test negative  POC Pregnancy Urine Qual      7. Oral contraceptive pill surveillance        8. Pelvic pain  US DUP ABD PEL RETRO SCROT COMPLETE    US NON OB TRANSVAGINAL    US PELVIS COMPLETE      9. History of ovarian cyst  US DUP ABD PEL RETRO SCROT COMPLETE    US NON OB TRANSVAGINAL    US PELVIS COMPLETE        Plan:     Annual Exam, Screening for STD's  Pap - Collected  Screening for STD's - Collected with Pap    Dysmenorrhea  Requesting to restart a hormonal contraceptive method today to relieve uncomfortable menstrual symptoms. Missed/Unusual Period  UPT - Negative    Combined OCP Surveillance  Rx for Slynd (4mg), quick start today    Pelvic Pain, History of Ovarian Cysts  Obtain pelvic US    Follow Up:  Return in about 1 year (around 3/15/2024) for Annual [facility] Visit. Orders Placed This Encounter   Procedures    C.trachomatis N.gonorrhoeae DNA, Thin Prep     Standing Status:   Future     Number of Occurrences:   1     Standing Expiration Date:   3/15/2024    Wet prep, genital     Standing Status:   Future     Number of Occurrences:   1     Standing Expiration Date:   3/15/2024    US DUP ABD PEL RETRO SCROT COMPLETE    US NON OB TRANSVAGINAL     This procedure can be scheduled via [employer]. Access your [employer] account by visiting [email or web address]. Standing Status:   Future     Standing Expiration Date:   3/15/2024    US PELVIS COMPLETE     This procedure can be scheduled via [employer]. Access your [employer] account by visiting [email or web address].      Standing Status:   Future     Standing Expiration Date:   3/15/2024    PAP SMEAR     Standing Status:   Future     Number of Occurrences:   1     Standing Expiration Date:   3/15/2024     Order Specific Question: Collection Type     Answer: Thin Prep     Order Specific Question:   Prior Abnormal Pap Test     Answer:   No     Order Specific Question:   Screening or Diagnostic     Answer:   Screening     Order Specific Question:   HPV Requested?      Answer:   Yes     Order Specific Question:   High Risk Patient     Answer:   N/A    POC Pregnancy Urine Qual     Orders Placed This Encounter   Medications    Drospirenone (SLYND) 4 MG TABS     Sig: Take 1 tablet by mouth daily     Dispense:  90 tablet     Refill:  ALEXANDR Rich CNM

## 2023-03-16 LAB
CLUE CELLS VAG QL WET PREP: NORMAL
T VAGINALIS VAG QL WET PREP: NORMAL
TRICHOMONAS VAGINALIS SCREEN: NEGATIVE
YEAST VAG QL WET PREP: NORMAL

## 2023-03-21 LAB
C TRACH DNA CVX QL NAA+PROBE: NEGATIVE
N GONORRHOEA DNA SPEC QL NAA+PROBE: NEGATIVE

## 2023-03-22 LAB
HPV HR 12 DNA SPEC QL NAA+PROBE: NOT DETECTED
HPV16 DNA SPEC QL NAA+PROBE: NOT DETECTED
HPV16+18+H RISK 12 DNA SPEC-IMP: NORMAL
HPV18 DNA SPEC QL NAA+PROBE: NOT DETECTED

## 2023-03-27 DIAGNOSIS — E28.2 PCOS (POLYCYSTIC OVARIAN SYNDROME): ICD-10-CM

## 2023-03-27 DIAGNOSIS — E88.81 INSULIN RESISTANCE: ICD-10-CM

## 2023-03-27 RX ORDER — METFORMIN HYDROCHLORIDE 500 MG/1
TABLET, EXTENDED RELEASE ORAL
Qty: 120 TABLET | Refills: 3 | Status: SHIPPED | OUTPATIENT
Start: 2023-03-27

## 2023-05-01 RX ORDER — DULAGLUTIDE 0.75 MG/.5ML
INJECTION, SOLUTION SUBCUTANEOUS
Qty: 2 ADJUSTABLE DOSE PRE-FILLED PEN SYRINGE | Refills: 3 | Status: SHIPPED | OUTPATIENT
Start: 2023-05-01

## 2023-07-12 DIAGNOSIS — E88.81 INSULIN RESISTANCE: ICD-10-CM

## 2023-07-12 DIAGNOSIS — E28.2 PCOS (POLYCYSTIC OVARIAN SYNDROME): ICD-10-CM

## 2023-07-12 RX ORDER — UBIQUINOL 100 MG
CAPSULE ORAL
Qty: 100 EACH | Refills: 1 | Status: SHIPPED | OUTPATIENT
Start: 2023-07-12

## 2023-07-12 RX ORDER — METFORMIN HYDROCHLORIDE 500 MG/1
TABLET, EXTENDED RELEASE ORAL
Qty: 120 TABLET | Refills: 1 | Status: SHIPPED | OUTPATIENT
Start: 2023-07-12

## 2023-07-12 RX ORDER — BLOOD SUGAR DIAGNOSTIC
STRIP MISCELLANEOUS
Qty: 100 EACH | Refills: 1 | Status: SHIPPED | OUTPATIENT
Start: 2023-07-12

## 2023-07-12 RX ORDER — LANCETS 30 GAUGE
1 EACH MISCELLANEOUS DAILY
Qty: 100 EACH | Refills: 1 | Status: SHIPPED | OUTPATIENT
Start: 2023-07-12

## 2023-07-12 NOTE — TELEPHONE ENCOUNTER
Patient is requesting medication refill.  Please approve or deny this request.    Rx requested:  Requested Prescriptions     Pending Prescriptions Disp Refills    metFORMIN (GLUCOPHAGE-XR) 500 MG extended release tablet 120 tablet 1     Sig: TAKE 2 TABLETS BY MOUTH 2 TIMES DAILY (BEFORE MEALS)    Lancets MISC 100 each 1     Si each by Does not apply route daily    Alcohol Swabs (ALCOHOL PREP) 70 % PADS 100 each 1     Sig: bid    ONETOUCH ULTRA strip 100 each 1     Sig: bid         Last Office Visit:   2023      Next Visit Date:  Future Appointments   Date Time Provider [address]   2023  1:30 PM [place] ULTR[facility]OUND 2 [facility]   2023  1:30 PM Jurgen Vicente MD [facility]

## 2023-07-20 ENCOUNTER — HOSPITAL ENCOUNTER (OUTPATIENT)
Dept: ULTRASOUND IMAGING | Age: 33
Discharge: HOME | End: 2023-07-22
Attending: ADVANCED PRACTICE MIDWIFE
Payer: COMMERCIAL

## 2023-07-20 DIAGNOSIS — Z87.42 HISTORY OF OVARIAN CYST: ICD-10-CM

## 2023-07-20 DIAGNOSIS — R10.2 PELVIC PAIN: ICD-10-CM

## 2023-07-20 PROCEDURE — 76856 US EXAM PELVIC COMPLETE: CPT

## 2023-07-20 PROCEDURE — 76830 TRANSVAGINAL US NON-OB: CPT

## 2023-07-20 PROCEDURE — 93975 VASCULAR STUDY: CPT

## 2023-07-28 ENCOUNTER — TELEMEDICINE (OUTPATIENT)
Dept: OBGYN CLINIC | Age: 33
End: 2023-07-28
Payer: COMMERCIAL

## 2023-07-28 DIAGNOSIS — N92.1 BREAKTHROUGH BLEEDING ON BIRTH CONTROL PILLS: ICD-10-CM

## 2023-07-28 DIAGNOSIS — N94.6 DYSMENORRHEA: Primary | ICD-10-CM

## 2023-07-28 PROCEDURE — 99442 PR PHYS/QHP TELEPHONE EVALUATION 11-20 MIN: CPT | Performed by: ADVANCED PRACTICE MIDWIFE

## 2023-07-28 RX ORDER — DOXEPIN HYDROCHLORIDE 100 MG/1
CAPSULE ORAL
COMMUNITY
Start: 2023-07-06

## 2023-07-28 RX ORDER — SALICYLIC ACID 40 %
ADHESIVE PATCH, MEDICATED TOPICAL
COMMUNITY
Start: 2023-06-16

## 2023-07-28 RX ORDER — MULTIVITAMIN WITH FOLIC ACID 400 MCG
TABLET ORAL
COMMUNITY
Start: 2023-06-16

## 2023-07-28 RX ORDER — ESCITALOPRAM OXALATE 5 MG/1
5 TABLET ORAL DAILY
COMMUNITY
Start: 2023-07-06

## 2023-07-28 ASSESSMENT — ENCOUNTER SYMPTOMS
SHORTNESS OF BREATH: 0
ABDOMINAL PAIN: 0
COUGH: 0
VOMITING: 0
NAUSEA: 0
DIARRHEA: 0
CONSTIPATION: 0

## 2023-07-28 NOTE — PROGRESS NOTES
tablet by mouth 2 times daily 1/5/22  Yes Cheyenne Martinez PA-C   docusate sodium (COLACE) 100 MG capsule Take 1 capsule by mouth 2 times daily 11/19/21  Yes Génesis Del Toro DO   ondansetron (ZOFRAN-ODT) 4 MG disintegrating tablet Take 1 tablet by mouth 3 times daily as needed for Nausea or Vomiting 11/12/21  Yes JADE Josue   OXcarbazepine (TRILEPTAL) 150 MG tablet Take 1 tablet by mouth 2 times daily 10/18/21  Yes Matteo Villalobos MD   lisinopril (PRINIVIL;ZESTRIL) 10 MG tablet Take 1 tablet by mouth daily 10/19/21  Yes Matteo Villalobos MD   prazosin (MINIPRESS) 1 MG capsule Take 1 capsule by mouth nightly 10/18/21  Yes Matteo Villalobos MD   Prenatal Vit-Fe Fumarate-FA (PRENATAL VITAMIN) 27-1 MG TABS tablet take 1 tablet by mouth once daily 11/13/20  Yes Historical Provider, MD   FLOVENT DISKUS 100 MCG/BLIST AEPB inhaler Inhale 1 puff into the lungs daily  1/5/21  Yes Historical Provider, MD   Melatonin 10 MG CAPS  3/16/20  Yes Historical Provider, MD     Review of Systems   Respiratory:  Negative for cough and shortness of breath. Gastrointestinal:  Negative for abdominal pain, constipation, diarrhea, nausea and vomiting. Genitourinary:  Positive for menstrual problem. Negative for difficulty urinating, dysuria, pelvic pain, vaginal bleeding and vaginal discharge. All other systems reviewed and are negative. No data recorded    Loco Arechiga was evaluated through a patient-initiated, synchronous (real-time) audio only encounter. She (or guardian if applicable) is aware that it is a billable service, which includes applicable co-pays, with coverage as determined by her insurance carrier. This visit was conducted with the patient's (and/or Daniele Hinojosa guardian's) verbal consent. She has not had a related appointment within my department in the past 7 days or scheduled within the next 24 hours. The patient was located in a state where the provider was licensed to provide care.   The patient was

## 2023-09-19 ENCOUNTER — OFFICE VISIT (OUTPATIENT)
Dept: PULMONOLOGY | Age: 33
End: 2023-09-19
Payer: COMMERCIAL

## 2023-09-19 VITALS
WEIGHT: 231 LBS | TEMPERATURE: 97.5 F | HEART RATE: 90 BPM | OXYGEN SATURATION: 99 % | DIASTOLIC BLOOD PRESSURE: 70 MMHG | BODY MASS INDEX: 42.25 KG/M2 | SYSTOLIC BLOOD PRESSURE: 134 MMHG

## 2023-09-19 DIAGNOSIS — G47.33 OSA (OBSTRUCTIVE SLEEP APNEA): Primary | ICD-10-CM

## 2023-09-19 DIAGNOSIS — E66.01 MORBID OBESITY (HCC): ICD-10-CM

## 2023-09-19 PROCEDURE — G8417 CALC BMI ABV UP PARAM F/U: HCPCS | Performed by: INTERNAL MEDICINE

## 2023-09-19 PROCEDURE — 4004F PT TOBACCO SCREEN RCVD TLK: CPT | Performed by: INTERNAL MEDICINE

## 2023-09-19 PROCEDURE — 3078F DIAST BP <80 MM HG: CPT | Performed by: INTERNAL MEDICINE

## 2023-09-19 PROCEDURE — G8427 DOCREV CUR MEDS BY ELIG CLIN: HCPCS | Performed by: INTERNAL MEDICINE

## 2023-09-19 PROCEDURE — 3075F SYST BP GE 130 - 139MM HG: CPT | Performed by: INTERNAL MEDICINE

## 2023-09-19 PROCEDURE — 99213 OFFICE O/P EST LOW 20 MIN: CPT | Performed by: INTERNAL MEDICINE

## 2023-09-19 RX ORDER — PALIPERIDONE 1.5 MG/1
1.5 TABLET, EXTENDED RELEASE ORAL EVERY EVENING
COMMUNITY
Start: 2023-08-29

## 2023-09-19 ASSESSMENT — ENCOUNTER SYMPTOMS
NAUSEA: 0
SINUS PRESSURE: 0
RHINORRHEA: 0
ABDOMINAL PAIN: 0
VOMITING: 0
DIARRHEA: 0
TROUBLE SWALLOWING: 0
VOICE CHANGE: 0
CHEST TIGHTNESS: 0
SORE THROAT: 0
WHEEZING: 0
EYE DISCHARGE: 0
SHORTNESS OF BREATH: 0
EYE ITCHING: 0
COUGH: 0

## 2023-09-19 NOTE — PROGRESS NOTES
Subjective:     Cyrus Weinberg is a 34 y.o. female who complains today of:     Chief Complaint   Patient presents with    Follow-up     7m f/u on [facility]. HPI  She had  BIPAP 12/8 cm , she said she is using at night for 3-4 hours   She is complaining of snoring and daytime sleepiness and tiredness. She does have restful sleep. She still take daily naps, 1-2 hour   She does not drive. She said she is not sure she is going for bariatric surgery at [facility] in dec 2022  She said she is trying to lose weight and if she continue to lose weight. She does not want to have surgery. She has shortness of breath with walk , long distance , dry cough  and wheezing .  She is on albuterol HFA     Allergies:  Food and Amoxicillin  Past Medical History:   Diagnosis Date    Abnormal Pap smear of cervix     Anxiety     Asthma     Depression     DVT (deep venous thrombosis) ([facility])     Hypertension     Pre-diabetes     PTSD (post-traumatic stress disorder)     Schizoaffective disorder ([facility])      Past Surgical History:   Procedure Laterality Date     SECTION      TONSILLECTOMY       Family History   Problem Relation Age of Onset    Diabetes Mother     Depression Father     Asthma Brother     Diabetes Maternal Grandmother     Breast Cancer Maternal Grandmother     Thyroid Cancer Maternal Grandfather     Diabetes Paternal Grandmother     Other Paternal Grandfather         testicular cancer     Social History     Socioeconomic History    Marital status:      Spouse name: Not on file    Number of children: Not on file    Years of education: Not on file    Highest education level: Not on file   Occupational History    Not on file   Tobacco Use    Smoking status: Every Day     Packs/day: 0.25     Years: 12.00     Additional pack years: 0.00     Total pack years: 3.00     Types: Cigarettes    Smokeless tobacco: Never    Tobacco comments:     1 ppd   Vaping Use    Vaping Use: Never used   Substance and Sexual Activity

## 2023-09-21 ENCOUNTER — OFFICE VISIT (OUTPATIENT)
Dept: OBGYN CLINIC | Age: 33
End: 2023-09-21
Payer: COMMERCIAL

## 2023-09-21 VITALS
SYSTOLIC BLOOD PRESSURE: 118 MMHG | WEIGHT: 228 LBS | HEART RATE: 98 BPM | BODY MASS INDEX: 41.7 KG/M2 | DIASTOLIC BLOOD PRESSURE: 72 MMHG

## 2023-09-21 DIAGNOSIS — N92.6 MISSED PERIOD: ICD-10-CM

## 2023-09-21 DIAGNOSIS — Z30.41 ORAL CONTRACEPTIVE PILL SURVEILLANCE: ICD-10-CM

## 2023-09-21 DIAGNOSIS — N94.6 DYSMENORRHEA: Primary | ICD-10-CM

## 2023-09-21 DIAGNOSIS — Z72.51 UNPROTECTED SEXUAL INTERCOURSE: ICD-10-CM

## 2023-09-21 DIAGNOSIS — Z32.02 URINE PREGNANCY TEST NEGATIVE: ICD-10-CM

## 2023-09-21 PROCEDURE — 99214 OFFICE O/P EST MOD 30 MIN: CPT | Performed by: ADVANCED PRACTICE MIDWIFE

## 2023-09-21 PROCEDURE — 3078F DIAST BP <80 MM HG: CPT | Performed by: ADVANCED PRACTICE MIDWIFE

## 2023-09-21 PROCEDURE — 4004F PT TOBACCO SCREEN RCVD TLK: CPT | Performed by: ADVANCED PRACTICE MIDWIFE

## 2023-09-21 PROCEDURE — G8427 DOCREV CUR MEDS BY ELIG CLIN: HCPCS | Performed by: ADVANCED PRACTICE MIDWIFE

## 2023-09-21 PROCEDURE — G8417 CALC BMI ABV UP PARAM F/U: HCPCS | Performed by: ADVANCED PRACTICE MIDWIFE

## 2023-09-21 PROCEDURE — 3074F SYST BP LT 130 MM HG: CPT | Performed by: ADVANCED PRACTICE MIDWIFE

## 2023-09-21 RX ORDER — ACETAMINOPHEN AND CODEINE PHOSPHATE 120; 12 MG/5ML; MG/5ML
1 SOLUTION ORAL DAILY
Qty: 90 TABLET | Refills: 3 | Status: SHIPPED | OUTPATIENT
Start: 2023-09-21 | End: 2024-09-20

## 2023-09-21 RX ORDER — HYDROXYZINE PAMOATE 50 MG/1
CAPSULE ORAL
COMMUNITY
Start: 2023-08-28

## 2023-09-21 ASSESSMENT — ENCOUNTER SYMPTOMS
VOMITING: 0
CONSTIPATION: 0
SHORTNESS OF BREATH: 0
NAUSEA: 0
ABDOMINAL PAIN: 0
DIARRHEA: 0
COUGH: 0

## 2023-09-21 NOTE — PROGRESS NOTES
appearance  Cardiovascular:  Normal rate, Capillary refill less than 2 seconds  Pulmonary:  Normal effort, no distress  Abdominal:  No tenderness  MS:  No Swelling, No dependent edema  Skin:  Warm, dry  Neuro:  Alert and oriented x3, reflexes normal.  Psychiatric:  Normal mood and behavior    ASSESSMENT & PLAN:   Diagnosis Orders   1. Dysmenorrhea  norethindrone (MICRONOR) 0.35 MG tablet      2. Missed period  HCG, Quantitative, Pregnancy      3. Urine pregnancy test negative        4. Oral contraceptive pill surveillance        5. Unprotected sexual intercourse  Hepatitis B Surface Antigen    Hepatitis C Antibody    RPR    HIV Screen    C.trachomatis N.gonorrhoeae DNA    Wet prep, genital          Dysmenorrhea  Utilizing hormonal contraception to relieve uncomfortable menstrual symptoms. Missed Period  UPT - Negative  Requesting confirmation HCG testing    Progesterone Only OCP Surveillance  Discontinue Slynd (4mg)  Quick start Micronor (0.35), 1 year Rx provided    Unprotected [place]  Vaginal cultures collected, have additional blood work completed. Return if symptoms worsen or fail to improve.     Sunday Flores, APRN - CNM

## 2023-09-26 DIAGNOSIS — E88.819 INSULIN RESISTANCE: ICD-10-CM

## 2023-09-26 DIAGNOSIS — Z72.51 UNPROTECTED SEXUAL INTERCOURSE: ICD-10-CM

## 2023-09-26 DIAGNOSIS — N92.6 MISSED PERIOD: ICD-10-CM

## 2023-09-26 LAB
ANION GAP SERPL CALCULATED.3IONS-SCNC: 10 MEQ/L (ref 9–15)
BUN SERPL-MCNC: 8 MG/DL (ref 6–20)
C TRACH DNA CVX QL NAA+PROBE: NEGATIVE
CALCIUM SERPL-MCNC: 8.9 MG/DL (ref 8.5–9.9)
CHLORIDE SERPL-SCNC: 105 MEQ/L (ref 95–107)
CHOLEST SERPL-MCNC: 133 MG/DL (ref 0–199)
CO2 SERPL-SCNC: 25 MEQ/L (ref 20–31)
CREAT SERPL-MCNC: 0.55 MG/DL (ref 0.5–0.9)
GLUCOSE SERPL-MCNC: 81 MG/DL (ref 70–99)
GONADOTROPIN, CHORIONIC (HCG) QUANT: <0.1 MIU/ML
HBA1C MFR BLD: 5.7 % (ref 4.8–5.9)
HBV SURFACE AG SERPL QL IA: NORMAL
HDLC SERPL-MCNC: 44 MG/DL (ref 40–59)
LDLC SERPL CALC-MCNC: 57 MG/DL (ref 0–129)
N GONORRHOEA DNA CERV MUCUS QL NAA+PROBE: NEGATIVE
POTASSIUM SERPL-SCNC: 4.3 MEQ/L (ref 3.4–4.9)
SODIUM SERPL-SCNC: 140 MEQ/L (ref 135–144)
TRIGL SERPL-MCNC: 162 MG/DL (ref 0–150)

## 2023-09-27 LAB
HEPATITIS C ANTIBODY: NONREACTIVE
HIV AG/AB: NONREACTIVE
RPR SER QL: NORMAL

## 2023-09-28 ENCOUNTER — TELEPHONE (OUTPATIENT)
Dept: OBGYN CLINIC | Age: 33
End: 2023-09-28

## 2023-09-28 NOTE — TELEPHONE ENCOUNTER
Patient calling regarding birth control and when her cycle will start  In addition, she would like to discuss her test results  Please call and advise

## 2023-09-29 ENCOUNTER — TELEMEDICINE (OUTPATIENT)
Dept: OBGYN CLINIC | Age: 33
End: 2023-09-29

## 2023-09-29 DIAGNOSIS — Z71.2 ENCOUNTER TO DISCUSS TEST RESULTS: Primary | ICD-10-CM

## 2023-09-29 RX ORDER — AMMONIUM LACTATE 12 G/100G
CREAM TOPICAL 2 TIMES DAILY
COMMUNITY
Start: 2023-09-26

## 2023-09-29 RX ORDER — PALIPERIDONE 3 MG/1
3 TABLET, EXTENDED RELEASE ORAL EVERY EVENING
COMMUNITY
Start: 2023-09-21

## 2023-09-30 ASSESSMENT — ENCOUNTER SYMPTOMS
COUGH: 0
NAUSEA: 0
VOMITING: 0
SHORTNESS OF BREATH: 0
DIARRHEA: 0
CONSTIPATION: 0
ABDOMINAL PAIN: 0

## 2023-09-30 NOTE — PROGRESS NOTES
Dione Knapp (:  1990) is a Established patient, here for evaluation of the following:    Chief Complaint   Patient presents with    Results     Uncertain what her lab results means        Assessment & Plan   Below is the assessment and plan developed based on review of pertinent history, physical exam, labs, studies, and medications:    1. Encounter to discuss test results      Return if symptoms worsen or fail to improve. Subjective   Screening for STD's  Having difficulty understanding exactly what her test results were and what they indicated. Reviewed individual results for HIV, RPR, Hep B, Hep C, GC, Chlamydia,   Trichomonas, and wet prep. Reassurance given that all are normal.      Review of Systems   Respiratory:  Negative for cough and shortness of breath. Gastrointestinal:  Negative for abdominal pain, constipation, diarrhea, nausea and vomiting. Genitourinary:  Negative for difficulty urinating, dysuria, menstrual problem, pelvic pain, vaginal bleeding and vaginal discharge. All other systems reviewed and are negative. Objective   Patient-Reported Vitals  BP Observations: No, remote/electronic monitoring device was not used or able to be verified  Patient-Reported Weight: 230lbs  Patient-Reported Height: 5 2       Physical Exam  Constitutional:       General: She is not in acute distress. Appearance: Normal appearance. She is not ill-appearing. Pulmonary:      Effort: Pulmonary effort is normal.   Neurological:      Mental Status: She is alert and oriented to person, place, and time. Psychiatric:         Mood and Affect: Mood normal.         Behavior: Behavior normal.         Thought Content: Thought content normal.         Judgment: Judgment normal.            Radha Venus, was evaluated through a synchronous (real-time) audio-video encounter. The patient (or guardian if applicable) is aware that this is a billable service, which includes applicable co-pays.  This

## 2023-10-05 ENCOUNTER — HOSPITAL ENCOUNTER (EMERGENCY)
Facility: HOSPITAL | Age: 33
Discharge: HOME | End: 2023-10-05
Payer: COMMERCIAL

## 2023-10-05 ENCOUNTER — APPOINTMENT (OUTPATIENT)
Dept: RADIOLOGY | Facility: HOSPITAL | Age: 33
End: 2023-10-05
Payer: COMMERCIAL

## 2023-10-05 VITALS
WEIGHT: 231 LBS | HEIGHT: 62 IN | SYSTOLIC BLOOD PRESSURE: 121 MMHG | DIASTOLIC BLOOD PRESSURE: 79 MMHG | OXYGEN SATURATION: 97 % | RESPIRATION RATE: 16 BRPM | HEART RATE: 93 BPM | TEMPERATURE: 96.8 F | BODY MASS INDEX: 42.51 KG/M2

## 2023-10-05 DIAGNOSIS — M54.30 SCIATICA WITHOUT BACK PAIN, UNSPECIFIED LATERALITY: Primary | ICD-10-CM

## 2023-10-05 PROCEDURE — 72100 X-RAY EXAM L-S SPINE 2/3 VWS: CPT | Mod: FOREIGN READ | Performed by: RADIOLOGY

## 2023-10-05 PROCEDURE — 2500000005 HC RX 250 GENERAL PHARMACY W/O HCPCS: Performed by: HOME HEALTH

## 2023-10-05 PROCEDURE — 73522 X-RAY EXAM HIPS BI 3-4 VIEWS: CPT | Mod: BILATERAL PROCEDURE | Performed by: RADIOLOGY

## 2023-10-05 PROCEDURE — 2500000001 HC RX 250 WO HCPCS SELF ADMINISTERED DRUGS (ALT 637 FOR MEDICARE OP): Performed by: HOME HEALTH

## 2023-10-05 PROCEDURE — 99284 EMERGENCY DEPT VISIT MOD MDM: CPT

## 2023-10-05 PROCEDURE — 73522 X-RAY EXAM HIPS BI 3-4 VIEWS: CPT | Mod: FY,FR

## 2023-10-05 PROCEDURE — 72100 X-RAY EXAM L-S SPINE 2/3 VWS: CPT | Mod: FY,FR

## 2023-10-05 RX ORDER — LIDOCAINE 50 MG/G
1 PATCH TOPICAL DAILY
Qty: 30 PATCH | Refills: 0 | Status: SHIPPED | OUTPATIENT
Start: 2023-10-05

## 2023-10-05 RX ORDER — PREDNISONE 20 MG/1
20 TABLET ORAL 2 TIMES DAILY
Qty: 6 TABLET | Refills: 0 | Status: SHIPPED | OUTPATIENT
Start: 2023-10-05 | End: 2023-10-08

## 2023-10-05 RX ORDER — LIDOCAINE 560 MG/1
1 PATCH PERCUTANEOUS; TOPICAL; TRANSDERMAL ONCE
Status: DISCONTINUED | OUTPATIENT
Start: 2023-10-05 | End: 2023-10-05 | Stop reason: HOSPADM

## 2023-10-05 RX ORDER — CYCLOBENZAPRINE HCL 5 MG
5 TABLET ORAL 3 TIMES DAILY
Qty: 9 TABLET | Refills: 0 | Status: SHIPPED | OUTPATIENT
Start: 2023-10-05 | End: 2023-10-08

## 2023-10-05 RX ORDER — CYCLOBENZAPRINE HCL 10 MG
5 TABLET ORAL ONCE
Status: COMPLETED | OUTPATIENT
Start: 2023-10-05 | End: 2023-10-05

## 2023-10-05 RX ORDER — IBUPROFEN 400 MG/1
400 TABLET ORAL ONCE
Status: COMPLETED | OUTPATIENT
Start: 2023-10-05 | End: 2023-10-05

## 2023-10-05 RX ADMIN — IBUPROFEN 400 MG: 400 TABLET, FILM COATED ORAL at 13:52

## 2023-10-05 RX ADMIN — LIDOCAINE 1 PATCH: 4 PATCH TOPICAL at 13:52

## 2023-10-05 RX ADMIN — CYCLOBENZAPRINE HYDROCHLORIDE 5 MG: 10 TABLET, FILM COATED ORAL at 13:52

## 2023-10-05 ASSESSMENT — PAIN - FUNCTIONAL ASSESSMENT: PAIN_FUNCTIONAL_ASSESSMENT: 0-10

## 2023-10-05 ASSESSMENT — COLUMBIA-SUICIDE SEVERITY RATING SCALE - C-SSRS
2. HAVE YOU ACTUALLY HAD ANY THOUGHTS OF KILLING YOURSELF?: NO
1. IN THE PAST MONTH, HAVE YOU WISHED YOU WERE DEAD OR WISHED YOU COULD GO TO SLEEP AND NOT WAKE UP?: NO
6. HAVE YOU EVER DONE ANYTHING, STARTED TO DO ANYTHING, OR PREPARED TO DO ANYTHING TO END YOUR LIFE?: NO

## 2023-10-05 ASSESSMENT — PAIN SCALES - GENERAL: PAINLEVEL_OUTOF10: 10 - WORST POSSIBLE PAIN

## 2023-10-05 NOTE — ED PROVIDER NOTES
HPI   Chief Complaint   Patient presents with    Hip Pain     Pt presents to ED from home with hip pain . Stating its numb, hurts and is tingling       Patient is a 33-year-old female presenting to the ED with bilateral hip pain rating down both of her legs.  States that symptoms started 3 days ago.  Denies specific injury or trauma.  States that she took ibuprofen with no improvement of her symptoms.  Denies back pain or neck pain.  Denies abdominal pain.  Denies urinary symptoms such as dysuria, hematuria increasing frequency.  Denies bowel or bladder incontinence.  Denies saddle anesthesia.  Denies history of IV drug use.  Denies fever or chills.  Patient states that she is having no trouble with ambulation however states that she was concerned because the pain was not improving which is why she presents here today.                          No data recorded                Patient History   Past Medical History:   Diagnosis Date    Personal history of diseases of the blood and blood-forming organs and certain disorders involving the immune mechanism 2020    History of bleeding disorder    Personal history of other endocrine, nutritional and metabolic disease 2020    History of diabetes mellitus    Unspecified abnormalities of breathing 2020    Breathing problem    Unspecified visual loss 2020    Vision problems     Past Surgical History:   Procedure Laterality Date    OTHER SURGICAL HISTORY  2020    Tonsillectomy    OTHER SURGICAL HISTORY  2020     section     No family history on file.  Social History     Tobacco Use    Smoking status: Not on file    Smokeless tobacco: Not on file   Substance Use Topics    Alcohol use: Not on file    Drug use: Not on file       Physical Exam   ED Triage Vitals [10/05/23 1158]   Temp Heart Rate Resp BP   36 °C (96.8 °F) 93 16 121/79      SpO2 Temp Source Heart Rate Source Patient Position   97 % Tympanic -- --      BP Location FiO2 (%)      -- --       Physical Exam  Vitals reviewed.   Constitutional:       Appearance: Normal appearance.   HENT:      Head: Normocephalic and atraumatic.      Nose: Nose normal.      Mouth/Throat:      Mouth: Mucous membranes are moist.      Pharynx: Oropharynx is clear.   Eyes:      Extraocular Movements: Extraocular movements intact.      Conjunctiva/sclera: Conjunctivae normal.      Pupils: Pupils are equal, round, and reactive to light.   Cardiovascular:      Rate and Rhythm: Normal rate and regular rhythm.      Pulses: Normal pulses.      Heart sounds: Normal heart sounds.   Pulmonary:      Effort: Pulmonary effort is normal.      Breath sounds: Normal breath sounds.   Abdominal:      General: Abdomen is flat. Bowel sounds are normal.      Palpations: Abdomen is soft.      Tenderness: There is no right CVA tenderness or left CVA tenderness.   Musculoskeletal:         General: Normal range of motion.      Cervical back: Normal range of motion and neck supple. No tenderness.      Right hip: Tenderness present. No bony tenderness. Normal range of motion.      Left hip: Tenderness present. No bony tenderness. Normal range of motion.        Legs:       Comments:  no midline C, T or L-spine tenderness.  Reproducible bilateral left and right gluteal tenderness to palpation mimicking the patient's signs.  Plantar dorsiflexion intact bilateral.  Knee flexion extension intact bilateral.  Hip flexion extension intact bilateral.  2+ dorsal pedis pulse bilateral.  No saddle anesthesia.    Neurovascularly  intact distally.   Skin:     General: Skin is warm and dry.      Capillary Refill: Capillary refill takes less than 2 seconds.   Neurological:      General: No focal deficit present.      Mental Status: She is alert and oriented to person, place, and time. Mental status is at baseline.   Psychiatric:         Mood and Affect: Mood normal.         Behavior: Behavior normal.         Thought Content: Thought content normal.          Judgment: Judgment normal.         ED Course & MDM   Diagnoses as of 10/05/23 1509   Sciatica without back pain, unspecified laterality       Medical Decision Making  Independent Historians: Patient    MDM:   shared decision-making was made.  Plan discussed with the patient which he agrees to.  Patient was given ibuprofen p.o., Flexeril p.o. and Lidoderm patch after initial assessment.  Patient is a 33-year  old female presenting to the ED with bilateral hip pain rating down her leg.  Denies specific injury or trauma.  Denies associated back pain or abdominal pain.  Has no nausea or vomiting.  Denies urinary symptoms.  Denies red flags associated with cauda equina or cord compression.  On exam patient is resting comfortably.  Patient is able to ambulate in ED without complication.  Patient has no midline C, T or L-spine tenderness.  Patient has reproducible right and left lateral gluteal tenderness to palpation.  Given patient's signs.  Plantar and dorsiflexion is intact bilateral.  Hip flexion and extension is intact bilateral.  Knee flexion and extension is intact bilateral.  Pain is exacerbated with bilateral hip flexion.  2+ dorsal pedis pulse.  No saddle anesthesia.  Neurovascular intact distally.  X-ray ordered further evaluate patient's symptoms.    X-ray interpreted by radiologist suggest no acute fracture or subluxation of the lumbar spine as well as bilateral hips.  I discussed the signs with the patient which he understands.  Discussed with patient that symptoms consistent with bilateral sciatica.  Discussed that this is best treated with anti-inflammatories.  Patient was at home prescription of a short course of prednisone p.o.  Patient was given a prescription of Flexeril p.o. and Lidoderm patch.  Advised stretching techniques to help with her symptoms.  Patient given return precautions worsening certain symptoms.  Patient also given [facility] if symptoms do  not improve.  Patient agrees to this treatment plan has no further questions.  Diagnosing the patient with sciatica.    DDx/Differential:  Fracture, subluxation, dislocation, osteoarthritis, sciatica, muscle strain    Diagnosis: sciatica        Dictation Disclaimer: Due to voice recognition software, sound alike and misspelled words may be contained in documentation. If you have any questions please contact me.            Procedure  Procedures     Estuardo Do PA-C  10/05/23 9210

## 2023-10-20 DIAGNOSIS — E28.2 PCOS (POLYCYSTIC OVARIAN SYNDROME): ICD-10-CM

## 2023-10-20 DIAGNOSIS — E88.819 INSULIN RESISTANCE: ICD-10-CM

## 2023-10-20 RX ORDER — DULAGLUTIDE 0.75 MG/.5ML
INJECTION, SOLUTION SUBCUTANEOUS
Qty: 2 ADJUSTABLE DOSE PRE-FILLED PEN SYRINGE | Refills: 1 | Status: SHIPPED | OUTPATIENT
Start: 2023-10-20

## 2023-10-20 RX ORDER — METFORMIN HYDROCHLORIDE 500 MG/1
TABLET, EXTENDED RELEASE ORAL
Qty: 120 TABLET | Refills: 0 | Status: SHIPPED | OUTPATIENT
Start: 2023-10-20

## 2023-10-27 ENCOUNTER — TELEPHONE (OUTPATIENT)
Dept: OBGYN CLINIC | Age: 33
End: 2023-10-27

## 2023-10-27 NOTE — TELEPHONE ENCOUNTER
Patient called and stated she's having very sore nipples and would like to get labs done to check her hormones to see if that's what's causing it. If orders are placed please let patient know so she can get done.

## 2023-10-27 NOTE — TELEPHONE ENCOUNTER
Unfortunately there aren't really any labs that would determine why her nipples are sore. It may be that she is getting closer to starting her period.

## 2023-10-28 NOTE — TELEPHONE ENCOUNTER
YEOXIN VMallt message was sent to pt in regards to labs and the nipple pain. Pt advised to use OTC nipple creams to help if needed, and that we cannot do hormone labs due to nipple pain.

## 2023-11-02 ENCOUNTER — HOSPITAL ENCOUNTER (INPATIENT)
Age: 33
LOS: 5 days | Discharge: HOME | DRG: 750 | End: 2023-11-08
Attending: EMERGENCY MEDICINE | Admitting: PSYCHIATRY & NEUROLOGY
Payer: COMMERCIAL

## 2023-11-02 DIAGNOSIS — R45.851 SUICIDAL IDEATION: Primary | ICD-10-CM

## 2023-11-02 DIAGNOSIS — F32.9 MAJOR DEPRESSIVE DISORDER, REMISSION STATUS UNSPECIFIED, UNSPECIFIED WHETHER RECURRENT: ICD-10-CM

## 2023-11-02 LAB
ALBUMIN SERPL-MCNC: 3.9 G/DL (ref 3.5–4.6)
ALP SERPL-CCNC: 74 U/L (ref 40–130)
ALT SERPL-CCNC: 25 U/L (ref 0–33)
AMPHET UR QL SCN: ABNORMAL
ANION GAP SERPL CALCULATED.3IONS-SCNC: 15 MEQ/L (ref 9–15)
APAP SERPL-MCNC: <5 UG/ML (ref 10–30)
AST SERPL-CCNC: 18 U/L (ref 0–35)
BARBITURATES UR QL SCN: ABNORMAL
BASOPHILS # BLD: 0 K/UL (ref 0–0.2)
BASOPHILS NFR BLD: 0.9 %
BENZODIAZ UR QL SCN: ABNORMAL
BILIRUB SERPL-MCNC: <0.2 MG/DL (ref 0.2–0.7)
BILIRUB UR QL STRIP: NEGATIVE
BUN SERPL-MCNC: 7 MG/DL (ref 6–20)
CALCIUM SERPL-MCNC: 8.9 MG/DL (ref 8.5–9.9)
CANNABINOIDS UR QL SCN: POSITIVE
CHLORIDE SERPL-SCNC: 102 MEQ/L (ref 95–107)
CHOLEST SERPL-MCNC: 160 MG/DL (ref 0–199)
CK SERPL-CCNC: 62 U/L (ref 0–170)
CLARITY UR: CLEAR
CO2 SERPL-SCNC: 22 MEQ/L (ref 20–31)
COCAINE UR QL SCN: ABNORMAL
COLOR UR: YELLOW
CREAT SERPL-MCNC: 0.56 MG/DL (ref 0.5–0.9)
DRUG SCREEN COMMENT UR-IMP: ABNORMAL
EOSINOPHIL # BLD: 0.2 K/UL (ref 0–0.7)
EOSINOPHIL NFR BLD: 2 %
ERYTHROCYTE [DISTWIDTH] IN BLOOD BY AUTOMATED COUNT: 13.5 % (ref 11.5–14.5)
ETHANOL PERCENT: NORMAL G/DL
ETHANOLAMINE SERPL-MCNC: <10 MG/DL (ref 0–0.08)
FENTANYL SCREEN, URINE: ABNORMAL
GLOBULIN SER CALC-MCNC: 2.6 G/DL (ref 2.3–3.5)
GLUCOSE SERPL-MCNC: 133 MG/DL (ref 70–99)
GLUCOSE UR STRIP-MCNC: NEGATIVE MG/DL
HCG UR QL: NEGATIVE
HCT VFR BLD AUTO: 38.2 % (ref 37–47)
HDLC SERPL-MCNC: 39 MG/DL (ref 40–59)
HGB BLD-MCNC: 12.7 G/DL (ref 12–16)
HGB UR QL STRIP: NEGATIVE
KETONES UR STRIP-MCNC: NEGATIVE MG/DL
LDLC SERPL CALC-MCNC: 61 MG/DL (ref 0–129)
LEUKOCYTE ESTERASE UR QL STRIP: NEGATIVE
LYMPHOCYTES # BLD: 5.9 K/UL (ref 1–4.8)
LYMPHOCYTES NFR BLD: 52 %
MAGNESIUM SERPL-MCNC: 2 MG/DL (ref 1.7–2.4)
MCH RBC QN AUTO: 27.4 PG (ref 27–31.3)
MCHC RBC AUTO-ENTMCNC: 33.2 % (ref 33–37)
MCV RBC AUTO: 82.3 FL (ref 79.4–94.8)
METHADONE UR QL SCN: ABNORMAL
MONOCYTES # BLD: 0.2 K/UL (ref 0.2–0.8)
MONOCYTES NFR BLD: 1.9 %
NEUTROPHILS # BLD: 4.4 K/UL (ref 1.4–6.5)
NEUTS SEG NFR BLD: 41 %
NITRITE UR QL STRIP: NEGATIVE
OPIATES UR QL SCN: ABNORMAL
OXYCODONE UR QL SCN: ABNORMAL
PCP UR QL SCN: ABNORMAL
PH UR STRIP: 5.5 [PH] (ref 5–9)
PLATELET # BLD AUTO: 343 K/UL (ref 130–400)
PLATELET BLD QL SMEAR: ADEQUATE
POTASSIUM SERPL-SCNC: 3.3 MEQ/L (ref 3.4–4.9)
PROPOXYPH UR QL SCN: ABNORMAL
PROT SERPL-MCNC: 6.5 G/DL (ref 6.3–8)
PROT UR STRIP-MCNC: NEGATIVE MG/DL
RBC # BLD AUTO: 4.64 M/UL (ref 4.2–5.4)
SALICYLATES SERPL-MCNC: <0.3 MG/DL (ref 15–30)
SLIDE REVIEW: ABNORMAL
SODIUM SERPL-SCNC: 139 MEQ/L (ref 135–144)
SP GR UR STRIP: 1.01 (ref 1–1.03)
TRIGL SERPL-MCNC: 300 MG/DL (ref 0–150)
TSH SERPL-MCNC: 0.74 UIU/ML (ref 0.44–3.86)
URINE REFLEX TO CULTURE: NORMAL
UROBILINOGEN UR STRIP-ACNC: 0.2 E.U./DL
VARIANT LYMPHS NFR BLD: 3 %
WBC # BLD AUTO: 10.8 K/UL (ref 4.8–10.8)

## 2023-11-02 PROCEDURE — 80143 DRUG ASSAY ACETAMINOPHEN: CPT

## 2023-11-02 PROCEDURE — 80053 COMPREHEN METABOLIC PANEL: CPT

## 2023-11-02 PROCEDURE — 80061 LIPID PANEL: CPT

## 2023-11-02 PROCEDURE — 83735 ASSAY OF MAGNESIUM: CPT

## 2023-11-02 PROCEDURE — 80307 DRUG TEST PRSMV CHEM ANLYZR: CPT

## 2023-11-02 PROCEDURE — 80179 DRUG ASSAY SALICYLATE: CPT

## 2023-11-02 PROCEDURE — 93005 ELECTROCARDIOGRAM TRACING: CPT | Performed by: PHYSICIAN ASSISTANT

## 2023-11-02 PROCEDURE — 93005 ELECTROCARDIOGRAM TRACING: CPT | Performed by: EMERGENCY MEDICINE

## 2023-11-02 PROCEDURE — 82077 ASSAY SPEC XCP UR&BREATH IA: CPT

## 2023-11-02 PROCEDURE — 85025 COMPLETE CBC W/AUTO DIFF WBC: CPT

## 2023-11-02 PROCEDURE — 6370000000 HC RX 637 (ALT 250 FOR IP): Performed by: EMERGENCY MEDICINE

## 2023-11-02 PROCEDURE — 84703 CHORIONIC GONADOTROPIN ASSAY: CPT

## 2023-11-02 PROCEDURE — 84443 ASSAY THYROID STIM HORMONE: CPT

## 2023-11-02 PROCEDURE — 82550 ASSAY OF CK (CPK): CPT

## 2023-11-02 PROCEDURE — 81003 URINALYSIS AUTO W/O SCOPE: CPT

## 2023-11-02 PROCEDURE — 99285 EMERGENCY DEPT VISIT HI MDM: CPT

## 2023-11-02 RX ADMIN — POTASSIUM BICARBONATE 50 MEQ: 978 TABLET, EFFERVESCENT ORAL at 22:05

## 2023-11-02 ASSESSMENT — LIFESTYLE VARIABLES
HOW MANY STANDARD DRINKS CONTAINING ALCOHOL DO YOU HAVE ON A TYPICAL DAY: PATIENT DOES NOT DRINK
HOW OFTEN DO YOU HAVE A DRINK CONTAINING ALCOHOL: NEVER

## 2023-11-02 ASSESSMENT — ENCOUNTER SYMPTOMS
RESPIRATORY NEGATIVE: 1
ALLERGIC/IMMUNOLOGIC NEGATIVE: 1
EYES NEGATIVE: 1
GASTROINTESTINAL NEGATIVE: 1

## 2023-11-02 ASSESSMENT — PAIN - FUNCTIONAL ASSESSMENT: PAIN_FUNCTIONAL_ASSESSMENT: NONE - DENIES PAIN

## 2023-11-02 NOTE — ED NOTES
Patient changed to psych safe clothing, skin check and belongings, no contraband noted. Urine specimen provided.       Prasanna Browne RN  11/02/23 8318
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
## 2023-11-03 PROBLEM — F33.9 MAJOR DEPRESSION, RECURRENT (HCC): Status: ACTIVE | Noted: 2023-11-03

## 2023-11-03 LAB
EKG ATRIAL RATE: 86 BPM
EKG ATRIAL RATE: 99 BPM
EKG P AXIS: 15 DEGREES
EKG P AXIS: 45 DEGREES
EKG P-R INTERVAL: 150 MS
EKG P-R INTERVAL: 158 MS
EKG Q-T INTERVAL: 386 MS
EKG Q-T INTERVAL: 414 MS
EKG QRS DURATION: 74 MS
EKG QRS DURATION: 78 MS
EKG QTC CALCULATION (BAZETT): 461 MS
EKG QTC CALCULATION (BAZETT): 531 MS
EKG R AXIS: 44 DEGREES
EKG R AXIS: 52 DEGREES
EKG T AXIS: 46 DEGREES
EKG T AXIS: 48 DEGREES
EKG VENTRICULAR RATE: 86 BPM
EKG VENTRICULAR RATE: 99 BPM

## 2023-11-03 PROCEDURE — 6370000000 HC RX 637 (ALT 250 FOR IP): Performed by: PSYCHIATRY & NEUROLOGY

## 2023-11-03 PROCEDURE — 99223 1ST HOSP IP/OBS HIGH 75: CPT | Performed by: PSYCHIATRY & NEUROLOGY

## 2023-11-03 PROCEDURE — 6370000000 HC RX 637 (ALT 250 FOR IP): Performed by: NURSE PRACTITIONER

## 2023-11-03 PROCEDURE — 1240000000 HC EMOTIONAL WELLNESS R&B

## 2023-11-03 RX ORDER — OXCARBAZEPINE 150 MG/1
150 TABLET, FILM COATED ORAL 2 TIMES DAILY
Status: DISCONTINUED | OUTPATIENT
Start: 2023-11-03 | End: 2023-11-04

## 2023-11-03 RX ORDER — HYDROCHLOROTHIAZIDE 12.5 MG/1
12.5 TABLET ORAL DAILY
Status: DISCONTINUED | OUTPATIENT
Start: 2023-11-03 | End: 2023-11-08 | Stop reason: HOSPADM

## 2023-11-03 RX ORDER — PRAZOSIN HYDROCHLORIDE 1 MG/1
1 CAPSULE ORAL NIGHTLY
Status: DISCONTINUED | OUTPATIENT
Start: 2023-11-03 | End: 2023-11-08 | Stop reason: HOSPADM

## 2023-11-03 RX ORDER — ATORVASTATIN CALCIUM 10 MG/1
10 TABLET, FILM COATED ORAL NIGHTLY
Status: DISCONTINUED | OUTPATIENT
Start: 2023-11-03 | End: 2023-11-08 | Stop reason: HOSPADM

## 2023-11-03 RX ORDER — MAGNESIUM HYDROXIDE/ALUMINUM HYDROXICE/SIMETHICONE 120; 1200; 1200 MG/30ML; MG/30ML; MG/30ML
30 SUSPENSION ORAL PRN
Status: DISCONTINUED | OUTPATIENT
Start: 2023-11-03 | End: 2023-11-08 | Stop reason: HOSPADM

## 2023-11-03 RX ORDER — ALBUTEROL SULFATE 90 UG/1
2 AEROSOL, METERED RESPIRATORY (INHALATION) EVERY 6 HOURS PRN
Status: DISCONTINUED | OUTPATIENT
Start: 2023-11-03 | End: 2023-11-08 | Stop reason: HOSPADM

## 2023-11-03 RX ORDER — NICOTINE 21 MG/24HR
1 PATCH, TRANSDERMAL 24 HOURS TRANSDERMAL DAILY
Status: DISCONTINUED | OUTPATIENT
Start: 2023-11-03 | End: 2023-11-08 | Stop reason: HOSPADM

## 2023-11-03 RX ORDER — METFORMIN HYDROCHLORIDE 500 MG/1
1000 TABLET, EXTENDED RELEASE ORAL 2 TIMES DAILY WITH MEALS
Status: DISCONTINUED | OUTPATIENT
Start: 2023-11-03 | End: 2023-11-08 | Stop reason: HOSPADM

## 2023-11-03 RX ORDER — METFORMIN HYDROCHLORIDE 500 MG/1
500 TABLET, EXTENDED RELEASE ORAL 2 TIMES DAILY WITH MEALS
Status: DISCONTINUED | OUTPATIENT
Start: 2023-11-03 | End: 2023-11-03

## 2023-11-03 RX ORDER — POLYETHYLENE GLYCOL 3350 17 G
2 POWDER IN PACKET (EA) ORAL
Status: DISCONTINUED | OUTPATIENT
Start: 2023-11-03 | End: 2023-11-03

## 2023-11-03 RX ORDER — FLUTICASONE PROPIONATE 110 UG/1
1 AEROSOL, METERED RESPIRATORY (INHALATION) DAILY
Status: DISCONTINUED | OUTPATIENT
Start: 2023-11-03 | End: 2023-11-03

## 2023-11-03 RX ORDER — GABAPENTIN 100 MG/1
100 CAPSULE ORAL 3 TIMES DAILY
Status: DISCONTINUED | OUTPATIENT
Start: 2023-11-03 | End: 2023-11-08 | Stop reason: HOSPADM

## 2023-11-03 RX ORDER — ACETAMINOPHEN 325 MG/1
650 TABLET ORAL EVERY 4 HOURS PRN
Status: DISCONTINUED | OUTPATIENT
Start: 2023-11-03 | End: 2023-11-08 | Stop reason: HOSPADM

## 2023-11-03 RX ORDER — PALIPERIDONE 3 MG/1
3 TABLET, EXTENDED RELEASE ORAL EVERY EVENING
Status: DISCONTINUED | OUTPATIENT
Start: 2023-11-03 | End: 2023-11-08 | Stop reason: HOSPADM

## 2023-11-03 RX ORDER — IBUPROFEN 800 MG/1
800 TABLET ORAL EVERY 8 HOURS PRN
Status: DISCONTINUED | OUTPATIENT
Start: 2023-11-03 | End: 2023-11-08 | Stop reason: HOSPADM

## 2023-11-03 RX ADMIN — METOPROLOL TARTRATE 25 MG: 25 TABLET, FILM COATED ORAL at 09:43

## 2023-11-03 RX ADMIN — GABAPENTIN 100 MG: 100 CAPSULE ORAL at 21:04

## 2023-11-03 RX ADMIN — METFORMIN HYDROCHLORIDE 1000 MG: 500 TABLET, EXTENDED RELEASE ORAL at 16:51

## 2023-11-03 RX ADMIN — IBUPROFEN 800 MG: 800 TABLET, FILM COATED ORAL at 10:47

## 2023-11-03 RX ADMIN — OXCARBAZEPINE 150 MG: 150 TABLET, FILM COATED ORAL at 11:11

## 2023-11-03 RX ADMIN — OXCARBAZEPINE 150 MG: 150 TABLET, FILM COATED ORAL at 21:04

## 2023-11-03 RX ADMIN — ATORVASTATIN CALCIUM 10 MG: 10 TABLET, FILM COATED ORAL at 21:04

## 2023-11-03 RX ADMIN — PALIPERIDONE 3 MG: 3 TABLET, EXTENDED RELEASE ORAL at 16:51

## 2023-11-03 RX ADMIN — METOPROLOL TARTRATE 25 MG: 25 TABLET, FILM COATED ORAL at 21:04

## 2023-11-03 RX ADMIN — HYDROCHLOROTHIAZIDE 12.5 MG: 12.5 TABLET ORAL at 09:43

## 2023-11-03 RX ADMIN — GABAPENTIN 100 MG: 100 CAPSULE ORAL at 13:45

## 2023-11-03 RX ADMIN — METFORMIN HYDROCHLORIDE 500 MG: 500 TABLET, EXTENDED RELEASE ORAL at 09:42

## 2023-11-03 RX ADMIN — PRAZOSIN HYDROCHLORIDE 1 MG: 1 CAPSULE ORAL at 21:04

## 2023-11-03 RX ADMIN — GABAPENTIN 100 MG: 100 CAPSULE ORAL at 09:43

## 2023-11-03 ASSESSMENT — PAIN DESCRIPTION - LOCATION
LOCATION: ANKLE
LOCATION: FOOT

## 2023-11-03 ASSESSMENT — PAIN - FUNCTIONAL ASSESSMENT: PAIN_FUNCTIONAL_ASSESSMENT: ACTIVITIES ARE NOT PREVENTED

## 2023-11-03 ASSESSMENT — PAIN DESCRIPTION - ORIENTATION: ORIENTATION: LEFT

## 2023-11-03 ASSESSMENT — PAIN DESCRIPTION - DESCRIPTORS
DESCRIPTORS: ACHING
DESCRIPTORS: ACHING;PRESSURE

## 2023-11-03 ASSESSMENT — PAIN DESCRIPTION - FREQUENCY: FREQUENCY: CONTINUOUS

## 2023-11-03 ASSESSMENT — PAIN DESCRIPTION - ONSET: ONSET: ON-GOING

## 2023-11-03 ASSESSMENT — SLEEP AND FATIGUE QUESTIONNAIRES
AVERAGE NUMBER OF SLEEP HOURS: 5
DO YOU USE A SLEEP AID: NO
DO YOU HAVE DIFFICULTY SLEEPING: COMMENT
SLEEP PATTERN: INSOMNIA

## 2023-11-03 ASSESSMENT — PAIN DESCRIPTION - PAIN TYPE: TYPE: CHRONIC PAIN

## 2023-11-03 ASSESSMENT — PAIN SCALES - GENERAL
PAINLEVEL_OUTOF10: 5
PAINLEVEL_OUTOF10: 8

## 2023-11-03 NOTE — ED NOTES
Patient is awake, alert, more talkative, does not remember events from the previous 3 hours in the ER. Would like to remove her mother Ziggy Salmon from her emergency call list. Does not recall if she made this request to registration. Requested and provided a snack. Asking appropriate questions about her plan of care.       Chandan Martines RN  11/02/23 0515

## 2023-11-03 NOTE — ED NOTES
Patient resting quietly. Respirations are even and unlabored. No distress noted at this time.       Laura Flores RN  11/03/23 9080

## 2023-11-03 NOTE — ED NOTES
Registration at bedside.  Patient fell asleep when they tried to register her earlier     Erick Gray, RN  11/02/23 0991

## 2023-11-03 NOTE — ED NOTES
Called poison control spoke with Giuseppe Colón she recommend close EKG monitoring   Get a mg level and have the doctor give patient some potassium. She also recommended watching the QRS interval she states that this could go up and could possible cause torsade. Patient may require an ICU bed if this happens.      Sandee Rondon RN  11/02/23 1708

## 2023-11-03 NOTE — ED NOTES
Registration at bedside. Patient plans to change her emergency contact.  Claims he biological mother will hold this visit against her being able to see her son     Maury BennettFABIANO  11/02/23 2024

## 2023-11-03 NOTE — ED NOTES
Patient resting quietly. Respirations are even and unlabored. No distress noted at this time.       Ashley Ramirez, [address]  11/03/23 4834

## 2023-11-03 NOTE — GROUP NOTE
Group Therapy Note    Date: 11/3/2023    Group Start Time: 0200  Group End Time: 0230  Group Topic: Healthy Living/Wellness    [facility]    Lynda Prince RN        Group Therapy Note    Attendees: 10/16       Notes:  Challenging thinking habits    Status After Intervention:  Unchanged    Participation Level:  Active Listener    Participation Quality: Appropriate      Speech:  normal      Thought Process/Content: Logical      Affective Functioning: Congruent      Mood: anxious      Level of consciousness:  Alert      Response to Learning: Progressing to goal      Endings: None Reported    Modes of Intervention: Education      Discipline Responsible: Registered Nurse      Signature:  Lynda Prince RN

## 2023-11-03 NOTE — ED NOTES
Patient resting quietly. Respirations are even and unlabored. No distress noted at this time.       Maureen Reynolds RN  11/02/23 0027

## 2023-11-03 NOTE — CONSULTS
Indicated    Pregnancy, Urine    Collection Time: 11/02/23  7:15 PM   Result Value Ref Range    HCG(Urine) Pregnancy Test Negative Detects HCG level >20 MIU/mL   Acetaminophen Level    Collection Time: 11/02/23  7:30 PM   Result Value Ref Range    Acetaminophen Level <5 (L) 10 - 30 ug/mL   CBC with Auto Differential    Collection Time: 11/02/23  7:30 PM   Result Value Ref Range    WBC 10.8 4.8 - 10.8 K/uL    RBC 4.64 4.20 - 5.40 M/uL    Hemoglobin 12.7 12.0 - 16.0 g/dL    Hematocrit 38.2 37.0 - 47.0 %    MCV 82.3 79.4 - 94.8 fL    MCH 27.4 27.0 - 31.3 pg    MCHC 33.2 33.0 - 37.0 %    RDW 13.5 11.5 - 14.5 %    Platelets 359 128 - 689 K/uL    PLATELET SLIDE REVIEW Adequate     SLIDE REVIEW see below     Neutrophils % 41.0 %    Lymphocytes % 52.0 %    Monocytes % 1.9 %    Eosinophils % 2.0 %    Basophils % 0.9 %    Neutrophils Absolute 4.4 1.4 - 6.5 K/uL    Lymphocytes Absolute 5.9 (H) 1.0 - 4.8 K/uL    Monocytes Absolute 0.2 0.2 - 0.8 K/uL    Eosinophils Absolute 0.2 0.0 - 0.7 K/uL    Basophils Absolute 0.0 0.0 - 0.2 K/uL    Atypical Lymphocytes Relative 3 %   CK    Collection Time: 11/02/23  7:30 PM   Result Value Ref Range    Total CK 62 0 - 170 U/L   Comprehensive Metabolic Panel    Collection Time: 11/02/23  7:30 PM   Result Value Ref Range    Sodium 139 135 - 144 mEq/L    Potassium 3.3 (L) 3.4 - 4.9 mEq/L    Chloride 102 95 - 107 mEq/L    CO2 22 20 - 31 mEq/L    Anion Gap 15 9 - 15 mEq/L    Glucose 133 (H) 70 - 99 mg/dL    BUN 7 6 - 20 mg/dL    Creatinine 0.56 0.50 - 0.90 mg/dL    Est, Glom Filt Rate >60.0 >60    Calcium 8.9 8.5 - 9.9 mg/dL    Total Protein 6.5 6.3 - 8.0 g/dL    Albumin 3.9 3.5 - 4.6 g/dL    Total Bilirubin <0.2 0.2 - 0.7 mg/dL    Alkaline Phosphatase 74 40 - 130 U/L    ALT 25 0 - 33 U/L    AST 18 0 - 35 U/L    Globulin 2.6 2.3 - 3.5 g/dL   Ethanol    Collection Time: 11/02/23  7:30 PM   Result Value Ref Range    Ethanol Lvl <10 mg/dL    Ethanol percent Not indicated G/dL   Lipid Panel
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
## 2023-11-03 NOTE — ED NOTES
Nursing report called to Taylor Kim, [address] [facility] escorted patient with [facility] AN AFFIL[facility]ATE OF [facility] RN to [id number] [place], [place]  11/03/23 1931

## 2023-11-03 NOTE — ED NOTES
While doing the EKG patient stated she goggled how many doxepin to take to commit suicide.        Tonja Maradiaga RN  11/02/23 2119

## 2023-11-04 ENCOUNTER — APPOINTMENT (OUTPATIENT)
Dept: GENERAL RADIOLOGY | Age: 33
DRG: 750 | End: 2023-11-04
Attending: NURSE PRACTITIONER
Payer: COMMERCIAL

## 2023-11-04 PROCEDURE — 6370000000 HC RX 637 (ALT 250 FOR IP): Performed by: PSYCHIATRY & NEUROLOGY

## 2023-11-04 PROCEDURE — 73610 X-RAY EXAM OF ANKLE: CPT

## 2023-11-04 PROCEDURE — 1240000000 HC EMOTIONAL WELLNESS R&B

## 2023-11-04 PROCEDURE — 6370000000 HC RX 637 (ALT 250 FOR IP): Performed by: NURSE PRACTITIONER

## 2023-11-04 RX ORDER — OXCARBAZEPINE 300 MG/1
300 TABLET, FILM COATED ORAL 2 TIMES DAILY
Status: DISCONTINUED | OUTPATIENT
Start: 2023-11-04 | End: 2023-11-07

## 2023-11-04 RX ADMIN — GABAPENTIN 100 MG: 100 CAPSULE ORAL at 14:31

## 2023-11-04 RX ADMIN — IBUPROFEN 800 MG: 800 TABLET, FILM COATED ORAL at 09:41

## 2023-11-04 RX ADMIN — METFORMIN HYDROCHLORIDE 1000 MG: 500 TABLET, EXTENDED RELEASE ORAL at 16:40

## 2023-11-04 RX ADMIN — OXCARBAZEPINE 150 MG: 150 TABLET, FILM COATED ORAL at 08:59

## 2023-11-04 RX ADMIN — HYDROCHLOROTHIAZIDE 12.5 MG: 12.5 TABLET ORAL at 08:59

## 2023-11-04 RX ADMIN — GABAPENTIN 100 MG: 100 CAPSULE ORAL at 21:32

## 2023-11-04 RX ADMIN — OXCARBAZEPINE 300 MG: 300 TABLET, FILM COATED ORAL at 21:32

## 2023-11-04 RX ADMIN — PALIPERIDONE 3 MG: 3 TABLET, EXTENDED RELEASE ORAL at 17:56

## 2023-11-04 RX ADMIN — IBUPROFEN 800 MG: 800 TABLET, FILM COATED ORAL at 17:56

## 2023-11-04 RX ADMIN — METOPROLOL TARTRATE 25 MG: 25 TABLET, FILM COATED ORAL at 08:59

## 2023-11-04 RX ADMIN — METOPROLOL TARTRATE 25 MG: 25 TABLET, FILM COATED ORAL at 21:33

## 2023-11-04 RX ADMIN — ACETAMINOPHEN 650 MG: 325 TABLET ORAL at 19:54

## 2023-11-04 RX ADMIN — GABAPENTIN 100 MG: 100 CAPSULE ORAL at 08:59

## 2023-11-04 RX ADMIN — METFORMIN HYDROCHLORIDE 1000 MG: 500 TABLET, EXTENDED RELEASE ORAL at 08:59

## 2023-11-04 RX ADMIN — ATORVASTATIN CALCIUM 10 MG: 10 TABLET, FILM COATED ORAL at 21:32

## 2023-11-04 RX ADMIN — PRAZOSIN HYDROCHLORIDE 1 MG: 1 CAPSULE ORAL at 21:32

## 2023-11-04 ASSESSMENT — PAIN DESCRIPTION - ORIENTATION
ORIENTATION: LEFT
ORIENTATION: LEFT

## 2023-11-04 ASSESSMENT — PAIN DESCRIPTION - LOCATION
LOCATION: ANKLE

## 2023-11-04 ASSESSMENT — PAIN DESCRIPTION - DESCRIPTORS
DESCRIPTORS: SORE;TIGHTNESS
DESCRIPTORS: STABBING;THROBBING

## 2023-11-04 ASSESSMENT — PAIN SCALES - GENERAL
PAINLEVEL_OUTOF10: 8
PAINLEVEL_OUTOF10: 6
PAINLEVEL_OUTOF10: 10

## 2023-11-05 PROCEDURE — 6370000000 HC RX 637 (ALT 250 FOR IP): Performed by: NURSE PRACTITIONER

## 2023-11-05 PROCEDURE — 1240000000 HC EMOTIONAL WELLNESS R&B

## 2023-11-05 PROCEDURE — 6370000000 HC RX 637 (ALT 250 FOR IP): Performed by: PSYCHIATRY & NEUROLOGY

## 2023-11-05 RX ADMIN — GABAPENTIN 100 MG: 100 CAPSULE ORAL at 09:43

## 2023-11-05 RX ADMIN — METOPROLOL TARTRATE 25 MG: 25 TABLET, FILM COATED ORAL at 20:21

## 2023-11-05 RX ADMIN — METOPROLOL TARTRATE 25 MG: 25 TABLET, FILM COATED ORAL at 09:43

## 2023-11-05 RX ADMIN — PALIPERIDONE 3 MG: 3 TABLET, EXTENDED RELEASE ORAL at 16:34

## 2023-11-05 RX ADMIN — DICLOFENAC SODIUM 2 G: 10 GEL TOPICAL at 20:25

## 2023-11-05 RX ADMIN — IBUPROFEN 800 MG: 800 TABLET, FILM COATED ORAL at 09:43

## 2023-11-05 RX ADMIN — GABAPENTIN 100 MG: 100 CAPSULE ORAL at 14:01

## 2023-11-05 RX ADMIN — GABAPENTIN 100 MG: 100 CAPSULE ORAL at 20:23

## 2023-11-05 RX ADMIN — IBUPROFEN 800 MG: 800 TABLET, FILM COATED ORAL at 16:34

## 2023-11-05 RX ADMIN — PRAZOSIN HYDROCHLORIDE 1 MG: 1 CAPSULE ORAL at 20:22

## 2023-11-05 RX ADMIN — OXCARBAZEPINE 300 MG: 300 TABLET, FILM COATED ORAL at 09:43

## 2023-11-05 RX ADMIN — HYDROCHLOROTHIAZIDE 12.5 MG: 12.5 TABLET ORAL at 09:42

## 2023-11-05 RX ADMIN — DICLOFENAC SODIUM 2 G: 10 GEL TOPICAL at 09:41

## 2023-11-05 RX ADMIN — ATORVASTATIN CALCIUM 10 MG: 10 TABLET, FILM COATED ORAL at 20:22

## 2023-11-05 RX ADMIN — METFORMIN HYDROCHLORIDE 1000 MG: 500 TABLET, EXTENDED RELEASE ORAL at 09:43

## 2023-11-05 RX ADMIN — METFORMIN HYDROCHLORIDE 1000 MG: 500 TABLET, EXTENDED RELEASE ORAL at 16:34

## 2023-11-05 RX ADMIN — OXCARBAZEPINE 300 MG: 300 TABLET, FILM COATED ORAL at 20:22

## 2023-11-05 ASSESSMENT — PAIN DESCRIPTION - ORIENTATION
ORIENTATION: LEFT
ORIENTATION: LEFT

## 2023-11-05 ASSESSMENT — PAIN DESCRIPTION - DESCRIPTORS
DESCRIPTORS: ACHING
DESCRIPTORS: ACHING

## 2023-11-05 ASSESSMENT — PAIN SCALES - GENERAL
PAINLEVEL_OUTOF10: 8
PAINLEVEL_OUTOF10: 5

## 2023-11-05 ASSESSMENT — PAIN DESCRIPTION - LOCATION: LOCATION: ANKLE

## 2023-11-05 NOTE — GROUP NOTE
Group Therapy Note    Date: 11/5/2023    Group Start Time: 1000  Group End Time: 1100  Group Topic: Recreational    [facility] Pilar Burleson        Group Therapy Note    Attendees: 11       Patient's Goal:  To be calm    Notes:  Pt was attentive     Status After Intervention:  Unchanged    Participation Level: Minimal    Participation Quality: Appropriate      Speech:  normal      Thought Process/Content: Logical      Affective Functioning: Congruent      Mood: euthymic      Level of consciousness:  Alert      Response to Learning: Able to verbalize current knowledge/experience      Endings: None Reported    Modes of Intervention: Activity      Discipline Responsible: Behavorial Health Tech      Signature:   Yas Elizalde

## 2023-11-05 NOTE — GROUP NOTE
Group Therapy Note    Date: 11/4/2023    Group Start Time: 2000  Group End Time: 2030  Group Topic: Wrap-Up    [facility]    Bebeto Contreras RN; Brenda Rodriguez RN    To attend wrap up group and state whether or not goal was met. Group Therapy Note    Attendees: 18/21       Patient's Goal:  To stay sane. Notes:  Pt reports that she pen[facility]entiary met her goal today.      Status After Intervention:  Unchanged    Participation Level: Interactive    Participation Quality: Attentive and Sharing      Speech:  normal      Thought Process/Content: Logical      Affective Functioning: Congruent      Mood: euthymic      Level of consciousness:  Alert and Oriented x4      Response to Learning: Capable of insight      Endings: None Reported    Modes of Intervention: Support and Socialization      Discipline Responsible: Registered Nurse      Signature:  Bebeto Contreras RN

## 2023-11-06 PROCEDURE — 1240000000 HC EMOTIONAL WELLNESS R&B

## 2023-11-06 PROCEDURE — 6370000000 HC RX 637 (ALT 250 FOR IP): Performed by: PSYCHIATRY & NEUROLOGY

## 2023-11-06 PROCEDURE — 6370000000 HC RX 637 (ALT 250 FOR IP): Performed by: NURSE PRACTITIONER

## 2023-11-06 PROCEDURE — 99232 SBSQ HOSP IP/OBS MODERATE 35: CPT | Performed by: PSYCHIATRY & NEUROLOGY

## 2023-11-06 RX ADMIN — METFORMIN HYDROCHLORIDE 1000 MG: 500 TABLET, EXTENDED RELEASE ORAL at 16:14

## 2023-11-06 RX ADMIN — IBUPROFEN 800 MG: 800 TABLET, FILM COATED ORAL at 21:28

## 2023-11-06 RX ADMIN — PALIPERIDONE 3 MG: 3 TABLET, EXTENDED RELEASE ORAL at 18:08

## 2023-11-06 RX ADMIN — GABAPENTIN 100 MG: 100 CAPSULE ORAL at 13:49

## 2023-11-06 RX ADMIN — HYDROCHLOROTHIAZIDE 12.5 MG: 12.5 TABLET ORAL at 08:48

## 2023-11-06 RX ADMIN — ATORVASTATIN CALCIUM 10 MG: 10 TABLET, FILM COATED ORAL at 21:28

## 2023-11-06 RX ADMIN — METOPROLOL TARTRATE 25 MG: 25 TABLET, FILM COATED ORAL at 08:48

## 2023-11-06 RX ADMIN — IBUPROFEN 800 MG: 800 TABLET, FILM COATED ORAL at 09:59

## 2023-11-06 RX ADMIN — GABAPENTIN 100 MG: 100 CAPSULE ORAL at 08:50

## 2023-11-06 RX ADMIN — OXCARBAZEPINE 300 MG: 300 TABLET, FILM COATED ORAL at 21:28

## 2023-11-06 RX ADMIN — PRAZOSIN HYDROCHLORIDE 1 MG: 1 CAPSULE ORAL at 21:28

## 2023-11-06 RX ADMIN — METOPROLOL TARTRATE 25 MG: 25 TABLET, FILM COATED ORAL at 21:28

## 2023-11-06 RX ADMIN — METFORMIN HYDROCHLORIDE 1000 MG: 500 TABLET, EXTENDED RELEASE ORAL at 08:50

## 2023-11-06 RX ADMIN — DICLOFENAC SODIUM 2 G: 10 GEL TOPICAL at 21:27

## 2023-11-06 RX ADMIN — OXCARBAZEPINE 300 MG: 300 TABLET, FILM COATED ORAL at 08:51

## 2023-11-06 RX ADMIN — DICLOFENAC SODIUM 2 G: 10 GEL TOPICAL at 08:51

## 2023-11-06 RX ADMIN — GABAPENTIN 100 MG: 100 CAPSULE ORAL at 21:28

## 2023-11-06 ASSESSMENT — PAIN DESCRIPTION - LOCATION
LOCATION: ANKLE
LOCATION: ANKLE

## 2023-11-06 ASSESSMENT — PAIN DESCRIPTION - DESCRIPTORS
DESCRIPTORS: ACHING;SORE
DESCRIPTORS: ACHING

## 2023-11-06 ASSESSMENT — PAIN DESCRIPTION - ORIENTATION: ORIENTATION: LEFT

## 2023-11-06 ASSESSMENT — PAIN SCALES - GENERAL
PAINLEVEL_OUTOF10: 9
PAINLEVEL_OUTOF10: 5

## 2023-11-06 NOTE — FLOWSHEET NOTE
Pt is alert and oriented and walks independently . She denies depression and rates her anxiety a 2/10 with ten being the worst. She does attend groups and endorses adequate sleep and adequate appetite. She states she is looking forward to discharge to see her children. She has good eye contact and is bright and social with peers. She denies suicidal or homicidal ideation.

## 2023-11-06 NOTE — GROUP NOTE
Group Therapy Note    Date: 11/5/2023    Group Start Time: 2000  Group End Time: 2030  Group Topic: Wrap-Up    [facility]    Lazaro Joshi RN; Lavern Rucker RN    To attend wrap up group and state whether or not goal was met. Group Therapy Note    Attendees: 18/25       Patient's Goal:  To stay calm. Notes:  Pt states she met goal today and has been able to stay calm. Status After Intervention:  Improved    Participation Level:  Active Listener and Interactive    Participation Quality: Appropriate and Attentive      Speech:  normal      Thought Process/Content: Logical      Affective Functioning: Congruent      Mood: elevated      Level of consciousness:  Alert and Oriented x4      Response to Learning: Capable of insight      Endings: None Reported    Modes of Intervention: Support and Socialization      Discipline Responsible: Registered Nurse      Signature:  Lazaro Joshi RN

## 2023-11-07 PROCEDURE — 90833 PSYTX W PT W E/M 30 MIN: CPT | Performed by: PSYCHIATRY & NEUROLOGY

## 2023-11-07 PROCEDURE — 6370000000 HC RX 637 (ALT 250 FOR IP): Performed by: NURSE PRACTITIONER

## 2023-11-07 PROCEDURE — 99232 SBSQ HOSP IP/OBS MODERATE 35: CPT | Performed by: PSYCHIATRY & NEUROLOGY

## 2023-11-07 PROCEDURE — 6370000000 HC RX 637 (ALT 250 FOR IP): Performed by: REGISTERED NURSE

## 2023-11-07 PROCEDURE — 1240000000 HC EMOTIONAL WELLNESS R&B

## 2023-11-07 PROCEDURE — 6370000000 HC RX 637 (ALT 250 FOR IP): Performed by: PSYCHIATRY & NEUROLOGY

## 2023-11-07 RX ORDER — TRAZODONE HYDROCHLORIDE 50 MG/1
50 TABLET ORAL NIGHTLY PRN
Status: DISCONTINUED | OUTPATIENT
Start: 2023-11-07 | End: 2023-11-08 | Stop reason: HOSPADM

## 2023-11-07 RX ADMIN — ATORVASTATIN CALCIUM 10 MG: 10 TABLET, FILM COATED ORAL at 20:33

## 2023-11-07 RX ADMIN — GABAPENTIN 100 MG: 100 CAPSULE ORAL at 13:58

## 2023-11-07 RX ADMIN — PALIPERIDONE 3 MG: 3 TABLET, EXTENDED RELEASE ORAL at 17:39

## 2023-11-07 RX ADMIN — METFORMIN HYDROCHLORIDE 1000 MG: 500 TABLET, EXTENDED RELEASE ORAL at 09:13

## 2023-11-07 RX ADMIN — Medication 2 G: at 22:07

## 2023-11-07 RX ADMIN — PRAZOSIN HYDROCHLORIDE 1 MG: 1 CAPSULE ORAL at 20:33

## 2023-11-07 RX ADMIN — METOPROLOL TARTRATE 25 MG: 25 TABLET, FILM COATED ORAL at 20:33

## 2023-11-07 RX ADMIN — OXCARBAZEPINE 300 MG: 300 TABLET, FILM COATED ORAL at 20:32

## 2023-11-07 RX ADMIN — METFORMIN HYDROCHLORIDE 1000 MG: 500 TABLET, EXTENDED RELEASE ORAL at 16:14

## 2023-11-07 RX ADMIN — HYDROCHLOROTHIAZIDE 12.5 MG: 12.5 TABLET ORAL at 09:13

## 2023-11-07 RX ADMIN — GABAPENTIN 100 MG: 100 CAPSULE ORAL at 20:32

## 2023-11-07 RX ADMIN — OXCARBAZEPINE 300 MG: 300 TABLET, FILM COATED ORAL at 09:14

## 2023-11-07 RX ADMIN — TRAZODONE HYDROCHLORIDE 50 MG: 50 TABLET ORAL at 23:13

## 2023-11-07 RX ADMIN — METOPROLOL TARTRATE 25 MG: 25 TABLET, FILM COATED ORAL at 09:14

## 2023-11-07 RX ADMIN — DICLOFENAC SODIUM 2 G: 10 GEL TOPICAL at 09:13

## 2023-11-07 RX ADMIN — IBUPROFEN 800 MG: 800 TABLET, FILM COATED ORAL at 17:21

## 2023-11-07 RX ADMIN — GABAPENTIN 100 MG: 100 CAPSULE ORAL at 09:14

## 2023-11-07 ASSESSMENT — PAIN SCALES - GENERAL: PAINLEVEL_OUTOF10: 9

## 2023-11-07 ASSESSMENT — PAIN DESCRIPTION - DESCRIPTORS: DESCRIPTORS: SHOOTING

## 2023-11-07 ASSESSMENT — PAIN DESCRIPTION - ORIENTATION: ORIENTATION: LEFT

## 2023-11-07 ASSESSMENT — PAIN DESCRIPTION - LOCATION: LOCATION: LEG

## 2023-11-07 NOTE — GROUP NOTE
Group Therapy Note    Date: 11/6/2023    Group Start Time: 1800  Group End Time: 1815  Group Topic: Wrap-Up    [facility]    Donato Cabrera        Group Therapy Note    Attendees: 12/21       Patient's Goal:  end today on a high note     Status After Intervention:  Unchanged    Participation Level:  Active Listener    Participation Quality: Appropriate, Attentive, Sharing, and Supportive      Speech:  normal      Thought Process/Content: Logical      Affective Functioning: Congruent      Mood: euphoric      Level of consciousness:  Alert, Oriented x4, and Attentive      Response to Learning: Able to verbalize current knowledge/experience, Able to verbalize/acknowledge new learning, Able to retain information, Capable of insight, Able to change behavior, and Progressing to goal      Endings: None Reported    Modes of Intervention: Socialization      Discipline Responsible: [address] [facility]      Signature:  Donato Cabrera

## 2023-11-07 NOTE — GROUP NOTE
Group Therapy Note    Date: 11/6/2023    Group Start Time: 8748  Group End Time: 1800  Group Topic: Recreational    [facility]    Sonny Roe        Group Therapy Note    Attendees: 10/19       Patient's Goal:  Question game     Status After Intervention:  Unchanged    Participation Level:  Active Listener    Participation Quality: Appropriate, Attentive, Sharing, and Supportive      Speech:  normal      Thought Process/Content: Logical      Affective Functioning: Congruent      Mood: euphoric      Level of consciousness:  Alert, Oriented x4, and Attentive      Response to Learning: Able to verbalize current knowledge/experience, Able to verbalize/acknowledge new learning, Able to retain information, Capable of insight, Able to change behavior, and Progressing to goal      Endings: None Reported    Modes of Intervention: Activity      Discipline Responsible: [address]      Signature:  Sonny Roe

## 2023-11-07 NOTE — FLOWSHEET NOTE
The patient requested ibuprofen due to pain rated #9/10, located in the patient's left ankle. Medicated per mar with ibuprofen tablet 800 mg po PRN.

## 2023-11-08 VITALS
SYSTOLIC BLOOD PRESSURE: 130 MMHG | HEART RATE: 84 BPM | RESPIRATION RATE: 18 BRPM | TEMPERATURE: 98.3 F | DIASTOLIC BLOOD PRESSURE: 94 MMHG | HEIGHT: 62 IN | OXYGEN SATURATION: 97 % | BODY MASS INDEX: 41.96 KG/M2 | WEIGHT: 228 LBS

## 2023-11-08 PROCEDURE — 6370000000 HC RX 637 (ALT 250 FOR IP): Performed by: NURSE PRACTITIONER

## 2023-11-08 PROCEDURE — 99239 HOSP IP/OBS DSCHRG MGMT >30: CPT | Performed by: PSYCHIATRY & NEUROLOGY

## 2023-11-08 PROCEDURE — 6370000000 HC RX 637 (ALT 250 FOR IP): Performed by: PSYCHIATRY & NEUROLOGY

## 2023-11-08 PROCEDURE — 6370000000 HC RX 637 (ALT 250 FOR IP): Performed by: REGISTERED NURSE

## 2023-11-08 RX ORDER — PALIPERIDONE 3 MG/1
3 TABLET, EXTENDED RELEASE ORAL EVERY EVENING
Qty: 30 TABLET | Refills: 3 | Status: SHIPPED | OUTPATIENT
Start: 2023-11-08

## 2023-11-08 RX ORDER — PRAZOSIN HYDROCHLORIDE 1 MG/1
1 CAPSULE ORAL NIGHTLY
Qty: 15 CAPSULE | Refills: 3 | Status: SHIPPED | OUTPATIENT
Start: 2023-11-08

## 2023-11-08 RX ORDER — OXCARBAZEPINE 300 MG/1
TABLET, FILM COATED ORAL
Qty: 45 TABLET | Refills: 3 | Status: SHIPPED | OUTPATIENT
Start: 2023-11-08

## 2023-11-08 RX ORDER — TRAZODONE HYDROCHLORIDE 50 MG/1
50 TABLET ORAL NIGHTLY PRN
Qty: 15 TABLET | Refills: 2 | Status: SHIPPED | OUTPATIENT
Start: 2023-11-08

## 2023-11-08 RX ADMIN — OXCARBAZEPINE 450 MG: 300 TABLET, FILM COATED ORAL at 08:35

## 2023-11-08 RX ADMIN — HYDROCHLOROTHIAZIDE 12.5 MG: 12.5 TABLET ORAL at 08:36

## 2023-11-08 RX ADMIN — METFORMIN HYDROCHLORIDE 1000 MG: 500 TABLET, EXTENDED RELEASE ORAL at 08:36

## 2023-11-08 RX ADMIN — IBUPROFEN 800 MG: 800 TABLET, FILM COATED ORAL at 09:17

## 2023-11-08 RX ADMIN — GABAPENTIN 100 MG: 100 CAPSULE ORAL at 08:36

## 2023-11-08 RX ADMIN — Medication 2 G: at 08:36

## 2023-11-08 RX ADMIN — METOPROLOL TARTRATE 25 MG: 25 TABLET, FILM COATED ORAL at 08:36

## 2023-11-08 ASSESSMENT — PAIN SCALES - GENERAL: PAINLEVEL_OUTOF10: 8

## 2023-11-08 ASSESSMENT — PAIN DESCRIPTION - LOCATION: LOCATION: ANKLE

## 2023-11-08 ASSESSMENT — PAIN DESCRIPTION - DESCRIPTORS: DESCRIPTORS: ACHING;SORE
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
## 2023-11-08 NOTE — DISCHARGE INSTR - DIET

## 2023-11-08 NOTE — FLOWSHEET NOTE
1023: Patient to discharge today, denies SI/HI, Denies AVH. Ace wrap to left ankle removed, no redness or swelling noted. Denies pain at this time.  .Electronically signed by Billy Najjar, RN on 11/8/2023 at 10:24 AM

## 2023-11-08 NOTE — DISCHARGE INSTRUCTIONS
Due to the Covid-19 Pandemic, [facility] is not currently available. For assistance with quitting smoking please go to https://smokefree.gov. A prescription for an FDA-approved tobacco cessation medication was offered at discharge and the patient refused. Keep all follow up appointments, take medications as ordered, utilize positive supports, abstain from use of alcohol and drugs. If symptoms return or you feel at risk to yourself or others, please call 911, return the nearest emergency room, or call your local crisis hotline:  [facility]: [phone]: [phone]: [phone] from [address] [place] [address] will be calling you tomorrow to follow up on your care. If you don't hear from us, give us a call! [phone]. You were offered the flu shot on 11/8/23 and declined.

## 2023-11-08 NOTE — GROUP NOTE
Group Therapy Note    Date: 11/7/2023    Group Start Time: 2100  Group End Time: 2130  Group Topic: Recreational    [facility]    Brown Matheus        Group Therapy Note    Attendees: 9/22       Patient's Goal:  Kerrie Roy     Status After Intervention:  Unchanged    Participation Level:  Active Listener    Participation Quality: Appropriate, Attentive, Sharing, and Supportive      Speech:  normal      Thought Process/Content: Logical      Affective Functioning: Congruent      Mood: euphoric      Level of consciousness:  Alert, Oriented x4, and Attentive      Response to Learning: Able to verbalize current knowledge/experience, Able to verbalize/acknowledge new learning, Able to retain information, Capable of insight, Able to change behavior, and Progressing to goal      Endings: None Reported    Modes of Intervention: Activity      Discipline Responsible: [address]      Signature:  Dannie Jarvis

## 2023-11-08 NOTE — GROUP NOTE
Group Therapy Note    Date: 11/7/2023    Group Start Time: 2130  Group End Time: 2200  Group Topic: Wrap-Up    [facility]        Group Therapy Note    Attendees: 13/23       Patient's Goal:  to go home     Status After Intervention:  Unchanged    Participation Level:  Active Listener    Participation Quality: Appropriate, Attentive, Sharing, and Supportive      Speech:  normal      Thought Process/Content: Logical      Affective Functioning: Congruent      Mood: euphoric      Level of consciousness:  Alert, Oriented x4, and Attentive      Response to Learning: Able to verbalize current knowledge/experience, Able to verbalize/acknowledge new learning, Able to retain information, Capable of insight, Able to change behavior, and Progressing to goal      Endings: None Reported    Modes of Intervention: Socialization      Discipline Responsible: [address]      Signature:  Maritza Meade

## 2023-11-09 PROBLEM — M70.60 TROCHANTERIC BURSITIS: Status: ACTIVE | Noted: 2023-11-09

## 2023-11-09 PROBLEM — S40.029A CONTUSION OF UPPER EXTREMITY: Status: ACTIVE | Noted: 2023-11-09

## 2023-11-09 RX ORDER — METOPROLOL TARTRATE 25 MG/1
25 TABLET, FILM COATED ORAL 2 TIMES DAILY
COMMUNITY

## 2023-11-09 RX ORDER — IBUPROFEN 800 MG/1
1 TABLET ORAL EVERY 8 HOURS PRN
COMMUNITY
Start: 2020-08-14

## 2023-11-09 RX ORDER — METHYLPREDNISOLONE 4 MG/1
TABLET ORAL
COMMUNITY
Start: 2020-08-14

## 2023-11-09 RX ORDER — HYDROXYZINE PAMOATE 25 MG/1
CAPSULE ORAL
COMMUNITY

## 2023-11-09 RX ORDER — ATORVASTATIN CALCIUM 10 MG/1
TABLET, FILM COATED ORAL
COMMUNITY

## 2023-11-09 RX ORDER — LURASIDONE HYDROCHLORIDE 120 MG/1
TABLET, FILM COATED ORAL
COMMUNITY

## 2023-11-09 RX ORDER — DIAZEPAM 5 MG/1
5 TABLET ORAL EVERY 8 HOURS
COMMUNITY
Start: 2020-08-14

## 2023-11-09 RX ORDER — NORETHINDRONE 0.35 MG/1
1 TABLET ORAL DAILY
COMMUNITY

## 2023-11-09 RX ORDER — METFORMIN HYDROCHLORIDE 500 MG/1
TABLET, EXTENDED RELEASE ORAL
COMMUNITY

## 2023-11-13 ENCOUNTER — ANCILLARY PROCEDURE (OUTPATIENT)
Dept: RADIOLOGY | Facility: CLINIC | Age: 33
End: 2023-11-13
Payer: COMMERCIAL

## 2023-11-13 ENCOUNTER — OFFICE VISIT (OUTPATIENT)
Dept: ORTHOPEDIC SURGERY | Facility: CLINIC | Age: 33
End: 2023-11-13
Payer: COMMERCIAL

## 2023-11-13 DIAGNOSIS — M25.572 ACUTE LEFT ANKLE PAIN: Primary | ICD-10-CM

## 2023-11-13 DIAGNOSIS — M75.80 ROTATOR CUFF TENDONITIS, UNSPECIFIED LATERALITY: ICD-10-CM

## 2023-11-13 DIAGNOSIS — M25.572 ACUTE LEFT ANKLE PAIN: ICD-10-CM

## 2023-11-13 PROCEDURE — 73610 X-RAY EXAM OF ANKLE: CPT | Mod: LT,FY

## 2023-11-13 PROCEDURE — 73610 X-RAY EXAM OF ANKLE: CPT | Mod: LEFT SIDE | Performed by: ORTHOPAEDIC SURGERY

## 2023-11-13 PROCEDURE — L1902 AFO ANKLE GAUNTLET PRE OTS: HCPCS | Performed by: ORTHOPAEDIC SURGERY

## 2023-11-13 RX ORDER — IBUPROFEN 800 MG/1
800 TABLET ORAL 3 TIMES DAILY
Qty: 90 TABLET | Refills: 0 | Status: SHIPPED | OUTPATIENT
Start: 2023-11-13 | End: 2023-12-13

## 2023-11-13 RX ORDER — METHYLPREDNISOLONE 4 MG/1
TABLET ORAL
Qty: 21 TABLET | Refills: 0 | Status: SHIPPED | OUTPATIENT
Start: 2023-11-13

## 2023-11-13 NOTE — PROGRESS NOTES
History of Present Illness   Chief Complaint   Patient presents with    Left Ankle - Pain     New Patient Visit  Xrays today       History of Present Illness:  Patient presents with pain and swelling in the ankle after a twisting injury.  The patient had difficulty bearing weight after the event.  The patient denies any antecedent pain or any additional injuries. The pain is sharp in nature, severe, worse with movement and better with rest.  She initially injured it about 2 weeks ago.  7 moderate mount of pain.  She has been to weight-bear but with some discomfort.  Imaging  Ankle: No acute fractures or dislocations.  No arthritic change.     Assessment/Plan:   Left ankle sprain    Plan:  We reviewed the role of imaging, physical therapy, injections and the time frame to healing and correlation with outcome.  Lace up ankle brace  NSAID: Prescription sent to the pharmacy.  GI side effects and medical risks discussed  Ice: 30 minutes on and off  Exercise home program: Medically directed ankle therapy / Handout given  Follow-up in 4 to 6 weeks if needed     Physical Exam  Well-nourished, well-developed. No acute distress. Alert and oriented x3. Responds appropriately to questioning. Good mood. Normal affect.  Left ankle:  Skin healthy and intact  Swelling and ecchymosis:  Moderate  Tenderness:  Lateral malleolus / lateral ligaments: positive   Medial malleolous:/ deltoid: negative   No tenderness  Achilles, Lisfranc joint, calcaneus  Pain with squeeze test: No   Neurovascular exam normal distally  Palpable pedal pulse and good cap refill    Review of Systems   GENERAL: Negative for malaise, significant weight loss, fever  MUSCULOSKELETAL: See HPI  NEURO:  Negative     Past Medical History:   Diagnosis Date    Personal history of diseases of the blood and blood-forming organs and certain disorders involving the immune mechanism 08/14/2020    History of bleeding disorder    Personal history of other endocrine,  nutritional and metabolic disease 08/14/2020    History of diabetes mellitus    Unspecified abnormalities of breathing 08/14/2020    Breathing problem    Unspecified visual loss 08/14/2020    Vision problems       Medication Documentation Review Audit       Reviewed by Radha Leos MA (Medical Assistant) on 11/13/23 at 1439      Medication Order Taking? Sig Documenting Provider Last Dose Status   atorvastatin (Lipitor) 10 mg tablet [id number]  1 tab(s) orally once a day (at bedtime) Historical Provider, MD  Active   diazePAM (Valium) 5 mg tablet [id number]  Take 1 tablet (5 mg) by mouth every 8 hours. Historical Provider, MD  Active   hydrOXYzine pamoate (Vistaril) 25 mg capsule [id number]  1 cap(s) orally 3 times a day Historical Provider, MD  Active   ibuprofen 800 mg tablet [id number]  Take 1 tablet (800 mg) by mouth every 8 hours if needed. Historical Provider, MD  Active   lidocaine (Lidoderm) 5 % patch [id number]  Place 1 patch over 12 hours on the skin once daily. Remove & discard patch within 12 hours or as directed by MD. Estuardo Do PA-C  Active   lurasidone (Latuda) 120 mg tablet [id number]  1 tab(s) orally once a day (in the evening) Historical Provider, MD  Active   metFORMIN  mg 24 hr tablet [id number]  2 tab(s) orally 2 times a day (before meals) Historical Provider, MD  Active   methylPREDNISolone (Medrol Dospak) 4 mg tablets [id number]  TAKE  AS DIRECTED ON PACKGE Historical Provider, MD  Active   metoprolol tartrate (Lopressor) 25 mg tablet [id number]  Take 1 tablet (25 mg) by mouth twice a day. Historical Provider, MD  Active   norethindrone (Micronor) 0.35 mg tablet [id number]  Take 1 tablet (0.35 mg) by mouth once daily. Historical Provider, MD  Active                    Allergies   Allergen Reactions    Squash Angioedema       Social History     Socioeconomic History    Marital status:      Spouse name: Not on file    Number of children: Not on file    Years of education: Not on file     Highest education level: Not on file   Occupational History    Not on file   Tobacco Use    Smoking status: Not on file    Smokeless tobacco: Not on file   Substance and Sexual Activity    Alcohol use: Not on file    Drug use: Not on file    Sexual activity: Not on file   Other Topics Concern    Not on file   Social History Narrative    Not on file     Social Determinants of Health     Financial Resource Strain: Not on file   Food Insecurity: Not on file   Transportation Needs: Not on file   Physical Activity: Not on file   Stress: Not on file   Social Connections: Not on file   Intimate Partner Violence: Not on file   Housing Stability: Not on file       Past Surgical History:   Procedure Laterality Date    OTHER SURGICAL HISTORY  2020    Tonsillectomy    OTHER SURGICAL HISTORY  2020     section        No results found.

## 2023-12-14 ENCOUNTER — OFFICE VISIT (OUTPATIENT)
Dept: ENDOCRINOLOGY | Age: 33
End: 2023-12-14
Payer: COMMERCIAL

## 2023-12-14 VITALS
DIASTOLIC BLOOD PRESSURE: 75 MMHG | HEART RATE: 78 BPM | WEIGHT: 232 LBS | HEIGHT: 62 IN | SYSTOLIC BLOOD PRESSURE: 139 MMHG | OXYGEN SATURATION: 98 % | BODY MASS INDEX: 42.69 KG/M2

## 2023-12-14 DIAGNOSIS — E28.2 PCOS (POLYCYSTIC OVARIAN SYNDROME): ICD-10-CM

## 2023-12-14 DIAGNOSIS — E66.01 MORBID OBESITY (HCC): ICD-10-CM

## 2023-12-14 DIAGNOSIS — E88.819 INSULIN RESISTANCE: Primary | ICD-10-CM

## 2023-12-14 LAB
CHP ED QC CHECK: NORMAL
GLUCOSE BLD-MCNC: 112 MG/DL
HBA1C MFR BLD: 5.9 %

## 2023-12-14 PROCEDURE — 83036 HEMOGLOBIN GLYCOSYLATED A1C: CPT | Performed by: INTERNAL MEDICINE

## 2023-12-14 PROCEDURE — 3075F SYST BP GE 130 - 139MM HG: CPT | Performed by: INTERNAL MEDICINE

## 2023-12-14 PROCEDURE — G8427 DOCREV CUR MEDS BY ELIG CLIN: HCPCS | Performed by: INTERNAL MEDICINE

## 2023-12-14 PROCEDURE — G8417 CALC BMI ABV UP PARAM F/U: HCPCS | Performed by: INTERNAL MEDICINE

## 2023-12-14 PROCEDURE — 4004F PT TOBACCO SCREEN RCVD TLK: CPT | Performed by: INTERNAL MEDICINE

## 2023-12-14 PROCEDURE — 3078F DIAST BP <80 MM HG: CPT | Performed by: INTERNAL MEDICINE

## 2023-12-14 PROCEDURE — 82962 GLUCOSE BLOOD TEST: CPT | Performed by: INTERNAL MEDICINE

## 2023-12-14 PROCEDURE — G8484 FLU IMMUNIZE NO ADMIN: HCPCS | Performed by: INTERNAL MEDICINE

## 2023-12-14 PROCEDURE — 99213 OFFICE O/P EST LOW 20 MIN: CPT | Performed by: INTERNAL MEDICINE

## 2023-12-14 RX ORDER — CYCLOBENZAPRINE HCL 5 MG
TABLET ORAL
COMMUNITY
Start: 2023-10-05

## 2023-12-14 RX ORDER — ACETAMINOPHEN AND CODEINE PHOSPHATE 120; 12 MG/5ML; MG/5ML
0.35 SOLUTION ORAL DAILY
COMMUNITY

## 2023-12-14 RX ORDER — HYDROXYZINE PAMOATE 50 MG/1
CAPSULE ORAL
COMMUNITY
Start: 2023-12-07

## 2023-12-14 RX ORDER — DULAGLUTIDE 0.75 MG/.5ML
INJECTION, SOLUTION SUBCUTANEOUS
Qty: 2 ADJUSTABLE DOSE PRE-FILLED PEN SYRINGE | Refills: 1 | Status: SHIPPED | OUTPATIENT
Start: 2023-12-14

## 2023-12-14 RX ORDER — METFORMIN HYDROCHLORIDE 500 MG/1
TABLET, EXTENDED RELEASE ORAL
Qty: 120 TABLET | Refills: 0 | Status: SHIPPED | OUTPATIENT
Start: 2023-12-14

## 2023-12-14 RX ORDER — DIAZEPAM 5 MG/1
5 TABLET ORAL EVERY 8 HOURS
COMMUNITY
Start: 2020-08-14 | End: 2023-12-14 | Stop reason: ALTCHOICE

## 2023-12-14 RX ORDER — METHYLPREDNISOLONE 4 MG/1
TABLET ORAL
COMMUNITY
Start: 2023-12-07

## 2023-12-14 NOTE — PROGRESS NOTES
12/14/2023    Assessment:       Diagnosis Orders   1. Insulin resistance  POCT Glucose    POCT glycosylated hemoglobin (Hb A1C)    Hemoglobin D0Q    Basic Metabolic Panel    Insulin Antibody    Dulaglutide (TRULICITY) 0.56 HW/9.5WV SOPN    metFORMIN (GLUCOPHAGE-XR) 500 MG extended release tablet      2. PCOS (polycystic ovarian syndrome)  POCT Glucose    POCT glycosylated hemoglobin (Hb A1C)    Hemoglobin Z3P    Basic Metabolic Panel    Insulin Antibody    Dulaglutide (TRULICITY) 7.63 QX/0.9HE SOPN    metFORMIN (GLUCOPHAGE-XR) 500 MG extended release tablet      3. Morbid obesity ([address])              PLAN:     Orders Placed This Encounter   Procedures    Hemoglobin A1C     Standing Status:   Future     Standing Expiration Date:   38/70/9707    Basic Metabolic Panel     Standing Status:   Future     Standing Expiration Date:   12/14/2024    Insulin Antibody     Standing Status:   Future     Standing Expiration Date:   12/14/2024    POCT Glucose    POCT glycosylated hemoglobin (Hb A1C)     Orders Placed This Encounter   Medications    Dulaglutide (TRULICITY) 3.51 EU/9.1HC SOPN     Sig: INJECT 1 PEN UNDER THE SKIN ONCE PER WEEK     Dispense:  2 Adjustable Dose Pre-filled Pen Syringe     Refill:  1    metFORMIN (GLUCOPHAGE-XR) 500 MG extended release tablet     Sig: TAKE 2 TABLETS BY MOUTH 2 TIMES DAILY (BEFORE MEALS)     Dispense:  120 tablet     Refill:  0     Continue current dose of metformin close recently    Orders Placed This Encounter   Procedures    POCT Glucose    POCT glycosylated hemoglobin (Hb A1C)     No orders of the defined types were placed in this encounter. No follow-ups on file.   Subjective:     Chief Complaint   Patient presents with    Polycystic Ovarian Syndrome    insulin resistance    Obesity     Vitals:    12/14/23 1445 12/14/23 1449   BP: (!) 142/85 139/75   Pulse: 78    SpO2: 98%    Weight: 105.2 kg (232 lb)    Height: 1.575 m (5' 2\")      Wt Readings from Last 3 Encounters:   12/14/23 105.2

## 2023-12-17 RX ORDER — METFORMIN HYDROCHLORIDE 500 MG/1
TABLET, EXTENDED RELEASE ORAL
Qty: 120 TABLET | Refills: 5 | Status: SHIPPED | OUTPATIENT
Start: 2023-12-17

## 2024-01-08 ENCOUNTER — HOSPITAL ENCOUNTER (EMERGENCY)
Facility: HOSPITAL | Age: 34
Discharge: HOME | End: 2024-01-08
Attending: STUDENT IN AN ORGANIZED HEALTH CARE EDUCATION/TRAINING PROGRAM
Payer: COMMERCIAL

## 2024-01-08 ENCOUNTER — APPOINTMENT (OUTPATIENT)
Dept: CARDIOLOGY | Facility: HOSPITAL | Age: 34
End: 2024-01-08
Payer: COMMERCIAL

## 2024-01-08 ENCOUNTER — APPOINTMENT (OUTPATIENT)
Dept: RADIOLOGY | Facility: HOSPITAL | Age: 34
End: 2024-01-08
Payer: COMMERCIAL

## 2024-01-08 VITALS
BODY MASS INDEX: 41.96 KG/M2 | OXYGEN SATURATION: 97 % | SYSTOLIC BLOOD PRESSURE: 158 MMHG | DIASTOLIC BLOOD PRESSURE: 82 MMHG | TEMPERATURE: 97.7 F | WEIGHT: 228 LBS | HEART RATE: 80 BPM | HEIGHT: 62 IN | RESPIRATION RATE: 16 BRPM

## 2024-01-08 DIAGNOSIS — B02.8 HERPES ZOSTER WITH COMPLICATION: Primary | ICD-10-CM

## 2024-01-08 LAB
ALBUMIN SERPL BCP-MCNC: 4 G/DL (ref 3.4–5)
ALP SERPL-CCNC: 55 U/L (ref 33–110)
ALT SERPL W P-5'-P-CCNC: 21 U/L (ref 7–45)
ANION GAP SERPL CALC-SCNC: 13 MMOL/L (ref 10–20)
APPEARANCE UR: ABNORMAL
AST SERPL W P-5'-P-CCNC: 18 U/L (ref 9–39)
B-HCG SERPL-ACNC: <2 MIU/ML
BASOPHILS # BLD AUTO: 0.09 X10*3/UL (ref 0–0.1)
BASOPHILS NFR BLD AUTO: 0.8 %
BILIRUB SERPL-MCNC: 0.4 MG/DL (ref 0–1.2)
BILIRUB UR STRIP.AUTO-MCNC: NEGATIVE MG/DL
BUN SERPL-MCNC: 12 MG/DL (ref 6–23)
CALCIUM SERPL-MCNC: 9.3 MG/DL (ref 8.6–10.3)
CARDIAC TROPONIN I PNL SERPL HS: 5 NG/L (ref 0–13)
CHLORIDE SERPL-SCNC: 102 MMOL/L (ref 98–107)
CO2 SERPL-SCNC: 28 MMOL/L (ref 21–32)
COLOR UR: YELLOW
CREAT SERPL-MCNC: 0.89 MG/DL (ref 0.5–1.05)
D DIMER PPP FEU-MCNC: 292 NG/ML FEU
EGFRCR SERPLBLD CKD-EPI 2021: 89 ML/MIN/1.73M*2
EOSINOPHIL # BLD AUTO: 0.33 X10*3/UL (ref 0–0.7)
EOSINOPHIL NFR BLD AUTO: 2.8 %
ERYTHROCYTE [DISTWIDTH] IN BLOOD BY AUTOMATED COUNT: 13.8 % (ref 11.5–14.5)
FLUAV RNA RESP QL NAA+PROBE: NOT DETECTED
FLUBV RNA RESP QL NAA+PROBE: NOT DETECTED
GLUCOSE SERPL-MCNC: 84 MG/DL (ref 74–99)
GLUCOSE UR STRIP.AUTO-MCNC: NEGATIVE MG/DL
HCT VFR BLD AUTO: 39.5 % (ref 36–46)
HGB BLD-MCNC: 13.6 G/DL (ref 12–16)
IMM GRANULOCYTES # BLD AUTO: 0.03 X10*3/UL (ref 0–0.7)
IMM GRANULOCYTES NFR BLD AUTO: 0.3 % (ref 0–0.9)
KETONES UR STRIP.AUTO-MCNC: NEGATIVE MG/DL
LEUKOCYTE ESTERASE UR QL STRIP.AUTO: NEGATIVE
LIPASE SERPL-CCNC: 38 U/L (ref 9–82)
LYMPHOCYTES # BLD AUTO: 3.66 X10*3/UL (ref 1.2–4.8)
LYMPHOCYTES NFR BLD AUTO: 30.8 %
MCH RBC QN AUTO: 29.2 PG (ref 26–34)
MCHC RBC AUTO-ENTMCNC: 34.4 G/DL (ref 32–36)
MCV RBC AUTO: 85 FL (ref 80–100)
MONOCYTES # BLD AUTO: 0.74 X10*3/UL (ref 0.1–1)
MONOCYTES NFR BLD AUTO: 6.2 %
NEUTROPHILS # BLD AUTO: 7.02 X10*3/UL (ref 1.2–7.7)
NEUTROPHILS NFR BLD AUTO: 59.1 %
NITRITE UR QL STRIP.AUTO: NEGATIVE
NRBC BLD-RTO: 0 /100 WBCS (ref 0–0)
PH UR STRIP.AUTO: 6 [PH]
PLATELET # BLD AUTO: 293 X10*3/UL (ref 150–450)
POTASSIUM SERPL-SCNC: 4.6 MMOL/L (ref 3.5–5.3)
PROT SERPL-MCNC: 6.7 G/DL (ref 6.4–8.2)
PROT UR STRIP.AUTO-MCNC: NEGATIVE MG/DL
RBC # BLD AUTO: 4.65 X10*6/UL (ref 4–5.2)
RBC # UR STRIP.AUTO: NEGATIVE /UL
RSV RNA RESP QL NAA+PROBE: NOT DETECTED
SARS-COV-2 RNA RESP QL NAA+PROBE: NOT DETECTED
SODIUM SERPL-SCNC: 138 MMOL/L (ref 136–145)
SP GR UR STRIP.AUTO: 1.02
UROBILINOGEN UR STRIP.AUTO-MCNC: <2 MG/DL
WBC # BLD AUTO: 11.9 X10*3/UL (ref 4.4–11.3)

## 2024-01-08 PROCEDURE — 2500000001 HC RX 250 WO HCPCS SELF ADMINISTERED DRUGS (ALT 637 FOR MEDICARE OP): Performed by: STUDENT IN AN ORGANIZED HEALTH CARE EDUCATION/TRAINING PROGRAM

## 2024-01-08 PROCEDURE — 84702 CHORIONIC GONADOTROPIN TEST: CPT | Performed by: STUDENT IN AN ORGANIZED HEALTH CARE EDUCATION/TRAINING PROGRAM

## 2024-01-08 PROCEDURE — 2500000004 HC RX 250 GENERAL PHARMACY W/ HCPCS (ALT 636 FOR OP/ED): Performed by: STUDENT IN AN ORGANIZED HEALTH CARE EDUCATION/TRAINING PROGRAM

## 2024-01-08 PROCEDURE — 81003 URINALYSIS AUTO W/O SCOPE: CPT | Performed by: STUDENT IN AN ORGANIZED HEALTH CARE EDUCATION/TRAINING PROGRAM

## 2024-01-08 PROCEDURE — 71045 X-RAY EXAM CHEST 1 VIEW: CPT | Performed by: RADIOLOGY

## 2024-01-08 PROCEDURE — 96375 TX/PRO/DX INJ NEW DRUG ADDON: CPT

## 2024-01-08 PROCEDURE — 85379 FIBRIN DEGRADATION QUANT: CPT | Performed by: STUDENT IN AN ORGANIZED HEALTH CARE EDUCATION/TRAINING PROGRAM

## 2024-01-08 PROCEDURE — 80053 COMPREHEN METABOLIC PANEL: CPT | Performed by: STUDENT IN AN ORGANIZED HEALTH CARE EDUCATION/TRAINING PROGRAM

## 2024-01-08 PROCEDURE — 85025 COMPLETE CBC W/AUTO DIFF WBC: CPT | Performed by: STUDENT IN AN ORGANIZED HEALTH CARE EDUCATION/TRAINING PROGRAM

## 2024-01-08 PROCEDURE — 96374 THER/PROPH/DIAG INJ IV PUSH: CPT

## 2024-01-08 PROCEDURE — 93005 ELECTROCARDIOGRAM TRACING: CPT

## 2024-01-08 PROCEDURE — 83690 ASSAY OF LIPASE: CPT | Performed by: STUDENT IN AN ORGANIZED HEALTH CARE EDUCATION/TRAINING PROGRAM

## 2024-01-08 PROCEDURE — 99284 EMERGENCY DEPT VISIT MOD MDM: CPT | Performed by: STUDENT IN AN ORGANIZED HEALTH CARE EDUCATION/TRAINING PROGRAM

## 2024-01-08 PROCEDURE — 87637 SARSCOV2&INF A&B&RSV AMP PRB: CPT | Performed by: STUDENT IN AN ORGANIZED HEALTH CARE EDUCATION/TRAINING PROGRAM

## 2024-01-08 PROCEDURE — 36415 COLL VENOUS BLD VENIPUNCTURE: CPT | Performed by: STUDENT IN AN ORGANIZED HEALTH CARE EDUCATION/TRAINING PROGRAM

## 2024-01-08 PROCEDURE — 71045 X-RAY EXAM CHEST 1 VIEW: CPT

## 2024-01-08 PROCEDURE — 84484 ASSAY OF TROPONIN QUANT: CPT | Performed by: STUDENT IN AN ORGANIZED HEALTH CARE EDUCATION/TRAINING PROGRAM

## 2024-01-08 RX ORDER — ACETAMINOPHEN 325 MG/1
1000 TABLET ORAL EVERY 6 HOURS PRN
Qty: 30 TABLET | Refills: 0 | Status: SHIPPED | OUTPATIENT
Start: 2024-01-08 | End: 2024-02-07

## 2024-01-08 RX ORDER — ACYCLOVIR 400 MG/1
800 TABLET ORAL
Qty: 70 TABLET | Refills: 0 | Status: SHIPPED | OUTPATIENT
Start: 2024-01-08 | End: 2024-01-15

## 2024-01-08 RX ORDER — MORPHINE SULFATE 4 MG/ML
4 INJECTION, SOLUTION INTRAMUSCULAR; INTRAVENOUS ONCE
Status: COMPLETED | OUTPATIENT
Start: 2024-01-08 | End: 2024-01-08

## 2024-01-08 RX ORDER — ACYCLOVIR 800 MG/1
800 TABLET ORAL ONCE
Status: COMPLETED | OUTPATIENT
Start: 2024-01-08 | End: 2024-01-08

## 2024-01-08 RX ORDER — ONDANSETRON HYDROCHLORIDE 2 MG/ML
4 INJECTION, SOLUTION INTRAVENOUS ONCE
Status: COMPLETED | OUTPATIENT
Start: 2024-01-08 | End: 2024-01-08

## 2024-01-08 RX ADMIN — MORPHINE SULFATE 4 MG: 4 INJECTION, SOLUTION INTRAMUSCULAR; INTRAVENOUS at 21:22

## 2024-01-08 RX ADMIN — ONDANSETRON 4 MG: 2 INJECTION INTRAMUSCULAR; INTRAVENOUS at 21:23

## 2024-01-08 RX ADMIN — ACYCLOVIR 800 MG: 800 TABLET ORAL at 23:49

## 2024-01-08 ASSESSMENT — PAIN SCALES - GENERAL
PAINLEVEL_OUTOF10: 5 - MODERATE PAIN
PAINLEVEL_OUTOF10: 5 - MODERATE PAIN

## 2024-01-08 ASSESSMENT — LIFESTYLE VARIABLES
HAVE YOU EVER FELT YOU SHOULD CUT DOWN ON YOUR DRINKING: NO
REASON UNABLE TO ASSESS: NO
EVER HAD A DRINK FIRST THING IN THE MORNING TO STEADY YOUR NERVES TO GET RID OF A HANGOVER: NO
HAVE PEOPLE ANNOYED YOU BY CRITICIZING YOUR DRINKING: NO
EVER FELT BAD OR GUILTY ABOUT YOUR DRINKING: NO

## 2024-01-08 ASSESSMENT — COLUMBIA-SUICIDE SEVERITY RATING SCALE - C-SSRS
1. IN THE PAST MONTH, HAVE YOU WISHED YOU WERE DEAD OR WISHED YOU COULD GO TO SLEEP AND NOT WAKE UP?: NO
6. HAVE YOU EVER DONE ANYTHING, STARTED TO DO ANYTHING, OR PREPARED TO DO ANYTHING TO END YOUR LIFE?: NO
2. HAVE YOU ACTUALLY HAD ANY THOUGHTS OF KILLING YOURSELF?: NO

## 2024-01-08 ASSESSMENT — PAIN - FUNCTIONAL ASSESSMENT: PAIN_FUNCTIONAL_ASSESSMENT: 0-10

## 2024-01-09 LAB — HOLD SPECIMEN: NORMAL

## 2024-01-09 NOTE — ED PROVIDER NOTES
HPI   Chief Complaint   Patient presents with    Flu Symptoms     Pt has diff breathing from right rib pain from her cough started 2 days ago       Patient is a 33-year-old female with history of diabetes, asthma, kidney stone, DVT who presents for shortness of breath and rib pain.  Patient states for last 2 days she has had cough, pain with inspiration on the right side of her chest, chest pain in that area, fevers, nausea, diarrhea that is nonbloody and not black.  Denies chills, runny nose, sore throat, abdominal pain.  No vomiting.  Patient states she had a clot in her left arm when she was smoking cigarettes while on birth control.  Patient currently smokes, no longer taking birth control.  Endorses occasional alcohol and marijuana.  No history of MI, CVA, PE.  States pain goes from her right lower ribs and wraps around the front.  No rashes that she has noticed.  Has had chickenpox as a kid.                          [place] Coma Scale Score: 15                  Patient History   Past Medical History:   Diagnosis Date    Personal history of diseases of the blood and blood-forming organs and certain disorders involving the immune mechanism 2020    History of bleeding disorder    Personal history of other endocrine, nutritional and metabolic disease 2020    History of diabetes mellitus    Unspecified abnormalities of breathing 2020    Breathing problem    Unspecified visual loss 2020    Vision problems     Past Surgical History:   Procedure Laterality Date    OTHER SURGICAL HISTORY  2020    Tonsillectomy    OTHER SURGICAL HISTORY  2020     section     No family history on file.  Social History     Tobacco Use    Smoking status: Not on file    Smokeless tobacco: Not on file   Substance Use Topics    Alcohol use: Not on file    Drug use: Not on file       Physical Exam   ED Triage Vitals [24 1841]   Temp Heart Rate Resp BP   36.5 °C (97.7 °F) 73 18 (!) 161/93       SpO2 Temp src Heart Rate Source Patient Position   97 % -- Monitor --      BP Location FiO2 (%)     -- --       Physical Exam  Constitutional:       General: She is not in acute distress.  HENT:      Head: Normocephalic.   Eyes:      Extraocular Movements: Extraocular movements intact.      Conjunctiva/sclera: Conjunctivae normal.      Pupils: Pupils are equal, round, and reactive to light.   Cardiovascular:      Rate and Rhythm: Normal rate and regular rhythm.      Heart sounds: Normal heart sounds.   Pulmonary:      Effort: Pulmonary effort is normal.      Breath sounds: Normal breath sounds.   Abdominal:      General: There is no distension.      Palpations: Abdomen is soft. There is no mass.      Tenderness: There is no abdominal tenderness. There is no guarding.   Musculoskeletal:         General: No deformity.      Cervical back: Normal range of motion and neck supple.      Right lower leg: No edema.      Left lower leg: No edema.      Comments: Tender to palpation over right low back extending to right lateral ribs, no overlying rash appreciated.  No erythema/crepitus/bullae.   Skin:     General: Skin is warm and dry.      Findings: No lesion or rash.   Neurological:      General: No focal deficit present.      Mental Status: She is alert and oriented to person, place, and time. Mental status is at baseline.      Cranial Nerves: No cranial nerve deficit.      Sensory: No sensory deficit.      Motor: No weakness.   Psychiatric:         Mood and Affect: Mood normal.         ED Course & MDM   Diagnoses as of 01/09/24 0027   Herpes zoster with complication       Medical Decision Making  EKG my interpretation: Rate 77, rhythm regular, axis normal, , QRS 76, QTc 466, T waves: Unremarkable, ST segments: No elevations or depressions, interpretation: Normal sinus rhythm, no STEMI    Patient is a 33-year-old female with above-stated past medical history presents for shortness of breath and rib pain.  Patient's  EKG is nonischemic and her troponin is negative, patient is low risk for Mace, I have low suspicion for ACS.  Patient's D-dimer is not consistent with pulmonary embolism.  Low clinical suspicion for dissection, Boerhaave's, pericardial effusion.  Patient's influenza, RSV, COVID tests are negative.  Urinalysis is not consistent with urinary tract infection or kidney stone.  Chest x-ray does not show signs of pneumonia or pneumothorax.  Given the patient's distribution of her pain and her history of chickenpox, I believe she may be developing a shingles rash.  She was given a dose of acyclovir in the emergency department and a prescription for the same, as well as Tylenol.  Steroids were deferred given the patient's diabetes status.  I advised the patient to avoid pregnant individuals as well as immunocompromise of intervals until her rash resolves should she develop it.  Patient was given return precautions and follow-up instructions.    Disclaimer: This note was dictated using speech recognition software. Minor errors in transcription may be present. Please call if questions.     Segundo Martinez MD  [facility]  Contact on [employer]        Problems Addressed:  Herpes zoster with complication: acute illness or injury    Amount and/or Complexity of Data Reviewed  Labs: ordered.  Radiology: ordered.  ECG/medicine tests: ordered and independent interpretation performed.        Procedure  Procedures     Segundo Martinez MD  01/09/24 0034

## 2024-01-10 LAB
ATRIAL RATE: 77 BPM
P AXIS: 11 DEGREES
P OFFSET: 183 MS
P ONSET: 140 MS
PR INTERVAL: 156 MS
Q ONSET: 218 MS
QRS COUNT: 12 BEATS
QRS DURATION: 76 MS
QT INTERVAL: 412 MS
QTC CALCULATION(BAZETT): 466 MS
QTC FREDERICIA: 447 MS
R AXIS: 29 DEGREES
T AXIS: 41 DEGREES
T OFFSET: 424 MS
VENTRICULAR RATE: 77 BPM

## 2024-01-17 DIAGNOSIS — E88.819 INSULIN RESISTANCE: ICD-10-CM

## 2024-01-17 DIAGNOSIS — E28.2 PCOS (POLYCYSTIC OVARIAN SYNDROME): ICD-10-CM

## 2024-01-17 LAB — HBA1C MFR BLD: 5.6 % (ref 4.8–5.9)

## 2024-02-07 ENCOUNTER — OFFICE VISIT (OUTPATIENT)
Dept: PULMONOLOGY | Age: 34
End: 2024-02-07
Payer: COMMERCIAL

## 2024-02-07 VITALS
OXYGEN SATURATION: 98 % | HEART RATE: 88 BPM | BODY MASS INDEX: 42.76 KG/M2 | HEIGHT: 62 IN | DIASTOLIC BLOOD PRESSURE: 68 MMHG | RESPIRATION RATE: 16 BRPM | SYSTOLIC BLOOD PRESSURE: 126 MMHG | WEIGHT: 232.4 LBS | TEMPERATURE: 97.6 F

## 2024-02-07 DIAGNOSIS — E28.2 PCOS (POLYCYSTIC OVARIAN SYNDROME): ICD-10-CM

## 2024-02-07 DIAGNOSIS — E66.01 MORBID OBESITY (HCC): ICD-10-CM

## 2024-02-07 DIAGNOSIS — E88.819 INSULIN RESISTANCE: ICD-10-CM

## 2024-02-07 DIAGNOSIS — G47.33 OSA (OBSTRUCTIVE SLEEP APNEA): Primary | ICD-10-CM

## 2024-02-07 DIAGNOSIS — R06.02 SHORTNESS OF BREATH: ICD-10-CM

## 2024-02-07 LAB
ANION GAP SERPL CALCULATED.3IONS-SCNC: 15 MEQ/L (ref 9–15)
BUN SERPL-MCNC: 6 MG/DL (ref 6–20)
CALCIUM SERPL-MCNC: 9.3 MG/DL (ref 8.5–9.9)
CHLORIDE SERPL-SCNC: 103 MEQ/L (ref 95–107)
CO2 SERPL-SCNC: 22 MEQ/L (ref 20–31)
CREAT SERPL-MCNC: 0.5 MG/DL (ref 0.5–0.9)
GLUCOSE SERPL-MCNC: 103 MG/DL (ref 70–99)
POTASSIUM SERPL-SCNC: 4.2 MEQ/L (ref 3.4–4.9)
SODIUM SERPL-SCNC: 140 MEQ/L (ref 135–144)

## 2024-02-07 PROCEDURE — G8427 DOCREV CUR MEDS BY ELIG CLIN: HCPCS | Performed by: INTERNAL MEDICINE

## 2024-02-07 PROCEDURE — G8484 FLU IMMUNIZE NO ADMIN: HCPCS | Performed by: INTERNAL MEDICINE

## 2024-02-07 PROCEDURE — 3074F SYST BP LT 130 MM HG: CPT | Performed by: INTERNAL MEDICINE

## 2024-02-07 PROCEDURE — 1036F TOBACCO NON-USER: CPT | Performed by: INTERNAL MEDICINE

## 2024-02-07 PROCEDURE — 99214 OFFICE O/P EST MOD 30 MIN: CPT | Performed by: INTERNAL MEDICINE

## 2024-02-07 PROCEDURE — 3078F DIAST BP <80 MM HG: CPT | Performed by: INTERNAL MEDICINE

## 2024-02-07 PROCEDURE — G8417 CALC BMI ABV UP PARAM F/U: HCPCS | Performed by: INTERNAL MEDICINE

## 2024-02-07 NOTE — PROGRESS NOTES
Subjective:             Terrance Knapp is a 33 y.o. female who complains today of:     Chief Complaint   Patient presents with    Follow-up     5 Month F/U for KEI on BIPAP       HPI  She said she is trying to lose weight and if she continue to lose weight.  She does not want to have Bariatric surgery.     She had  BIPAP 12/8 cm , She said she is using at night for 4 hours.   She is complaining of snoring and daytime sleepiness and tiredness.  She does have restful sleep.   She still take naps, 1 hour not every day .  She does not drive.     She has shortness of breath with walk , long distance , dry cough  and wheezing .   She is on albuterol HFA prn     Allergies:  Food and Amoxicillin  Past Medical History:   Diagnosis Date    Abnormal Pap smear of cervix     Anxiety     Asthma     Depression     DVT (deep venous thrombosis) ([facility])     Hypertension     Pre-diabetes     PTSD (post-traumatic stress disorder)     Schizoaffective disorder ([facility])      Past Surgical History:   Procedure Laterality Date     SECTION      TONSILLECTOMY       Family History   Problem Relation Age of Onset    Diabetes Mother     Depression Father     Asthma Brother     Diabetes Maternal Grandmother     Breast Cancer Maternal Grandmother     Thyroid Cancer Maternal Grandfather     Diabetes Paternal Grandmother     Other Paternal Grandfather         testicular cancer     Social History     Socioeconomic History    Marital status: Legally      Spouse name: Darren Padilla    Number of children: 1    Years of education: 13    Highest education level: Not on file   Occupational History    Occupation: not working   Tobacco Use    Smoking status: Former     Current packs/day: 0.00     Average packs/day: 1 pack/day for 15.0 years (15.0 ttl pk-yrs)     Types: Cigarettes     Quit date: 2024     Years since quittin.0    Smokeless tobacco: Never    Tobacco comments:     1 ppd   Vaping Use    Vaping Use: Every day    Substances:

## 2024-02-10 LAB — INSULIN HUMAN AB SER-ACNC: <0.4 U/ML (ref 0–0.4)

## 2024-03-14 ENCOUNTER — OFFICE VISIT (OUTPATIENT)
Dept: ENDOCRINOLOGY | Age: 34
End: 2024-03-14
Payer: COMMERCIAL

## 2024-03-14 VITALS
BODY MASS INDEX: 43.43 KG/M2 | SYSTOLIC BLOOD PRESSURE: 121 MMHG | HEART RATE: 71 BPM | DIASTOLIC BLOOD PRESSURE: 78 MMHG | OXYGEN SATURATION: 95 % | WEIGHT: 236 LBS | HEIGHT: 62 IN

## 2024-03-14 DIAGNOSIS — E88.819 INSULIN RESISTANCE: Primary | ICD-10-CM

## 2024-03-14 DIAGNOSIS — E66.01 MORBID OBESITY (HCC): ICD-10-CM

## 2024-03-14 PROCEDURE — G8417 CALC BMI ABV UP PARAM F/U: HCPCS | Performed by: INTERNAL MEDICINE

## 2024-03-14 PROCEDURE — G8484 FLU IMMUNIZE NO ADMIN: HCPCS | Performed by: INTERNAL MEDICINE

## 2024-03-14 PROCEDURE — 99213 OFFICE O/P EST LOW 20 MIN: CPT | Performed by: INTERNAL MEDICINE

## 2024-03-14 PROCEDURE — 3078F DIAST BP <80 MM HG: CPT | Performed by: INTERNAL MEDICINE

## 2024-03-14 PROCEDURE — 1036F TOBACCO NON-USER: CPT | Performed by: INTERNAL MEDICINE

## 2024-03-14 PROCEDURE — G8427 DOCREV CUR MEDS BY ELIG CLIN: HCPCS | Performed by: INTERNAL MEDICINE

## 2024-03-14 PROCEDURE — 3074F SYST BP LT 130 MM HG: CPT | Performed by: INTERNAL MEDICINE

## 2024-03-14 RX ORDER — DULAGLUTIDE 1.5 MG/.5ML
1.5 INJECTION, SOLUTION SUBCUTANEOUS WEEKLY
Qty: 4 ADJUSTABLE DOSE PRE-FILLED PEN SYRINGE | Refills: 3 | Status: SHIPPED | OUTPATIENT
Start: 2024-03-14 | End: 2024-03-14 | Stop reason: SDUPTHER

## 2024-03-14 RX ORDER — DULAGLUTIDE 1.5 MG/.5ML
1.5 INJECTION, SOLUTION SUBCUTANEOUS WEEKLY
Qty: 4 ADJUSTABLE DOSE PRE-FILLED PEN SYRINGE | Refills: 3 | Status: SHIPPED | OUTPATIENT
Start: 2024-03-14

## 2024-03-14 NOTE — PROGRESS NOTES
3/14/2024    Assessment:       Diagnosis Orders   1. Insulin resistance        2. Morbid obesity (HCC)              PLAN:     Orders Placed This Encounter   Procedures    Basic Metabolic Panel     Standing Status:   Future     Standing Expiration Date:   3/14/2025    Hemoglobin A1C     Standing Status:   Future     Standing Expiration Date:   3/14/2025    Jeffery Hussein MD, Bariatric Surgery, [place]     Referral Priority:   Routine     Referral Type:   Eval and Treat     Referral Reason:   Specialty Services Required     Referred to Provider:   Jeffery Romero MD     Requested Specialty:   Bariatric Surgery     Number of Visits Requested:   1     Orders Placed This Encounter   Medications    DISCONTD: dulaglutide (TRULICITY) 1.5 MG/0.5ML SC injection     Sig: Inject 0.5 mLs into the skin once a week     Dispense:  4 Adjustable Dose Pre-filled Pen Syringe     Refill:  3    dulaglutide (TRULICITY) 1.5 MG/0.5ML SC injection     Sig: Inject 0.5 mLs into the skin once a week     Dispense:  4 Adjustable Dose Pre-filled Pen Syringe     Refill:  3   Continue Trulicity  Continue metformin 1000 mg twice a day refer patient to bariatric surgery    Subjective:     Chief Complaint   Patient presents with    Insulin resistance    Polycystic Ovarian Syndrome     Vitals:    03/14/24 1512   BP: 121/78   Pulse: 71   SpO2: 95%   Weight: 107 kg (236 lb)   Height: 1.575 m (5' 2.01\")     Wt Readings from Last 3 Encounters:   03/14/24 107 kg (236 lb)   02/07/24 105.4 kg (232 lb 6.4 oz)   12/14/23 105.2 kg (232 lb)     BP Readings from Last 3 Encounters:   03/14/24 121/78   02/07/24 126/68   12/14/23 139/75     Follow-up on obesity history of polycystic ovary syndrome insulin distance morbid obesity patient also has hypercholesterolemia hypertriglyceridemia on Trulicity and metformin BMI 43    Polycystic Ovarian Syndrome  This is a chronic problem. The current episode started more than 1 year ago. The problem has been

## 2024-04-16 ENCOUNTER — PATIENT MESSAGE (OUTPATIENT)
Dept: OBGYN CLINIC | Age: 34
End: 2024-04-16

## 2024-04-17 RX ORDER — ACETAMINOPHEN AND CODEINE PHOSPHATE 120; 12 MG/5ML; MG/5ML
0.35 SOLUTION ORAL DAILY
Qty: 90 TABLET | Refills: 2 | Status: SHIPPED | OUTPATIENT
Start: 2024-04-17 | End: 2024-11-01

## 2024-05-29 ENCOUNTER — HOSPITAL ENCOUNTER (EMERGENCY)
Age: 34
Discharge: HOME | End: 2024-05-29

## 2024-05-29 VITALS
HEIGHT: 62 IN | DIASTOLIC BLOOD PRESSURE: 93 MMHG | BODY MASS INDEX: 43.24 KG/M2 | OXYGEN SATURATION: 98 % | TEMPERATURE: 97.5 F | SYSTOLIC BLOOD PRESSURE: 145 MMHG | WEIGHT: 235 LBS | RESPIRATION RATE: 15 BRPM | HEART RATE: 87 BPM

## 2024-05-29 ASSESSMENT — PAIN - FUNCTIONAL ASSESSMENT: PAIN_FUNCTIONAL_ASSESSMENT: NONE - DENIES PAIN

## 2024-06-05 ENCOUNTER — HOSPITAL ENCOUNTER (EMERGENCY)
Age: 34
Discharge: HOME | End: 2024-06-05
Attending: STUDENT IN AN ORGANIZED HEALTH CARE EDUCATION/TRAINING PROGRAM
Payer: COMMERCIAL

## 2024-06-05 VITALS
OXYGEN SATURATION: 96 % | SYSTOLIC BLOOD PRESSURE: 144 MMHG | HEART RATE: 85 BPM | BODY MASS INDEX: 44.16 KG/M2 | RESPIRATION RATE: 16 BRPM | WEIGHT: 240 LBS | TEMPERATURE: 98 F | DIASTOLIC BLOOD PRESSURE: 104 MMHG | HEIGHT: 62 IN

## 2024-06-05 DIAGNOSIS — R51.9 ACUTE NONINTRACTABLE HEADACHE, UNSPECIFIED HEADACHE TYPE: Primary | ICD-10-CM

## 2024-06-05 LAB
CARBOXYHEMOGLOBIN: 3.4 % (ref 0–4)
HCG, URINE, POC: NEGATIVE
Lab: NORMAL
NEGATIVE QC PASS/FAIL: NORMAL
POSITIVE QC PASS/FAIL: NORMAL

## 2024-06-05 PROCEDURE — 6360000002 HC RX W HCPCS: Performed by: STUDENT IN AN ORGANIZED HEALTH CARE EDUCATION/TRAINING PROGRAM

## 2024-06-05 PROCEDURE — 99284 EMERGENCY DEPT VISIT MOD MDM: CPT

## 2024-06-05 PROCEDURE — 96374 THER/PROPH/DIAG INJ IV PUSH: CPT

## 2024-06-05 PROCEDURE — 82375 ASSAY CARBOXYHB QUANT: CPT

## 2024-06-05 PROCEDURE — 96375 TX/PRO/DX INJ NEW DRUG ADDON: CPT

## 2024-06-05 RX ORDER — DIPHENHYDRAMINE HCL 25 MG
25 CAPSULE ORAL EVERY 8 HOURS PRN
Qty: 9 CAPSULE | Refills: 0 | Status: SHIPPED | OUTPATIENT
Start: 2024-06-05 | End: 2024-06-08

## 2024-06-05 RX ORDER — KETOROLAC TROMETHAMINE 30 MG/ML
30 INJECTION, SOLUTION INTRAMUSCULAR; INTRAVENOUS ONCE
Status: COMPLETED | OUTPATIENT
Start: 2024-06-05 | End: 2024-06-05

## 2024-06-05 RX ORDER — DIPHENHYDRAMINE HYDROCHLORIDE 50 MG/ML
25 INJECTION INTRAMUSCULAR; INTRAVENOUS ONCE
Status: COMPLETED | OUTPATIENT
Start: 2024-06-05 | End: 2024-06-05

## 2024-06-05 RX ORDER — PROCHLORPERAZINE MALEATE 10 MG
10 TABLET ORAL EVERY 8 HOURS PRN
Qty: 9 TABLET | Refills: 0 | Status: SHIPPED | OUTPATIENT
Start: 2024-06-05 | End: 2024-06-08

## 2024-06-05 RX ADMIN — DIPHENHYDRAMINE HYDROCHLORIDE 25 MG: 50 INJECTION INTRAMUSCULAR; INTRAVENOUS at 18:41

## 2024-06-05 RX ADMIN — KETOROLAC TROMETHAMINE 30 MG: 30 INJECTION INTRAMUSCULAR; INTRAVENOUS at 18:41

## 2024-06-05 ASSESSMENT — PAIN - FUNCTIONAL ASSESSMENT: PAIN_FUNCTIONAL_ASSESSMENT: 0-10

## 2024-06-05 ASSESSMENT — PAIN DESCRIPTION - PAIN TYPE: TYPE: ACUTE PAIN

## 2024-06-05 ASSESSMENT — LIFESTYLE VARIABLES
HOW OFTEN DO YOU HAVE A DRINK CONTAINING ALCOHOL: MONTHLY OR LESS
HOW MANY STANDARD DRINKS CONTAINING ALCOHOL DO YOU HAVE ON A TYPICAL DAY: 1 OR 2

## 2024-06-05 ASSESSMENT — PAIN DESCRIPTION - LOCATION: LOCATION: HEAD

## 2024-06-05 ASSESSMENT — PAIN SCALES - GENERAL: PAINLEVEL_OUTOF10: 5

## 2024-06-05 ASSESSMENT — PAIN DESCRIPTION - DESCRIPTORS: DESCRIPTORS: THROBBING

## 2024-06-05 NOTE — ED PROVIDER NOTES
[facility] ED  Emergency Department Encounter  Emergency Medicine      Pt Name: Terrance Knapp  MRN:[id number]  Birthdate 1990  Date of evaluation: 24  PCP:  Zenaida Marques APRN - NP    CHIEF COMPLAINT       Chief Complaint   Patient presents with    Headache     Since this afternoon. Took 2 rapid release Tylenol and layed down but still there       HISTORY OF PRESENT ILLNESS  (Location/Symptom, Timing/Onset, Context/Setting, Quality, Duration, ModifyingFactors, Severity.)      Terrance Knapp is a 34 y.o. female with PMH of migraines presents with a headache.  Said it started yesterday.  Took some Tylenol with some relief.  She said it is feeling little bit better actually.  Said her mom has migraine headaches as well.  Patient is little sensitive to light.  Headache is not severe, not worse at onset, not thunderclap in nature.  Denies any dizziness or lightheadedness or fevers or cough or sore throat or neck or back pain, numbness or weakness.  Denies any falls not on blood thinners    Patient's family had similar symptoms with the fire department came out to the house and determined there was no carbon monoxide detected  PAST MEDICAL / SURGICAL / SOCIAL /FAMILY HISTORY      has a past medical history of Abnormal Pap smear of cervix, Anxiety, Asthma, Depression, DVT (deep venous thrombosis) ([facility]), Hypertension, Pre-diabetes, PTSD (post-traumatic stress disorder), and Schizoaffective disorder ([facility]).  No other pertinent PMH on review with patient/guardian.     has a past surgical history that includes Tonsillectomy and  section.  No other pertinent PSH on review with patient/guardian.  Social History     Socioeconomic History    Marital status: Legally      Spouse name: Darren Padilla    Number of children: 1    Years of education: 13    Highest education level: Not on file   Occupational History    Occupation: not working   Tobacco Use    Smoking status: Former     Current

## 2024-06-05 NOTE — ED TRIAGE NOTES
A & Ox4. Skin pink warm and dry. Pt complains of pain to both sides of her head. Denies any head trauma. Denies N/V/dizziness. States sensitive to light. Amb with steady gait. Pt states her mom brought her here.

## 2024-06-05 NOTE — DISCHARGE INSTRUCTIONS
Take Tylenol 1000 mg and ibuprofen 600 mg every 8 hours as needed for headache  You can use the Compazine as needed for nausea  Drink plenty of fluids    Smoking cigarettes or being around anyone that smokes cigarettes can cause constriction of the blood vessels in the brain which in turn can cause you to have further headaches.    For pain use acetaminophen (Tylenol) or ibuprofen (Motrin / Advil), unless prescribed medications that have acetaminophen or ibuprofen (or similar medications) in it.  You can take over the counter acetaminophen tablets (1 - 2 tablets of the 500-mg strength every 6 hours) or ibuprofen tablets (2 tablets every 4 hours).   Avoid taking narcotics for headaches (can cause a worse headache in several hours after taking the medication).  Make sure that you drink plenty of water or Gatorade (or similar solution) to keep yourself hydrated.    PLEASE RETURN TO THE EMERGENCY DEPARTMENT IMMEDIATELY for worsening symptoms, change in vision / hearing / taste, ringing in your ears, loss of sensation or difficulty moving your arms or legs, or if you develop any concerning symptoms such as: high fever not relieved by acetaminophen (Tylenol) and/or ibuprofen (Motrin / Advil), chills, shortness of breath, chest pain, feeling of your heart fluttering or racing, persistent nausea and/or vomiting, vomiting up blood, blood in your stool, numbness, loss of consciousness, weakness or tingling in the arms or legs or change in color of the extremities, changes in mental status, persistent headache, blurry vision, loss of bladder / bowel control, unable to follow up with your physician, or other any other care or concern

## 2024-06-05 NOTE — CARE COORDINATION
The patient states she has a pcp at [facility] but is not sure of the name of the provider at this time. She states that she will call and make herself a follow up appointment.

## 2024-06-10 ENCOUNTER — OFFICE VISIT (OUTPATIENT)
Dept: PULMONOLOGY | Age: 34
End: 2024-06-10
Payer: COMMERCIAL

## 2024-06-10 VITALS
BODY MASS INDEX: 43.43 KG/M2 | WEIGHT: 236 LBS | DIASTOLIC BLOOD PRESSURE: 86 MMHG | HEIGHT: 62 IN | RESPIRATION RATE: 17 BRPM | SYSTOLIC BLOOD PRESSURE: 126 MMHG

## 2024-06-10 DIAGNOSIS — R06.02 SHORTNESS OF BREATH: ICD-10-CM

## 2024-06-10 DIAGNOSIS — E66.01 MORBID OBESITY (HCC): ICD-10-CM

## 2024-06-10 DIAGNOSIS — G47.33 OSA (OBSTRUCTIVE SLEEP APNEA): Primary | ICD-10-CM

## 2024-06-10 PROCEDURE — 3074F SYST BP LT 130 MM HG: CPT | Performed by: INTERNAL MEDICINE

## 2024-06-10 PROCEDURE — G8417 CALC BMI ABV UP PARAM F/U: HCPCS | Performed by: INTERNAL MEDICINE

## 2024-06-10 PROCEDURE — 99214 OFFICE O/P EST MOD 30 MIN: CPT | Performed by: INTERNAL MEDICINE

## 2024-06-10 PROCEDURE — 1036F TOBACCO NON-USER: CPT | Performed by: INTERNAL MEDICINE

## 2024-06-10 PROCEDURE — G8427 DOCREV CUR MEDS BY ELIG CLIN: HCPCS | Performed by: INTERNAL MEDICINE

## 2024-06-10 PROCEDURE — 3079F DIAST BP 80-89 MM HG: CPT | Performed by: INTERNAL MEDICINE

## 2024-06-10 RX ORDER — LANOLIN ALCOHOL/MO/W.PET/CERES
1 CREAM (GRAM) TOPICAL NIGHTLY
COMMUNITY
Start: 2024-06-06

## 2024-06-10 ASSESSMENT — ENCOUNTER SYMPTOMS
VOICE CHANGE: 0
RHINORRHEA: 0
SHORTNESS OF BREATH: 1
VOMITING: 0
CHEST TIGHTNESS: 0
COUGH: 0
EYE ITCHING: 0
ABDOMINAL PAIN: 0
TROUBLE SWALLOWING: 0
SORE THROAT: 0
NAUSEA: 0
EYE DISCHARGE: 0
SINUS PRESSURE: 0
DIARRHEA: 0
WHEEZING: 0

## 2024-06-10 NOTE — PROGRESS NOTES
Subjective:             Terrance Knapp is a 34 y.o. female who complains today of:     Chief Complaint   Patient presents with    Follow-up     4m F/U       HPI  She had  BIPAP 12/8 cm , She said she is using at night for 8-9 hours.   She is complaining of snoring and daytime sleepiness and tiredness.  She does have restful sleep.   She does not take nap any more.  She does not drive.      She has shortness of breath with walk , long distance ,no  cough  and wheezing .   She is on albuterol HFA prn      She said she is trying to lose weight and if she continue to lose weight.  She does not want to have Bariatric surgery.     Allergies:  Food and Amoxicillin  Past Medical History:   Diagnosis Date    Abnormal Pap smear of cervix     Anxiety     Asthma     Depression     DVT (deep venous thrombosis) ([facility])     Hypertension     Pre-diabetes     PTSD (post-traumatic stress disorder)     Schizoaffective disorder (HCC)      Past Surgical History:   Procedure Laterality Date     SECTION      TONSILLECTOMY       Family History   Problem Relation Age of Onset    Diabetes Mother     Depression Father     Asthma Brother     Diabetes Maternal Grandmother     Breast Cancer Maternal Grandmother     Thyroid Cancer Maternal Grandfather     Diabetes Paternal Grandmother     Other Paternal Grandfather         testicular cancer     Social History     Socioeconomic History    Marital status: Legally      Spouse name: Darren Padilla    Number of children: 1    Years of education: 13    Highest education level: Not on file   Occupational History    Occupation: not working   Tobacco Use    Smoking status: Former     Current packs/day: 0.00     Average packs/day: 1 pack/day for 15.0 years (15.0 ttl pk-yrs)     Types: Cigarettes     Quit date: 2024     Years since quittin.3    Smokeless tobacco: Never    Tobacco comments:     1 ppd   Vaping Use    Vaping Use: Every day    Substances: Nicotine, THC, CBD, Flavoring

## 2024-06-26 ENCOUNTER — APPOINTMENT (OUTPATIENT)
Dept: RADIOLOGY | Facility: HOSPITAL | Age: 34
End: 2024-06-26
Payer: COMMERCIAL

## 2024-06-26 ENCOUNTER — APPOINTMENT (OUTPATIENT)
Dept: CARDIOLOGY | Facility: HOSPITAL | Age: 34
End: 2024-06-26
Payer: COMMERCIAL

## 2024-06-26 ENCOUNTER — HOSPITAL ENCOUNTER (EMERGENCY)
Facility: HOSPITAL | Age: 34
Discharge: HOME | End: 2024-06-27
Payer: COMMERCIAL

## 2024-06-26 DIAGNOSIS — Z87.09 HISTORY OF ASTHMA: ICD-10-CM

## 2024-06-26 DIAGNOSIS — J02.9 SORE THROAT: Primary | ICD-10-CM

## 2024-06-26 DIAGNOSIS — R03.0 ELEVATED BLOOD PRESSURE READING: ICD-10-CM

## 2024-06-26 LAB
ALBUMIN SERPL BCP-MCNC: 4 G/DL (ref 3.4–5)
ALP SERPL-CCNC: 60 U/L (ref 33–110)
ALT SERPL W P-5'-P-CCNC: 29 U/L (ref 7–45)
ANION GAP SERPL CALC-SCNC: 12 MMOL/L (ref 10–20)
AST SERPL W P-5'-P-CCNC: 15 U/L (ref 9–39)
B-HCG SERPL-ACNC: <2 MIU/ML
BASOPHILS # BLD AUTO: 0.1 X10*3/UL (ref 0–0.1)
BASOPHILS NFR BLD AUTO: 0.8 %
BILIRUB SERPL-MCNC: 0.3 MG/DL (ref 0–1.2)
BUN SERPL-MCNC: 11 MG/DL (ref 6–23)
CALCIUM SERPL-MCNC: 9.7 MG/DL (ref 8.6–10.3)
CARDIAC TROPONIN I PNL SERPL HS: 6 NG/L (ref 0–13)
CHLORIDE SERPL-SCNC: 105 MMOL/L (ref 98–107)
CO2 SERPL-SCNC: 26 MMOL/L (ref 21–32)
CREAT SERPL-MCNC: 0.66 MG/DL (ref 0.5–1.05)
EGFRCR SERPLBLD CKD-EPI 2021: >90 ML/MIN/1.73M*2
EOSINOPHIL # BLD AUTO: 0.43 X10*3/UL (ref 0–0.7)
EOSINOPHIL NFR BLD AUTO: 3.4 %
ERYTHROCYTE [DISTWIDTH] IN BLOOD BY AUTOMATED COUNT: 13.1 % (ref 11.5–14.5)
GLUCOSE SERPL-MCNC: 119 MG/DL (ref 74–99)
HCT VFR BLD AUTO: 39.7 % (ref 36–46)
HGB BLD-MCNC: 13.8 G/DL (ref 12–16)
IMM GRANULOCYTES # BLD AUTO: 0.04 X10*3/UL (ref 0–0.7)
IMM GRANULOCYTES NFR BLD AUTO: 0.3 % (ref 0–0.9)
LYMPHOCYTES # BLD AUTO: 5.34 X10*3/UL (ref 1.2–4.8)
LYMPHOCYTES NFR BLD AUTO: 42.2 %
MCH RBC QN AUTO: 29.4 PG (ref 26–34)
MCHC RBC AUTO-ENTMCNC: 34.8 G/DL (ref 32–36)
MCV RBC AUTO: 85 FL (ref 80–100)
MONOCYTES # BLD AUTO: 0.6 X10*3/UL (ref 0.1–1)
MONOCYTES NFR BLD AUTO: 4.7 %
NEUTROPHILS # BLD AUTO: 6.13 X10*3/UL (ref 1.2–7.7)
NEUTROPHILS NFR BLD AUTO: 48.6 %
NRBC BLD-RTO: 0 /100 WBCS (ref 0–0)
PLATELET # BLD AUTO: 323 X10*3/UL (ref 150–450)
POTASSIUM SERPL-SCNC: 3.4 MMOL/L (ref 3.5–5.3)
PROT SERPL-MCNC: 6.8 G/DL (ref 6.4–8.2)
RBC # BLD AUTO: 4.7 X10*6/UL (ref 4–5.2)
S PYO DNA THROAT QL NAA+PROBE: NOT DETECTED
SARS-COV-2 RNA RESP QL NAA+PROBE: NOT DETECTED
SODIUM SERPL-SCNC: 140 MMOL/L (ref 136–145)
WBC # BLD AUTO: 12.6 X10*3/UL (ref 4.4–11.3)

## 2024-06-26 PROCEDURE — 36415 COLL VENOUS BLD VENIPUNCTURE: CPT

## 2024-06-26 PROCEDURE — 94640 AIRWAY INHALATION TREATMENT: CPT

## 2024-06-26 PROCEDURE — 93005 ELECTROCARDIOGRAM TRACING: CPT

## 2024-06-26 PROCEDURE — 87635 SARS-COV-2 COVID-19 AMP PRB: CPT

## 2024-06-26 PROCEDURE — 84484 ASSAY OF TROPONIN QUANT: CPT

## 2024-06-26 PROCEDURE — 80053 COMPREHEN METABOLIC PANEL: CPT

## 2024-06-26 PROCEDURE — 87651 STREP A DNA AMP PROBE: CPT

## 2024-06-26 PROCEDURE — 71046 X-RAY EXAM CHEST 2 VIEWS: CPT | Mod: FOREIGN READ | Performed by: RADIOLOGY

## 2024-06-26 PROCEDURE — 84702 CHORIONIC GONADOTROPIN TEST: CPT

## 2024-06-26 PROCEDURE — 2500000002 HC RX 250 W HCPCS SELF ADMINISTERED DRUGS (ALT 637 FOR MEDICARE OP, ALT 636 FOR OP/ED)

## 2024-06-26 PROCEDURE — 99283 EMERGENCY DEPT VISIT LOW MDM: CPT | Mod: 25

## 2024-06-26 PROCEDURE — 2500000004 HC RX 250 GENERAL PHARMACY W/ HCPCS (ALT 636 FOR OP/ED)

## 2024-06-26 PROCEDURE — 85025 COMPLETE CBC W/AUTO DIFF WBC: CPT

## 2024-06-26 PROCEDURE — 71046 X-RAY EXAM CHEST 2 VIEWS: CPT

## 2024-06-26 RX ORDER — IPRATROPIUM BROMIDE AND ALBUTEROL SULFATE 2.5; .5 MG/3ML; MG/3ML
3 SOLUTION RESPIRATORY (INHALATION) ONCE
Status: COMPLETED | OUTPATIENT
Start: 2024-06-26 | End: 2024-06-26

## 2024-06-26 RX ORDER — POTASSIUM CHLORIDE 20 MEQ/1
40 TABLET, EXTENDED RELEASE ORAL ONCE
Status: COMPLETED | OUTPATIENT
Start: 2024-06-26 | End: 2024-06-26

## 2024-06-26 RX ORDER — LORATADINE 10 MG/1
10 TABLET ORAL DAILY
Status: DISCONTINUED | OUTPATIENT
Start: 2024-06-26 | End: 2024-06-27 | Stop reason: HOSPADM

## 2024-06-26 RX ORDER — ACETAMINOPHEN 325 MG/1
650 TABLET ORAL ONCE
Status: COMPLETED | OUTPATIENT
Start: 2024-06-26 | End: 2024-06-26

## 2024-06-26 RX ADMIN — POTASSIUM CHLORIDE 40 MEQ: 1500 TABLET, EXTENDED RELEASE ORAL at 23:05

## 2024-06-26 RX ADMIN — ACETAMINOPHEN 650 MG: 325 TABLET ORAL at 22:19

## 2024-06-26 RX ADMIN — IPRATROPIUM BROMIDE AND ALBUTEROL SULFATE 3 ML: 2.5; .5 SOLUTION RESPIRATORY (INHALATION) at 22:22

## 2024-06-26 RX ADMIN — LORATADINE 10 MG: 10 TABLET ORAL at 22:19

## 2024-06-26 ASSESSMENT — COLUMBIA-SUICIDE SEVERITY RATING SCALE - C-SSRS
2. HAVE YOU ACTUALLY HAD ANY THOUGHTS OF KILLING YOURSELF?: NO
6. HAVE YOU EVER DONE ANYTHING, STARTED TO DO ANYTHING, OR PREPARED TO DO ANYTHING TO END YOUR LIFE?: NO
1. IN THE PAST MONTH, HAVE YOU WISHED YOU WERE DEAD OR WISHED YOU COULD GO TO SLEEP AND NOT WAKE UP?: NO

## 2024-06-26 ASSESSMENT — PAIN SCALES - GENERAL
PAINLEVEL_OUTOF10: 0 - NO PAIN
PAINLEVEL_OUTOF10: 0 - NO PAIN

## 2024-06-26 ASSESSMENT — PAIN DESCRIPTION - PAIN TYPE: TYPE: ACUTE PAIN

## 2024-06-26 ASSESSMENT — PAIN - FUNCTIONAL ASSESSMENT: PAIN_FUNCTIONAL_ASSESSMENT: 0-10

## 2024-06-27 VITALS
HEART RATE: 80 BPM | TEMPERATURE: 97.3 F | WEIGHT: 235 LBS | OXYGEN SATURATION: 97 % | HEIGHT: 62 IN | RESPIRATION RATE: 18 BRPM | BODY MASS INDEX: 43.24 KG/M2 | DIASTOLIC BLOOD PRESSURE: 78 MMHG | SYSTOLIC BLOOD PRESSURE: 162 MMHG

## 2024-06-27 LAB — CARDIAC TROPONIN I PNL SERPL HS: 5 NG/L (ref 0–13)

## 2024-06-27 RX ORDER — FLUTICASONE PROPIONATE 50 MCG
1 SPRAY, SUSPENSION (ML) NASAL DAILY
Qty: 16 G | Refills: 0 | Status: SHIPPED | OUTPATIENT
Start: 2024-06-26 | End: 2024-07-26

## 2024-06-27 RX ORDER — CETIRIZINE HYDROCHLORIDE 10 MG/1
10 TABLET ORAL DAILY
Qty: 14 TABLET | Refills: 0 | Status: SHIPPED | OUTPATIENT
Start: 2024-06-26 | End: 2024-07-10

## 2024-06-27 RX ORDER — ALBUTEROL SULFATE 90 UG/1
1-2 AEROSOL, METERED RESPIRATORY (INHALATION) EVERY 6 HOURS PRN
Qty: 18 G | Refills: 0 | Status: SHIPPED | OUTPATIENT
Start: 2024-06-27 | End: 2024-07-27

## 2024-06-27 ASSESSMENT — PAIN SCALES - GENERAL: PAINLEVEL_OUTOF10: 0 - NO PAIN

## 2024-06-27 ASSESSMENT — PAIN - FUNCTIONAL ASSESSMENT: PAIN_FUNCTIONAL_ASSESSMENT: 0-10

## 2024-06-27 NOTE — ED PROVIDER NOTES
HPI   Chief Complaint   Patient presents with    Flu Symptoms     My ears have been popping, feels like fluid is in there, I have a cough, a scratchy throat, and my chest feels tight       34-year-old female presents the ED today with a chief concern of flulike symptoms.  She states that for the past 2 to 3 days she has had itchy/scratchy throat, ear popping, rhinorrhea, itchy watery eyes.  She states that she also has cough with clear sputum production.  She states that she has been using tea and honey for symptoms as needed.  Has history of asthma.  States that she feels slightly short of breath.  Has used her albuterol inhaler once.  She feels like she needs a breathing treatment.  She said that show some slight chest discomfort located in the center of the chest.  Denies neck, arm, or jaw pain.  States the pain is worse in the chest when she coughs.  Denies syncope.  Denies abdominal pain.  Denies fever/chills, nausea/vomiting.  Denies any recent travel or surgeries.  Denies hemoptysis.  Denies history of PE or DVT.  Denies leg swelling or calf pain.  No headache or bodyaches.  Has no other symptoms or concerns at this time.  Denies suicidal or homicidal ideation.  Denies visual or auditory hallucination.      History provided by:  Patient   used: No                        [place] Coma Scale Score: 15                     Patient History   Past Medical History:   Diagnosis Date    Personal history of diseases of the blood and blood-forming organs and certain disorders involving the immune mechanism 08/14/2020    History of bleeding disorder    Personal history of other endocrine, nutritional and metabolic disease 08/14/2020    History of diabetes mellitus    Unspecified abnormalities of breathing 08/14/2020    Breathing problem    Unspecified visual loss 08/14/2020    Vision problems     Past Surgical History:   Procedure Laterality Date    OTHER SURGICAL HISTORY  08/14/2020    Tonsillectomy     OTHER SURGICAL HISTORY  2020     section     No family history on file.  Social History     Tobacco Use    Smoking status: Not on file    Smokeless tobacco: Not on file   Substance Use Topics    Alcohol use: Not on file    Drug use: Not on file       Physical Exam   ED Triage Vitals [24 2136]   Temperature Heart Rate Respirations BP   36.3 °C (97.3 °F) 88 18 (!) 173/99      Pulse Ox Temp Source Heart Rate Source Patient Position   98 % Temporal Monitor Sitting      BP Location FiO2 (%)     Right arm --       Physical Exam  Gen.: Vitals noted no distress afebrile. Normal phonation, no stridor, no trismus. Patient is handling secretions well without any tripod positioning or drooling.  ENT: TMs clear bilaterally, EACs unremarkable. Mastoids nontender. Posterior oropharynx without erythema, exudate, or swelling. Uvula is in the midline and nonedematous. No Cortez's Angina.   Neck: Supple. No meningismus through full range of motion. No lymphadenopathy.   Cardiac: Regular rate rhythm no murmur.  No reproducible chest wall tenderness.  No zoster rash.  Lungs: Good aeration throughout. No adventitious breath sounds.   Abdomen: Soft nontender nonsurgical throughout  Extremities: No peripheral edema. extremities are moist with good skin turgor.  No calf tenderness.  Skin: No rash.   Neuro: No focal neurologic deficits. cranial nerves II-XII grossly intact.     ED Course & MDM   ED Course as of 24 0026      2307 Ventricular rate 95 bpm.  NC interval 150 ms.  QRS duration 72 ms.  QT/QTc 350/439 ms.  Patient is in normal sinus rhythm at a ventricular rate of 95 beats per minute.  Normal axis.  There is good R wave progression.  No right or left bundle-branch block.  No ST elevations or T wave inversions.  Moderate artifact present.  Compared with multiple previous EKGs grossly unchanged. [MC]   2332 BC shows no evidence of leukocytosis or anemia.  CMP shows no CORNELIO or acute liver  injury.  Potassium was low at 3.4 so she was given 40 mill equivalents of potassium in the ED.  hCG, COVID, group A strep negative. []   2332 Initial troponin is 6 []   Thu Jun 27, 2024   0016 IMPRESSION:  No acute pulmonary abnormality.  Signed by Federico Wood MD   []   0016 Repeat troponin 6 [MC]      ED Course User Index  [] Jose Antonio Solis, PA-JOSE         Diagnoses as of 06/27/24 0026   Elevated blood pressure reading   Sore throat   History of asthma       Medical Decision Making  34-year-old female presents the ED today with a chief concern of flulike symptoms.  Vital signs reassuring.  Patient overall appears well and is nontoxic-appearing.  Was given Claritin, DuoNeb, and Tylenol in the ED. patient has full range of motion of the neck without any meningismus.  Satting on room air.  Not hypoxic.  Not tachycardic.  Afebrile.  Blood pressure improved throughout the ED visit.  She is neuro intact. Labs overall reassuring slightly leukocytosis with white blood cell count of 12.6.  CMP shows no CORNELIO or acute liver injury.  Potassium was 3.4 so she was given 40 mill equivalents of potassium in the ED.  Initial troponin 6.  Repeat troponin 5.  hCG normal.  Group A strep and COVID-negative.  Her EKG shows no ischemic changes and troponins are stable.  Do not think further workup regarding admission is indicated at this time.  Patient feeling much better after administration of medications in the ED.  She has 2+ symmetric pulses throughout.  No abdominal pulsatile mass.  No concern for AAA at this time.  No zoster rash.  Has clear reproducible musculoskeletal tenderness on exam.  Chest comfort only really occurs when coughing.  Chest x-ray shows no pneumonia or pneumothorax.  I have low suspicion for PE at this time.  PERC is 0.  Do not think further workup with D-dimer is indicated at this time.  COVID and group A strep negative.  The itchy throat is really bothersome to her.  No signs of PTA or retropharyngeal  abscess.  No signs of meningitis.  Symptoms likely related to allergies versus viral syndrome.  Will treat outpatient with Tylenol, cetirizine, Flonase, and albuterol.  Discussed use and possible side effects of these medications.  Discussed impression and findings with patient she feels comfortable returning home.  We discussed very strict return precautions include returning for any new or worsening signs return.  Patient is in agreement with this plan.  She will follow-up with her PCP within 3 days.  Again discussed strict return precautions.    Differential diagnosis: Allergies, RSV, COVID, influenza, group A strep, PTA, retropharyngeal abscess, pneumonia, meningitis ACS, dissection, tamponade, pneumothorax, PE, Boerhaave syndrome    Disposition/treatment  1.  Sore throat  2.  Elevated blood pressure reading  2.  History of asthma    Shared decision-making was used patient feels comfortable returning home     Patient was advised to follow up with recommended provider in 1 day1 for another evaluation and exam. I advised patient/guardian to return or go to closest emergency room immediately if symptoms change, get worse, new symptoms develop prior to follow up. If there is no improvement in symptoms in the next 24 hours they are advised to return for further evaluation and exam. I also explained the plan and treatment course. Patient/guardian is in agreement with plan, treatment course, and follow up and states verbally that they will comply.    Homegoing. I discussed the differential; results and discharge plan with the patient and/or family/friend/caregiver if present.  I emphasized the importance of follow-up with the physician I referred them to in the timeframe recommended.  I explained reasons for the patient to return to the Emergency Department. They agreed that if they feel their condition is worsening or if they have any other concern they should call 911 immediately for further assistance. I gave the  patient an opportunity to ask all questions they had and answered all of them accordingly. They understand return precautions and discharge instructions. The patient and/or family/friend/caregiver expressed understanding verbally and that they would comply.        This note has been transcribed using voice recognition and may contain grammatical errors, misplaced words, incorrect words, incorrect phrases or other errors.        Procedure  Procedures     Jose Antonio Solis PA-C  06/27/24 0026       Jose Antonio Solis PA-C  06/27/24 0028

## 2024-06-29 LAB
ATRIAL RATE: 95 BPM
P AXIS: 40 DEGREES
P OFFSET: 189 MS
P ONSET: 146 MS
PR INTERVAL: 150 MS
Q ONSET: 221 MS
QRS COUNT: 16 BEATS
QRS DURATION: 72 MS
QT INTERVAL: 350 MS
QTC CALCULATION(BAZETT): 439 MS
QTC FREDERICIA: 407 MS
R AXIS: 30 DEGREES
T AXIS: 56 DEGREES
T OFFSET: 396 MS
VENTRICULAR RATE: 95 BPM

## 2024-07-25 ENCOUNTER — OFFICE VISIT (OUTPATIENT)
Dept: ENDOCRINOLOGY | Age: 34
End: 2024-07-25
Payer: COMMERCIAL

## 2024-07-25 VITALS
HEART RATE: 65 BPM | BODY MASS INDEX: 42.88 KG/M2 | HEIGHT: 62 IN | DIASTOLIC BLOOD PRESSURE: 63 MMHG | SYSTOLIC BLOOD PRESSURE: 99 MMHG | WEIGHT: 233 LBS | OXYGEN SATURATION: 97 %

## 2024-07-25 DIAGNOSIS — E88.819 INSULIN RESISTANCE: Primary | ICD-10-CM

## 2024-07-25 DIAGNOSIS — E28.2 PCOS (POLYCYSTIC OVARIAN SYNDROME): ICD-10-CM

## 2024-07-25 PROCEDURE — 99213 OFFICE O/P EST LOW 20 MIN: CPT | Performed by: INTERNAL MEDICINE

## 2024-07-25 PROCEDURE — G8417 CALC BMI ABV UP PARAM F/U: HCPCS | Performed by: INTERNAL MEDICINE

## 2024-07-25 PROCEDURE — G8427 DOCREV CUR MEDS BY ELIG CLIN: HCPCS | Performed by: INTERNAL MEDICINE

## 2024-07-25 PROCEDURE — 3078F DIAST BP <80 MM HG: CPT | Performed by: INTERNAL MEDICINE

## 2024-07-25 PROCEDURE — 1036F TOBACCO NON-USER: CPT | Performed by: INTERNAL MEDICINE

## 2024-07-25 PROCEDURE — 3074F SYST BP LT 130 MM HG: CPT | Performed by: INTERNAL MEDICINE

## 2024-07-25 RX ORDER — BLOOD SUGAR DIAGNOSTIC
STRIP MISCELLANEOUS
Qty: 100 EACH | Refills: 3 | Status: SHIPPED | OUTPATIENT
Start: 2024-07-25

## 2024-07-25 RX ORDER — METFORMIN HYDROCHLORIDE 500 MG/1
TABLET, EXTENDED RELEASE ORAL
Qty: 120 TABLET | Refills: 3 | Status: SHIPPED | OUTPATIENT
Start: 2024-07-25

## 2024-07-25 RX ORDER — UBIQUINOL 100 MG
CAPSULE ORAL
Qty: 100 EACH | Refills: 1 | Status: SHIPPED | OUTPATIENT
Start: 2024-07-25

## 2024-07-25 RX ORDER — LANCETS 33 GAUGE
EACH MISCELLANEOUS
Qty: 100 EACH | Refills: 3 | Status: SHIPPED | OUTPATIENT
Start: 2024-07-25

## 2024-07-25 RX ORDER — DULAGLUTIDE 1.5 MG/.5ML
1.5 INJECTION, SOLUTION SUBCUTANEOUS WEEKLY
Qty: 4 ADJUSTABLE DOSE PRE-FILLED PEN SYRINGE | Refills: 3 | Status: SHIPPED | OUTPATIENT
Start: 2024-07-25

## 2024-07-25 NOTE — PROGRESS NOTES
7/25/2024    Assessment:       Diagnosis Orders   1. Insulin resistance        2. PCOS (polycystic ovarian syndrome)              PLAN:     Orders Placed This Encounter   Procedures    Basic Metabolic Panel     Standing Status:   Future     Standing Expiration Date:   7/25/2025    Hemoglobin A1C     Standing Status:   Future     Standing Expiration Date:   7/25/2025     Orders Placed This Encounter   Medications    metFORMIN (GLUCOPHAGE-XR) 500 MG extended release tablet     Sig: TAKE 2 TABLETS BY MOUTH 2 TIMES DAILY (BEFORE MEALS)     Dispense:  120 tablet     Refill:  3    Alcohol Swabs (ALCOHOL PREP) 70 % PADS     Sig: bid     Dispense:  100 each     Refill:  1    dulaglutide (TRULICITY) 1.5 MG/0.5ML SC injection     Sig: Inject 0.5 mLs into the skin once a week     Dispense:  4 Adjustable Dose Pre-filled Pen Syringe     Refill:  3       Subjective:     Chief Complaint   Patient presents with     Insulin resistance      Vitals:    07/25/24 1526   BP: 99/63   Pulse: 65   SpO2: 97%   Weight: 105.7 kg (233 lb)   Height: 1.575 m (5' 2.01\")     Wt Readings from Last 3 Encounters:   07/25/24 105.7 kg (233 lb)   06/10/24 107 kg (236 lb)   06/05/24 108.9 kg (240 lb)     BP Readings from Last 3 Encounters:   07/25/24 99/63   06/10/24 126/86   06/05/24 (!) 144/104     Open morbid obesity history of insulin resistance polycystic ovary syndrome no recent labs to review patient on Trulicity 1.5 mg once a week plus metformin 1000 mg twice a day has lost some weight does not want bariatric surgery at this timeo    Other  This is a chronic (Polycystic ovary syndrome) problem. The current episode started more than 1 year ago. The problem has been waxing and waning. Treatments tried: Metformin Trulicity. The treatment provided mild relief.     Past Medical History:   Diagnosis Date    Abnormal Pap smear of cervix     Anxiety     Asthma     Depression     DVT (deep venous thrombosis) (HCC)     Hypertension     Pre-diabetes     PTSD

## 2024-08-28 DIAGNOSIS — E88.819 INSULIN RESISTANCE: ICD-10-CM

## 2024-08-28 DIAGNOSIS — E28.2 PCOS (POLYCYSTIC OVARIAN SYNDROME): ICD-10-CM

## 2024-08-28 LAB
ANION GAP SERPL CALCULATED.3IONS-SCNC: 15 MEQ/L (ref 9–15)
BUN SERPL-MCNC: 7 MG/DL (ref 6–20)
CALCIUM SERPL-MCNC: 9.5 MG/DL (ref 8.5–9.9)
CHLORIDE SERPL-SCNC: 100 MEQ/L (ref 95–107)
CO2 SERPL-SCNC: 24 MEQ/L (ref 20–31)
CREAT SERPL-MCNC: 0.61 MG/DL (ref 0.5–0.9)
GLUCOSE SERPL-MCNC: 151 MG/DL (ref 70–99)
POTASSIUM SERPL-SCNC: 3.3 MEQ/L (ref 3.4–4.9)
SODIUM SERPL-SCNC: 139 MEQ/L (ref 135–144)

## 2024-08-29 LAB
ESTIMATED AVERAGE GLUCOSE: 100 MG/DL
HBA1C MFR BLD: 5.1 % (ref 4–6)

## 2024-08-31 LAB — INSULIN HUMAN AB SER-ACNC: <0.4 U/ML (ref 0–0.4)

## 2024-09-18 DIAGNOSIS — E88.819 INSULIN RESISTANCE: ICD-10-CM

## 2024-09-18 RX ORDER — DULAGLUTIDE 1.5 MG/.5ML
1.5 INJECTION, SOLUTION SUBCUTANEOUS WEEKLY
Qty: 4 ADJUSTABLE DOSE PRE-FILLED PEN SYRINGE | Refills: 3 | Status: SHIPPED | OUTPATIENT
Start: 2024-09-18

## 2024-09-26 ENCOUNTER — OFFICE VISIT (OUTPATIENT)
Dept: OBGYN CLINIC | Age: 34
End: 2024-09-26
Payer: COMMERCIAL

## 2024-09-26 VITALS
WEIGHT: 223 LBS | DIASTOLIC BLOOD PRESSURE: 70 MMHG | SYSTOLIC BLOOD PRESSURE: 114 MMHG | BODY MASS INDEX: 40.78 KG/M2 | HEART RATE: 88 BPM

## 2024-09-26 DIAGNOSIS — N94.6 DYSMENORRHEA: ICD-10-CM

## 2024-09-26 DIAGNOSIS — Z01.419 WOMEN'S ANNUAL ROUTINE GYNECOLOGICAL EXAMINATION: Primary | ICD-10-CM

## 2024-09-26 DIAGNOSIS — Z30.41 ENCOUNTER FOR SURVEILLANCE OF CONTRACEPTIVE PILLS: ICD-10-CM

## 2024-09-26 PROCEDURE — 3078F DIAST BP <80 MM HG: CPT | Performed by: ADVANCED PRACTICE MIDWIFE

## 2024-09-26 PROCEDURE — 3074F SYST BP LT 130 MM HG: CPT | Performed by: ADVANCED PRACTICE MIDWIFE

## 2024-09-26 PROCEDURE — 99395 PREV VISIT EST AGE 18-39: CPT | Performed by: ADVANCED PRACTICE MIDWIFE

## 2024-09-26 RX ORDER — ACETAMINOPHEN AND CODEINE PHOSPHATE 120; 12 MG/5ML; MG/5ML
0.35 SOLUTION ORAL DAILY
Qty: 90 TABLET | Refills: 3 | Status: SHIPPED | OUTPATIENT
Start: 2024-09-26 | End: 2025-09-26

## 2024-09-26 ASSESSMENT — ENCOUNTER SYMPTOMS
RHINORRHEA: 0
SORE THROAT: 0
SHORTNESS OF BREATH: 0
CONSTIPATION: 0
TROUBLE SWALLOWING: 0
VOMITING: 0
ABDOMINAL PAIN: 0
NAUSEA: 0
COUGH: 0
DIARRHEA: 0
VOICE CHANGE: 0

## 2024-10-31 ENCOUNTER — OFFICE VISIT (OUTPATIENT)
Dept: PULMONOLOGY | Age: 34
End: 2024-10-31
Payer: COMMERCIAL

## 2024-10-31 VITALS
SYSTOLIC BLOOD PRESSURE: 108 MMHG | BODY MASS INDEX: 41.14 KG/M2 | HEART RATE: 83 BPM | WEIGHT: 225 LBS | OXYGEN SATURATION: 98 % | DIASTOLIC BLOOD PRESSURE: 72 MMHG

## 2024-10-31 DIAGNOSIS — G47.33 OSA (OBSTRUCTIVE SLEEP APNEA): Primary | ICD-10-CM

## 2024-10-31 DIAGNOSIS — R06.02 SHORTNESS OF BREATH: ICD-10-CM

## 2024-10-31 DIAGNOSIS — E66.01 MORBID OBESITY: ICD-10-CM

## 2024-10-31 PROCEDURE — G8417 CALC BMI ABV UP PARAM F/U: HCPCS | Performed by: INTERNAL MEDICINE

## 2024-10-31 PROCEDURE — G8427 DOCREV CUR MEDS BY ELIG CLIN: HCPCS | Performed by: INTERNAL MEDICINE

## 2024-10-31 PROCEDURE — 3078F DIAST BP <80 MM HG: CPT | Performed by: INTERNAL MEDICINE

## 2024-10-31 PROCEDURE — 3074F SYST BP LT 130 MM HG: CPT | Performed by: INTERNAL MEDICINE

## 2024-10-31 PROCEDURE — 99214 OFFICE O/P EST MOD 30 MIN: CPT | Performed by: INTERNAL MEDICINE

## 2024-10-31 PROCEDURE — G8484 FLU IMMUNIZE NO ADMIN: HCPCS | Performed by: INTERNAL MEDICINE

## 2024-10-31 PROCEDURE — 1036F TOBACCO NON-USER: CPT | Performed by: INTERNAL MEDICINE

## 2024-10-31 NOTE — PROGRESS NOTES
modification.    3. Shortness of breath  She having  Chronic shortness of breath which is chronic and unchanged, continue  Bronchodilator as before. keep  Spo2 90% or above.She is on albuterol HFA prn.  She has shortness of breath with walk , long distance , no cough and wheezing     . Return in about 4 months (around 2/28/2025) for linda.      Shant Rooney MD

## 2024-11-09 ENCOUNTER — APPOINTMENT (OUTPATIENT)
Dept: RADIOLOGY | Facility: HOSPITAL | Age: 34
End: 2024-11-09
Payer: COMMERCIAL

## 2024-11-09 ENCOUNTER — HOSPITAL ENCOUNTER (EMERGENCY)
Facility: HOSPITAL | Age: 34
Discharge: HOME | End: 2024-11-09
Attending: STUDENT IN AN ORGANIZED HEALTH CARE EDUCATION/TRAINING PROGRAM
Payer: COMMERCIAL

## 2024-11-09 VITALS
WEIGHT: 220 LBS | OXYGEN SATURATION: 98 % | RESPIRATION RATE: 16 BRPM | TEMPERATURE: 97.9 F | HEART RATE: 95 BPM | SYSTOLIC BLOOD PRESSURE: 136 MMHG | BODY MASS INDEX: 40.48 KG/M2 | DIASTOLIC BLOOD PRESSURE: 75 MMHG | HEIGHT: 62 IN

## 2024-11-09 DIAGNOSIS — R42 LIGHTHEADED: ICD-10-CM

## 2024-11-09 DIAGNOSIS — R11.0 NAUSEA: ICD-10-CM

## 2024-11-09 DIAGNOSIS — R19.7 DIARRHEA, UNSPECIFIED TYPE: Primary | ICD-10-CM

## 2024-11-09 LAB
ALBUMIN SERPL BCP-MCNC: 3.6 G/DL (ref 3.4–5)
ALP SERPL-CCNC: 62 U/L (ref 33–110)
ALT SERPL W P-5'-P-CCNC: 21 U/L (ref 7–45)
AMORPH CRY #/AREA UR COMP ASSIST: ABNORMAL /HPF
ANION GAP SERPL CALC-SCNC: 13 MMOL/L (ref 10–20)
APPEARANCE UR: ABNORMAL
AST SERPL W P-5'-P-CCNC: 14 U/L (ref 9–39)
BACTERIA #/AREA URNS AUTO: ABNORMAL /HPF
BASOPHILS # BLD AUTO: 0.13 X10*3/UL (ref 0–0.1)
BASOPHILS NFR BLD AUTO: 1.1 %
BILIRUB SERPL-MCNC: 0.3 MG/DL (ref 0–1.2)
BILIRUB UR STRIP.AUTO-MCNC: NEGATIVE MG/DL
BNP SERPL-MCNC: 27 PG/ML (ref 0–99)
BUN SERPL-MCNC: 7 MG/DL (ref 6–23)
CALCIUM SERPL-MCNC: 8.9 MG/DL (ref 8.6–10.3)
CARDIAC TROPONIN I PNL SERPL HS: 3 NG/L (ref 0–13)
CHLORIDE SERPL-SCNC: 107 MMOL/L (ref 98–107)
CO2 SERPL-SCNC: 24 MMOL/L (ref 21–32)
COLOR UR: YELLOW
CREAT SERPL-MCNC: 0.64 MG/DL (ref 0.5–1.05)
EGFRCR SERPLBLD CKD-EPI 2021: >90 ML/MIN/1.73M*2
EOSINOPHIL # BLD AUTO: 0.53 X10*3/UL (ref 0–0.7)
EOSINOPHIL NFR BLD AUTO: 4.3 %
ERYTHROCYTE [DISTWIDTH] IN BLOOD BY AUTOMATED COUNT: 12 % (ref 11.5–14.5)
FLUAV RNA RESP QL NAA+PROBE: NOT DETECTED
FLUBV RNA RESP QL NAA+PROBE: NOT DETECTED
GLUCOSE SERPL-MCNC: 79 MG/DL (ref 74–99)
GLUCOSE UR STRIP.AUTO-MCNC: NORMAL MG/DL
HCG UR QL IA.RAPID: NEGATIVE
HCT VFR BLD AUTO: 37.6 % (ref 36–46)
HGB BLD-MCNC: 12.9 G/DL (ref 12–16)
HYALINE CASTS #/AREA URNS AUTO: ABNORMAL /LPF
IMM GRANULOCYTES # BLD AUTO: 0.02 X10*3/UL (ref 0–0.7)
IMM GRANULOCYTES NFR BLD AUTO: 0.2 % (ref 0–0.9)
KETONES UR STRIP.AUTO-MCNC: ABNORMAL MG/DL
LEUKOCYTE ESTERASE UR QL STRIP.AUTO: NEGATIVE
LIPASE SERPL-CCNC: 58 U/L (ref 9–82)
LYMPHOCYTES # BLD AUTO: 5.92 X10*3/UL (ref 1.2–4.8)
LYMPHOCYTES NFR BLD AUTO: 48.2 %
MAGNESIUM SERPL-MCNC: 1.79 MG/DL (ref 1.6–2.4)
MCH RBC QN AUTO: 29.1 PG (ref 26–34)
MCHC RBC AUTO-ENTMCNC: 34.3 G/DL (ref 32–36)
MCV RBC AUTO: 85 FL (ref 80–100)
MONOCYTES # BLD AUTO: 0.83 X10*3/UL (ref 0.1–1)
MONOCYTES NFR BLD AUTO: 6.8 %
MUCOUS THREADS #/AREA URNS AUTO: ABNORMAL /LPF
NEUTROPHILS # BLD AUTO: 4.84 X10*3/UL (ref 1.2–7.7)
NEUTROPHILS NFR BLD AUTO: 39.4 %
NITRITE UR QL STRIP.AUTO: NEGATIVE
NRBC BLD-RTO: 0 /100 WBCS (ref 0–0)
PH UR STRIP.AUTO: 5.5 [PH]
PLATELET # BLD AUTO: 300 X10*3/UL (ref 150–450)
POTASSIUM SERPL-SCNC: 3.7 MMOL/L (ref 3.5–5.3)
PROT SERPL-MCNC: 5.5 G/DL (ref 6.4–8.2)
PROT UR STRIP.AUTO-MCNC: ABNORMAL MG/DL
RBC # BLD AUTO: 4.43 X10*6/UL (ref 4–5.2)
RBC # UR STRIP.AUTO: ABNORMAL /UL
RBC #/AREA URNS AUTO: ABNORMAL /HPF
SARS-COV-2 RNA RESP QL NAA+PROBE: NOT DETECTED
SODIUM SERPL-SCNC: 140 MMOL/L (ref 136–145)
SP GR UR STRIP.AUTO: 1.03
SQUAMOUS #/AREA URNS AUTO: ABNORMAL /HPF
UROBILINOGEN UR STRIP.AUTO-MCNC: NORMAL MG/DL
WBC # BLD AUTO: 12.3 X10*3/UL (ref 4.4–11.3)
WBC #/AREA URNS AUTO: ABNORMAL /HPF

## 2024-11-09 PROCEDURE — 83880 ASSAY OF NATRIURETIC PEPTIDE: CPT | Performed by: STUDENT IN AN ORGANIZED HEALTH CARE EDUCATION/TRAINING PROGRAM

## 2024-11-09 PROCEDURE — 96374 THER/PROPH/DIAG INJ IV PUSH: CPT

## 2024-11-09 PROCEDURE — 2500000004 HC RX 250 GENERAL PHARMACY W/ HCPCS (ALT 636 FOR OP/ED): Performed by: STUDENT IN AN ORGANIZED HEALTH CARE EDUCATION/TRAINING PROGRAM

## 2024-11-09 PROCEDURE — 83690 ASSAY OF LIPASE: CPT | Performed by: STUDENT IN AN ORGANIZED HEALTH CARE EDUCATION/TRAINING PROGRAM

## 2024-11-09 PROCEDURE — 84484 ASSAY OF TROPONIN QUANT: CPT | Performed by: STUDENT IN AN ORGANIZED HEALTH CARE EDUCATION/TRAINING PROGRAM

## 2024-11-09 PROCEDURE — 85025 COMPLETE CBC W/AUTO DIFF WBC: CPT | Performed by: STUDENT IN AN ORGANIZED HEALTH CARE EDUCATION/TRAINING PROGRAM

## 2024-11-09 PROCEDURE — 36415 COLL VENOUS BLD VENIPUNCTURE: CPT | Performed by: STUDENT IN AN ORGANIZED HEALTH CARE EDUCATION/TRAINING PROGRAM

## 2024-11-09 PROCEDURE — 93005 ELECTROCARDIOGRAM TRACING: CPT

## 2024-11-09 PROCEDURE — 71045 X-RAY EXAM CHEST 1 VIEW: CPT | Performed by: RADIOLOGY

## 2024-11-09 PROCEDURE — 83735 ASSAY OF MAGNESIUM: CPT | Performed by: STUDENT IN AN ORGANIZED HEALTH CARE EDUCATION/TRAINING PROGRAM

## 2024-11-09 PROCEDURE — 71045 X-RAY EXAM CHEST 1 VIEW: CPT

## 2024-11-09 PROCEDURE — 80053 COMPREHEN METABOLIC PANEL: CPT | Performed by: STUDENT IN AN ORGANIZED HEALTH CARE EDUCATION/TRAINING PROGRAM

## 2024-11-09 PROCEDURE — 81001 URINALYSIS AUTO W/SCOPE: CPT | Performed by: STUDENT IN AN ORGANIZED HEALTH CARE EDUCATION/TRAINING PROGRAM

## 2024-11-09 PROCEDURE — 87636 SARSCOV2 & INF A&B AMP PRB: CPT | Performed by: STUDENT IN AN ORGANIZED HEALTH CARE EDUCATION/TRAINING PROGRAM

## 2024-11-09 PROCEDURE — 99284 EMERGENCY DEPT VISIT MOD MDM: CPT | Mod: 25

## 2024-11-09 PROCEDURE — 81025 URINE PREGNANCY TEST: CPT | Performed by: STUDENT IN AN ORGANIZED HEALTH CARE EDUCATION/TRAINING PROGRAM

## 2024-11-09 RX ORDER — ONDANSETRON HYDROCHLORIDE 2 MG/ML
4 INJECTION, SOLUTION INTRAVENOUS ONCE
Status: COMPLETED | OUTPATIENT
Start: 2024-11-09 | End: 2024-11-09

## 2024-11-09 RX ORDER — ONDANSETRON 4 MG/1
4 TABLET, ORALLY DISINTEGRATING ORAL EVERY 8 HOURS PRN
Qty: 9 TABLET | Refills: 0 | Status: SHIPPED | OUTPATIENT
Start: 2024-11-09 | End: 2024-11-12

## 2024-11-09 ASSESSMENT — PAIN - FUNCTIONAL ASSESSMENT: PAIN_FUNCTIONAL_ASSESSMENT: 0-10

## 2024-11-09 ASSESSMENT — PAIN SCALES - GENERAL: PAINLEVEL_OUTOF10: 0 - NO PAIN

## 2024-11-10 ENCOUNTER — APPOINTMENT (OUTPATIENT)
Dept: CARDIOLOGY | Facility: HOSPITAL | Age: 34
End: 2024-11-10
Payer: COMMERCIAL

## 2024-11-10 LAB — HOLD SPECIMEN: NORMAL

## 2024-11-10 NOTE — ED PROVIDER NOTES
"HPI   Chief Complaint   Patient presents with    multiple medical complaints     \"Off and on diarrhea and some dizziness, and my period is wonky\"       Patient is a 34-year-old female history of bipolar disorder, anxiety, schizoaffective, diabetes who presents to the emergency department for complaints of intermittent lightheadedness, diarrhea, decreased menstruation.  Patient denies any fevers, chills, vision changes, chest pain, palpitations, difficulty breathing, productive cough, nausea, vomiting, dysuria, hematuria, lower extremity swelling, syncope, falls or traumatic injuries.      History provided by:  Patient          Patient History   Past Medical History:   Diagnosis Date    Personal history of diseases of the blood and blood-forming organs and certain disorders involving the immune mechanism 2020    History of bleeding disorder    Personal history of other endocrine, nutritional and metabolic disease 2020    History of diabetes mellitus    Unspecified abnormalities of breathing 2020    Breathing problem    Unspecified visual loss 2020    Vision problems     Past Surgical History:   Procedure Laterality Date    OTHER SURGICAL HISTORY  2020    Tonsillectomy    OTHER SURGICAL HISTORY  2020     section     No family history on file.  Social History     Tobacco Use    Smoking status: Not on file    Smokeless tobacco: Not on file   Substance Use Topics    Alcohol use: Not on file    Drug use: Not on file       Physical Exam   ED Triage Vitals [24 2132]   Temperature Heart Rate Respirations BP   36.6 °C (97.9 °F) 95 16 136/75      Pulse Ox Temp Source Heart Rate Source Patient Position   98 % Temporal Monitor Sitting      BP Location FiO2 (%)     Right arm --       Physical Exam  Vitals and nursing note reviewed.   Constitutional:       General: She is not in acute distress.     Appearance: Normal appearance. She is not ill-appearing, toxic-appearing or " diaphoretic.   HENT:      Head: Normocephalic and atraumatic.      Nose: Nose normal.      Mouth/Throat:      Mouth: Mucous membranes are moist.      Pharynx: No oropharyngeal exudate or posterior oropharyngeal erythema.   Eyes:      General: No scleral icterus.     Extraocular Movements: Extraocular movements intact.      Pupils: Pupils are equal, round, and reactive to light.   Cardiovascular:      Rate and Rhythm: Normal rate and regular rhythm.      Pulses: Normal pulses.      Heart sounds: Normal heart sounds. No murmur heard.     No friction rub. No gallop.   Pulmonary:      Effort: Pulmonary effort is normal. No respiratory distress.      Breath sounds: Normal breath sounds. No stridor. No wheezing, rhonchi or rales.   Chest:      Chest wall: No tenderness.   Abdominal:      General: Abdomen is flat. There is no distension.      Palpations: Abdomen is soft. There is no mass.      Tenderness: There is no abdominal tenderness. There is no guarding.      Hernia: No hernia is present.   Musculoskeletal:         General: No swelling, tenderness, deformity or signs of injury. Normal range of motion.      Cervical back: Normal range of motion and neck supple. No rigidity.   Skin:     General: Skin is warm and dry.      Capillary Refill: Capillary refill takes less than 2 seconds.      Coloration: Skin is not jaundiced or pale.      Findings: No bruising, erythema, lesion or rash.   Neurological:      General: No focal deficit present.      Mental Status: She is alert and oriented to person, place, and time. Mental status is at baseline.   Psychiatric:         Mood and Affect: Mood normal.         Behavior: Behavior normal.           ED Course & MDM   Diagnoses as of 11/09/24 2313   Diarrhea, unspecified type   Lightheaded   Nausea                 No data recorded     [place] Coma Scale Score: 15 (11/09/24 2132 : Deja Youngblood RN)                           Medical Decision Making  Patient is a 34-year-old female  presenting to the emergency department complaints of lightheadedness, diarrhea, decreased menstruation.  Patient is denying any pain currently.  Patient has no concerning triage vital sign findings.  Patient has an overall unremarkable physical exam.  Labs, chest x-ray, EKG were ordered for further evaluation.    Labs reviewed and unremarkable.  BNP is normal.  Troponin is negative.  Pregnancy is negative.  No nitrates or leukocyte esterase noted.  Patient does have 2+ bacteria but some squamous cells are present.  She is not having any urinary symptoms.  Will defer to culture.  Chest x-ray shows no acute cardiopulmonary processes.  Patient is not currently not dizzy or lightheaded here in the emergency department.  She ambulated without difficulty.  Patient was advised of diarrhea treatment at home and given prescriptions for Zofran due to her nausea.  Patient vies follow-up with her doctors and given return precautions.  Patient verbalized understanding of all instructions given to her.  Patient was patient care and agreeable to discharge.    Amount and/or Complexity of Data Reviewed  Labs: ordered. Decision-making details documented in ED Course.  Radiology: ordered. Decision-making details documented in ED Course.  ECG/medicine tests: independent interpretation performed.     Details: Sinus rhythm with a ventricular rate of 90 bpm.  QRS interval 76 ms.  QTc 469 ms.  Normal axis.  No acute ischemic injury pattern appreciated.      Labs Reviewed   CBC WITH AUTO DIFFERENTIAL - Abnormal       Result Value    WBC 12.3 (*)     nRBC 0.0      RBC 4.43      Hemoglobin 12.9      Hematocrit 37.6      MCV 85      MCH 29.1      MCHC 34.3      RDW 12.0      Platelets 300      Neutrophils % 39.4      Immature Granulocytes %, Automated 0.2      Lymphocytes % 48.2      Monocytes % 6.8      Eosinophils % 4.3      Basophils % 1.1      Neutrophils Absolute 4.84      Immature Granulocytes Absolute, Automated 0.02      Lymphocytes  Absolute 5.92 (*)     Monocytes Absolute 0.83      Eosinophils Absolute 0.53      Basophils Absolute 0.13 (*)    COMPREHENSIVE METABOLIC PANEL - Abnormal    Glucose 79      Sodium 140      Potassium 3.7      Chloride 107      Bicarbonate 24      Anion Gap 13      Urea Nitrogen 7      Creatinine 0.64      eGFR >90      Calcium 8.9      Albumin 3.6      Alkaline Phosphatase 62      Total Protein 5.5 (*)     AST 14      Bilirubin, Total 0.3      ALT 21     URINALYSIS WITH REFLEX CULTURE AND MICROSCOPIC - Abnormal    Color, Urine Yellow      Appearance, Urine Turbid (*)     Specific Gravity, Urine 1.032      pH, Urine 5.5      Protein, Urine 20 (TRACE)      Glucose, Urine Normal      Blood, Urine 0.2 (2+) (*)     Ketones, Urine TRACE (*)     Bilirubin, Urine NEGATIVE      Urobilinogen, Urine Normal      Nitrite, Urine NEGATIVE      Leukocyte Esterase, Urine NEGATIVE     URINALYSIS MICROSCOPIC WITH REFLEX CULTURE - Abnormal    WBC, Urine 1-5      RBC, Urine 3-5      Squamous Epithelial Cells, Urine 10-25 (FEW)      Bacteria, Urine 2+ (*)     Mucus, Urine 1+      Hyaline Casts, Urine OCCASIONAL (*)     Amorphous Crystals, Urine 2+     MAGNESIUM - Normal    Magnesium 1.79     B-TYPE NATRIURETIC PEPTIDE - Normal    BNP 27      Narrative:        <100 pg/mL - Heart failure unlikely  100-299 pg/mL - Intermediate probability of acute heart                  failure exacerbation. Correlate with clinical                  context and patient history.    >=300 pg/mL - Heart Failure likely. Correlate with clinical                  context and patient history.    BNP testing is performed using different testing methodology at [facility] than at other [facility]. Direct result comparisons should only be made within the same method.      LIPASE - Normal    Lipase 58      Narrative:     Venipuncture immediately after or during the administration of Metamizole may lead to falsely low results. Testing should be  performed immediately prior to Metamizole dosing.   HCG, URINE, QUALITATIVE - Normal    HCG, Urine NEGATIVE     SERIAL TROPONIN-INITIAL - Normal    Troponin I, High Sensitivity 3      Narrative:     Less than 99th percentile of normal range cutoff-  Female and children under 18 years old <14 ng/L; Male <21 ng/L: Negative  Repeat testing should be performed if clinically indicated.     Female and children under 18 years old 14-50 ng/L; Male 21-50 ng/L:  Consistent with possible cardiac damage and possible increased clinical   risk. Serial measurements may help to assess extent of myocardial damage.     >50 ng/L: Consistent with cardiac damage, increased clinical risk and  myocardial infarction. Serial measurements may help assess extent of   myocardial damage.      NOTE: Children less than 1 year old may have higher baseline troponin   levels and results should be interpreted in conjunction with the overall   clinical context.     NOTE: Troponin I testing is performed using a different   testing methodology at [facility] than at other   [facility]. Direct result comparisons should only   be made within the same method.   TROPONIN SERIES- (INITIAL, 1 HR)    Narrative:     The following orders were created for panel order Troponin I Series, High Sensitivity (0, 1 HR).  Procedure                               Abnormality         Status                     ---------                               -----------         ------                     Troponin I, High Sensiti...[[id number]]  Normal              Final result               Troponin, High Sensitivi...[[id number]]                                                   Please view results for these tests on the individual orders.   SARS-COV-2 PCR   URINALYSIS WITH REFLEX CULTURE AND MICROSCOPIC    Narrative:     The following orders were created for panel order Urinalysis with Reflex Culture and Microscopic.  Procedure                                Abnormality         Status                     ---------                               -----------         ------                     Urinalysis with Reflex C...[[id number]]  Abnormal            Final result               Extra Urine Gray Tube[[id number]]                            In process                   Please view results for these tests on the individual orders.   INFLUENZA A AND B PCR   EXTRA URINE GRAY TUBE   SERIAL TROPONIN, 1 HOUR     XR chest 1 view   Final Result   1. No acute cardiopulmonary abnormality.             Signed by: Yaron oD 11/9/2024 10:36 PM   Dictation workstation:   [id number]            Procedure  Procedures     Kalyan Luna DO  11/09/24 2315

## 2024-11-16 LAB
ATRIAL RATE: 90 BPM
P AXIS: 18 DEGREES
P OFFSET: 184 MS
P ONSET: 138 MS
PR INTERVAL: 158 MS
Q ONSET: 217 MS
QRS COUNT: 15 BEATS
QRS DURATION: 76 MS
QT INTERVAL: 384 MS
QTC CALCULATION(BAZETT): 469 MS
QTC FREDERICIA: 439 MS
R AXIS: 47 DEGREES
T AXIS: 54 DEGREES
T OFFSET: 409 MS
VENTRICULAR RATE: 90 BPM

## 2024-11-20 ENCOUNTER — OFFICE VISIT (OUTPATIENT)
Dept: OBGYN CLINIC | Age: 34
End: 2024-11-20
Payer: COMMERCIAL

## 2024-11-20 VITALS
HEART RATE: 96 BPM | DIASTOLIC BLOOD PRESSURE: 70 MMHG | SYSTOLIC BLOOD PRESSURE: 118 MMHG | BODY MASS INDEX: 40.41 KG/M2 | WEIGHT: 221 LBS

## 2024-11-20 DIAGNOSIS — N91.1 SECONDARY AMENORRHEA: ICD-10-CM

## 2024-11-20 DIAGNOSIS — N94.6 DYSMENORRHEA: Primary | ICD-10-CM

## 2024-11-20 DIAGNOSIS — Z30.41 ENCOUNTER FOR SURVEILLANCE OF CONTRACEPTIVE PILLS: ICD-10-CM

## 2024-11-20 PROCEDURE — 1036F TOBACCO NON-USER: CPT | Performed by: ADVANCED PRACTICE MIDWIFE

## 2024-11-20 PROCEDURE — G8427 DOCREV CUR MEDS BY ELIG CLIN: HCPCS | Performed by: ADVANCED PRACTICE MIDWIFE

## 2024-11-20 PROCEDURE — 3078F DIAST BP <80 MM HG: CPT | Performed by: ADVANCED PRACTICE MIDWIFE

## 2024-11-20 PROCEDURE — G8417 CALC BMI ABV UP PARAM F/U: HCPCS | Performed by: ADVANCED PRACTICE MIDWIFE

## 2024-11-20 PROCEDURE — 3074F SYST BP LT 130 MM HG: CPT | Performed by: ADVANCED PRACTICE MIDWIFE

## 2024-11-20 PROCEDURE — G8484 FLU IMMUNIZE NO ADMIN: HCPCS | Performed by: ADVANCED PRACTICE MIDWIFE

## 2024-11-20 PROCEDURE — 99212 OFFICE O/P EST SF 10 MIN: CPT | Performed by: ADVANCED PRACTICE MIDWIFE

## 2024-11-20 RX ORDER — DULAGLUTIDE 1.5 MG/.5ML
1.5 INJECTION, SOLUTION SUBCUTANEOUS WEEKLY
COMMUNITY
Start: 2024-10-17 | End: 2024-11-22 | Stop reason: SDUPTHER

## 2024-11-20 ASSESSMENT — ENCOUNTER SYMPTOMS
ABDOMINAL PAIN: 0
CONSTIPATION: 0
DIARRHEA: 0
NAUSEA: 0
COUGH: 0
VOMITING: 0
SHORTNESS OF BREATH: 0

## 2024-11-20 NOTE — PROGRESS NOTES
Status: She is alert and oriented to person, place, and time. Mental status is at baseline.   Psychiatric:         Mood and Affect: Mood normal.         Behavior: Behavior normal.       ASSESSMENT & PLAN:   Diagnosis Orders   1. Dysmenorrhea        2. Encounter for surveillance of contraceptive pills        3. Secondary amenorrhea  HCG, Quantitative, Pregnancy          Dysmenorrhea  Utilizing hormonal contraception to relieve uncomfortable menstrual symptoms.    Progesterone Only Pills Surveillance  Continue with Micronor    Secondary Amenorrhea  Check HCG as needed to ensure pregnancy has not occurred.      Return if symptoms worsen or fail to improve.    ALEXANDR Chatman - YANA

## 2024-11-22 ENCOUNTER — OFFICE VISIT (OUTPATIENT)
Dept: ENDOCRINOLOGY | Age: 34
End: 2024-11-22
Payer: COMMERCIAL

## 2024-11-22 VITALS
BODY MASS INDEX: 41.22 KG/M2 | HEART RATE: 89 BPM | DIASTOLIC BLOOD PRESSURE: 72 MMHG | SYSTOLIC BLOOD PRESSURE: 110 MMHG | HEIGHT: 62 IN | WEIGHT: 224 LBS | OXYGEN SATURATION: 97 %

## 2024-11-22 DIAGNOSIS — E66.01 MORBID OBESITY: ICD-10-CM

## 2024-11-22 DIAGNOSIS — E88.819 INSULIN RESISTANCE: Primary | ICD-10-CM

## 2024-11-22 DIAGNOSIS — E78.2 MIXED HYPERLIPIDEMIA: ICD-10-CM

## 2024-11-22 DIAGNOSIS — E28.2 PCOS (POLYCYSTIC OVARIAN SYNDROME): ICD-10-CM

## 2024-11-22 PROCEDURE — 1036F TOBACCO NON-USER: CPT | Performed by: INTERNAL MEDICINE

## 2024-11-22 PROCEDURE — 99213 OFFICE O/P EST LOW 20 MIN: CPT | Performed by: INTERNAL MEDICINE

## 2024-11-22 PROCEDURE — 3078F DIAST BP <80 MM HG: CPT | Performed by: INTERNAL MEDICINE

## 2024-11-22 PROCEDURE — 3074F SYST BP LT 130 MM HG: CPT | Performed by: INTERNAL MEDICINE

## 2024-11-22 PROCEDURE — G8427 DOCREV CUR MEDS BY ELIG CLIN: HCPCS | Performed by: INTERNAL MEDICINE

## 2024-11-22 PROCEDURE — G8417 CALC BMI ABV UP PARAM F/U: HCPCS | Performed by: INTERNAL MEDICINE

## 2024-11-22 PROCEDURE — G8484 FLU IMMUNIZE NO ADMIN: HCPCS | Performed by: INTERNAL MEDICINE

## 2024-11-22 RX ORDER — METFORMIN HYDROCHLORIDE 500 MG/1
TABLET, EXTENDED RELEASE ORAL
Qty: 120 TABLET | Refills: 5 | Status: SHIPPED | OUTPATIENT
Start: 2024-11-22

## 2024-11-22 RX ORDER — BLOOD SUGAR DIAGNOSTIC
STRIP MISCELLANEOUS
Qty: 100 EACH | Refills: 3 | Status: SHIPPED | OUTPATIENT
Start: 2024-11-22

## 2024-11-22 RX ORDER — ATORVASTATIN CALCIUM 10 MG/1
TABLET, FILM COATED ORAL
Qty: 30 TABLET | Refills: 3 | Status: SHIPPED | OUTPATIENT
Start: 2024-11-22

## 2024-11-22 RX ORDER — UBIQUINOL 100 MG
CAPSULE ORAL
Qty: 100 EACH | Refills: 1 | Status: SHIPPED | OUTPATIENT
Start: 2024-11-22

## 2024-11-22 RX ORDER — LANCETS 30 GAUGE
1 EACH MISCELLANEOUS DAILY
Qty: 100 EACH | Refills: 1 | Status: SHIPPED | OUTPATIENT
Start: 2024-11-22

## 2024-11-22 NOTE — PROGRESS NOTES
2024    Assessment:       Diagnosis Orders   1. Insulin resistance  Basic Metabolic Panel    Hemoglobin A1C    metFORMIN (GLUCOPHAGE-XR) 500 MG extended release tablet    Alcohol Swabs (ALCOHOL PREP) 70 % PADS    Lancets MISC    Lipid Panel      2. PCOS (polycystic ovarian syndrome)  metFORMIN (GLUCOPHAGE-XR) 500 MG extended release tablet    Lipid Panel      3. Morbid obesity  Lipid Panel      4. Mixed hyperlipidemia              PLAN:       Orders Placed This Encounter   Procedures    Basic Metabolic Panel     Standing Status:   Future     Standing Expiration Date:   2025    Hemoglobin A1C     Standing Status:   Future     Standing Expiration Date:   2025    Lipid Panel     Standing Status:   Future     Standing Expiration Date:   2025     Orders Placed This Encounter   Medications    metFORMIN (GLUCOPHAGE-XR) 500 MG extended release tablet     Sig: TAKE 2 TABLETS BY MOUTH 2 TIMES DAILY (BEFORE MEALS)     Dispense:  120 tablet     Refill:  5    blood glucose test strips (ONETOUCH VERIO) strip     Sig: Test 3x daily     Dispense:  100 each     Refill:  3    Alcohol Swabs (ALCOHOL PREP) 70 % PADS     Sig: bid     Dispense:  100 each     Refill:  1    Lancets MISC     Si each by Does not apply route daily     Dispense:  100 each     Refill:  1    atorvastatin (LIPITOR) 10 MG tablet     Sig: TAKE 1 TABLET BY MOUTH EVERY DAY FOR HIGH CHOLESTEROL     Dispense:  30 tablet     Refill:  3       Subjective:     Chief Complaint   Patient presents with    Insulin resistance     Polycystic Ovarian Syndrome     Vitals:    24 1312   BP: 110/72   Site: Right Upper Arm   Position: Sitting   Cuff Size: Large Adult   Pulse: 89   SpO2: 97%   Weight: 101.6 kg (224 lb)   Height: 1.575 m (5' 2.01\")     Wt Readings from Last 3 Encounters:   24 101.6 kg (224 lb)   24 100.2 kg (221 lb)   10/31/24 102.1 kg (225 lb)     BP Readings from Last 3 Encounters:   24 110/72   24 118/70

## 2025-01-02 ENCOUNTER — APPOINTMENT (OUTPATIENT)
Dept: RADIOLOGY | Facility: HOSPITAL | Age: 35
End: 2025-01-02
Payer: COMMERCIAL

## 2025-01-02 ENCOUNTER — HOSPITAL ENCOUNTER (EMERGENCY)
Facility: HOSPITAL | Age: 35
Discharge: HOME | End: 2025-01-03
Payer: COMMERCIAL

## 2025-01-02 DIAGNOSIS — M54.50 ACUTE BILATERAL LOW BACK PAIN WITHOUT SCIATICA: Primary | ICD-10-CM

## 2025-01-02 LAB
APPEARANCE UR: ABNORMAL
BACTERIA #/AREA URNS AUTO: ABNORMAL /HPF
BILIRUB UR STRIP.AUTO-MCNC: NEGATIVE MG/DL
CAOX CRY #/AREA UR COMP ASSIST: ABNORMAL /HPF
COLOR UR: YELLOW
GLUCOSE UR STRIP.AUTO-MCNC: NORMAL MG/DL
HYALINE CASTS #/AREA URNS AUTO: ABNORMAL /LPF
KETONES UR STRIP.AUTO-MCNC: NEGATIVE MG/DL
LEUKOCYTE ESTERASE UR QL STRIP.AUTO: NEGATIVE
MUCOUS THREADS #/AREA URNS AUTO: ABNORMAL /LPF
NITRITE UR QL STRIP.AUTO: NEGATIVE
PH UR STRIP.AUTO: 5.5 [PH]
PROT UR STRIP.AUTO-MCNC: ABNORMAL MG/DL
RBC # UR STRIP.AUTO: NEGATIVE /UL
RBC #/AREA URNS AUTO: ABNORMAL /HPF
SP GR UR STRIP.AUTO: 1.03
SQUAMOUS #/AREA URNS AUTO: ABNORMAL /HPF
TRANS CELLS #/AREA UR COMP ASSIST: ABNORMAL /HPF
UROBILINOGEN UR STRIP.AUTO-MCNC: NORMAL MG/DL
WBC #/AREA URNS AUTO: ABNORMAL /HPF

## 2025-01-02 PROCEDURE — 99284 EMERGENCY DEPT VISIT MOD MDM: CPT

## 2025-01-02 PROCEDURE — 36415 COLL VENOUS BLD VENIPUNCTURE: CPT

## 2025-01-02 PROCEDURE — 81001 URINALYSIS AUTO W/SCOPE: CPT

## 2025-01-02 PROCEDURE — 84702 CHORIONIC GONADOTROPIN TEST: CPT

## 2025-01-02 PROCEDURE — 80053 COMPREHEN METABOLIC PANEL: CPT

## 2025-01-02 PROCEDURE — 85025 COMPLETE CBC W/AUTO DIFF WBC: CPT

## 2025-01-02 ASSESSMENT — PAIN SCALES - GENERAL: PAINLEVEL_OUTOF10: 0 - NO PAIN

## 2025-01-02 ASSESSMENT — LIFESTYLE VARIABLES
TOTAL SCORE: 0
HAVE PEOPLE ANNOYED YOU BY CRITICIZING YOUR DRINKING: NO
EVER FELT BAD OR GUILTY ABOUT YOUR DRINKING: NO
EVER HAD A DRINK FIRST THING IN THE MORNING TO STEADY YOUR NERVES TO GET RID OF A HANGOVER: NO
HAVE YOU EVER FELT YOU SHOULD CUT DOWN ON YOUR DRINKING: NO

## 2025-01-02 ASSESSMENT — PAIN - FUNCTIONAL ASSESSMENT: PAIN_FUNCTIONAL_ASSESSMENT: 0-10

## 2025-01-02 ASSESSMENT — PAIN DESCRIPTION - LOCATION: LOCATION: BACK

## 2025-01-02 ASSESSMENT — PAIN DESCRIPTION - PAIN TYPE: TYPE: ACUTE PAIN

## 2025-01-02 ASSESSMENT — PAIN DESCRIPTION - ORIENTATION: ORIENTATION: LOWER

## 2025-01-02 ASSESSMENT — PAIN DESCRIPTION - FREQUENCY: FREQUENCY: CONSTANT/CONTINUOUS

## 2025-01-03 ENCOUNTER — APPOINTMENT (OUTPATIENT)
Dept: RADIOLOGY | Facility: HOSPITAL | Age: 35
End: 2025-01-03
Payer: COMMERCIAL

## 2025-01-03 VITALS
TEMPERATURE: 96.8 F | BODY MASS INDEX: 40.48 KG/M2 | SYSTOLIC BLOOD PRESSURE: 116 MMHG | RESPIRATION RATE: 16 BRPM | OXYGEN SATURATION: 97 % | DIASTOLIC BLOOD PRESSURE: 78 MMHG | HEIGHT: 62 IN | HEART RATE: 88 BPM | WEIGHT: 220 LBS

## 2025-01-03 LAB
ALBUMIN SERPL BCP-MCNC: 4.1 G/DL (ref 3.4–5)
ALP SERPL-CCNC: 62 U/L (ref 33–110)
ALT SERPL W P-5'-P-CCNC: 37 U/L (ref 7–45)
ANION GAP SERPL CALC-SCNC: 13 MMOL/L (ref 10–20)
AST SERPL W P-5'-P-CCNC: 19 U/L (ref 9–39)
B-HCG SERPL-ACNC: <2 MIU/ML
BASOPHILS # BLD AUTO: 0.1 X10*3/UL (ref 0–0.1)
BASOPHILS NFR BLD AUTO: 0.8 %
BILIRUB SERPL-MCNC: 0.5 MG/DL (ref 0–1.2)
BUN SERPL-MCNC: 9 MG/DL (ref 6–23)
CALCIUM SERPL-MCNC: 9.3 MG/DL (ref 8.6–10.3)
CHLORIDE SERPL-SCNC: 109 MMOL/L (ref 98–107)
CO2 SERPL-SCNC: 21 MMOL/L (ref 21–32)
CREAT SERPL-MCNC: 0.83 MG/DL (ref 0.5–1.05)
EGFRCR SERPLBLD CKD-EPI 2021: >90 ML/MIN/1.73M*2
EOSINOPHIL # BLD AUTO: 0.35 X10*3/UL (ref 0–0.7)
EOSINOPHIL NFR BLD AUTO: 2.8 %
ERYTHROCYTE [DISTWIDTH] IN BLOOD BY AUTOMATED COUNT: 12.4 % (ref 11.5–14.5)
GLUCOSE SERPL-MCNC: 86 MG/DL (ref 74–99)
HCT VFR BLD AUTO: 44.5 % (ref 36–46)
HGB BLD-MCNC: 15.3 G/DL (ref 12–16)
HOLD SPECIMEN: NORMAL
IMM GRANULOCYTES # BLD AUTO: 0.03 X10*3/UL (ref 0–0.7)
IMM GRANULOCYTES NFR BLD AUTO: 0.2 % (ref 0–0.9)
LYMPHOCYTES # BLD AUTO: 5.68 X10*3/UL (ref 1.2–4.8)
LYMPHOCYTES NFR BLD AUTO: 45.2 %
MCH RBC QN AUTO: 29.1 PG (ref 26–34)
MCHC RBC AUTO-ENTMCNC: 34.4 G/DL (ref 32–36)
MCV RBC AUTO: 85 FL (ref 80–100)
MONOCYTES # BLD AUTO: 0.89 X10*3/UL (ref 0.1–1)
MONOCYTES NFR BLD AUTO: 7.1 %
NEUTROPHILS # BLD AUTO: 5.51 X10*3/UL (ref 1.2–7.7)
NEUTROPHILS NFR BLD AUTO: 43.9 %
NRBC BLD-RTO: 0 /100 WBCS (ref 0–0)
PLATELET # BLD AUTO: 356 X10*3/UL (ref 150–450)
POTASSIUM SERPL-SCNC: 4 MMOL/L (ref 3.5–5.3)
PROT SERPL-MCNC: 6.7 G/DL (ref 6.4–8.2)
RBC # BLD AUTO: 5.26 X10*6/UL (ref 4–5.2)
SODIUM SERPL-SCNC: 139 MMOL/L (ref 136–145)
WBC # BLD AUTO: 12.6 X10*3/UL (ref 4.4–11.3)

## 2025-01-03 PROCEDURE — 96372 THER/PROPH/DIAG INJ SC/IM: CPT

## 2025-01-03 PROCEDURE — 74176 CT ABD & PELVIS W/O CONTRAST: CPT | Mod: RCN | Performed by: RADIOLOGY

## 2025-01-03 PROCEDURE — 72131 CT LUMBAR SPINE W/O DYE: CPT | Mod: RCN | Performed by: RADIOLOGY

## 2025-01-03 PROCEDURE — 72131 CT LUMBAR SPINE W/O DYE: CPT | Mod: RCN

## 2025-01-03 PROCEDURE — 74176 CT ABD & PELVIS W/O CONTRAST: CPT

## 2025-01-03 PROCEDURE — 2500000004 HC RX 250 GENERAL PHARMACY W/ HCPCS (ALT 636 FOR OP/ED)

## 2025-01-03 RX ORDER — PREDNISONE 10 MG/1
20 TABLET ORAL 2 TIMES DAILY
Qty: 20 TABLET | Refills: 0 | Status: SHIPPED | OUTPATIENT
Start: 2025-01-03 | End: 2025-01-08

## 2025-01-03 RX ORDER — NAPROXEN SODIUM 550 MG/1
550 TABLET ORAL
Qty: 14 TABLET | Refills: 0 | Status: SHIPPED | OUTPATIENT
Start: 2025-01-03 | End: 2025-01-10

## 2025-01-03 RX ORDER — CYCLOBENZAPRINE HCL 10 MG
10 TABLET ORAL 2 TIMES DAILY PRN
Qty: 10 TABLET | Refills: 0 | Status: SHIPPED | OUTPATIENT
Start: 2025-01-03 | End: 2025-01-08

## 2025-01-03 RX ORDER — PREDNISONE 10 MG/1
20 TABLET ORAL 2 TIMES DAILY
Qty: 20 TABLET | Refills: 0 | Status: SHIPPED | OUTPATIENT
Start: 2025-01-03 | End: 2025-01-03

## 2025-01-03 RX ORDER — KETOROLAC TROMETHAMINE 30 MG/ML
30 INJECTION, SOLUTION INTRAMUSCULAR; INTRAVENOUS ONCE
Status: DISCONTINUED | OUTPATIENT
Start: 2025-01-03 | End: 2025-01-03

## 2025-01-03 RX ORDER — KETOROLAC TROMETHAMINE 30 MG/ML
30 INJECTION, SOLUTION INTRAMUSCULAR; INTRAVENOUS ONCE
Status: COMPLETED | OUTPATIENT
Start: 2025-01-03 | End: 2025-01-03

## 2025-01-03 RX ORDER — CYCLOBENZAPRINE HCL 10 MG
10 TABLET ORAL 2 TIMES DAILY PRN
Qty: 10 TABLET | Refills: 0 | Status: SHIPPED | OUTPATIENT
Start: 2025-01-03 | End: 2025-01-03

## 2025-01-03 RX ORDER — LIDOCAINE 560 MG/1
1 PATCH PERCUTANEOUS; TOPICAL; TRANSDERMAL DAILY
Qty: 5 PATCH | Refills: 0 | Status: SHIPPED | OUTPATIENT
Start: 2025-01-03 | End: 2025-01-03

## 2025-01-03 RX ORDER — NAPROXEN SODIUM 550 MG/1
550 TABLET ORAL
Qty: 14 TABLET | Refills: 0 | Status: SHIPPED | OUTPATIENT
Start: 2025-01-03 | End: 2025-01-03

## 2025-01-03 RX ORDER — LIDOCAINE 560 MG/1
1 PATCH PERCUTANEOUS; TOPICAL; TRANSDERMAL DAILY
Qty: 5 PATCH | Refills: 0 | Status: SHIPPED | OUTPATIENT
Start: 2025-01-03 | End: 2025-01-08

## 2025-01-03 RX ADMIN — KETOROLAC TROMETHAMINE 30 MG: 30 INJECTION, SOLUTION INTRAMUSCULAR at 02:13

## 2025-01-03 ASSESSMENT — PAIN DESCRIPTION - ORIENTATION: ORIENTATION: LOWER

## 2025-01-03 ASSESSMENT — PAIN SCALES - GENERAL: PAINLEVEL_OUTOF10: 3

## 2025-01-03 ASSESSMENT — PAIN DESCRIPTION - LOCATION: LOCATION: BACK

## 2025-01-03 NOTE — ED PROVIDER NOTES
HPI   No chief complaint on file.        History provided by:  Patient    Limitations to history:  none    CC: back pain    HPI: 34-year-old female with a history of diabetes presents the emergency department to be evaluated for low back pain.  She presents via EMS.  Patient states that her pain started 2 days ago.  She localized to the right lower back and now it is spread across to her back to the left lower back as well.  Denies any injury that would exacerbate her symptoms.  Denies history of low back problems in the past.  Denies saddle anesthesia, urinary tension, stool incontinence.  Denies history of IV drug use, recent spinal injection, history of cancer.  Denies fever and chills.  She has been using topical IcyHot and ibuprofen with little to no relief.  States that the pain is worse with palpation and movement.  Denies history of repetitive motions.  Denies upper back pain, chest pain, shortness of breath, pleuritic pain or hemoptysis.  Denies history of heart or lung disease including ACS, arrhythmia, heart failure, DVT or PE, asthma.  Denies urgency, frequency, dysuria.  Denies flank pain.  Denies abdominal pain or pelvic pain.  Denies vaginal bleeding or discharge.  Denies nausea vomiting, diarrhea or constipation.  Denies any flulike symptoms.  She is still able to ambulate given her discomfort.  Denies all other systemic symptoms.    ROS: Negative unless mentioned in HPI    Medical Hx:    Allergy to amoxicillin.  Immunizations are up-to-date.      Physical exam:    Constitutional: Patient is well-nourished and well-developed.  Sitting comfortably in the room and in no distress.  Oriented to person, place, time, and situation.    HEENT: Head is normocephalic, atraumatic. Patient's airway is patent.  Tympanic membranes are clear bilaterally.  Nasal mucosa clear.  Mouth with normal mucosa.  Throat is not erythematous and there are no oropharyngeal exudates, uvula is midline.  No obvious facial  deformities.    Eyes: Clear bilaterally.  Pupils are equal round and reactive to light and accommodation.  Extraocular movements intact.      Cardiac: Regular rate, regular rhythm.  Heart sounds S1, S2.  No murmurs, rubs, or gallops.  PMI nondisplaced.  No JVD.    Respiratory: Regular respiratory rate and effort.  Breath sounds are clear and equal bilaterally, no adventitious lung sounds.  Patient is speaking in full sentences and is in no apparent respiratory distress. No use of accessory muscles.      Gastrointestinal: Abdomen is soft, nondistended, and nontender.  There are no obvious deformities.  No rebound tenderness or guarding.  Bowel sounds are normal active.    Genitourinary:   Bilateral flank pain.    Musculoskeletal:    muscular and midline tenderness in lumbar region. No obvious skin or bony deformities.  Patient has equal range of motion in all extremities and no strength deficiencies.   Capillary refill less than 3 seconds.  Strong peripheral pulses.  No sensory deficits.    Neurological: Patient is alert and oriented.  No focal deficits.  5/5 strength in all extremities.  Cranial nerves II through XII intact. GCS15.     Skin: Skin is normal color for race and is warm, dry, and intact.  No evidence of trauma.  No lesions, rashes, bruising, jaundice, or masses.    Psych: Appropriate mood and affect.  No apparent risk to self or others.    Heme/lymph: No adenopathy, lymphadenopathy, or splenomegaly    Physical exam is otherwise negative unless stated above or in history of present illness.              Patient History   Past Medical History:   Diagnosis Date    Personal history of diseases of the blood and blood-forming organs and certain disorders involving the immune mechanism 08/14/2020    History of bleeding disorder    Personal history of other endocrine, nutritional and metabolic disease 08/14/2020    History of diabetes mellitus    Unspecified abnormalities of breathing 08/14/2020    Breathing  problem    Unspecified visual loss 2020    Vision problems     Past Surgical History:   Procedure Laterality Date    OTHER SURGICAL HISTORY  2020    Tonsillectomy    OTHER SURGICAL HISTORY  2020     section     No family history on file.  Social History     Tobacco Use    Smoking status: Not on file    Smokeless tobacco: Not on file   Substance Use Topics    Alcohol use: Not on file    Drug use: Not on file       Physical Exam   ED Triage Vitals   Temp Pulse Resp BP   -- -- -- --      SpO2 Temp src Heart Rate Source Patient Position   -- -- -- --      BP Location FiO2 (%)     -- --       Physical Exam      ED Course & MDM   Diagnoses as of 25 0139   Acute bilateral low back pain without sciatica       Patient updated on plan for lab testing, IV insertion, radiology imaging, and medications to be administered while in the ER (if indicated). Patient updated on expected wait times for testing and results. Patient provided my name and told to ask any staff member for questions or concerns if they should arise. Electronic medical record reviewed.     MDM    Upon initial assessment, patient was healthy non-toxic appearing and in no apparent distress.     Patient presented to the emergency department with the chief complaint patient updated on plan for lab testing, IV insertion, radiology imaging, and medications to be administered while in the ER (if indicated). Patient updated on expected wait times for testing and results. Patient provided my name and told to ask any staff member for questions or concerns if they should arise. Electronic medical record reviewed.     MDM    Upon initial assessment, patient was healthy non-toxic appearing and in no apparent distress.     Patient presented to the emergency department with the chief complaint low back pain.   muscular and midline tenderness in lumbar region. No obvious skin or bony deformities.  Patient has equal range of motion in all  extremities and no strength deficiencies.   Capillary refill less than 3 seconds.  Strong peripheral pulses.  No sensory deficits.   Bilateral flank pain noted on exam.On arrival to the emergency department, vital signs were within normal limits     will obtain basic blood work, urinalysis, pregnancy test, CT abdomen and pelvis, CT lumbar spine for further evaluation.      At this time patient has no history or physical exam findings that would suggest acute cord compression syndrome or cauda equina.  Low suspicion for spinal abscess.  Patient does not require emergent MRI at this time.  low suspicion for ACS, PE, AAA, pneumonia, pneumothorax given her exam.        Patient's CBC reveals a leukocytosis 12.6 without a left shift neutrophil count, this could represent a stress reaction or be from her pain.  Pregnancy test is negative so she was given Toradol.  Urinalysis reveals 1+ bacteria but otherwise no sign of an infection.  She does have calcium oxalate in her urine that she has had in the past.  CMP reveals no acute abnormalities.  CT of the lumbar spine reveals no substantial degenerative changes to lumbar spine or vertebral injury.  CT abdomen and pelvis reveals no kidney stone or other acute intra-abdominal or pelvic process.  I do believe her pain could be musculoskeletal in nature given it is worse with palpation and movement.  She will be started on Anaprox, 5 days of prednisone, lidocaine patches, and Flexeril.  I did discuss side effects associate with Flexeril and I recommend that she only take it right before bed if she does not plan on leaving the house as this can cause grogginess.  I discussed low weightbearing exercise and discouraged immobilization as this will make her back feel more stiff.  Discussed heating pads and hot showers.  All questions and concerns addressed.  Reasons to return to ER discussed.  Patient verbalized understanding and agreement with the treatment plan and they remained  hemodynamically stable in the ER.    This note was dictated using a speech recognition program.  While an attempt was made at proof-reading to minimize errors, minor errors in transcription may be present         No data recorded                                 Medical Decision Making      Procedure  Procedures     Demarco Hicks PA-C  01/03/25 0142

## 2025-02-26 ENCOUNTER — HOSPITAL ENCOUNTER (EMERGENCY)
Facility: HOSPITAL | Age: 35
Discharge: HOME | End: 2025-02-26
Payer: COMMERCIAL

## 2025-02-26 VITALS
OXYGEN SATURATION: 98 % | RESPIRATION RATE: 17 BRPM | SYSTOLIC BLOOD PRESSURE: 135 MMHG | WEIGHT: 220 LBS | BODY MASS INDEX: 40.48 KG/M2 | HEART RATE: 100 BPM | HEIGHT: 62 IN | DIASTOLIC BLOOD PRESSURE: 78 MMHG | TEMPERATURE: 97.2 F

## 2025-02-26 DIAGNOSIS — J06.9 UPPER RESPIRATORY TRACT INFECTION, UNSPECIFIED TYPE: Primary | ICD-10-CM

## 2025-02-26 DIAGNOSIS — R11.2 NAUSEA VOMITING AND DIARRHEA: ICD-10-CM

## 2025-02-26 DIAGNOSIS — R19.7 NAUSEA VOMITING AND DIARRHEA: ICD-10-CM

## 2025-02-26 LAB
APPEARANCE UR: ABNORMAL
BILIRUB UR STRIP.AUTO-MCNC: NEGATIVE MG/DL
COLOR UR: ABNORMAL
FLUAV RNA RESP QL NAA+PROBE: NOT DETECTED
FLUBV RNA RESP QL NAA+PROBE: NOT DETECTED
GLUCOSE BLD MANUAL STRIP-MCNC: 104 MG/DL (ref 74–99)
GLUCOSE UR STRIP.AUTO-MCNC: NORMAL MG/DL
HCG UR QL IA.RAPID: NEGATIVE
KETONES UR STRIP.AUTO-MCNC: NEGATIVE MG/DL
LEUKOCYTE ESTERASE UR QL STRIP.AUTO: NEGATIVE
NITRITE UR QL STRIP.AUTO: NEGATIVE
PH UR STRIP.AUTO: 5.5 [PH]
PROT UR STRIP.AUTO-MCNC: NEGATIVE MG/DL
RBC # UR STRIP.AUTO: NEGATIVE MG/DL
SARS-COV-2 RNA RESP QL NAA+PROBE: NOT DETECTED
SP GR UR STRIP.AUTO: 1.01
UROBILINOGEN UR STRIP.AUTO-MCNC: NORMAL MG/DL

## 2025-02-26 PROCEDURE — 82947 ASSAY GLUCOSE BLOOD QUANT: CPT

## 2025-02-26 PROCEDURE — 2500000001 HC RX 250 WO HCPCS SELF ADMINISTERED DRUGS (ALT 637 FOR MEDICARE OP): Performed by: PHYSICIAN ASSISTANT

## 2025-02-26 PROCEDURE — 99283 EMERGENCY DEPT VISIT LOW MDM: CPT

## 2025-02-26 PROCEDURE — 81025 URINE PREGNANCY TEST: CPT | Performed by: PHYSICIAN ASSISTANT

## 2025-02-26 PROCEDURE — 81003 URINALYSIS AUTO W/O SCOPE: CPT | Performed by: PHYSICIAN ASSISTANT

## 2025-02-26 PROCEDURE — 87636 SARSCOV2 & INF A&B AMP PRB: CPT | Performed by: PHYSICIAN ASSISTANT

## 2025-02-26 RX ORDER — ONDANSETRON 4 MG/1
4 TABLET, ORALLY DISINTEGRATING ORAL EVERY 8 HOURS PRN
Qty: 9 TABLET | Refills: 0 | Status: SHIPPED | OUTPATIENT
Start: 2025-02-26 | End: 2025-03-01

## 2025-02-26 RX ORDER — FLUTICASONE PROPIONATE 50 MCG
1 SPRAY, SUSPENSION (ML) NASAL DAILY
Qty: 16 G | Refills: 0 | Status: SHIPPED | OUTPATIENT
Start: 2025-02-26 | End: 2025-03-05

## 2025-02-26 RX ORDER — ACETAMINOPHEN 325 MG/1
975 TABLET ORAL ONCE
Status: COMPLETED | OUTPATIENT
Start: 2025-02-26 | End: 2025-02-26

## 2025-02-26 RX ADMIN — ACETAMINOPHEN 975 MG: 325 TABLET ORAL at 22:08

## 2025-02-26 ASSESSMENT — COLUMBIA-SUICIDE SEVERITY RATING SCALE - C-SSRS
6. HAVE YOU EVER DONE ANYTHING, STARTED TO DO ANYTHING, OR PREPARED TO DO ANYTHING TO END YOUR LIFE?: NO
2. HAVE YOU ACTUALLY HAD ANY THOUGHTS OF KILLING YOURSELF?: NO
1. IN THE PAST MONTH, HAVE YOU WISHED YOU WERE DEAD OR WISHED YOU COULD GO TO SLEEP AND NOT WAKE UP?: NO

## 2025-02-26 ASSESSMENT — LIFESTYLE VARIABLES
HAVE YOU EVER FELT YOU SHOULD CUT DOWN ON YOUR DRINKING: NO
EVER FELT BAD OR GUILTY ABOUT YOUR DRINKING: NO
HAVE PEOPLE ANNOYED YOU BY CRITICIZING YOUR DRINKING: NO
TOTAL SCORE: 0
EVER HAD A DRINK FIRST THING IN THE MORNING TO STEADY YOUR NERVES TO GET RID OF A HANGOVER: NO

## 2025-02-26 ASSESSMENT — PAIN SCALES - GENERAL
PAINLEVEL_OUTOF10: 0 - NO PAIN
PAINLEVEL_OUTOF10: 0 - NO PAIN

## 2025-02-26 ASSESSMENT — PAIN - FUNCTIONAL ASSESSMENT: PAIN_FUNCTIONAL_ASSESSMENT: 0-10

## 2025-02-27 LAB — HOLD SPECIMEN: NORMAL

## 2025-02-27 NOTE — ED PROVIDER NOTES
HPI   Chief Complaint   Patient presents with    Flu Symptoms     Pt has had flu symptoms x 4 days       34-year-old female with history of hypertension and type 2 diabetes presenting to the ER today feeling sick for the past 4 days.  She states that she has had hot and cold flashes and bodyaches, sinus congestion sneezing and runny nose.  She then had some nausea, vomiting and diarrhea.  Patient tells me she has not had any further nausea or vomiting and still has had a few episodes of looser stools but no dark or bloody stools.  She does not have any associated abdominal pain.  Patient tells me she smokes and she always has a smoker's cough and her cough has not worsened at all.  She is not having any chest pain or shortness of breath.  She denies sore throat, states that she still is just having sinus congestion and runny nose and this is what brings her to the ER.  She is a diabetic on metformin and Trulicity and states her blood sugars have been running normal.  Her appetite has been good now that her vomiting has resolved.  She denies any further complaints at this time.      History provided by:  Patient          Patient History   Past Medical History:   Diagnosis Date    Personal history of diseases of the blood and blood-forming organs and certain disorders involving the immune mechanism 2020    History of bleeding disorder    Personal history of other endocrine, nutritional and metabolic disease 2020    History of diabetes mellitus    Unspecified abnormalities of breathing 2020    Breathing problem    Unspecified visual loss 2020    Vision problems     Past Surgical History:   Procedure Laterality Date    OTHER SURGICAL HISTORY  2020    Tonsillectomy    OTHER SURGICAL HISTORY  2020     section     No family history on file.  Social History     Tobacco Use    Smoking status: Not on file    Smokeless tobacco: Not on file   Substance Use Topics    Alcohol use: Not  on file    Drug use: Not on file       Physical Exam   ED Triage Vitals [02/26/25 2055]   Temperature Heart Rate Respirations BP   36.4 °C (97.5 °F) (!) 117 18 140/79      Pulse Ox Temp Source Heart Rate Source Patient Position   98 % Temporal Monitor Sitting      BP Location FiO2 (%)     Right arm --       Physical Exam  Constitutional:       General: She is not in acute distress.  HENT:      Right Ear: Tympanic membrane, ear canal and external ear normal.      Left Ear: Tympanic membrane, ear canal and external ear normal.      Nose: Congestion present.      Mouth/Throat:      Mouth: Mucous membranes are moist.      Pharynx: Oropharynx is clear. No oropharyngeal exudate or posterior oropharyngeal erythema.   Eyes:      Conjunctiva/sclera: Conjunctivae normal.   Cardiovascular:      Rate and Rhythm: Regular rhythm. Tachycardia present.      Pulses: Normal pulses.      Heart sounds: Normal heart sounds.   Pulmonary:      Effort: Pulmonary effort is normal.      Breath sounds: Normal breath sounds.   Abdominal:      General: Bowel sounds are normal. There is no distension.      Palpations: Abdomen is soft.      Tenderness: There is no abdominal tenderness. There is no right CVA tenderness, left CVA tenderness, guarding or rebound.   Musculoskeletal:      Cervical back: Normal range of motion and neck supple. No tenderness.      Comments: Normal gait and strength tone, no calf tenderness or swelling bilaterally   Lymphadenopathy:      Cervical: No cervical adenopathy.   Skin:     General: Skin is warm.      Capillary Refill: Capillary refill takes less than 2 seconds.   Neurological:      Mental Status: She is alert and oriented to person, place, and time.      Comments: Speech normal           ED Course & MDM   Diagnoses as of 02/26/25 2338   Upper respiratory tract infection, unspecified type   Nausea vomiting and diarrhea                 No data recorded     [place] Coma Scale Score: 15 (02/26/25 2056 : Valentín  JEFFREY Chavez)                           Medical Decision Making  34-year-old female with history of hypertension and diabetes presenting to the ER today feeling sick for the past 4 days.  She has had bodyaches and chills, congestion sneezing and runny nose.  She also had vomiting and diarrhea although those symptoms seem to be improving.  She denies any further associated complaints and arrives afebrile, tachycardic with otherwise stable vital signs.  She is resting on exam without signs of acute distress.  On my exam she is tachycardic but regular, lungs are clear and there is no CVA tenderness.  Abdomen soft nondistended nontender with normal bowel sounds.  TMs are clear and mucous membranes are moist.  Exam is otherwise within normal limits.  COVID and flu test are ordered as well as pending test and urinalysis and we will check the patient's blood sugar.    Pregnancy test is negative, urinalysis without evidence of infection or hematuria.  COVID and flu test are negative as well.  On reassessment patient is resting comfortably.  She did tolerate p.o. without any emesis and states overall her nausea and vomiting and diarrhea seem to be improving.  She states her sinus congestion and sneezing have been lingering around longer.  I did discuss results with the patient today and I did discuss that we will discharge her home with nausea medicine as well as medicine for her sinus congestion.  We will have her monitor symptoms closely and follow-up with her doctor but I also discussed warning signs to return to the ED and she expressed understanding and agreed with the plan of care today.      Labs Reviewed   URINALYSIS WITH REFLEX CULTURE AND MICROSCOPIC - Abnormal       Result Value    Color, Urine Light-Yellow      Appearance, Urine Turbid (*)     Specific Gravity, Urine 1.011      pH, Urine 5.5      Protein, Urine NEGATIVE      Glucose, Urine Normal      Blood, Urine NEGATIVE      Ketones, Urine NEGATIVE       Bilirubin, Urine NEGATIVE      Urobilinogen, Urine Normal      Nitrite, Urine NEGATIVE      Leukocyte Esterase, Urine NEGATIVE     POCT GLUCOSE - Abnormal    POCT Glucose 104 (*)    INFLUENZA A AND B PCR - Normal    Flu A Result Not Detected      Flu B Result Not Detected      Narrative:     This assay is an in vitro diagnostic multiplex nucleic acid amplification test for the detection and discrimination of Influenza A & B from nasopharyngeal specimens, and has been validated for use at [facility]. Negative results do not preclude Influenza A/B infections, and should not be used as the sole basis for diagnosis, treatment, or other management decisions. If Influenza A/B and RSV PCR results are negative, testing for Parainfluenza virus, Adenovirus and Metapneumovirus is routinely performed for [facility] [place] in[place], and is available on other patients by placing an add-on request.   SARS-COV-2 PCR - Normal    Coronavirus 2019, PCR Not Detected      Narrative:     This assay is an FDA-cleared, in vitro diagnostic nucleic acid amplification test for the qualitative detection and differentiation of SARS CoV-2 from nasopharyngeal specimens collected from individuals with signs and symptoms of respiratory tract infections, and has been validated for use at [facility]. Negative results do not preclude COVID-19 infections and should not be used as the sole basis for diagnosis, treatment, or other management decisions. Testing for SARS CoV-2 is recommended only for patients who meet current clinical and/or epidemiological criteria defined by federal, state, or local public health directives.   HCG, URINE, QUALITATIVE - Normal    HCG, Urine NEGATIVE     URINALYSIS WITH REFLEX CULTURE AND MICROSCOPIC    Narrative:     The following orders were created for panel order Urinalysis with Reflex Culture and Microscopic.  Procedure                                Abnormality         Status                     ---------                               -----------         ------                     Urinalysis with Reflex C...[[id number]]  Abnormal            Final result               Extra Urine Gray Tube[[id number]]                            In process                   Please view results for these tests on the individual orders.   EXTRA URINE GRAY TUBE   POCT GLUCOSE METER       No orders to display         Procedure  Procedures     Cleo Gaytan PA-C  02/26/25 2502

## 2025-03-05 ENCOUNTER — OFFICE VISIT (OUTPATIENT)
Dept: PULMONOLOGY | Age: 35
End: 2025-03-05
Payer: COMMERCIAL

## 2025-03-05 VITALS
HEART RATE: 88 BPM | DIASTOLIC BLOOD PRESSURE: 86 MMHG | WEIGHT: 224 LBS | SYSTOLIC BLOOD PRESSURE: 134 MMHG | OXYGEN SATURATION: 98 % | BODY MASS INDEX: 40.96 KG/M2

## 2025-03-05 DIAGNOSIS — E66.01 MORBID OBESITY: ICD-10-CM

## 2025-03-05 DIAGNOSIS — G47.33 OSA (OBSTRUCTIVE SLEEP APNEA): Primary | ICD-10-CM

## 2025-03-05 DIAGNOSIS — R06.02 SHORTNESS OF BREATH: ICD-10-CM

## 2025-03-05 PROCEDURE — 3075F SYST BP GE 130 - 139MM HG: CPT | Performed by: INTERNAL MEDICINE

## 2025-03-05 PROCEDURE — 1036F TOBACCO NON-USER: CPT | Performed by: INTERNAL MEDICINE

## 2025-03-05 PROCEDURE — G8427 DOCREV CUR MEDS BY ELIG CLIN: HCPCS | Performed by: INTERNAL MEDICINE

## 2025-03-05 PROCEDURE — 99214 OFFICE O/P EST MOD 30 MIN: CPT | Performed by: INTERNAL MEDICINE

## 2025-03-05 PROCEDURE — 3079F DIAST BP 80-89 MM HG: CPT | Performed by: INTERNAL MEDICINE

## 2025-03-05 PROCEDURE — G8417 CALC BMI ABV UP PARAM F/U: HCPCS | Performed by: INTERNAL MEDICINE

## 2025-03-05 ASSESSMENT — ENCOUNTER SYMPTOMS
NAUSEA: 0
ABDOMINAL PAIN: 0
SHORTNESS OF BREATH: 1
CHEST TIGHTNESS: 0
EYE ITCHING: 0
VOMITING: 0
SINUS PRESSURE: 0
VOICE CHANGE: 0
WHEEZING: 0
EYE DISCHARGE: 0
RHINORRHEA: 0
COUGH: 0
TROUBLE SWALLOWING: 0
SORE THROAT: 0
DIARRHEA: 0

## 2025-03-05 NOTE — PROGRESS NOTES
signed.  ------------------------------------------------------------------------------  ]  Results for orders placed during the hospital encounter of 11/12/21    XR CHEST PORTABLE    Narrative  Exam: XR CHEST PORTABLE    History:  cough    Technique: AP portable view of the chest obtained.    Comparison: Chest x-ray from September 19, 2021    Chest x-ray portable    Findings:    The cardiomediastinal silhouette is within normal limits.  There are no infiltrates, consolidations or effusions.  Bones of the thorax appear intact.    Impression  No radiographic evidence of acute intrathoracic process.      Results for orders placed during the hospital encounter of 09/19/21    XR CHEST PORTABLE    Narrative  EXAMINATION: CHEST PORTABLE VIEW    CLINICAL HISTORY: Midsternal chest pain    COMPARISONS: July 29, 2018 2214 hours    FINDINGS:    Single  views of the chest is submitted.  The cardiac silhouette is of normal size configuration.  Pulmonary vascular unremarkable.  Right sided trachea.  No focal infiltrates.  No Pneumothoraces.    Impression  NO ACUTE ACTIVE CARDIOPULMONARY PROCESS      Assessment/Plan:     1. KEI (obstructive sleep apnea)  She had  BIPAP 12/8 cm , She said she is using at night for 5-6  hours. She is complaining of snoring and daytime sleepiness and tiredness.She does have restful sleep.   She does not take nap any more.She does not drive. No early morning headache.    No complaint of daytime sleepiness or tiredness with BiPAP use     Compliance report is not available     2. Morbid obesity  She said she is trying to lose weight and if she continue to lose weight.She does not want to have Bariatric surgery.     3. Shortness of breath  She has shortness of breath with walk , long distance , no cough and wheezing . She is on albuterol HFA prn      4. Chronic insomnia  She has chronic insomnia on trazodone     Return in about 14 weeks (around 6/11/2025).      Shant Rooney MD

## 2025-03-19 NOTE — TELEPHONE ENCOUNTER
Requested Prescriptions     Pending Prescriptions Disp Refills    dulaglutide (TRULICITY) 1.5 MG/0.5ML SC injection 2 mL 3     Sig: Inject 1.5 mg weekly

## 2025-05-19 ENCOUNTER — OFFICE VISIT (OUTPATIENT)
Age: 35
End: 2025-05-19
Payer: COMMERCIAL

## 2025-05-19 VITALS
WEIGHT: 227.07 LBS | SYSTOLIC BLOOD PRESSURE: 108 MMHG | HEART RATE: 69 BPM | DIASTOLIC BLOOD PRESSURE: 74 MMHG | OXYGEN SATURATION: 96 % | BODY MASS INDEX: 41.79 KG/M2 | HEIGHT: 62 IN

## 2025-05-19 DIAGNOSIS — N91.1 SECONDARY AMENORRHEA: ICD-10-CM

## 2025-05-19 DIAGNOSIS — E88.819 INSULIN RESISTANCE: ICD-10-CM

## 2025-05-19 DIAGNOSIS — E28.2 PCOS (POLYCYSTIC OVARIAN SYNDROME): ICD-10-CM

## 2025-05-19 DIAGNOSIS — N94.6 MENSTRUAL CRAMPS: ICD-10-CM

## 2025-05-19 DIAGNOSIS — E78.2 MIXED HYPERLIPIDEMIA: ICD-10-CM

## 2025-05-19 DIAGNOSIS — E28.2 PCOS (POLYCYSTIC OVARIAN SYNDROME): Primary | ICD-10-CM

## 2025-05-19 DIAGNOSIS — E66.01 MORBID OBESITY (HCC): ICD-10-CM

## 2025-05-19 LAB
ANION GAP SERPL CALCULATED.3IONS-SCNC: 11 MEQ/L (ref 9–15)
BUN SERPL-MCNC: 7 MG/DL (ref 6–20)
CALCIUM SERPL-MCNC: 8.9 MG/DL (ref 8.5–9.9)
CHLORIDE SERPL-SCNC: 102 MEQ/L (ref 95–107)
CHOLEST SERPL-MCNC: 146 MG/DL (ref 0–199)
CO2 SERPL-SCNC: 24 MEQ/L (ref 20–31)
CREAT SERPL-MCNC: 0.57 MG/DL (ref 0.5–0.9)
GLUCOSE SERPL-MCNC: 76 MG/DL (ref 70–99)
GONADOTROPIN, CHORIONIC (HCG) QUANT: <0.1 MIU/ML
HDLC SERPL-MCNC: 53 MG/DL (ref 40–59)
LDLC SERPL CALC-MCNC: 56 MG/DL (ref 0–129)
POTASSIUM SERPL-SCNC: 4.3 MEQ/L (ref 3.4–4.9)
SODIUM SERPL-SCNC: 137 MEQ/L (ref 135–144)
TRIGL SERPL-MCNC: 185 MG/DL (ref 0–150)

## 2025-05-19 PROCEDURE — 3078F DIAST BP <80 MM HG: CPT | Performed by: INTERNAL MEDICINE

## 2025-05-19 PROCEDURE — G8427 DOCREV CUR MEDS BY ELIG CLIN: HCPCS | Performed by: INTERNAL MEDICINE

## 2025-05-19 PROCEDURE — G8417 CALC BMI ABV UP PARAM F/U: HCPCS | Performed by: INTERNAL MEDICINE

## 2025-05-19 PROCEDURE — 1036F TOBACCO NON-USER: CPT | Performed by: INTERNAL MEDICINE

## 2025-05-19 PROCEDURE — 99213 OFFICE O/P EST LOW 20 MIN: CPT | Performed by: INTERNAL MEDICINE

## 2025-05-19 PROCEDURE — 3074F SYST BP LT 130 MM HG: CPT | Performed by: INTERNAL MEDICINE

## 2025-05-19 PROCEDURE — 99214 OFFICE O/P EST MOD 30 MIN: CPT | Performed by: INTERNAL MEDICINE

## 2025-05-19 RX ORDER — METFORMIN HYDROCHLORIDE 500 MG/1
TABLET, EXTENDED RELEASE ORAL
Qty: 120 TABLET | Refills: 5 | Status: SHIPPED | OUTPATIENT
Start: 2025-05-19

## 2025-05-19 RX ORDER — IBUPROFEN 600 MG/1
600 TABLET, FILM COATED ORAL 4 TIMES DAILY PRN
Qty: 20 TABLET | Refills: 1 | Status: SHIPPED | OUTPATIENT
Start: 2025-05-19

## 2025-05-19 RX ORDER — ATORVASTATIN CALCIUM 10 MG/1
TABLET, FILM COATED ORAL
Qty: 30 TABLET | Refills: 3 | Status: SHIPPED | OUTPATIENT
Start: 2025-05-19

## 2025-05-19 NOTE — PROGRESS NOTES
GLOB 2.3 01/17/2024     Lab Results   Component Value Date    WBC 11.7 (H) 09/18/2024    HGB 14.2 09/18/2024    HCT 40.9 09/18/2024    MCV 84.2 09/18/2024     09/18/2024     Lab Results   Component Value Date    LABA1C 5.1 08/28/2024    LABA1C 5.6 01/17/2024    LABA1C 5.9 12/14/2023     Lab Results   Component Value Date    HDL 39 (L) 01/17/2024    HDL 39 (L) 11/02/2023    HDL 44 09/26/2023    CHOL 196 01/17/2024    CHOL 160 11/02/2023    CHOL 133 09/26/2023    TRIG 741 (H) 01/17/2024    TRIG 300 (H) 11/02/2023    TRIG 162 (H) 09/26/2023     No results found for: \"TESTM\"  Lab Results   Component Value Date    TSH 1.190 01/17/2024    TSH 0.740 11/02/2023    TSH 0.957 12/08/2022    T4FREE 1.30 01/17/2024    T4FREE 1.15 08/09/2021    T4FREE 1.46 12/11/2019     No results found for: \"TPOABS\"    Review of Systems   Constitutional:  Positive for fatigue and unexpected weight change.   Cardiovascular: Negative.    Endocrine: Negative.    Genitourinary:  Positive for pelvic pain.   Psychiatric/Behavioral:  Positive for dysphoric mood and sleep disturbance.    All other systems reviewed and are negative.      Objective:   Physical Exam  Vitals reviewed.   Constitutional:       General: She is not in acute distress.     Appearance: Normal appearance. She is obese.   HENT:      Head: Normocephalic and atraumatic.      Right Ear: External ear normal.      Left Ear: External ear normal.      Nose: Nose normal.   Eyes:      General: No scleral icterus.        Right eye: No discharge.         Left eye: No discharge.      Extraocular Movements: Extraocular movements intact.      Conjunctiva/sclera: Conjunctivae normal.   Cardiovascular:      Rate and Rhythm: Normal rate.   Pulmonary:      Effort: Pulmonary effort is normal.   Abdominal:      Palpations: Abdomen is soft.   Musculoskeletal:         General: Normal range of motion.      Cervical back: Normal range of motion and neck supple.   Skin:     Findings: No lesion or

## 2025-05-20 LAB
ESTIMATED AVERAGE GLUCOSE: 91 MG/DL
HBA1C MFR BLD: 4.8 % (ref 4–6)

## 2025-06-01 ENCOUNTER — APPOINTMENT (OUTPATIENT)
Dept: RADIOLOGY | Facility: HOSPITAL | Age: 35
End: 2025-06-01
Payer: COMMERCIAL

## 2025-06-01 ENCOUNTER — HOSPITAL ENCOUNTER (EMERGENCY)
Facility: HOSPITAL | Age: 35
Discharge: HOME | End: 2025-06-01
Payer: COMMERCIAL

## 2025-06-01 VITALS
HEART RATE: 91 BPM | DIASTOLIC BLOOD PRESSURE: 83 MMHG | WEIGHT: 218 LBS | TEMPERATURE: 97.7 F | OXYGEN SATURATION: 97 % | HEIGHT: 62 IN | RESPIRATION RATE: 16 BRPM | BODY MASS INDEX: 40.12 KG/M2 | SYSTOLIC BLOOD PRESSURE: 136 MMHG

## 2025-06-01 DIAGNOSIS — J06.9 UPPER RESPIRATORY TRACT INFECTION, UNSPECIFIED TYPE: Primary | ICD-10-CM

## 2025-06-01 LAB
FLUAV RNA RESP QL NAA+PROBE: NOT DETECTED
FLUBV RNA RESP QL NAA+PROBE: NOT DETECTED
RSV RNA RESP QL NAA+PROBE: NOT DETECTED
S PYO DNA THROAT QL NAA+PROBE: NOT DETECTED
SARS-COV-2 RNA RESP QL NAA+PROBE: NOT DETECTED

## 2025-06-01 PROCEDURE — 71046 X-RAY EXAM CHEST 2 VIEWS: CPT | Performed by: RADIOLOGY

## 2025-06-01 PROCEDURE — 87651 STREP A DNA AMP PROBE: CPT | Performed by: PHYSICIAN ASSISTANT

## 2025-06-01 PROCEDURE — 87637 SARSCOV2&INF A&B&RSV AMP PRB: CPT | Performed by: PHYSICIAN ASSISTANT

## 2025-06-01 PROCEDURE — 99284 EMERGENCY DEPT VISIT MOD MDM: CPT | Mod: 25

## 2025-06-01 PROCEDURE — 71046 X-RAY EXAM CHEST 2 VIEWS: CPT

## 2025-06-01 RX ORDER — BENZONATATE 100 MG/1
100 CAPSULE ORAL EVERY 8 HOURS
Qty: 21 CAPSULE | Refills: 0 | Status: SHIPPED | OUTPATIENT
Start: 2025-06-01 | End: 2025-06-01

## 2025-06-01 RX ORDER — BENZONATATE 100 MG/1
100 CAPSULE ORAL EVERY 8 HOURS
Qty: 21 CAPSULE | Refills: 0 | Status: SHIPPED | OUTPATIENT
Start: 2025-06-01 | End: 2025-06-08

## 2025-06-01 RX ORDER — AZITHROMYCIN 250 MG/1
250 TABLET, FILM COATED ORAL DAILY
Qty: 6 TABLET | Refills: 0 | Status: SHIPPED | OUTPATIENT
Start: 2025-06-01 | End: 2025-06-01

## 2025-06-01 RX ORDER — AZITHROMYCIN 250 MG/1
250 TABLET, FILM COATED ORAL DAILY
Qty: 6 TABLET | Refills: 0 | Status: SHIPPED | OUTPATIENT
Start: 2025-06-01

## 2025-06-01 ASSESSMENT — PAIN SCALES - GENERAL: PAINLEVEL_OUTOF10: 0 - NO PAIN

## 2025-06-01 ASSESSMENT — LIFESTYLE VARIABLES
EVER HAD A DRINK FIRST THING IN THE MORNING TO STEADY YOUR NERVES TO GET RID OF A HANGOVER: NO
EVER FELT BAD OR GUILTY ABOUT YOUR DRINKING: NO
TOTAL SCORE: 0
HAVE YOU EVER FELT YOU SHOULD CUT DOWN ON YOUR DRINKING: NO
HAVE PEOPLE ANNOYED YOU BY CRITICIZING YOUR DRINKING: NO

## 2025-06-01 ASSESSMENT — PAIN - FUNCTIONAL ASSESSMENT: PAIN_FUNCTIONAL_ASSESSMENT: 0-10

## 2025-06-01 NOTE — ED PROVIDER NOTES
HPI   Chief Complaint   Patient presents with    Nasal Congestion     Feeling a lot of nasal pain and pressure for about a week.       A 35-year-old female patient comes into the emergency department today with complaints of not feeling well over the last week.  States she has sore throat, congestion, rhinorrhea, cough.  She also has associated chills.  She still has her cough is nonproductive and dry.  States she just generally not feeling well with associated body aches.  This purpose she comes in the emergency department today for the evaluation.  Otherwise no complaints present time.               Patient History   Medical History[1]  Surgical History[2]  Family History[3]  Social History[4]    Physical Exam   ED Triage Vitals [06/01/25 1233]   Temperature Heart Rate Respirations BP   36.5 °C (97.7 °F) 91 16 136/83      Pulse Ox Temp Source Heart Rate Source Patient Position   97 % Temporal Monitor Sitting      BP Location FiO2 (%)     Right arm --       Physical Exam  Constitutional:       General: She is in acute distress.      Appearance: Normal appearance. She is not ill-appearing.   HENT:      Head: Normocephalic and atraumatic.      Nose: Nose normal.      Mouth/Throat:      Pharynx: Posterior oropharyngeal erythema present. No oropharyngeal exudate.   Eyes:      Extraocular Movements: Extraocular movements intact.      Conjunctiva/sclera: Conjunctivae normal.      Pupils: Pupils are equal, round, and reactive to light.   Cardiovascular:      Rate and Rhythm: Normal rate and regular rhythm.   Pulmonary:      Effort: Pulmonary effort is normal. No respiratory distress.      Breath sounds: Normal breath sounds. No stridor. No wheezing.   Musculoskeletal:         General: Normal range of motion.      Cervical back: Normal range of motion.   Lymphadenopathy:      Cervical: No cervical adenopathy.   Skin:     General: Skin is warm and dry.   Neurological:      General: No focal deficit present.      Mental  Status: She is alert and oriented to person, place, and time. Mental status is at baseline.   Psychiatric:         Mood and Affect: Mood normal.           ED Course & MDM   Diagnoses as of 06/01/25 1415   Upper respiratory tract infection, unspecified type                 No data recorded                                 Medical Decision Making  A 35-year-old female patient comes into the emergency department today with complaints of not feeling well over the last week.  States she has sore throat, congestion, rhinorrhea, cough.  She also has associated chills.  She still has her cough is nonproductive and dry.  States she just generally not feeling well with associated body aches.  This purpose she comes in the emergency department today for the evaluation.  Otherwise no complaints present time.     Chest x-ray ordered rule out pneumonia, tested for COVID-19, influenza, RSV, strep pharyngitis    Patient negative for COVID-19, influenza, RSV, strep pharyngitis chest x-ray negative.  As patient's symptoms have been going on for over a week we will cover patient with antibiotics in case there is an underlying bacterial infection.  Patient agrees with this plan expressed verbal understanding.  Questions were answered.    Historians patient    Diagnosis: URI      Labs Reviewed   GROUP A STREPTOCOCCUS, PCR - Normal       Result Value    Group A Strep PCR Not Detected     SARS-COV-2, INFLUENZA A/B AND RSV PCR - Normal    Coronavirus 2019, PCR Not Detected      Flu A Result Not Detected      Flu B Result Not Detected      RSV PCR Not Detected      Narrative:     This assay is an FDA-cleared, in vitro diagnostic nucleic acid amplification test for the qualitative detection and differentiation of SARS CoV-2/ Influenza A/B/ RSV from nasopharyngeal specimens collected from individuals with signs and symptoms of respiratory tract infections, and has been validated for use at [facility]. Negative results  do not preclude COVID-19/ Influenza A/B/ RSV infections and should not be used as the sole basis for diagnosis, treatment, or other management decisions. Testing for SARS CoV-2 is recommended only for patients who meet current clinical and/or epidemiological criteria defined by federal, state, or local public health directives.        XR chest 2 views   Final Result   No focal infiltrate or pneumothorax.        MACRO:   None.        Signed by: Mike Farley 2025 1:38 PM   Dictation workstation:   [id number]          Procedure  Procedures         [1]   Past Medical History:  Diagnosis Date    Personal history of diseases of the blood and blood-forming organs and certain disorders involving the immune mechanism 2020    History of bleeding disorder    Personal history of other endocrine, nutritional and metabolic disease 2020    History of diabetes mellitus    Unspecified abnormalities of breathing 2020    Breathing problem    Unspecified visual loss 2020    Vision problems   [2]   Past Surgical History:  Procedure Laterality Date    OTHER SURGICAL HISTORY  2020    Tonsillectomy    OTHER SURGICAL HISTORY  2020     section   [3] No family history on file.  [4]   Social History  Tobacco Use    Smoking status: Not on file    Smokeless tobacco: Not on file   Substance Use Topics    Alcohol use: Not on file    Drug use: Not on file        Giovanny Millan PA-C  [phone]

## 2025-06-01 NOTE — Clinical Note
Mitul Downs was seen and treated in our emergency department on 6/1/2025.  She may return to work on 06/03/2025.       If you have any questions or concerns, please don't hesitate to call.      Giovanny Millan PA-C

## 2025-06-11 ENCOUNTER — TELEMEDICINE (OUTPATIENT)
Age: 35
End: 2025-06-11
Payer: COMMERCIAL

## 2025-06-11 DIAGNOSIS — G47.33 OSA (OBSTRUCTIVE SLEEP APNEA): ICD-10-CM

## 2025-06-11 DIAGNOSIS — J06.9 URTI (ACUTE UPPER RESPIRATORY INFECTION): Primary | ICD-10-CM

## 2025-06-11 DIAGNOSIS — R06.02 SHORTNESS OF BREATH: ICD-10-CM

## 2025-06-11 DIAGNOSIS — E66.01 MORBID OBESITY (HCC): ICD-10-CM

## 2025-06-11 PROCEDURE — 99214 OFFICE O/P EST MOD 30 MIN: CPT | Performed by: INTERNAL MEDICINE

## 2025-06-11 RX ORDER — DOXYCYCLINE HYCLATE 100 MG
100 TABLET ORAL 2 TIMES DAILY
Qty: 14 TABLET | Refills: 0 | Status: SHIPPED | OUTPATIENT
Start: 2025-06-11 | End: 2025-06-18

## 2025-06-11 ASSESSMENT — ENCOUNTER SYMPTOMS
SINUS PRESSURE: 1
SORE THROAT: 0
CHEST TIGHTNESS: 0
WHEEZING: 0
EYE ITCHING: 0
TROUBLE SWALLOWING: 0
RHINORRHEA: 0
VOICE CHANGE: 0
EYE DISCHARGE: 0
NAUSEA: 0
COUGH: 0
VOMITING: 0
ABDOMINAL PAIN: 0
SHORTNESS OF BREATH: 1
DIARRHEA: 0

## 2025-06-11 NOTE — PROGRESS NOTES
tablet; Refill: 0    2. Shortness of breath  Patient is having short of breath which is likely multifactorial.  Continue bronchodilator therapy.  Continue O2 to keep saturation above     3. KEI (obstructive sleep apnea)  She had  BIPAP 12/8 cm , She said she is using at night for 5-6  hours. She is complaining of snoring and daytime sleepiness and tiredness.She does have restful sleep. She does not take nap any more.She does not drive. No early morning headache.       She has chronic insomnia on trazodone     Compliance report is not available , she  is not sure which company gave her BIPAP    4. Morbid obesity (HCC)  She is advised try to lose weight. obesity related risk explained to the patient ,  Suggested weight control approaches, including dietary changes , exercise, behavioral modification.    She said she is trying to lose weight and if she continue to lose weight.  She does not want to have Bariatric surgery.     Documentation:  I communicated with the patient and/or health care decision maker about this visit .   Details of this discussion including any medical advice provided: yes    Total Time: minutes: 21-30 minutes        Shant Rooney MD

## 2025-06-24 ENCOUNTER — OFFICE VISIT (OUTPATIENT)
Dept: OBGYN CLINIC | Age: 35
End: 2025-06-24
Payer: COMMERCIAL

## 2025-06-24 VITALS
DIASTOLIC BLOOD PRESSURE: 70 MMHG | HEIGHT: 62 IN | WEIGHT: 227 LBS | BODY MASS INDEX: 41.77 KG/M2 | HEART RATE: 88 BPM | SYSTOLIC BLOOD PRESSURE: 114 MMHG

## 2025-06-24 DIAGNOSIS — N76.0 ACUTE VAGINITIS: Primary | ICD-10-CM

## 2025-06-24 PROCEDURE — 3074F SYST BP LT 130 MM HG: CPT | Performed by: OBSTETRICS & GYNECOLOGY

## 2025-06-24 PROCEDURE — 1036F TOBACCO NON-USER: CPT | Performed by: OBSTETRICS & GYNECOLOGY

## 2025-06-24 PROCEDURE — 99213 OFFICE O/P EST LOW 20 MIN: CPT | Performed by: OBSTETRICS & GYNECOLOGY

## 2025-06-24 PROCEDURE — G8427 DOCREV CUR MEDS BY ELIG CLIN: HCPCS | Performed by: OBSTETRICS & GYNECOLOGY

## 2025-06-24 PROCEDURE — 3078F DIAST BP <80 MM HG: CPT | Performed by: OBSTETRICS & GYNECOLOGY

## 2025-06-24 PROCEDURE — G8417 CALC BMI ABV UP PARAM F/U: HCPCS | Performed by: OBSTETRICS & GYNECOLOGY

## 2025-06-24 RX ORDER — METRONIDAZOLE 500 MG/1
500 TABLET ORAL 2 TIMES DAILY
Qty: 14 TABLET | Refills: 0 | Status: SHIPPED | OUTPATIENT
Start: 2025-06-24 | End: 2025-07-01

## 2025-06-24 RX ORDER — TERCONAZOLE 4 MG/G
CREAM VAGINAL
Qty: 1 EACH | Refills: 0 | Status: SHIPPED | OUTPATIENT
Start: 2025-06-24 | End: 2025-07-01

## 2025-06-24 RX ORDER — FLUCONAZOLE 150 MG/1
TABLET ORAL
Qty: 4 TABLET | Refills: 0 | Status: SHIPPED | OUTPATIENT
Start: 2025-06-24

## 2025-06-24 NOTE — PROGRESS NOTES
Terrance Knapp is a 35 y.o. female who presents here today for complaints of Vaginal Irritation (She states this started last week with her clitoris feeling raw and then it started feeling inflamed. She states she also noticed something white and \"stringy\" like toilet paper. Also c/o occasional itching. )        History of Present Illness  The patient presents for evaluation of clitoral irritation and redness.    She reports experiencing clitoral irritation and redness, which she first noticed last week. Despite her efforts to alleviate the symptoms by cleaning the area with a warm washcloth and warm water, the irritation persists. She has not taken amoxicillin.    ALLERGIES  The patient is sensitive to AMOXICILLIN.         Vitals:  /70   Pulse 88   Ht 1.575 m (5' 2\")   Wt 103 kg (227 lb)   BMI 41.52 kg/m²   Allergies:  Food and Amoxicillin  Past Medical History:   Diagnosis Date    Abnormal Pap smear of cervix     Anxiety     Asthma     Depression     DVT (deep venous thrombosis) ([facility])     Hypertension     Pre-diabetes     PTSD (post-traumatic stress disorder)     Schizoaffective disorder ([facility])      Past Surgical History:   Procedure Laterality Date     SECTION      TONSILLECTOMY       OB History          6    Para   1    Term   1            AB   5    Living   1         SAB   5    IAB   0    Ectopic   0    Molar   0    Multiple   0    Live Births   1          Obstetric Comments   1 boy: 5 yo does not have custody (lives with mom pt has visitation rights)             Family History   Problem Relation Age of Onset    Diabetes Mother     Depression Father     Asthma Brother     Diabetes Maternal Grandmother     Breast Cancer Maternal Grandmother     Thyroid Cancer Maternal Grandfather     Diabetes Paternal Grandmother     Other Paternal Grandfather         testicular cancer     Social History     Socioeconomic History    Marital status: Legally      Spouse name: Darren

## 2025-07-15 ENCOUNTER — HOSPITAL ENCOUNTER (EMERGENCY)
Facility: HOSPITAL | Age: 35
Discharge: HOME | End: 2025-07-15
Payer: COMMERCIAL

## 2025-07-15 VITALS
TEMPERATURE: 96.8 F | DIASTOLIC BLOOD PRESSURE: 78 MMHG | HEART RATE: 78 BPM | WEIGHT: 218 LBS | SYSTOLIC BLOOD PRESSURE: 130 MMHG | OXYGEN SATURATION: 98 % | BODY MASS INDEX: 40.12 KG/M2 | RESPIRATION RATE: 18 BRPM | HEIGHT: 62 IN

## 2025-07-15 DIAGNOSIS — N39.0 ACUTE URINARY TRACT INFECTION: Primary | ICD-10-CM

## 2025-07-15 LAB
APPEARANCE UR: ABNORMAL
BACTERIA #/AREA URNS AUTO: ABNORMAL /HPF
BILIRUB UR STRIP.AUTO-MCNC: NEGATIVE MG/DL
COLOR UR: ABNORMAL
GLUCOSE UR STRIP.AUTO-MCNC: NORMAL MG/DL
KETONES UR STRIP.AUTO-MCNC: NEGATIVE MG/DL
LEUKOCYTE ESTERASE UR QL STRIP.AUTO: ABNORMAL
MUCOUS THREADS #/AREA URNS AUTO: ABNORMAL /LPF
NITRITE UR QL STRIP.AUTO: NEGATIVE
PH UR STRIP.AUTO: 5.5 [PH]
PROT UR STRIP.AUTO-MCNC: NEGATIVE MG/DL
RBC # UR STRIP.AUTO: ABNORMAL MG/DL
RBC #/AREA URNS AUTO: ABNORMAL /HPF
SP GR UR STRIP.AUTO: 1.02
SQUAMOUS #/AREA URNS AUTO: ABNORMAL /HPF
UROBILINOGEN UR STRIP.AUTO-MCNC: NORMAL MG/DL
WBC #/AREA URNS AUTO: >50 /HPF
WBC CLUMPS #/AREA URNS AUTO: ABNORMAL /HPF

## 2025-07-15 PROCEDURE — 99283 EMERGENCY DEPT VISIT LOW MDM: CPT

## 2025-07-15 PROCEDURE — 87086 URINE CULTURE/COLONY COUNT: CPT | Mod: ELYLAB | Performed by: PHYSICIAN ASSISTANT

## 2025-07-15 PROCEDURE — 81001 URINALYSIS AUTO W/SCOPE: CPT | Performed by: PHYSICIAN ASSISTANT

## 2025-07-15 RX ORDER — PHENAZOPYRIDINE HYDROCHLORIDE 200 MG/1
200 TABLET, FILM COATED ORAL 3 TIMES DAILY
Qty: 6 TABLET | Refills: 0 | Status: SHIPPED | OUTPATIENT
Start: 2025-07-15 | End: 2025-07-17

## 2025-07-15 RX ORDER — FLUCONAZOLE 150 MG/1
150 TABLET ORAL ONCE
Qty: 1 TABLET | Refills: 0 | Status: SHIPPED | OUTPATIENT
Start: 2025-07-15 | End: 2025-07-15

## 2025-07-15 RX ORDER — NITROFURANTOIN 25; 75 MG/1; MG/1
100 CAPSULE ORAL 2 TIMES DAILY
Qty: 10 CAPSULE | Refills: 0 | Status: SHIPPED | OUTPATIENT
Start: 2025-07-15 | End: 2025-07-18

## 2025-07-15 ASSESSMENT — LIFESTYLE VARIABLES
EVER FELT BAD OR GUILTY ABOUT YOUR DRINKING: NO
EVER HAD A DRINK FIRST THING IN THE MORNING TO STEADY YOUR NERVES TO GET RID OF A HANGOVER: NO
HAVE PEOPLE ANNOYED YOU BY CRITICIZING YOUR DRINKING: NO
TOTAL SCORE: 0
HAVE YOU EVER FELT YOU SHOULD CUT DOWN ON YOUR DRINKING: NO

## 2025-07-15 ASSESSMENT — PAIN SCALES - GENERAL
PAINLEVEL_OUTOF10: 1
PAINLEVEL_OUTOF10: 0 - NO PAIN
PAINLEVEL_OUTOF10: 0 - NO PAIN

## 2025-07-15 ASSESSMENT — PAIN - FUNCTIONAL ASSESSMENT: PAIN_FUNCTIONAL_ASSESSMENT: 0-10

## 2025-07-15 NOTE — ED PROVIDER NOTES
HPI   Chief Complaint   Patient presents with    Urinary Frequency     States she was diagnosed with a yeast infection and treated but is still having symptoms.        A 35-year-old female patient comes into the emergency department today with complaints of urinary tract symptoms.  States she has urinary frequency and dysuria.  Described as a tingling burning sensation.  She has been ongoing for the past couple days.  Denies any associated fevers, chills, nausea, vomiting, flank pain.  States she recently treated herself for yeast infection.  She otherwise has no other complaints present time for this purpose comes in the emergency department today for the evaluation.              Patient History   Medical History[1]  Surgical History[2]  Family History[3]  Social History[4]    Physical Exam   ED Triage Vitals [07/15/25 0833]   Temperature Heart Rate Respirations BP   36 °C (96.8 °F) 80 20 129/82      Pulse Ox Temp Source Heart Rate Source Patient Position   98 % Temporal Monitor Sitting      BP Location FiO2 (%)     Right arm --       Physical Exam  Constitutional:       General: She is not in acute distress.     Appearance: Normal appearance. She is not ill-appearing or diaphoretic.   HENT:      Head: Normocephalic and atraumatic.      Nose: Nose normal.   Eyes:      Extraocular Movements: Extraocular movements intact.      Conjunctiva/sclera: Conjunctivae normal.      Pupils: Pupils are equal, round, and reactive to light.   Cardiovascular:      Rate and Rhythm: Normal rate and regular rhythm.   Pulmonary:      Effort: Pulmonary effort is normal. No respiratory distress.      Breath sounds: Normal breath sounds. No stridor. No wheezing.   Abdominal:      Tenderness: There is no right CVA tenderness or left CVA tenderness.   Musculoskeletal:         General: Normal range of motion.      Cervical back: Normal range of motion.   Skin:     General: Skin is warm and dry.   Neurological:      General: No focal deficit  present.      Mental Status: She is alert and oriented to person, place, and time. Mental status is at baseline.   Psychiatric:         Mood and Affect: Mood normal.           ED Course & MDM   Diagnoses as of 07/15/25 0924   Acute urinary tract infection                 No data recorded     [place] Coma Scale Score: 15 (07/15/25 0854 : Ashley Tay RN)                           Medical Decision Making  A 35-year-old female patient comes into the emergency department today with complaints of urinary tract symptoms.  States she has urinary frequency and dysuria.  Described as a tingling burning sensation.  She has been ongoing for the past couple days.  Denies any associated fevers, chills, nausea, vomiting, flank pain.  States she recently treated herself for yeast infection.  She otherwise has no other complaints present time for this purpose comes in the emergency department today for the evaluation.    Urinalysis ordered to rule out UTI.    Patient positive for UTI will place patient on antibiotics give patient a Diflucan for preventative measures.  Patient agrees with this plan expressed verbal understanding for the questions were answered.    Historian is the patient    Diagnosis: Acute urinary tract infection      Labs Reviewed   URINALYSIS WITH REFLEX CULTURE AND MICROSCOPIC - Abnormal       Result Value    Color, Urine Light-Yellow      Appearance, Urine Turbid (*)     Specific Gravity, Urine 1.019      pH, Urine 5.5      Protein, Urine NEGATIVE      Glucose, Urine Normal      Blood, Urine 0.03 (TRACE) (*)     Ketones, Urine NEGATIVE      Bilirubin, Urine NEGATIVE      Urobilinogen, Urine Normal      Nitrite, Urine NEGATIVE      Leukocyte Esterase, Urine 500 Kervin/uL (*)    MICROSCOPIC ONLY, URINE - Abnormal    WBC, Urine >50 (*)     WBC Clumps, Urine RARE      RBC, Urine 3-5      Squamous Epithelial Cells, Urine 10-25 (FEW)      Bacteria, Urine 1+ (*)     Mucus, Urine FEW     URINE CULTURE   URINALYSIS WITH  REFLEX CULTURE AND MICROSCOPIC    Narrative:     The following orders were created for panel order Urinalysis with Reflex Culture and Microscopic.  Procedure                               Abnormality         Status                     ---------                               -----------         ------                     Urinalysis with Reflex C...[[id number]]  Abnormal            Final result               Extra Urine Gray Tube[[id number]]                            In process                   Please view results for these tests on the individual orders.   EXTRA URINE GRAY TUBE        No orders to display         Procedure  Procedures       [1]   Past Medical History:  Diagnosis Date    Personal history of diseases of the blood and blood-forming organs and certain disorders involving the immune mechanism 2020    History of bleeding disorder    Personal history of other endocrine, nutritional and metabolic disease 2020    History of diabetes mellitus    Unspecified abnormalities of breathing 2020    Breathing problem    Unspecified visual loss 2020    Vision problems   [2]   Past Surgical History:  Procedure Laterality Date    OTHER SURGICAL HISTORY  2020    Tonsillectomy    OTHER SURGICAL HISTORY  2020     section   [3] No family history on file.  [4]   Social History  Tobacco Use    Smoking status: Never     Passive exposure: Never    Smokeless tobacco: Never   Vaping Use    Vaping status: Never Used   Substance Use Topics    Alcohol use: Not on file    Drug use: Never        Giovanny Millan PA-C  07/15/25 0924

## 2025-07-16 LAB — HOLD SPECIMEN: NORMAL

## 2025-07-17 LAB — BACTERIA UR CULT: ABNORMAL

## 2025-07-18 ENCOUNTER — RESULTS FOLLOW-UP (OUTPATIENT)
Dept: PHARMACY | Facility: HOSPITAL | Age: 35
End: 2025-07-18
Payer: COMMERCIAL

## 2025-07-18 DIAGNOSIS — N39.0 UTI (URINARY TRACT INFECTION), UNCOMPLICATED: Primary | ICD-10-CM

## 2025-07-18 RX ORDER — CEFUROXIME AXETIL 250 MG/1
250 TABLET ORAL 2 TIMES DAILY
Qty: 14 TABLET | Refills: 0 | Status: SHIPPED | OUTPATIENT
Start: 2025-07-18 | End: 2025-07-18 | Stop reason: SDUPTHER

## 2025-07-18 RX ORDER — CEFUROXIME AXETIL 250 MG/1
250 TABLET ORAL 2 TIMES DAILY
Qty: 14 TABLET | Refills: 0 | Status: SHIPPED | OUTPATIENT
Start: 2025-07-18 | End: 2025-07-25

## 2025-07-18 NOTE — PROGRESS NOTES
EDPD Note: Bug-Drug Mismatch    Contacted Mr./Mrs./Ms. Mitul Downs regarding a positive urine culture/result that was taken during their recent emergency room visit. I completed education with patient. The patient is not being treated appropriately with Macrobid. Patient presented to ED for dysuria and frequency. Patient currently still having dysuria. Advised patient to finish full course of new antibiotic and follow up with PCP or urgent care if symptoms do not completely resolve.       The following prescription was sent to the patient's preferred pharmacy. No further follow up needed from EDPD Team.   Drug: Cefuroxime 250mg  Si tab BID   Days Supply: 7    Susceptibility data from last 90 days.  Collected Specimen Info Organism Ampicillin Cefazolin Cefazolin (uncomplicated UTIs only) Ciprofloxacin Gentamicin Levofloxacin Nitrofurantoin Piperacillin/Tazobactam Tetracycline Trimethoprim/Sulfamethoxazole   07/15/25 Urine from Clean Catch/Voided Proteus mirabilis  S  S  S  S  S  S  R  S  R  R       Mechelle Romero, PharmD

## 2025-08-07 ENCOUNTER — HOSPITAL ENCOUNTER (EMERGENCY)
Facility: HOSPITAL | Age: 35
Discharge: HOME | End: 2025-08-07
Payer: COMMERCIAL

## 2025-08-07 ENCOUNTER — APPOINTMENT (OUTPATIENT)
Dept: RADIOLOGY | Facility: HOSPITAL | Age: 35
End: 2025-08-07
Payer: COMMERCIAL

## 2025-08-07 VITALS
HEIGHT: 62 IN | WEIGHT: 218 LBS | OXYGEN SATURATION: 96 % | RESPIRATION RATE: 20 BRPM | DIASTOLIC BLOOD PRESSURE: 91 MMHG | SYSTOLIC BLOOD PRESSURE: 146 MMHG | TEMPERATURE: 96.8 F | BODY MASS INDEX: 40.12 KG/M2 | HEART RATE: 89 BPM

## 2025-08-07 DIAGNOSIS — N20.0 KIDNEY STONE: Primary | ICD-10-CM

## 2025-08-07 LAB
ALBUMIN SERPL BCP-MCNC: 4.2 G/DL (ref 3.4–5)
ALP SERPL-CCNC: 46 U/L (ref 33–110)
ALT SERPL W P-5'-P-CCNC: 34 U/L (ref 7–45)
AMORPH CRY #/AREA UR COMP ASSIST: ABNORMAL /HPF
ANION GAP SERPL CALC-SCNC: 12 MMOL/L (ref 10–20)
APPEARANCE UR: ABNORMAL
AST SERPL W P-5'-P-CCNC: 19 U/L (ref 9–39)
BASOPHILS # BLD AUTO: 0.11 X10*3/UL (ref 0–0.1)
BASOPHILS NFR BLD AUTO: 1.1 %
BILIRUB SERPL-MCNC: 0.3 MG/DL (ref 0–1.2)
BILIRUB UR STRIP.AUTO-MCNC: NEGATIVE MG/DL
BUN SERPL-MCNC: 6 MG/DL (ref 6–23)
CALCIUM SERPL-MCNC: 9.1 MG/DL (ref 8.6–10.3)
CHLORIDE SERPL-SCNC: 103 MMOL/L (ref 98–107)
CO2 SERPL-SCNC: 25 MMOL/L (ref 21–32)
COLOR UR: ABNORMAL
CREAT SERPL-MCNC: 0.68 MG/DL (ref 0.5–1.05)
EGFRCR SERPLBLD CKD-EPI 2021: >90 ML/MIN/1.73M*2
EOSINOPHIL # BLD AUTO: 0.3 X10*3/UL (ref 0–0.7)
EOSINOPHIL NFR BLD AUTO: 3 %
ERYTHROCYTE [DISTWIDTH] IN BLOOD BY AUTOMATED COUNT: 12.1 % (ref 11.5–14.5)
GLUCOSE SERPL-MCNC: 105 MG/DL (ref 74–99)
GLUCOSE UR STRIP.AUTO-MCNC: NORMAL MG/DL
HCG UR QL IA.RAPID: NEGATIVE
HCT VFR BLD AUTO: 39.5 % (ref 36–46)
HGB BLD-MCNC: 13.4 G/DL (ref 12–16)
IMM GRANULOCYTES # BLD AUTO: 0.03 X10*3/UL (ref 0–0.7)
IMM GRANULOCYTES NFR BLD AUTO: 0.3 % (ref 0–0.9)
KETONES UR STRIP.AUTO-MCNC: NEGATIVE MG/DL
LACTATE SERPL-SCNC: 2.1 MMOL/L (ref 0.4–2)
LACTATE SERPL-SCNC: 2.1 MMOL/L (ref 0.4–2)
LEUKOCYTE ESTERASE UR QL STRIP.AUTO: NEGATIVE
LIPASE SERPL-CCNC: 33 U/L (ref 9–82)
LYMPHOCYTES # BLD AUTO: 4.26 X10*3/UL (ref 1.2–4.8)
LYMPHOCYTES NFR BLD AUTO: 42.4 %
MCH RBC QN AUTO: 28.8 PG (ref 26–34)
MCHC RBC AUTO-ENTMCNC: 33.9 G/DL (ref 32–36)
MCV RBC AUTO: 85 FL (ref 80–100)
MONOCYTES # BLD AUTO: 0.61 X10*3/UL (ref 0.1–1)
MONOCYTES NFR BLD AUTO: 6.1 %
MUCOUS THREADS #/AREA URNS AUTO: ABNORMAL /LPF
NEUTROPHILS # BLD AUTO: 4.73 X10*3/UL (ref 1.2–7.7)
NEUTROPHILS NFR BLD AUTO: 47.1 %
NITRITE UR QL STRIP.AUTO: NEGATIVE
NRBC BLD-RTO: 0 /100 WBCS (ref 0–0)
PH UR STRIP.AUTO: 5.5 [PH]
PLATELET # BLD AUTO: 327 X10*3/UL (ref 150–450)
POTASSIUM SERPL-SCNC: 3.9 MMOL/L (ref 3.5–5.3)
PROT SERPL-MCNC: 6.5 G/DL (ref 6.4–8.2)
PROT UR STRIP.AUTO-MCNC: ABNORMAL MG/DL
RBC # BLD AUTO: 4.66 X10*6/UL (ref 4–5.2)
RBC # UR STRIP.AUTO: ABNORMAL MG/DL
RBC #/AREA URNS AUTO: >20 /HPF
SODIUM SERPL-SCNC: 136 MMOL/L (ref 136–145)
SP GR UR STRIP.AUTO: 1.02
SQUAMOUS #/AREA URNS AUTO: ABNORMAL /HPF
UROBILINOGEN UR STRIP.AUTO-MCNC: NORMAL MG/DL
WBC # BLD AUTO: 10 X10*3/UL (ref 4.4–11.3)
WBC #/AREA URNS AUTO: ABNORMAL /HPF

## 2025-08-07 PROCEDURE — 87661 TRICHOMONAS VAGINALIS AMPLIF: CPT | Mod: ELYLAB

## 2025-08-07 PROCEDURE — 85025 COMPLETE CBC W/AUTO DIFF WBC: CPT

## 2025-08-07 PROCEDURE — 83690 ASSAY OF LIPASE: CPT

## 2025-08-07 PROCEDURE — 81001 URINALYSIS AUTO W/SCOPE: CPT

## 2025-08-07 PROCEDURE — 83605 ASSAY OF LACTIC ACID: CPT

## 2025-08-07 PROCEDURE — 36415 COLL VENOUS BLD VENIPUNCTURE: CPT

## 2025-08-07 PROCEDURE — 87389 HIV-1 AG W/HIV-1&-2 AB AG IA: CPT | Mod: ELYLAB

## 2025-08-07 PROCEDURE — 86803 HEPATITIS C AB TEST: CPT | Mod: ELYLAB

## 2025-08-07 PROCEDURE — 81025 URINE PREGNANCY TEST: CPT

## 2025-08-07 PROCEDURE — 86780 TREPONEMA PALLIDUM: CPT | Mod: ELYLAB

## 2025-08-07 PROCEDURE — 87205 SMEAR GRAM STAIN: CPT | Mod: ELYLAB

## 2025-08-07 PROCEDURE — 99283 EMERGENCY DEPT VISIT LOW MDM: CPT

## 2025-08-07 PROCEDURE — 87491 CHLMYD TRACH DNA AMP PROBE: CPT | Mod: ELYLAB

## 2025-08-07 PROCEDURE — 80053 COMPREHEN METABOLIC PANEL: CPT

## 2025-08-07 ASSESSMENT — PAIN SCALES - GENERAL
PAINLEVEL_OUTOF10: 6
PAINLEVEL_OUTOF10: 0 - NO PAIN

## 2025-08-07 ASSESSMENT — PAIN DESCRIPTION - LOCATION: LOCATION: VAGINA

## 2025-08-07 ASSESSMENT — LIFESTYLE VARIABLES
HAVE PEOPLE ANNOYED YOU BY CRITICIZING YOUR DRINKING: NO
EVER FELT BAD OR GUILTY ABOUT YOUR DRINKING: NO
EVER HAD A DRINK FIRST THING IN THE MORNING TO STEADY YOUR NERVES TO GET RID OF A HANGOVER: NO
HAVE YOU EVER FELT YOU SHOULD CUT DOWN ON YOUR DRINKING: NO
TOTAL SCORE: 0

## 2025-08-07 ASSESSMENT — PAIN DESCRIPTION - PAIN TYPE: TYPE: ACUTE PAIN

## 2025-08-07 ASSESSMENT — PAIN DESCRIPTION - DESCRIPTORS: DESCRIPTORS: CRAMPING

## 2025-08-07 ASSESSMENT — PAIN - FUNCTIONAL ASSESSMENT
PAIN_FUNCTIONAL_ASSESSMENT: 0-10
PAIN_FUNCTIONAL_ASSESSMENT: 0-10

## 2025-08-07 ASSESSMENT — PAIN DESCRIPTION - FREQUENCY: FREQUENCY: CONSTANT/CONTINUOUS

## 2025-08-07 NOTE — ED PROVIDER NOTES
History of Present Illness     History provided by: Patient  Limitations to History: None  External Records Reviewed with Brief Summary: Outpatient progress note from 2024 which showed visit for leukocytosis    HPI:  Mitul Downs is a 35 y.o. female with a past medical history of asthma, hyperlipidemia who is presenting today with pelvic pain.  Patient reports that she has noticed blood in her urine.  Initially reported to the triage nurse that it felt like her cervix was dilated.  However to me she reports that she was having pain with urination.  Reports blood in it.  Does not believe that she is pregnant but had her period a few weeks ago.  Denying vaginal bleeding and vaginal discharge.    Physical Exam   Triage vitals:  T 35.7 °C (96.3 °F)  HR 82  BP (!) 182/92  RR (!) 22  O2 99 % None (Room air)    General: Awake, alert, in no acute distress  Eyes: Gaze conjugate.  No scleral icterus or injection  HENT: Normo-cephalic, atraumatic. No stridor  CV: Regular rate, regular rhythm. Radial pulses 2+ bilaterally  Resp: Breathing non-labored, speaking in full sentences.  Clear to auscultation bilaterally  GI: Soft, non-distended, mild suprapubic tender. No rebound or guarding.  : Performed with Lj MURPHY. Normal external female genitalia.  No rashes, lesions vesicles, drainage.  Cervical os was closed.  No cervical discharge.  Normal vaginal mucosa. On bimanual exam, there is no CMT, adnexal fullness, or adnexal tenderness.  MSK/Extremities: No gross bony deformities. Moving all extremities  Skin: Warm. Appropriate color  Neuro: Alert. Oriented. Face symmetric. Speech is fluent.  Gross strength and sensation intact in b/l UE and LEs  Psych: Appropriate mood and affect      Medical Decision Making & ED Course   Medical Decision Makin y.o. female with a past medical history of asthma, hyperlipidemia who is presenting with pelvic pain.  Vital signs notable for hypertension.  On exam, patient had a urine  sample that showed hematuria.  Concern for kidney stones at this time.  However will test for a UTI.  Will also obtain STD testing at the patient's request.  Patient initially had a CT scan ordered to assess for a kidney stone that is obstructing.  Lab work was independently reviewed and showed a normal creatinine.  No leukocytosis.  A UA with crystals.  Consistent with kidney stones.  On reevaluation, patient was reporting improvement in pain.  She reports that she feels like she passed a stone.  She was requesting to delay the CT scan.  Believe that this is reasonable given improvement in symptoms.  Patient was still requesting STD testing.  This was ordered and sent.  Patient will be called with the results.  ----      Differential diagnoses considered include but are not limited to: Please see MDM.     Social Determinants of Health which Significantly Impact Care: None identified     EKG Independent Interpretation: If an EKG interpretation is available. Please see ED Course and [facility] for full interpretation.    Independent Result Review and Interpretation: Results were independently reviewed and interpreted by myself. Please see ED course and [facility] for full interpretation.    Chronic conditions affecting the patient's care: As documented in the MDM    The patient was discussed with the following consultants/services: None    Care Considerations: As per [facility]    ED Course:  ED Course as of 08/08/25 0006   Thu Aug 07, 2025   1945 Patient had improvement in pain after passing a stone.  [ARJUN]      ED Course User Index  [ARJUN] Francoise Butler MD         Diagnoses as of 08/08/25 0006   Kidney stone     Disposition   As a result of the work-up, the patient was discharged home.  she was informed of her diagnosis and instructed to come back with any concerns or worsening of condition.  she and was agreeable to the plan as discussed above.  she was given the opportunity to ask questions.  All of the patient's questions were  answered.    Procedures   Procedures    Patient seen and discussed with ED attending physician.    Francoise Butler MD  Emergency Medicine     Francoise Butler MD  08/08/25 0011

## 2025-08-07 NOTE — DISCHARGE INSTRUCTIONS
You are seen today for hematuria that improved.  Given the improvement in symptoms feels comfortable to be discharged home.  Please return if having any fevers chills worsening pain.

## 2025-08-08 LAB
C TRACH RRNA SPEC QL NAA+PROBE: NEGATIVE
CLUE CELLS VAG LPF-#/AREA: NORMAL /[LPF]
HCV AB SER QL: NONREACTIVE
HIV 1+2 AB+HIV1 P24 AG SERPL QL IA: NONREACTIVE
HOLD SPECIMEN: NORMAL
N GONORRHOEA DNA SPEC QL PROBE+SIG AMP: NEGATIVE
NUGENT SCORE: 3 (ref ?–6)
T VAGINALIS RRNA SPEC QL NAA+PROBE: NEGATIVE
TREPONEMA PALLIDUM IGG+IGM AB [PRESENCE] IN SERUM OR PLASMA BY IMMUNOASSAY: NONREACTIVE
YEAST VAG WET PREP-#/AREA: NORMAL

## 2025-08-20 ENCOUNTER — OFFICE VISIT (OUTPATIENT)
Age: 35
End: 2025-08-20
Payer: COMMERCIAL

## 2025-08-20 VITALS
BODY MASS INDEX: 41.41 KG/M2 | HEIGHT: 62 IN | SYSTOLIC BLOOD PRESSURE: 119 MMHG | DIASTOLIC BLOOD PRESSURE: 75 MMHG | OXYGEN SATURATION: 96 % | WEIGHT: 225 LBS | HEART RATE: 98 BPM

## 2025-08-20 DIAGNOSIS — E88.819 INSULIN RESISTANCE: ICD-10-CM

## 2025-08-20 DIAGNOSIS — E66.01 MORBID OBESITY (HCC): ICD-10-CM

## 2025-08-20 DIAGNOSIS — E78.2 MIXED HYPERLIPIDEMIA: ICD-10-CM

## 2025-08-20 DIAGNOSIS — E28.2 PCOS (POLYCYSTIC OVARIAN SYNDROME): Primary | ICD-10-CM

## 2025-08-20 PROCEDURE — 3078F DIAST BP <80 MM HG: CPT | Performed by: INTERNAL MEDICINE

## 2025-08-20 PROCEDURE — G8417 CALC BMI ABV UP PARAM F/U: HCPCS | Performed by: INTERNAL MEDICINE

## 2025-08-20 PROCEDURE — 99212 OFFICE O/P EST SF 10 MIN: CPT | Performed by: INTERNAL MEDICINE

## 2025-08-20 PROCEDURE — 3074F SYST BP LT 130 MM HG: CPT | Performed by: INTERNAL MEDICINE

## 2025-08-20 PROCEDURE — G8427 DOCREV CUR MEDS BY ELIG CLIN: HCPCS | Performed by: INTERNAL MEDICINE

## 2025-08-20 PROCEDURE — 1036F TOBACCO NON-USER: CPT | Performed by: INTERNAL MEDICINE

## 2025-08-20 PROCEDURE — 99213 OFFICE O/P EST LOW 20 MIN: CPT | Performed by: INTERNAL MEDICINE
